# Patient Record
Sex: FEMALE | Race: WHITE | NOT HISPANIC OR LATINO | URBAN - METROPOLITAN AREA
[De-identification: names, ages, dates, MRNs, and addresses within clinical notes are randomized per-mention and may not be internally consistent; named-entity substitution may affect disease eponyms.]

---

## 2017-07-06 ENCOUNTER — OFFICE (AMBULATORY)
Dept: URBAN - METROPOLITAN AREA CLINIC 75 | Facility: CLINIC | Age: 77
End: 2017-07-06

## 2017-07-06 VITALS
SYSTOLIC BLOOD PRESSURE: 112 MMHG | DIASTOLIC BLOOD PRESSURE: 74 MMHG | HEIGHT: 68 IN | HEART RATE: 80 BPM | WEIGHT: 206 LBS

## 2017-07-06 DIAGNOSIS — Z86.010 PERSONAL HISTORY OF COLONIC POLYPS: ICD-10-CM

## 2017-07-06 DIAGNOSIS — K59.00 CONSTIPATION, UNSPECIFIED: ICD-10-CM

## 2017-07-06 DIAGNOSIS — R19.5 OTHER FECAL ABNORMALITIES: ICD-10-CM

## 2017-07-06 PROCEDURE — 99204 OFFICE O/P NEW MOD 45 MIN: CPT | Performed by: INTERNAL MEDICINE

## 2017-08-10 VITALS
TEMPERATURE: 97.1 F | DIASTOLIC BLOOD PRESSURE: 55 MMHG | DIASTOLIC BLOOD PRESSURE: 73 MMHG | HEART RATE: 85 BPM | SYSTOLIC BLOOD PRESSURE: 104 MMHG | SYSTOLIC BLOOD PRESSURE: 114 MMHG | DIASTOLIC BLOOD PRESSURE: 100 MMHG | DIASTOLIC BLOOD PRESSURE: 72 MMHG | SYSTOLIC BLOOD PRESSURE: 148 MMHG | RESPIRATION RATE: 15 BRPM | OXYGEN SATURATION: 98 % | RESPIRATION RATE: 16 BRPM | RESPIRATION RATE: 17 BRPM | SYSTOLIC BLOOD PRESSURE: 101 MMHG | WEIGHT: 206 LBS | DIASTOLIC BLOOD PRESSURE: 78 MMHG | HEART RATE: 88 BPM | HEART RATE: 83 BPM | DIASTOLIC BLOOD PRESSURE: 53 MMHG | HEART RATE: 80 BPM | SYSTOLIC BLOOD PRESSURE: 93 MMHG | SYSTOLIC BLOOD PRESSURE: 99 MMHG | HEIGHT: 68 IN | HEART RATE: 90 BPM | SYSTOLIC BLOOD PRESSURE: 122 MMHG | HEART RATE: 96 BPM | DIASTOLIC BLOOD PRESSURE: 54 MMHG | OXYGEN SATURATION: 100 % | SYSTOLIC BLOOD PRESSURE: 108 MMHG | TEMPERATURE: 97.3 F | DIASTOLIC BLOOD PRESSURE: 56 MMHG | OXYGEN SATURATION: 99 % | HEART RATE: 95 BPM | DIASTOLIC BLOOD PRESSURE: 46 MMHG | RESPIRATION RATE: 18 BRPM

## 2017-08-11 ENCOUNTER — AMBULATORY SURGICAL CENTER (AMBULATORY)
Dept: URBAN - METROPOLITAN AREA SURGERY 17 | Facility: SURGERY | Age: 77
End: 2017-08-11

## 2017-08-11 ENCOUNTER — OFFICE (AMBULATORY)
Dept: URBAN - METROPOLITAN AREA CLINIC 64 | Facility: CLINIC | Age: 77
End: 2017-08-11

## 2017-08-11 DIAGNOSIS — R19.5 OTHER FECAL ABNORMALITIES: ICD-10-CM

## 2017-08-11 DIAGNOSIS — Z86.010 PERSONAL HISTORY OF COLONIC POLYPS: ICD-10-CM

## 2017-08-11 DIAGNOSIS — K57.30 DIVERTICULOSIS OF LARGE INTESTINE WITHOUT PERFORATION OR ABS: ICD-10-CM

## 2017-08-11 DIAGNOSIS — K31.7 POLYP OF STOMACH AND DUODENUM: ICD-10-CM

## 2017-08-11 DIAGNOSIS — D12.5 BENIGN NEOPLASM OF SIGMOID COLON: ICD-10-CM

## 2017-08-11 DIAGNOSIS — K59.00 CONSTIPATION, UNSPECIFIED: ICD-10-CM

## 2017-08-11 DIAGNOSIS — K21.0 GASTRO-ESOPHAGEAL REFLUX DISEASE WITH ESOPHAGITIS: ICD-10-CM

## 2017-08-11 LAB
GI HISTOLOGY: A. UNSPECIFIED: (no result)
GI HISTOLOGY: B. UNSPECIFIED: (no result)
GI HISTOLOGY: C. SELECT: (no result)
GI HISTOLOGY: PDF REPORT: (no result)

## 2017-08-11 PROCEDURE — 88305 TISSUE EXAM BY PATHOLOGIST: CPT | Performed by: INTERNAL MEDICINE

## 2017-08-11 PROCEDURE — 45380 COLONOSCOPY AND BIOPSY: CPT | Performed by: INTERNAL MEDICINE

## 2017-08-11 PROCEDURE — 43239 EGD BIOPSY SINGLE/MULTIPLE: CPT | Performed by: INTERNAL MEDICINE

## 2017-08-11 RX ADMIN — PROPOFOL 50 MG: 10 INJECTION, EMULSION INTRAVENOUS at 12:43

## 2017-08-11 RX ADMIN — PROPOFOL 50 MG: 10 INJECTION, EMULSION INTRAVENOUS at 12:50

## 2017-08-11 RX ADMIN — PROPOFOL 100 MG: 10 INJECTION, EMULSION INTRAVENOUS at 12:43

## 2017-08-11 RX ADMIN — PROPOFOL 50 MG: 10 INJECTION, EMULSION INTRAVENOUS at 12:53

## 2017-08-11 RX ADMIN — PROPOFOL 50 MG: 10 INJECTION, EMULSION INTRAVENOUS at 12:58

## 2017-08-11 RX ADMIN — PROPOFOL 50 MG: 10 INJECTION, EMULSION INTRAVENOUS at 12:48

## 2019-04-22 ENCOUNTER — HOSPITAL ENCOUNTER (OUTPATIENT)
Dept: CARDIOLOGY | Facility: HOSPITAL | Age: 79
Discharge: HOME | End: 2019-04-22
Attending: INTERNAL MEDICINE | Admitting: INTERNAL MEDICINE

## 2020-03-18 ENCOUNTER — TRANSCRIBE ORDERS (OUTPATIENT)
Dept: ADMINISTRATIVE | Facility: HOSPITAL | Age: 80
End: 2020-03-18

## 2020-03-18 DIAGNOSIS — S02.30XB: Primary | ICD-10-CM

## 2020-03-18 DIAGNOSIS — S02.30XA CLOSED FRACTURE OF ORBITAL FLOOR, UNSPECIFIED LATERALITY, INITIAL ENCOUNTER (HCC): ICD-10-CM

## 2020-03-20 ENCOUNTER — HOSPITAL ENCOUNTER (OUTPATIENT)
Dept: CT IMAGING | Facility: HOSPITAL | Age: 80
Discharge: HOME | End: 2020-03-20
Admitting: OPHTHALMOLOGY

## 2020-03-20 DIAGNOSIS — S02.30XB: ICD-10-CM

## 2020-03-20 PROCEDURE — 70486 CT MAXILLOFACIAL W/O DYE: CPT

## 2020-05-01 ENCOUNTER — HOSPITAL ENCOUNTER (OUTPATIENT)
Facility: HOSPITAL | Age: 80
Setting detail: OBSERVATION
Discharge: HOME | End: 2020-05-02
Attending: EMERGENCY MEDICINE | Admitting: INTERNAL MEDICINE

## 2020-05-01 ENCOUNTER — APPOINTMENT (OUTPATIENT)
Dept: GENERAL RADIOLOGY | Facility: HOSPITAL | Age: 80
End: 2020-05-01

## 2020-05-01 DIAGNOSIS — I48.21 PERMANENT ATRIAL FIBRILLATION WITH RAPID VENTRICULAR RESPONSE (HCC): Primary | ICD-10-CM

## 2020-05-01 DIAGNOSIS — R04.0 ACUTE ANTERIOR EPISTAXIS: ICD-10-CM

## 2020-05-01 PROBLEM — Z79.01 CHRONIC ANTICOAGULATION: Status: ACTIVE | Noted: 2020-05-01

## 2020-05-01 LAB
ANION GAP SERPL CALCULATED.3IONS-SCNC: 14 MMOL/L (ref 5–15)
APTT PPP: 28.1 SECONDS (ref 24–31)
BASOPHILS # BLD AUTO: 0 10*3/MM3 (ref 0–0.2)
BASOPHILS NFR BLD AUTO: 0.7 % (ref 0–1.5)
BUN BLD-MCNC: 19 MG/DL (ref 8–23)
BUN/CREAT SERPL: 20.7 (ref 7–25)
CALCIUM SPEC-SCNC: 9.7 MG/DL (ref 8.6–10.5)
CHLORIDE SERPL-SCNC: 104 MMOL/L (ref 98–107)
CO2 SERPL-SCNC: 28 MMOL/L (ref 22–29)
CREAT BLD-MCNC: 0.92 MG/DL (ref 0.57–1)
DEPRECATED RDW RBC AUTO: 49.9 FL (ref 37–54)
EOSINOPHIL # BLD AUTO: 0.2 10*3/MM3 (ref 0–0.4)
EOSINOPHIL NFR BLD AUTO: 3.2 % (ref 0.3–6.2)
ERYTHROCYTE [DISTWIDTH] IN BLOOD BY AUTOMATED COUNT: 14.7 % (ref 12.3–15.4)
GFR SERPL CREATININE-BSD FRML MDRD: 59 ML/MIN/1.73
GLUCOSE BLD-MCNC: 106 MG/DL (ref 65–99)
HCT VFR BLD AUTO: 35.1 % (ref 34–46.6)
HGB BLD-MCNC: 11.9 G/DL (ref 12–15.9)
HOLD SPECIMEN: NORMAL
INR PPP: 1.22 (ref 0.9–1.1)
LYMPHOCYTES # BLD AUTO: 1.7 10*3/MM3 (ref 0.7–3.1)
LYMPHOCYTES NFR BLD AUTO: 27.6 % (ref 19.6–45.3)
MCH RBC QN AUTO: 32.9 PG (ref 26.6–33)
MCHC RBC AUTO-ENTMCNC: 34 G/DL (ref 31.5–35.7)
MCV RBC AUTO: 96.7 FL (ref 79–97)
MONOCYTES # BLD AUTO: 0.8 10*3/MM3 (ref 0.1–0.9)
MONOCYTES NFR BLD AUTO: 13.1 % (ref 5–12)
NEUTROPHILS # BLD AUTO: 3.4 10*3/MM3 (ref 1.7–7)
NEUTROPHILS NFR BLD AUTO: 55.4 % (ref 42.7–76)
NRBC BLD AUTO-RTO: 0.1 /100 WBC (ref 0–0.2)
PLATELET # BLD AUTO: 245 10*3/MM3 (ref 140–450)
PMV BLD AUTO: 8.7 FL (ref 6–12)
POTASSIUM BLD-SCNC: 3.9 MMOL/L (ref 3.5–5.2)
PROTHROMBIN TIME: 12.3 SECONDS (ref 9.6–11.7)
RBC # BLD AUTO: 3.63 10*6/MM3 (ref 3.77–5.28)
SODIUM BLD-SCNC: 146 MMOL/L (ref 136–145)
TROPONIN T SERPL-MCNC: <0.01 NG/ML (ref 0–0.03)
WBC NRBC COR # BLD: 6.1 10*3/MM3 (ref 3.4–10.8)

## 2020-05-01 PROCEDURE — 84484 ASSAY OF TROPONIN QUANT: CPT | Performed by: PHYSICIAN ASSISTANT

## 2020-05-01 PROCEDURE — 85610 PROTHROMBIN TIME: CPT | Performed by: EMERGENCY MEDICINE

## 2020-05-01 PROCEDURE — 25010000002 ONDANSETRON PER 1 MG: Performed by: EMERGENCY MEDICINE

## 2020-05-01 PROCEDURE — 25010000002 MORPHINE PER 10 MG: Performed by: EMERGENCY MEDICINE

## 2020-05-01 PROCEDURE — 96366 THER/PROPH/DIAG IV INF ADDON: CPT

## 2020-05-01 PROCEDURE — 96367 TX/PROPH/DG ADDL SEQ IV INF: CPT

## 2020-05-01 PROCEDURE — 80048 BASIC METABOLIC PNL TOTAL CA: CPT | Performed by: PHYSICIAN ASSISTANT

## 2020-05-01 PROCEDURE — 71045 X-RAY EXAM CHEST 1 VIEW: CPT

## 2020-05-01 PROCEDURE — 99219 PR INITIAL OBSERVATION CARE/DAY 50 MINUTES: CPT | Performed by: PHYSICIAN ASSISTANT

## 2020-05-01 PROCEDURE — G0378 HOSPITAL OBSERVATION PER HR: HCPCS

## 2020-05-01 PROCEDURE — 80048 BASIC METABOLIC PNL TOTAL CA: CPT | Performed by: EMERGENCY MEDICINE

## 2020-05-01 PROCEDURE — 93005 ELECTROCARDIOGRAM TRACING: CPT | Performed by: EMERGENCY MEDICINE

## 2020-05-01 PROCEDURE — 99284 EMERGENCY DEPT VISIT MOD MDM: CPT

## 2020-05-01 PROCEDURE — 96376 TX/PRO/DX INJ SAME DRUG ADON: CPT

## 2020-05-01 PROCEDURE — 96365 THER/PROPH/DIAG IV INF INIT: CPT

## 2020-05-01 PROCEDURE — 96375 TX/PRO/DX INJ NEW DRUG ADDON: CPT

## 2020-05-01 PROCEDURE — 85730 THROMBOPLASTIN TIME PARTIAL: CPT | Performed by: EMERGENCY MEDICINE

## 2020-05-01 PROCEDURE — 85025 COMPLETE CBC W/AUTO DIFF WBC: CPT | Performed by: EMERGENCY MEDICINE

## 2020-05-01 RX ORDER — LEVOTHYROXINE SODIUM 88 UG/1
88 TABLET ORAL
Status: DISCONTINUED | OUTPATIENT
Start: 2020-05-02 | End: 2020-05-02 | Stop reason: HOSPADM

## 2020-05-01 RX ORDER — ACETAMINOPHEN 650 MG/1
650 SUPPOSITORY RECTAL EVERY 4 HOURS PRN
Status: DISCONTINUED | OUTPATIENT
Start: 2020-05-01 | End: 2020-05-02 | Stop reason: HOSPADM

## 2020-05-01 RX ORDER — CETIRIZINE HYDROCHLORIDE 10 MG/1
10 TABLET ORAL DAILY
Status: DISCONTINUED | OUTPATIENT
Start: 2020-05-02 | End: 2020-05-02 | Stop reason: HOSPADM

## 2020-05-01 RX ORDER — PANTOPRAZOLE SODIUM 40 MG/1
40 TABLET, DELAYED RELEASE ORAL EVERY MORNING
Status: DISCONTINUED | OUTPATIENT
Start: 2020-05-02 | End: 2020-05-02 | Stop reason: HOSPADM

## 2020-05-01 RX ORDER — ALUMINA, MAGNESIA, AND SIMETHICONE 2400; 2400; 240 MG/30ML; MG/30ML; MG/30ML
15 SUSPENSION ORAL EVERY 6 HOURS PRN
Status: DISCONTINUED | OUTPATIENT
Start: 2020-05-01 | End: 2020-05-02 | Stop reason: HOSPADM

## 2020-05-01 RX ORDER — ONDANSETRON 4 MG/1
4 TABLET, FILM COATED ORAL EVERY 6 HOURS PRN
Status: DISCONTINUED | OUTPATIENT
Start: 2020-05-01 | End: 2020-05-02 | Stop reason: HOSPADM

## 2020-05-01 RX ORDER — ONDANSETRON 2 MG/ML
4 INJECTION INTRAMUSCULAR; INTRAVENOUS ONCE
Status: COMPLETED | OUTPATIENT
Start: 2020-05-01 | End: 2020-05-01

## 2020-05-01 RX ORDER — MULTIVITAMIN,THERAPEUTIC
1 TABLET ORAL DAILY
Status: DISCONTINUED | OUTPATIENT
Start: 2020-05-02 | End: 2020-05-02 | Stop reason: HOSPADM

## 2020-05-01 RX ORDER — COCAINE HYDROCHLORIDE 40 MG/ML
SOLUTION NASAL
Status: DISPENSED
Start: 2020-05-01 | End: 2020-05-02

## 2020-05-01 RX ORDER — ONDANSETRON 2 MG/ML
4 INJECTION INTRAMUSCULAR; INTRAVENOUS EVERY 6 HOURS PRN
Status: DISCONTINUED | OUTPATIENT
Start: 2020-05-01 | End: 2020-05-02 | Stop reason: HOSPADM

## 2020-05-01 RX ORDER — FUROSEMIDE 40 MG/1
40 TABLET ORAL 2 TIMES DAILY
COMMUNITY
End: 2022-01-01

## 2020-05-01 RX ORDER — CALCIUM CARBONATE 200(500)MG
2 TABLET,CHEWABLE ORAL 2 TIMES DAILY PRN
Status: DISCONTINUED | OUTPATIENT
Start: 2020-05-01 | End: 2020-05-02 | Stop reason: HOSPADM

## 2020-05-01 RX ORDER — ASPIRIN 81 MG/1
81 TABLET, CHEWABLE ORAL DAILY
Status: ON HOLD | COMMUNITY
End: 2020-05-02 | Stop reason: SDUPTHER

## 2020-05-01 RX ORDER — SODIUM CHLORIDE 0.9 % (FLUSH) 0.9 %
10 SYRINGE (ML) INJECTION AS NEEDED
Status: DISCONTINUED | OUTPATIENT
Start: 2020-05-01 | End: 2020-05-02 | Stop reason: HOSPADM

## 2020-05-01 RX ORDER — OMEPRAZOLE 20 MG/1
20 CAPSULE, DELAYED RELEASE ORAL DAILY
COMMUNITY
End: 2022-01-01

## 2020-05-01 RX ORDER — MORPHINE SULFATE 4 MG/ML
4 INJECTION, SOLUTION INTRAMUSCULAR; INTRAVENOUS ONCE
Status: COMPLETED | OUTPATIENT
Start: 2020-05-01 | End: 2020-05-01

## 2020-05-01 RX ORDER — DILTIAZEM HCL IN NACL,ISO-OSM 125 MG/125
5-15 PLASTIC BAG, INJECTION (ML) INTRAVENOUS
Status: DISCONTINUED | OUTPATIENT
Start: 2020-05-01 | End: 2020-05-02 | Stop reason: HOSPADM

## 2020-05-01 RX ORDER — POTASSIUM CHLORIDE 20 MEQ/1
40 TABLET, EXTENDED RELEASE ORAL AS NEEDED
Status: DISCONTINUED | OUTPATIENT
Start: 2020-05-01 | End: 2020-05-02 | Stop reason: HOSPADM

## 2020-05-01 RX ORDER — POTASSIUM CHLORIDE 1.5 G/1.77G
40 POWDER, FOR SOLUTION ORAL AS NEEDED
Status: DISCONTINUED | OUTPATIENT
Start: 2020-05-01 | End: 2020-05-02 | Stop reason: HOSPADM

## 2020-05-01 RX ORDER — DOCUSATE SODIUM 100 MG/1
100 CAPSULE, LIQUID FILLED ORAL 2 TIMES DAILY PRN
Status: DISCONTINUED | OUTPATIENT
Start: 2020-05-01 | End: 2020-05-02 | Stop reason: HOSPADM

## 2020-05-01 RX ORDER — CHOLECALCIFEROL (VITAMIN D3) 125 MCG
5 CAPSULE ORAL NIGHTLY PRN
Status: DISCONTINUED | OUTPATIENT
Start: 2020-05-01 | End: 2020-05-02 | Stop reason: HOSPADM

## 2020-05-01 RX ORDER — LEVOTHYROXINE SODIUM 88 UG/1
88 TABLET ORAL
COMMUNITY
End: 2022-01-01 | Stop reason: SDUPTHER

## 2020-05-01 RX ORDER — ACETAMINOPHEN 160 MG/5ML
650 SOLUTION ORAL EVERY 4 HOURS PRN
Status: DISCONTINUED | OUTPATIENT
Start: 2020-05-01 | End: 2020-05-02 | Stop reason: HOSPADM

## 2020-05-01 RX ORDER — ESTRADIOL 0.1 MG/G
2 CREAM VAGINAL 2 TIMES WEEKLY
COMMUNITY
End: 2022-01-01

## 2020-05-01 RX ORDER — BISACODYL 10 MG
10 SUPPOSITORY, RECTAL RECTAL DAILY PRN
Status: DISCONTINUED | OUTPATIENT
Start: 2020-05-01 | End: 2020-05-02 | Stop reason: HOSPADM

## 2020-05-01 RX ORDER — POTASSIUM CHLORIDE 20 MEQ/1
20 TABLET, EXTENDED RELEASE ORAL 2 TIMES DAILY
COMMUNITY
End: 2022-01-01 | Stop reason: SDUPTHER

## 2020-05-01 RX ORDER — ACETAMINOPHEN 325 MG/1
650 TABLET ORAL EVERY 4 HOURS PRN
Status: DISCONTINUED | OUTPATIENT
Start: 2020-05-01 | End: 2020-05-02 | Stop reason: HOSPADM

## 2020-05-01 RX ORDER — OXYBUTYNIN CHLORIDE 10 MG/1
10 TABLET, EXTENDED RELEASE ORAL DAILY
COMMUNITY
End: 2022-01-01

## 2020-05-01 RX ORDER — MAGNESIUM SULFATE HEPTAHYDRATE 40 MG/ML
2 INJECTION, SOLUTION INTRAVENOUS AS NEEDED
Status: DISCONTINUED | OUTPATIENT
Start: 2020-05-01 | End: 2020-05-02 | Stop reason: HOSPADM

## 2020-05-01 RX ORDER — MULTIPLE VITAMINS W/ MINERALS TAB 9MG-400MCG
1 TAB ORAL DAILY
COMMUNITY
End: 2022-01-01

## 2020-05-01 RX ORDER — OXYBUTYNIN CHLORIDE 10 MG/1
10 TABLET, EXTENDED RELEASE ORAL DAILY
Status: DISCONTINUED | OUTPATIENT
Start: 2020-05-02 | End: 2020-05-02 | Stop reason: HOSPADM

## 2020-05-01 RX ORDER — FUROSEMIDE 40 MG/1
40 TABLET ORAL
Status: DISCONTINUED | OUTPATIENT
Start: 2020-05-01 | End: 2020-05-02 | Stop reason: HOSPADM

## 2020-05-01 RX ORDER — DILTIAZEM HYDROCHLORIDE 5 MG/ML
5 INJECTION INTRAVENOUS ONCE
Status: COMPLETED | OUTPATIENT
Start: 2020-05-01 | End: 2020-05-01

## 2020-05-01 RX ORDER — LORATADINE 10 MG/1
10 TABLET ORAL EVERY EVENING
COMMUNITY

## 2020-05-01 RX ORDER — SODIUM CHLORIDE 0.9 % (FLUSH) 0.9 %
10 SYRINGE (ML) INJECTION EVERY 12 HOURS SCHEDULED
Status: DISCONTINUED | OUTPATIENT
Start: 2020-05-01 | End: 2020-05-02 | Stop reason: HOSPADM

## 2020-05-01 RX ORDER — MAGNESIUM SULFATE HEPTAHYDRATE 40 MG/ML
4 INJECTION, SOLUTION INTRAVENOUS AS NEEDED
Status: DISCONTINUED | OUTPATIENT
Start: 2020-05-01 | End: 2020-05-02 | Stop reason: HOSPADM

## 2020-05-01 RX ADMIN — DILTIAZEM HYDROCHLORIDE 5 MG: 5 INJECTION INTRAVENOUS at 20:09

## 2020-05-01 RX ADMIN — ONDANSETRON 4 MG: 2 INJECTION INTRAMUSCULAR; INTRAVENOUS at 19:24

## 2020-05-01 RX ADMIN — METOPROLOL TARTRATE 25 MG: 25 TABLET, FILM COATED ORAL at 23:00

## 2020-05-01 RX ADMIN — DILTIAZEM HYDROCHLORIDE 5 MG/HR: 5 INJECTION INTRAVENOUS at 20:55

## 2020-05-01 RX ADMIN — MORPHINE SULFATE 4 MG: 4 INJECTION INTRAVENOUS at 19:24

## 2020-05-01 RX ADMIN — Medication 10 ML: at 23:00

## 2020-05-01 RX ADMIN — SODIUM CHLORIDE 839 MG: 9 INJECTION, SOLUTION INTRAVENOUS at 20:17

## 2020-05-01 RX ADMIN — ACETAMINOPHEN 650 MG: 325 TABLET ORAL at 23:01

## 2020-05-01 NOTE — ED PROVIDER NOTES
Subjective   History of Present Illness  80-year-old female with an onset today of bleeding from the right side of her nose which is been recurrent.  Bleeding became heavier this evening and was running down the back of her throat so she came into the emergency department.  The patient has a history of orbital floor surgery with fracture of the nose after trauma about 1 month ago.  The patient also has a history of atrial fibrillation and is on Xarelto.  Last dose was less than 24 hours ago.  Review of Systems    No past medical history on file.    Allergies   Allergen Reactions   • Haloperidol Unknown - Low Severity       No past surgical history on file.    No family history on file.    Social History     Socioeconomic History   • Marital status:      Spouse name: Not on file   • Number of children: Not on file   • Years of education: Not on file   • Highest education level: Not on file           Objective   Physical Exam  Patient is awake and alert she was afebrile her heart rate was 113 and her sats are 100%.  HEENT exam pupils equal round reactive to light EOMs are full she has bleeding from the right side of the nose and I am unable to identify a definite site.  The patient has some blood noted in the posterior pharynx.  Her neck is supple and nontender.  The chest was clear and atraumatic.  Cardiovascular exam reveals a tachycardia.  Abdomen was soft she has no cyanosis clubbing or edema she has no generalized ecchymosis or petechiae.  Procedures           ED Course            Results for orders placed or performed during the hospital encounter of 05/01/20   Basic Metabolic Panel   Result Value Ref Range    Glucose 106 (H) 65 - 99 mg/dL    BUN 19 8 - 23 mg/dL    Creatinine 0.92 0.57 - 1.00 mg/dL    Sodium 146 (H) 136 - 145 mmol/L    Potassium 3.9 3.5 - 5.2 mmol/L    Chloride 104 98 - 107 mmol/L    CO2 28.0 22.0 - 29.0 mmol/L    Calcium 9.7 8.6 - 10.5 mg/dL    eGFR Non  Amer 59 (L) >60  mL/min/1.73    BUN/Creatinine Ratio 20.7 7.0 - 25.0    Anion Gap 14.0 5.0 - 15.0 mmol/L   Protime-INR   Result Value Ref Range    Protime 12.3 (H) 9.6 - 11.7 Seconds    INR 1.22 (H) 0.90 - 1.10   aPTT   Result Value Ref Range    PTT 28.1 24.0 - 31.0 seconds   CBC Auto Differential   Result Value Ref Range    WBC 6.10 3.40 - 10.80 10*3/mm3    RBC 3.63 (L) 3.77 - 5.28 10*6/mm3    Hemoglobin 11.9 (L) 12.0 - 15.9 g/dL    Hematocrit 35.1 34.0 - 46.6 %    MCV 96.7 79.0 - 97.0 fL    MCH 32.9 26.6 - 33.0 pg    MCHC 34.0 31.5 - 35.7 g/dL    RDW 14.7 12.3 - 15.4 %    RDW-SD 49.9 37.0 - 54.0 fl    MPV 8.7 6.0 - 12.0 fL    Platelets 245 140 - 450 10*3/mm3    Neutrophil % 55.4 42.7 - 76.0 %    Lymphocyte % 27.6 19.6 - 45.3 %    Monocyte % 13.1 (H) 5.0 - 12.0 %    Eosinophil % 3.2 0.3 - 6.2 %    Basophil % 0.7 0.0 - 1.5 %    Neutrophils, Absolute 3.40 1.70 - 7.00 10*3/mm3    Lymphocytes, Absolute 1.70 0.70 - 3.10 10*3/mm3    Monocytes, Absolute 0.80 0.10 - 0.90 10*3/mm3    Eosinophils, Absolute 0.20 0.00 - 0.40 10*3/mm3    Basophils, Absolute 0.00 0.00 - 0.20 10*3/mm3    nRBC 0.1 0.0 - 0.2 /100 WBC     Medications   Cocaine HCl 40 MG/ML nasal solution  - ADS Override Pull (has no administration in time range)   sodium chloride 0.9 % flush 10 mL (has no administration in time range)   Tranexamic Acid 839 mg in sodium chloride 0.9 % 100 mL (839 mg Intravenous New Bag 5/1/20 2017)   dilTIAZem (CARDIZEM) 125 mg in 125 mL NS (1 mg/mL) infusion (has no administration in time range)   Morphine sulfate (PF) injection 4 mg (4 mg Intravenous Given 5/1/20 1924)   ondansetron (ZOFRAN) injection 4 mg (4 mg Intravenous Given 5/1/20 1924)   dilTIAZem (CARDIZEM) injection 5 mg (5 mg Intravenous Given 5/1/20 2009)     No radiology results for the last day                                    MDM  The patient had evidence of bleeding from the right side of the nose.  The patient initially had a cottonball soaked with cocaine placed into the right  nares.  The patient had persistent heavy bleeding after removal of this and I could not identify a specific site on the septum of bleeding.  The patient had a rapid Rhino placed into the right nares with improvement of the bleeding.  The patient is on Xarelto due to her atrial fibrillation and her heart rate was actually 117.  The patient was treated with tranexamic acid to improve her coagulation.  This was successful with control of the bleeding.  The patient continued to have a tachycardia at 110 with her atrial fibrillation in which she was given Cardizem intravenously.  Heart rate was controlled.  The patient will be admitted this evening for observation of her epistaxis as well as continued treatment of her atrial fibrillation.  Her lab work was unremarkable.  Final diagnoses:   Permanent atrial fibrillation with rapid ventricular response   Acute anterior epistaxis            Etienne Holden MD  05/01/20 2027

## 2020-05-02 ENCOUNTER — APPOINTMENT (OUTPATIENT)
Dept: CARDIOLOGY | Facility: HOSPITAL | Age: 80
End: 2020-05-02

## 2020-05-02 VITALS
DIASTOLIC BLOOD PRESSURE: 71 MMHG | HEART RATE: 78 BPM | TEMPERATURE: 97.7 F | HEIGHT: 68 IN | OXYGEN SATURATION: 98 % | BODY MASS INDEX: 28.79 KG/M2 | SYSTOLIC BLOOD PRESSURE: 106 MMHG | WEIGHT: 190 LBS | RESPIRATION RATE: 16 BRPM

## 2020-05-02 PROBLEM — S02.85XA ORBIT FRACTURE, RIGHT (HCC): Status: ACTIVE | Noted: 2020-05-02

## 2020-05-02 LAB
ANION GAP SERPL CALCULATED.3IONS-SCNC: 15 MMOL/L (ref 5–15)
BASOPHILS # BLD AUTO: 0.1 10*3/MM3 (ref 0–0.2)
BASOPHILS NFR BLD AUTO: 1.3 % (ref 0–1.5)
BH CV ECHO MEAS - % IVS THICK: 83.9 %
BH CV ECHO MEAS - % LVPW THICK: 43.2 %
BH CV ECHO MEAS - ACS: 1.8 CM
BH CV ECHO MEAS - AO MAX PG (FULL): 2.2 MMHG
BH CV ECHO MEAS - AO MAX PG: 5.2 MMHG
BH CV ECHO MEAS - AO MEAN PG (FULL): 1.4 MMHG
BH CV ECHO MEAS - AO MEAN PG: 2.9 MMHG
BH CV ECHO MEAS - AO ROOT AREA (BSA CORRECTED): 1.7
BH CV ECHO MEAS - AO ROOT AREA: 9.2 CM^2
BH CV ECHO MEAS - AO ROOT DIAM: 3.4 CM
BH CV ECHO MEAS - AO V2 MAX: 114.3 CM/SEC
BH CV ECHO MEAS - AO V2 MEAN: 81.5 CM/SEC
BH CV ECHO MEAS - AO V2 VTI: 23 CM
BH CV ECHO MEAS - AORTIC HR: 212.3 BPM
BH CV ECHO MEAS - AORTIC R-R: 0.28 SEC
BH CV ECHO MEAS - AVA(I,A): 2.5 CM^2
BH CV ECHO MEAS - AVA(I,D): 2.5 CM^2
BH CV ECHO MEAS - AVA(V,A): 2.5 CM^2
BH CV ECHO MEAS - AVA(V,D): 2.5 CM^2
BH CV ECHO MEAS - BSA(HAYCOCK): 2.1 M^2
BH CV ECHO MEAS - BSA: 2 M^2
BH CV ECHO MEAS - BZI_BMI: 28.9 KILOGRAMS/M^2
BH CV ECHO MEAS - BZI_METRIC_HEIGHT: 172.7 CM
BH CV ECHO MEAS - BZI_METRIC_WEIGHT: 86.2 KG
BH CV ECHO MEAS - CI(AO): 22.4 L/MIN/M^2
BH CV ECHO MEAS - CI(LVOT): 6.1 L/MIN/M^2
BH CV ECHO MEAS - CO(AO): 44.9 L/MIN
BH CV ECHO MEAS - CO(LVOT): 12.2 L/MIN
BH CV ECHO MEAS - EDV(CUBED): 85 ML
BH CV ECHO MEAS - EDV(MOD-SP4): 49.7 ML
BH CV ECHO MEAS - EDV(TEICH): 87.6 ML
BH CV ECHO MEAS - EF(CUBED): 71.8 %
BH CV ECHO MEAS - EF(MOD-BP): 54 %
BH CV ECHO MEAS - EF(MOD-SP4): 54 %
BH CV ECHO MEAS - EF(TEICH): 63.7 %
BH CV ECHO MEAS - ESV(CUBED): 24 ML
BH CV ECHO MEAS - ESV(MOD-SP4): 22.9 ML
BH CV ECHO MEAS - ESV(TEICH): 31.8 ML
BH CV ECHO MEAS - FS: 34.4 %
BH CV ECHO MEAS - IVS/LVPW: 0.91
BH CV ECHO MEAS - IVSD: 0.8 CM
BH CV ECHO MEAS - IVSS: 1.5 CM
BH CV ECHO MEAS - LA DIMENSION(2D): 4.4 CM
BH CV ECHO MEAS - LV DIASTOLIC VOL/BSA (35-75): 24.9 ML/M^2
BH CV ECHO MEAS - LV MASS(C)D: 117.2 GRAMS
BH CV ECHO MEAS - LV MASS(C)DI: 58.6 GRAMS/M^2
BH CV ECHO MEAS - LV MASS(C)S: 128.8 GRAMS
BH CV ECHO MEAS - LV MASS(C)SI: 64.4 GRAMS/M^2
BH CV ECHO MEAS - LV MAX PG: 3 MMHG
BH CV ECHO MEAS - LV MEAN PG: 1.4 MMHG
BH CV ECHO MEAS - LV SYSTOLIC VOL/BSA (12-30): 11.4 ML/M^2
BH CV ECHO MEAS - LV V1 MAX: 86.6 CM/SEC
BH CV ECHO MEAS - LV V1 MEAN: 56.6 CM/SEC
BH CV ECHO MEAS - LV V1 VTI: 17.8 CM
BH CV ECHO MEAS - LVIDD: 4.4 CM
BH CV ECHO MEAS - LVIDS: 2.9 CM
BH CV ECHO MEAS - LVOT AREA: 3.2 CM^2
BH CV ECHO MEAS - LVOT DIAM: 2 CM
BH CV ECHO MEAS - LVPWD: 0.88 CM
BH CV ECHO MEAS - LVPWS: 1.3 CM
BH CV ECHO MEAS - MV DEC SLOPE: 617.1 CM/SEC^2
BH CV ECHO MEAS - MV DEC TIME: 0.17 SEC
BH CV ECHO MEAS - MV E MAX VEL: 107.7 CM/SEC
BH CV ECHO MEAS - MV MAX PG: 7.5 MMHG
BH CV ECHO MEAS - MV MEAN PG: 1.9 MMHG
BH CV ECHO MEAS - MV V2 MAX: 136.9 CM/SEC
BH CV ECHO MEAS - MV V2 MEAN: 60 CM/SEC
BH CV ECHO MEAS - MV V2 VTI: 23.6 CM
BH CV ECHO MEAS - MVA(VTI): 2.4 CM^2
BH CV ECHO MEAS - PA ACC TIME: 0.07 SEC
BH CV ECHO MEAS - PA MAX PG (FULL): 0.22 MMHG
BH CV ECHO MEAS - PA MAX PG: 1.4 MMHG
BH CV ECHO MEAS - PA MEAN PG (FULL): 0.55 MMHG
BH CV ECHO MEAS - PA MEAN PG: 1.7 MMHG
BH CV ECHO MEAS - PA PR(ACCEL): 48.2 MMHG
BH CV ECHO MEAS - PA V2 MAX: 42.3 CM/SEC
BH CV ECHO MEAS - PA V2 MEAN: 63.5 CM/SEC
BH CV ECHO MEAS - PA V2 VTI: 16.1 CM
BH CV ECHO MEAS - RAP SYSTOLE: 8 MMHG
BH CV ECHO MEAS - RV MAX PG: 1.2 MMHG
BH CV ECHO MEAS - RV MEAN PG: 1.1 MMHG
BH CV ECHO MEAS - RV V1 MAX: 39 CM/SEC
BH CV ECHO MEAS - RV V1 MEAN: 49.7 CM/SEC
BH CV ECHO MEAS - RV V1 VTI: 12.1 CM
BH CV ECHO MEAS - RVDD: 3.3 CM
BH CV ECHO MEAS - RVSP: 49.2 MMHG
BH CV ECHO MEAS - SI(AO): 105.7 ML/M^2
BH CV ECHO MEAS - SI(CUBED): 30.5 ML/M^2
BH CV ECHO MEAS - SI(LVOT): 28.8 ML/M^2
BH CV ECHO MEAS - SI(MOD-SP4): 13.4 ML/M^2
BH CV ECHO MEAS - SI(TEICH): 27.9 ML/M^2
BH CV ECHO MEAS - SV(AO): 211.3 ML
BH CV ECHO MEAS - SV(CUBED): 61 ML
BH CV ECHO MEAS - SV(LVOT): 57.6 ML
BH CV ECHO MEAS - SV(MOD-SP4): 26.9 ML
BH CV ECHO MEAS - SV(TEICH): 55.8 ML
BH CV ECHO MEAS - TR MAX VEL: 320.7 CM/SEC
BUN BLD-MCNC: 19 MG/DL (ref 8–23)
BUN/CREAT SERPL: 21.1 (ref 7–25)
CALCIUM SPEC-SCNC: 9.3 MG/DL (ref 8.6–10.5)
CHLORIDE SERPL-SCNC: 104 MMOL/L (ref 98–107)
CO2 SERPL-SCNC: 26 MMOL/L (ref 22–29)
CREAT BLD-MCNC: 0.9 MG/DL (ref 0.57–1)
DEPRECATED RDW RBC AUTO: 49.9 FL (ref 37–54)
EOSINOPHIL # BLD AUTO: 0.2 10*3/MM3 (ref 0–0.4)
EOSINOPHIL NFR BLD AUTO: 3.8 % (ref 0.3–6.2)
ERYTHROCYTE [DISTWIDTH] IN BLOOD BY AUTOMATED COUNT: 14.5 % (ref 12.3–15.4)
GFR SERPL CREATININE-BSD FRML MDRD: 60 ML/MIN/1.73
GLUCOSE BLD-MCNC: 107 MG/DL (ref 65–99)
HCT VFR BLD AUTO: 33.8 % (ref 34–46.6)
HGB BLD-MCNC: 11.3 G/DL (ref 12–15.9)
LV EF 2D ECHO EST: 50 %
LYMPHOCYTES # BLD AUTO: 1.8 10*3/MM3 (ref 0.7–3.1)
LYMPHOCYTES NFR BLD AUTO: 32.5 % (ref 19.6–45.3)
MAXIMAL PREDICTED HEART RATE: 141 BPM
MCH RBC QN AUTO: 32.3 PG (ref 26.6–33)
MCHC RBC AUTO-ENTMCNC: 33.3 G/DL (ref 31.5–35.7)
MCV RBC AUTO: 96.9 FL (ref 79–97)
MONOCYTES # BLD AUTO: 0.6 10*3/MM3 (ref 0.1–0.9)
MONOCYTES NFR BLD AUTO: 11.4 % (ref 5–12)
NEUTROPHILS # BLD AUTO: 2.8 10*3/MM3 (ref 1.7–7)
NEUTROPHILS NFR BLD AUTO: 51 % (ref 42.7–76)
NRBC BLD AUTO-RTO: 0.1 /100 WBC (ref 0–0.2)
PLATELET # BLD AUTO: 219 10*3/MM3 (ref 140–450)
PMV BLD AUTO: 8.8 FL (ref 6–12)
POTASSIUM BLD-SCNC: 4.2 MMOL/L (ref 3.5–5.2)
RBC # BLD AUTO: 3.49 10*6/MM3 (ref 3.77–5.28)
SODIUM BLD-SCNC: 145 MMOL/L (ref 136–145)
STRESS TARGET HR: 120 BPM
TROPONIN T SERPL-MCNC: <0.01 NG/ML (ref 0–0.03)
TROPONIN T SERPL-MCNC: <0.01 NG/ML (ref 0–0.03)
TSH SERPL DL<=0.05 MIU/L-ACNC: 2.1 UIU/ML (ref 0.27–4.2)
WBC NRBC COR # BLD: 5.5 10*3/MM3 (ref 3.4–10.8)

## 2020-05-02 PROCEDURE — 93306 TTE W/DOPPLER COMPLETE: CPT | Performed by: INTERNAL MEDICINE

## 2020-05-02 PROCEDURE — G0378 HOSPITAL OBSERVATION PER HR: HCPCS

## 2020-05-02 PROCEDURE — 93306 TTE W/DOPPLER COMPLETE: CPT

## 2020-05-02 PROCEDURE — 84484 ASSAY OF TROPONIN QUANT: CPT | Performed by: PHYSICIAN ASSISTANT

## 2020-05-02 PROCEDURE — 84443 ASSAY THYROID STIM HORMONE: CPT | Performed by: PHYSICIAN ASSISTANT

## 2020-05-02 PROCEDURE — 99217 PR OBSERVATION CARE DISCHARGE MANAGEMENT: CPT | Performed by: INTERNAL MEDICINE

## 2020-05-02 PROCEDURE — 85025 COMPLETE CBC W/AUTO DIFF WBC: CPT | Performed by: PHYSICIAN ASSISTANT

## 2020-05-02 PROCEDURE — 99204 OFFICE O/P NEW MOD 45 MIN: CPT | Performed by: INTERNAL MEDICINE

## 2020-05-02 RX ORDER — CEPHALEXIN 500 MG/1
500 CAPSULE ORAL 3 TIMES DAILY
Qty: 9 CAPSULE | Refills: 0 | Status: SHIPPED | OUTPATIENT
Start: 2020-05-02 | End: 2020-05-05

## 2020-05-02 RX ORDER — ASPIRIN 81 MG/1
81 TABLET, CHEWABLE ORAL DAILY
Start: 2020-05-07 | End: 2022-01-01 | Stop reason: HOSPADM

## 2020-05-02 RX ADMIN — Medication 10 ML: at 08:32

## 2020-05-02 RX ADMIN — ACETAMINOPHEN 650 MG: 325 TABLET ORAL at 03:26

## 2020-05-02 RX ADMIN — FUROSEMIDE 40 MG: 40 TABLET ORAL at 08:32

## 2020-05-02 RX ADMIN — ACETAMINOPHEN 650 MG: 325 TABLET ORAL at 08:32

## 2020-05-02 RX ADMIN — CETIRIZINE HYDROCHLORIDE 10 MG: 10 TABLET, FILM COATED ORAL at 08:32

## 2020-05-02 RX ADMIN — METOPROLOL TARTRATE 25 MG: 25 TABLET, FILM COATED ORAL at 08:32

## 2020-05-02 RX ADMIN — OXYBUTYNIN 10 MG: 10 TABLET, FILM COATED, EXTENDED RELEASE ORAL at 08:32

## 2020-05-02 RX ADMIN — PANTOPRAZOLE SODIUM 40 MG: 40 TABLET, DELAYED RELEASE ORAL at 06:36

## 2020-05-02 RX ADMIN — ACETAMINOPHEN 650 MG: 325 TABLET ORAL at 12:40

## 2020-05-02 RX ADMIN — LEVOTHYROXINE SODIUM 88 MCG: 88 TABLET ORAL at 06:36

## 2020-05-02 RX ADMIN — THERA TABS 1 TABLET: TAB at 08:32

## 2020-05-02 NOTE — DISCHARGE INSTR - APPOINTMENTS
Call [facility] to schedule an appointment for Monday May 4th or Tuesday May 5th to have the Rhinorocket removed from the right nare.   [address] [place] [address] [phone]

## 2020-05-02 NOTE — PLAN OF CARE
Problem: Patient Care Overview  Goal: Plan of Care Review  Outcome: Ongoing (interventions implemented as appropriate)  Note:   Pt still in afib, off cardizem drip. HR in 70's. Has rhinorocket in right nare. Originally had an appointment to see her ENT physician this Wednesday. No distress. Will continue to monitor  Goal: Individualization and Mutuality  Outcome: Ongoing (interventions implemented as appropriate)  Goal: Discharge Needs Assessment  Outcome: Ongoing (interventions implemented as appropriate)  Goal: Interprofessional Rounds/Family Conf  Outcome: Ongoing (interventions implemented as appropriate)

## 2020-05-02 NOTE — CONSULTS
Cardiology Consult Note      REQUESTING PHYSICIAN    Terrance Tyler MD    PATIENT IDENTIFICATION    Name:@ Age: 80 y.o. Sex:@ : @ MRN:@    REASON FOR CONSULTATION:  80-year-old female with no known history of coronary occlusive disease.  She does have history of permanent atrial fibrillation on Xarelto, essential hypertension, moderate MR, pulmonary hypertension.    Echo 2019:  EF 51%, moderate to severe LAE, mild MR, moderate TR, PASP 26.04 mmHg      CC:  Epistaxis      HISTORY OF PRESENT ILLNESS:   The patient presented to [facility]  with complaint of intermittent right-sided epistaxis over the past couple of weeks.    Patient states she and her  went on vacation to [facility] at the beginning of March and she suffered a fall, falling on her face.    Patient states she was walking across the street when she suddenly lost consciousness and fell.  She denies any warning to the fall, did not try to catch herself, she sustained no injuries to her hands or forearms.  She sustained a fracture to her orbital floor with subsequent surgery on 3/31.  The patient states she has had 4 episodes of epistaxis that she describes as moderate since her surgery. She has typically been able to get them to stop at home, but the bleeding was persistent for approximately 1.5 hours and she decided to come to the ED. EKG in ER showed A. fib RVR at 117, Rhino plug placed for her epistaxis.  ENT has performed video conference with the patient this morning.  They are requesting patient hold Xarelto until seen in their office for removal of Rhino plug on Tuesday of next week.   Additional work-up includes troponins negative x2, INR 1.22, hemoglobin 11.3.  Chest x-ray with no active disease.    Upon my evaluation, patient is lying supine in bed and appears to be in no acute distress.  Patient recalls a story as outlined above.  Her only complaint is some blurred vision that has been persistent since her surgery.  She  "denies any headache, chest pain, pressure or tightness.  She denies any shortness of breath.  Patient stated she has never had palpitations with her atrial fibrillation.  She denies any shortness of breath.  She sees her cardiologist-Dr. Clyde Muller- routinely.  2D echocardiogram has been completed here.    We had a long discussion regarding her syncopal event and that she should see her primary cardiologist for further work-up and evaluation.  We discussed the risk benefits and options with regards to anticoagulation cessation.        REVIEW OF SYSTEMS:  Pertinent items are noted in HPI, all other systems reviewed and negative    OBJECTIVE       ASSESSMENT/PLAN    Right-sided nosebleed    Chronic anticoagulation    GERD (gastroesophageal reflux disease)    HTN (hypertension)    Hypothyroid    Moderate mitral regurgitation    Pulmonary hypertension (CMS/HCC)    Permanent atrial fibrillation with rapid ventricular response    Orbit fracture, right    Recommendations  Patient will be low risk for stroke while off anticoagulation for the next few days.  Cardiac status appropriate for discharge when okay with other services  Follow-up with primary cardiologist in 1 to 2 weeks to discuss syncopal events and further cardiac work-up.  Resume anticoagulation at discretion of ENT.  Echocardiogram reviewed and demonstrated no significant wall motion abnormality and normal EF.  Significant biatrial enlargement was noted.      Vital Signs  Visit Vitals  /74 (BP Location: Left arm, Patient Position: Lying)   Pulse 68   Temp 97.7 °F (36.5 °C) (Oral)   Resp 14   Ht 172.7 cm (68\")   Wt 86.2 kg (190 lb)   SpO2 98%   BMI 28.89 kg/m²     Oxygen Therapy  SpO2: 98 %  Pulse Oximetry Type: Intermittent  Device (Oxygen Therapy): room air  Device (Oxygen Therapy): room air  Flowsheet Rows      First Filed Value   Admission Height  172.7 cm (68\") Documented at 05/01/2020 1818   Admission Weight  83.9 kg (185 lb) Documented at " "05/01/2020 1818        Intake & Output (last 3 days)       04/29 0701 - 04/30 0700 04/30 0701 - 05/01 0700 05/01 0701 - 05/02 0700 05/02 0701 - 05/03 0700    P.O.    240    IV Piggyback   200     Total Intake(mL/kg)   200 (2.3) 240 (2.8)    Urine (mL/kg/hr)    900 (2.8)    Total Output    900    Net   +200 -660            Urine Unmeasured Occurrence   1 x         Lines, Drains & Airways    Active LDAs     Name:   Placement date:   Placement time:   Site:   Days:    Peripheral IV 05/01/20 1905 Right Antecubital   05/01/20 1905    Antecubital   less than 1                MEDICAL HISTORY    Past Medical History:   Diagnosis Date   • Atrial fibrillation (CMS/HCC) 10/10/2013   • Chronic anticoagulation 5/1/2020   • GERD (gastroesophageal reflux disease) 2/11/2013   • HTN (hypertension) 6/12/2014   • Hypothyroid 2/11/2013        SURGICAL HISTORY    History reviewed. No pertinent surgical history.  Recent maxillofacial surgery    FAMILY HISTORY    Family History   Problem Relation Age of Onset   • Heart murmur Father        SOCIAL HISTORY    Social History     Tobacco Use   • Smoking status: Never Smoker   Substance Use Topics   • Alcohol use: Not on file        ALLERGIES    Allergies   Allergen Reactions   • Haloperidol Unknown - Low Severity              /74 (BP Location: Left arm, Patient Position: Lying)   Pulse 68   Temp 97.7 °F (36.5 °C) (Oral)   Resp 14   Ht 172.7 cm (68\")   Wt 86.2 kg (190 lb)   SpO2 98%   BMI 28.89 kg/m²   Intake/Output last 3 shifts:  I/O last 3 completed shifts:  In: 200 [IV Piggyback:200]  Out: -   Intake/Output this shift:  I/O this shift:  In: 240 [P.O.:240]  Out: 900 [Urine:900]    PHYSICAL EXAM:    General: Alert, cooperative, no distress, appears stated age  Head:  Normocephalic, mucous membranes moist, renal plug noted right nare  Eyes:  Conjunctiva/corneas clear, EOM's intact     Neck:  Supple,  no bruit  Lungs: Clear to auscultation bilaterally, no wheezes rhonchi rales " are noted  Chest wall: No tenderness  Heart::  Irregularly irregular rate and rhythm, S1 and S2 normal, 1/6 holosystolic murmur.  Rub or gallop  Abdomen: Soft, non-tender, nondistended bowel sounds active  Extremities: No cyanosis, clubbing, or edema groin soft no hematoma.  Pulses: 2+ and symmetric all extremities  Skin:  No rashes or lesions  Neuro/psych: A&O x3. CN II through XII are grossly intact with appropriate affect      Scheduled Meds:        cetirizine 10 mg Oral Daily   furosemide 40 mg Oral BID   levothyroxine 88 mcg Oral Q AM   metoprolol tartrate 25 mg Oral Q12H   oxybutynin XL 10 mg Oral Daily   pantoprazole 40 mg Oral QAM   sodium chloride 10 mL Intravenous Q12H   Thera 1 tablet Oral Daily       Continuous Infusions:      dilTIAZem 5-15 mg/hr Last Rate: Stopped (05/02/20 0015)       PRN Meds:    •  acetaminophen **OR** acetaminophen **OR** acetaminophen  •  aluminum-magnesium hydroxide-simethicone  •  bisacodyl  •  calcium carbonate  •  docusate sodium  •  magnesium hydroxide  •  magnesium sulfate **OR** magnesium sulfate **OR** magnesium sulfate  •  melatonin  •  ondansetron **OR** ondansetron  •  potassium chloride  •  potassium chloride  •  [COMPLETED] Insert peripheral IV **AND** sodium chloride  •  sodium chloride        Results Review:     I reviewed the patient's new clinical results.    CBC    Results from last 7 days   Lab Units 05/02/20  0423 05/01/20 1919   WBC 10*3/mm3 5.50 6.10   HEMOGLOBIN g/dL 11.3* 11.9*   PLATELETS 10*3/mm3 219 245     Cr Clearance Estimated Creatinine Clearance: 58.3 mL/min (by C-G formula based on SCr of 0.9 mg/dL).  Coag   Results from last 7 days   Lab Units 05/01/20 1919   INR  1.22*   APTT seconds 28.1     HbA1C No results found for: HGBA1C  Blood Glucose No results found for: POCGLU  Infection     CMP   Results from last 7 days   Lab Units 05/01/20  2218 05/01/20 1919   SODIUM mmol/L 145 146*   POTASSIUM mmol/L 4.2 3.9   CHLORIDE mmol/L 104 104   CO2  mmol/L 26.0 28.0   BUN mg/dL 19 19   CREATININE mg/dL 0.90 0.92   GLUCOSE mg/dL 107* 106*     ABG      UA      BASIA  No results found for: POCMETH, POCAMPHET, POCBARBITUR, POCBENZO, POCCOCAINE, POCOPIATES, POCOXYCODO, POCPHENCYC, POCPROPOXY, POCTHC, POCTRICYC  Lysis Labs   Results from last 7 days   Lab Units 05/02/20  0423 05/01/20 2218 05/01/20 1919   INR   --   --  1.22*   APTT seconds  --   --  28.1   HEMOGLOBIN g/dL 11.3*  --  11.9*   PLATELETS 10*3/mm3 219  --  245   CREATININE mg/dL  --  0.90 0.92     Radiology(recent) Xr Chest 1 View    Result Date: 5/1/2020  Mildly limited study demonstrating no active disease.  Electronically Signed By-Jarocho Lizarraga On:5/1/2020 8:46 PM This report was finalized on [id number] by  Jarocho Lizarraga, .        Results from last 7 days   Lab Units 05/02/20  0941   TROPONIN T ng/mL <0.010       Xrays, labs reviewed personally by physician.    ECG/EMG Results (most recent)     Procedure Component Value Units Date/Time    ECG 12 Lead [[id number]] Collected:  05/01/20 1918     Updated:  05/01/20 1920    Narrative:       HEART RATE= 117  bpm  RR Interval= 511  ms  PA Interval=   ms  P Horizontal Axis=   deg  P Front Axis=   deg  QRSD Interval= 93  ms  QT Interval= 311  ms  QRS Axis= -16  deg  T Wave Axis= -65  deg  - ABNORMAL ECG -  Atrial fibrillation  Inferior infarct, age indeterminate  Anterior infarct, old  No previous ECG available for comparison  Electronically Signed By:   Date and Time of Study: 2020-05-01 19:18:31    Adult Transthoracic Echo Complete W/ Cont if Necessary Per Protocol [[id number]] Collected:  05/02/20 0703     Updated:  05/02/20 0753     BSA 2.0 m^2      RVIDd 3.3 cm      IVSd 0.8 cm      IVSs 1.5 cm      LVIDd 4.4 cm      LVIDs 2.9 cm      LVPWd 0.88 cm      BH CV ECHO TIRARA - LVPWS 1.3 cm      IVS/LVPW 0.91     FS 34.4 %      EDV(Teich) 87.6 ml      ESV(Teich) 31.8 ml      EF(Teich) 63.7 %      EDV(cubed) 85.0 ml      ESV(cubed) 24.0 ml      EF(cubed) 71.8 %       % IVS thick 83.9 %      % LVPW thick 43.2 %      LV mass(C)d 117.2 grams      LV mass(C)dI 58.6 grams/m^2      LV mass(C)s 128.8 grams      LV mass(C)sI 64.4 grams/m^2      SV(Teich) 55.8 ml      SI(Teich) 27.9 ml/m^2      SV(cubed) 61.0 ml      SI(cubed) 30.5 ml/m^2      Ao root diam 3.4 cm      Ao root area 9.2 cm^2      ACS 1.8 cm      LVOT diam 2.0 cm      LVOT area 3.2 cm^2      EDV(MOD-sp4) 49.7 ml      ESV(MOD-sp4) 22.9 ml      EF(MOD-sp4) 54.0 %      SV(MOD-sp4) 26.9 ml      SI(MOD-sp4) 13.4 ml/m^2      Ao root area (BSA corrected) 1.7     LV You Vol (BSA corrected) 24.9 ml/m^2      LV Sys Vol (BSA corrected) 11.4 ml/m^2      Aortic R-R 0.28 sec      Aortic .3 BPM      MV E max meet 107.7 cm/sec      MV V2 max 136.9 cm/sec      MV max PG 7.5 mmHg      MV V2 mean 60.0 cm/sec      MV mean PG 1.9 mmHg      MV V2 VTI 23.6 cm      MVA(VTI) 2.4 cm^2      MV dec slope 617.1 cm/sec^2      MV dec time 0.17 sec      Ao pk meet 114.3 cm/sec      Ao max PG 5.2 mmHg      Ao max PG (full) 2.2 mmHg      Ao V2 mean 81.5 cm/sec      Ao mean PG 2.9 mmHg      Ao mean PG (full) 1.4 mmHg      Ao V2 VTI 23.0 cm      APRIL(I,A) 2.5 cm^2      APRIL(I,D) 2.5 cm^2      APRIL(V,A) 2.5 cm^2      APRIL(V,D) 2.5 cm^2      LV V1 max PG 3.0 mmHg      LV V1 mean PG 1.4 mmHg      LV V1 max 86.6 cm/sec      LV V1 mean 56.6 cm/sec      LV V1 VTI 17.8 cm      CO(Ao) 44.9 l/min      CI(Ao) 22.4 l/min/m^2      SV(Ao) 211.3 ml      SI(Ao) 105.7 ml/m^2      CO(LVOT) 12.2 l/min      CI(LVOT) 6.1 l/min/m^2      SV(LVOT) 57.6 ml      SI(LVOT) 28.8 ml/m^2      PA V2 max 42.3 cm/sec      PA max PG 1.4 mmHg      PA max PG (full) 0.22 mmHg      PA V2 mean 63.5 cm/sec      PA mean PG 1.7 mmHg      PA mean PG (full) 0.55 mmHg      PA V2 VTI 16.1 cm      PA acc time 0.07 sec      RV V1 max PG 1.2 mmHg      RV V1 mean PG 1.1 mmHg      RV V1 max 39.0 cm/sec      RV V1 mean 49.7 cm/sec      RV V1 VTI 12.1 cm      TR max meet 320.7 cm/sec      RVSP(TR) 49.2 mmHg       RAP systole 8.0 mmHg      PA pr(Accel) 48.2 mmHg       CV ECHO TIARRA - BZI_BMI 28.9 kilograms/m^2       CV ECHO TIARRA - BSA(HAYCOCK) 2.1 m^2       CV ECHO TIARRA - BZI_METRIC_WEIGHT 86.2 kg       CV ECHO TIARRA - BZI_METRIC_HEIGHT 172.7 cm      Target HR (85%) 120 bpm      Max. Pred. HR (100%) 141 bpm      EF(MOD-bp) 54.0 %      LA dimension(2D) 4.4 cm             Medication Review:   I have reviewed the patient's current medication list  Scheduled Meds:    cetirizine 10 mg Oral Daily   furosemide 40 mg Oral BID   levothyroxine 88 mcg Oral Q AM   metoprolol tartrate 25 mg Oral Q12H   oxybutynin XL 10 mg Oral Daily   pantoprazole 40 mg Oral QAM   sodium chloride 10 mL Intravenous Q12H   Thera 1 tablet Oral Daily     Continuous Infusions:    dilTIAZem 5-15 mg/hr Last Rate: Stopped (05/02/20 0015)     PRN Meds:.•  acetaminophen **OR** acetaminophen **OR** acetaminophen  •  aluminum-magnesium hydroxide-simethicone  •  bisacodyl  •  calcium carbonate  •  docusate sodium  •  magnesium hydroxide  •  magnesium sulfate **OR** magnesium sulfate **OR** magnesium sulfate  •  melatonin  •  ondansetron **OR** ondansetron  •  potassium chloride  •  potassium chloride  •  [COMPLETED] Insert peripheral IV **AND** sodium chloride  •  sodium chloride    Imaging:  Imaging Results (Last 72 Hours)     Procedure Component Value Units Date/Time    XR Chest 1 View [[id number]] Collected:  05/01/20 2046     Updated:  05/01/20 2049    Narrative:       DATE OF EXAM:  5/1/2020 8:20 PM     PROCEDURE:  XR CHEST 1 VW-     INDICATIONS:  Epistaxis, atrial fibrillation; I48.21-Permanent atrial fibrillation;  R04.0-Epistaxis     COMPARISON:  None available.     TECHNIQUE:   Single radiographic AP view of the chest was obtained.     FINDINGS:  The study is limited by lordotic patient positioning and patient  rotation towards the right. Overlying artifacts. The lungs are well  expanded and clear. No pneumothorax. Cardiomediastinal contours  "within  normal limits given technique. No acute osseous abnormality is  identified.        Impression:       Mildly limited study demonstrating no active disease.     Electronically Signed By-Jarocho Lizarraga On:5/1/2020 8:46 PM  This report was finalized on [id number] by  Jarocho Lizarraga, .            LIAN Palencia  05/02/20  10:46       EMR Dragon/Transcription:   \"Dictated utilizing Dragon dictation\".                   Electronically signed by LIAN Palencia, 05/02/20, 8:49 AM.    "

## 2020-05-02 NOTE — CONSULTS
Otolaryngology Consult  Date of Service: 5/2/2020    Reason for Consultation: epistaxis  Requesting physician: Mat  Service: ED    CC: epistaxis    History of Present Illness  Marychuy Verdugo is a 80 y.o. female admitted for epistaxis with history of Afib w/ RVR on Aspirin/Xarelto. In ED had packing of rapid rhino placed in right nare and bleeding has subsequently stopped. Patient also given TXA and blood thinners stopped. Patient has h/o right orbital floor fracture in early March 2020 in [facility], now s/p repair by ophthalmology in late Mar 2020. Bleeding has been off/on since but severe episode yesterday.       * Note this consult was conducted via telehealth communication    [facility]  Past Medical History:   Diagnosis Date   • Atrial fibrillation (CMS/HCC) 10/10/2013   • Chronic anticoagulation 5/1/2020   • GERD (gastroesophageal reflux disease) 2/11/2013   • HTN (hypertension) 6/12/2014   • Hypothyroid 2/11/2013       PSH  History reviewed. No pertinent surgical history.    Meds  Current Facility-Administered Medications   Medication Dose Route Frequency Provider Last Rate Last Dose   • acetaminophen (TYLENOL) tablet 650 mg  650 mg Oral Q4H PRN Rachel Rivero PA-C   650 mg at 05/02/20 0326    Or   • acetaminophen (TYLENOL) 160 MG/5ML solution 650 mg  650 mg Oral Q4H PRN Rachel Rivero PA-C        Or   • acetaminophen (TYLENOL) suppository 650 mg  650 mg Rectal Q4H PRN Rachel Rivero PA-C       • aluminum-magnesium hydroxide-simethicone (MAALOX MAX) 400-400-40 MG/5ML suspension 15 mL  15 mL Oral Q6H PRN Rachel Rivero PA-C       • bisacodyl (DULCOLAX) suppository 10 mg  10 mg Rectal Daily PRN Rachel Rivero PA-C       • calcium carbonate (TUMS) chewable tablet 500 mg (200 mg elemental)  2 tablet Oral BID PRN Rachel Rivero PA-C       • cetirizine (zyrTEC) tablet 10 mg  10 mg Oral Daily Rachel Rivero PA-C       • dilTIAZem (CARDIZEM) 125 mg in 125 mL NS (1 mg/mL) infusion  5-15 mg/hr  Intravenous Titrated Etienne Holden MD   Stopped at 05/02/20 0015   • docusate sodium (COLACE) capsule 100 mg  100 mg Oral BID PRN Rachel Rivero PA-C       • furosemide (LASIX) tablet 40 mg  40 mg Oral BID Rachel Rivero PA-C       • levothyroxine (SYNTHROID, LEVOTHROID) tablet 88 mcg  88 mcg Oral Q AM Rachel Rivero PA-C   88 mcg at 05/02/20 0636   • magnesium hydroxide (MILK OF MAGNESIA) suspension 2400 mg/10mL 10 mL  10 mL Oral Daily PRN Rachel Rivero PA-C       • Magnesium Sulfate 2 gram Bolus, followed by 8 gram infusion (total Mg dose 10 grams)- Mg less than or equal to 1mg/dL  2 g Intravenous PRN Rachel Rivero PA-C        Or   • Magnesium Sulfate 2 gram / 50mL Infusion (GIVE X 3 BAGS TO EQUAL 6GM TOTAL DOSE) - Mg 1.1 - 1.5 mg/dl  2 g Intravenous PRN Rachel Rivero PA-C        Or   • Magnesium Sulfate 4 gram infusion- Mg 1.6-1.9 mg/dL  4 g Intravenous PRN Rachel Rivero PA-C       • melatonin tablet 5 mg  5 mg Oral Nightly PRN Rachel Rivero PA-C       • metoprolol tartrate (LOPRESSOR) tablet 25 mg  25 mg Oral Q12H Rachel Rivero PA-C   25 mg at 05/01/20 2300   • ondansetron (ZOFRAN) tablet 4 mg  4 mg Oral Q6H PRN Rachel Rivero PA-C        Or   • ondansetron (ZOFRAN) injection 4 mg  4 mg Intravenous Q6H PRN Rachel Rivero PA-C       • oxybutynin XL (DITROPAN-XL) 24 hr tablet 10 mg  10 mg Oral Daily Rachel Rivero PA-C       • pantoprazole (PROTONIX) EC tablet 40 mg  40 mg Oral QAM Rachel Rivero PA-C   40 mg at 05/02/20 0636   • potassium chloride (K-DUR,KLOR-CON) CR tablet 40 mEq  40 mEq Oral PRN Rachel Rivero PA-C       • potassium chloride (KLOR-CON) packet 40 mEq  40 mEq Oral PRN Rachel Rivero PA-C       • sodium chloride 0.9 % flush 10 mL  10 mL Intravenous PRN Rachel Rivero PA-C       • sodium chloride 0.9 % flush 10 mL  10 mL Intravenous Q12H Rachel Rivero PA-C   10 mL at 05/01/20 2300   • sodium chloride 0.9 % flush 10 mL  10 mL  "Intravenous PRN Rachel Rivero PA-C       • Thera tablet 1 tablet  1 tablet Oral Daily Rachel Rivero PA-C           Social History  Social History     Socioeconomic History   • Marital status:      Spouse name: Not on file   • Number of children: Not on file   • Years of education: Not on file   • Highest education level: Not on file   Tobacco Use   • Smoking status: Never Smoker         Family History  Family History   Problem Relation Age of Onset   • Heart murmur Father        Allergies  Allergies   Allergen Reactions   • Haloperidol Unknown - Low Severity       ROS: Constitutional:  Negative; Eyes:  Negative; ENT: See HPI; CV: Afib with RVR; Resp:negative; GI:  Negative; : negative; MSK: negative; Integumentary: negative; Neuro: negative; Psych: negative; Endoc: negative; Heme: negative; Allergy/Immunology: negative      Physical Exam:  Vitals: /68 (Patient Position: Lying)   Pulse 78   Temp 97.8 °F (36.6 °C) (Oral)   Resp 14   Ht 172.7 cm (68\")   Wt 86.4 kg (190 lb 7.6 oz)   SpO2 94%   BMI 28.96 kg/m²     - PE somewhat limited due to nature of video visit, but on inspection rapid rhino packing in place. No active bleeding from mouth or either nare      Labs  Hgb 11.3  INR 1.2  PTT 28.1    Imaging  CT face from 3/20 reviewed showing right orbital floor blowout fracture    Studies reviewed  none    Procedure:    none        Assessment and Plan/Recommendations  Marychuy Verdugo is a 80 y.o. female with h/o Afib on blood thinners and orbital floor fracture, presenting with right sided nosebleed. Fracture was repaired late March.  Bleeding controlled by rapid rhino packing. Explained to patient that bleeding more likely related to mucosa nasal injury that hasn't healed as opposed to post-surgical bleeding      - Recommend leaving rapid rhino packing in place (may dc home with it) and have her follow up with her our office for removal  - please have her on anti-staph antibiotics (oral med " is ok, e.g. Keflex) until packing removed   - agree with cardiology consult. Appreciate their recs about whether she can temporarily stop Xarelto which would decrease chance of further bleeding  - Advised patient and nursing (if still in house) to call our office Monday AM to set up appt for Tues/Wed for packing removal      Rad Gr MD  [facility]  O: [phone]  C: [phone]

## 2020-05-02 NOTE — PLAN OF CARE
Problem: Patient Care Overview  Goal: Plan of Care Review  Outcome: Ongoing (interventions implemented as appropriate)  Flowsheets (Taken 5/2/2020 1411)  Progress: improving  Plan of Care Reviewed With: patient  Outcome Summary: pt still have rhinorocket in her right nare with some complaints of discomfort. Tylenol 650mg po being given with good pain control. Dr. Gr wants pt to make an appointment for Tuesday or Wednesday to have the rhinorocket removed. Pt will stay off of Xarelto until then as agreed to by Dr. Banks. Continue to monitor.

## 2020-05-02 NOTE — H&P
[facility] Medicine Services    Patient Name: Marychuy Verdugo  : 1940  MRN: [id number]  Primary Care Physician: Curt Ng MD  Date of admission: 2020    Patient Care Team:  Curt Ng MD as PCP - General  Curt Ng MD as PCP - Family Medicine    Subjective   History Present Illness     Chief Complaint:   Chief Complaint   Patient presents with   • Nose Bleed     Ms. Verdugo is a 80 y.o. with a past medical history of permanent atrial fibrillation on Xarelto, GERD, HTN and hypothyroidism who presents to [facility]  complaining of intermittent right-sided nosebleeds over the past couple of weeks.  The patient states she and her  went on vacation to [facility] at the beginning of March and she suffered a fall, falling on her face.  She underwent orbital floor surgery with fracture of her right orbital floor on 3/31.  The patient states her nosebleeds have been intermittent since then.  She has typically been able to get them to stop at home, but she states the bleeding was significant for approximately 1.5 hours before coming into the ED.    In the ED, the patient's labs included the following: Na 146, K 3.9, BUN 19, Cr 0.92, glucose 106, WBC 6.1, hgb 11.9, plts 245.  EKG: atrial fibrillation, .  The patient had a cottonball soaked with cocaine placed to her right nares with persistent bleeding.  A rapid rhino was placed with improvement and she was given TXA to help with coagulation.  She was given a cardizem bolus and placed on a cardizem drip with improvement in her heart rate.  The patient was admitted for ENT consultation and cardiac evaluation.           History of Present Illness    Review of Systems   HENT: Positive for nosebleeds.         Right orbital floor pain d/t recent fracture and repair   Neurological: Negative for dizziness and light-headedness.   All other systems reviewed and are negative.    Personal History     Past  Medical History:   Past Medical History:   Diagnosis Date   • Atrial fibrillation (CMS/HCC) 10/10/2013   • Chronic anticoagulation 5/1/2020   • GERD (gastroesophageal reflux disease) 2/11/2013   • HTN (hypertension) 6/12/2014   • Hypothyroid 2/11/2013     Surgical History:    History reviewed. No pertinent surgical history.     Family History: family history includes Heart murmur in her father.     Social History:  reports that she has never smoked. She does not have any smokeless tobacco history on file.    Medications:  Prior to Admission medications    Not on File       Allergies:    Allergies   Allergen Reactions   • Haloperidol Unknown - Low Severity       Objective   Objective     Vital Signs  Temp:  [97.5 °F (36.4 °C)-98 °F (36.7 °C)] 97.5 °F (36.4 °C)  Heart Rate:  [] 90  Resp:  [14-18] 14  BP: (109-152)/(56-90) 109/61  SpO2:  [97 %-100 %] 97 %  on   ;   Device (Oxygen Therapy): room air  Body mass index is 29.16 kg/m².    Physical Exam   Constitutional: She is oriented to person, place, and time. She appears well-developed and well-nourished. No distress.   HENT:   Right rapid rhino in place with dried blood on right nares   Eyes: Pupils are equal, round, and reactive to light. EOM are normal.   Neck: Normal range of motion. Neck supple.   Cardiovascular: Exam reveals no gallop and no friction rub.   No murmur heard.  Irregular rate and rhythm    Pulmonary/Chest: Effort normal and breath sounds normal. No stridor. No respiratory distress. She has no wheezes.   Abdominal: Soft. Bowel sounds are normal. She exhibits no distension. There is no tenderness. There is no guarding.   Musculoskeletal: Normal range of motion. She exhibits no edema or deformity.   Neurological: She is oriented to person, place, and time.   Skin: Skin is warm and dry. She is not diaphoretic.   Psychiatric: She has a normal mood and affect.   Vitals reviewed.    Results Review:    Results from last 7 days   Lab Units  05/01/20 1919   WBC 10*3/mm3 6.10   HEMOGLOBIN g/dL 11.9*   HEMATOCRIT % 35.1   PLATELETS 10*3/mm3 245   INR  1.22*     Results from last 7 days   Lab Units 05/01/20 2218 05/01/20 1919   SODIUM mmol/L  --  146*   POTASSIUM mmol/L  --  3.9   CHLORIDE mmol/L  --  104   CO2 mmol/L  --  28.0   BUN mg/dL  --  19   CREATININE mg/dL  --  0.92   GLUCOSE mg/dL  --  106*   CALCIUM mg/dL  --  9.7   TROPONIN T ng/mL <0.010  --      Estimated Creatinine Clearance: 57.2 mL/min (by C-G formula based on SCr of 0.92 mg/dL).  Brief Urine Lab Results     None          Microbiology Results (last 10 days)     ** No results found for the last 240 hours. **          ECG/EMG Results (most recent)     Procedure Component Value Units Date/Time    ECG 12 Lead [[id number]] Collected:  05/01/20 1918     Updated:  05/01/20 1920    Narrative:       HEART RATE= 117  bpm  RR Interval= 511  ms  MT Interval=   ms  P Horizontal Axis=   deg  P Front Axis=   deg  QRSD Interval= 93  ms  QT Interval= 311  ms  QRS Axis= -16  deg  T Wave Axis= -65  deg  - ABNORMAL ECG -  Atrial fibrillation  Inferior infarct, age indeterminate  Anterior infarct, old  No previous ECG available for comparison  Electronically Signed By:   Date and Time of Study: 2020-05-01 19:18:31                    Xr Chest 1 View    Result Date: 5/1/2020  Mildly limited study demonstrating no active disease.  Electronically Signed By-Jarocho Lizarraga On:5/1/2020 8:46 PM This report was finalized on [id number] by  Jarocho Lizarraga, .        Estimated Creatinine Clearance: 57.2 mL/min (by C-G formula based on SCr of 0.92 mg/dL).    Assessment/Plan   Assessment/Plan       Active Hospital Problems    Diagnosis  POA   • **Permanent atrial fibrillation with rapid ventricular response [I48.21]  Yes     Priority: High   • Right-sided nosebleed [R04.0]  Unknown     Priority: Medium   • Chronic anticoagulation [Z79.01]  Not Applicable   • HTN (hypertension) [I10]  Yes   • GERD (gastroesophageal reflux  disease) [K21.9]  Yes   • Hypothyroid [E03.9]  Yes      Resolved Hospital Problems   No resolved problems to display.     Atrial fibrillation w/ RVR with known permanent atrial fibrillation  - EKG: atrial fibrillation,   - echo pending  - TSH pending  - hold home aspirin and Xarelto d/t current nosebleed  - patient given cardizem bolus + drip in ED, continue cardizem drip   - cardiology consulted   - cardiac monitoring   - regular diet     Recurrent right sided nosebleed s/p orbital floor surgery  - s/p cottonball with cocaine, rapid rhino and TXA  - ENT consulted for AM evaluation   - monitor     Essential hypertension, chronic  - continue home lasix, metoprolol    Hypothyroidism   - continue home synthroid     GERD  - continue home omeprazole     Overweight (BMI 29.16)  - encourage lifestyle modifications    VTE Prophylaxis - bilateral SCDs    Mechanical Order History:     None      Pharmalogical Order History:     None        CODE STATUS:    Code Status and Medical Interventions:   Ordered at: 05/01/20 3138     Code Status:    CPR     Medical Interventions (Level of Support Prior to Arrest):    Full     I discussed the patient's findings and my recommendations with patient.    Electronically signed by Rachel Rivero PA-C, 05/01/20, 8:21 PM.  [facility] Hospitalist Team

## 2020-05-02 NOTE — DISCHARGE SUMMARY
Date of Admission: 5/1/2020    Date of Discharge:  5/2/2020    Length of stay:  LOS: 0 days     Patient was examined with relevant and adequate PPE keeping in mind the current coronavirus pandemic.      Presenting Problem/History of Present Illness   Present on Admission:  • Permanent atrial fibrillation with rapid ventricular response  • GERD (gastroesophageal reflux disease)  • Essential hypertension  • Hypothyroid  • Moderate mitral regurgitation  • Pulmonary hypertension (CMS/HCC)  • Orbit fracture, right        Hospital Course    Chief Complaint   Patient presents with   • Nose Bleed       Marychuy Verdugo 80 y.o. female.      Ms. Verdugo is a 80 y.o. with a past medical history of permanent atrial fibrillation on Xarelto, GERD, HTN and hypothyroidism who presents to [facility] 5/1 complaining of intermittent right-sided nosebleeds over the past couple of weeks.  The patient states she and her  went on vacation to [facility] at the beginning of March and she suffered a fall, falling on her face.  She underwent orbital floor surgery with fracture of her right orbital floor on 3/31.  The patient states her nosebleeds have been intermittent since then.  She has typically been able to get them to stop at home, but she states the bleeding was significant for approximately 1.5 hours before coming into the ED.     In the ED, the patient's labs included the following: Na 146, K 3.9, BUN 19, Cr 0.92, glucose 106, WBC 6.1, hgb 11.9, plts 245.  EKG: atrial fibrillation, .  The patient had a cottonball soaked with cocaine placed to her right nares with persistent bleeding.  A rapid rhino was placed with improvement and she was given TXA to help with coagulation.  She was given a cardizem bolus and placed on a cardizem drip with improvement in her heart rate.  The patient was admitted for ENT consultation and cardiac evaluation.            During the hospital stay patient was seen by ENT via a remote call  and by cardiology.  She was appropriately advised she needs to follow-up with ENT in 2 days for removal of the Rhino Rocket.  And she needs to stay off of aspirin and Eliquis.    Review of Systems   Constitution: Negative.   HENT: Positive for nosebleeds.    Eyes: Positive for double vision.   Cardiovascular: Negative.    Respiratory: Negative.    Endocrine: Negative.    Hematologic/Lymphatic: Negative.    Skin: Negative.    Musculoskeletal: Negative.    Gastrointestinal: Negative.    Genitourinary: Negative.    Neurological: Negative.    Psychiatric/Behavioral: Negative.    Allergic/Immunologic: Negative.    All other systems reviewed and are negative.      Family History   Problem Relation Age of Onset   • Heart murmur Father         Past Medical History:   Diagnosis Date   • Atrial fibrillation (CMS/HCC) 10/10/2013   • Chronic anticoagulation 5/1/2020   • GERD (gastroesophageal reflux disease) 2/11/2013   • HTN (hypertension) 6/12/2014   • Hypothyroid 2/11/2013       History reviewed. No pertinent surgical history.    Social History     Socioeconomic History   • Marital status:      Spouse name: Not on file   • Number of children: Not on file   • Years of education: Not on file   • Highest education level: Not on file   Tobacco Use   • Smoking status: Never Smoker       Vital Signs  Temp:  [97.5 °F (36.4 °C)-98 °F (36.7 °C)] 97.7 °F (36.5 °C)  Heart Rate:  [] 78  Resp:  [14-18] 16  BP: ()/(45-90) 106/71    Physical Exam:  Physical Exam   Constitutional: She is oriented to person, place, and time. She appears well-developed and well-nourished. No distress.   Very pleasant lady   HENT:   Head: Normocephalic and atraumatic.   Right Ear: External ear normal.   Left Ear: External ear normal.   Nose: Nose normal.   Mouth/Throat: Oropharynx is clear and moist. No oropharyngeal exudate.   Edema right maxillary area and Rhino Rocket in place in the right nose   Eyes: Pupils are equal, round, and  reactive to light. Conjunctivae and EOM are normal. Right eye exhibits no discharge. Left eye exhibits no discharge. No scleral icterus.   Neck: Normal range of motion. No JVD present. No tracheal deviation present. No thyromegaly present.   Cardiovascular: Normal rate and normal heart sounds. Exam reveals no gallop and no friction rub.   No murmur heard.  Rate and rhythm is irregular.  +2 leg edema per patient   Pulmonary/Chest: Effort normal and breath sounds normal. No stridor. No respiratory distress. She has no wheezes. She has no rales. She exhibits no tenderness.   Abdominal: Soft. Bowel sounds are normal. She exhibits no distension and no mass. There is no tenderness. There is no rebound and no guarding. No hernia.   Musculoskeletal: Normal range of motion. She exhibits edema. She exhibits no tenderness or deformity.   Lymphadenopathy:     She has no cervical adenopathy.   Neurological: She is alert and oriented to person, place, and time. No cranial nerve deficit or sensory deficit. She exhibits normal muscle tone. Coordination normal.   Skin: Skin is warm and dry. No rash noted. She is not diaphoretic. No erythema.   Psychiatric: She has a normal mood and affect. Her behavior is normal.   Nursing note and vitals reviewed.              Discharge Diagnosis:     Active Hospital Problems    Diagnosis  POA   • **Right-sided nosebleed [R04.0]  Unknown   • Orbit fracture, right [S02.85XA]  Yes   • Permanent atrial fibrillation with rapid ventricular response [I48.21]  Yes   • Chronic anticoagulation [Z79.01]  Not Applicable   • Pulmonary hypertension (CMS/HCC) [I27.20]  Yes   • Moderate mitral regurgitation [I34.0]  Yes   • Essential hypertension [I10]  Yes   • GERD (gastroesophageal reflux disease) [K21.9]  Yes   • Hypothyroid [E03.9]  Yes      Resolved Hospital Problems   No resolved problems to display.       Estimated Creatinine Clearance: 58.3 mL/min (by C-G formula based on SCr of 0.9 mg/dL).    Discharge  Disposition    Destination      Coordination has not been started for this encounter.      Durable Medical Equipment      Coordination has not been started for this encounter.      Dialysis/Infusion      Coordination has not been started for this encounter.      Home Medical Care      Coordination has not been started for this encounter.      Therapy      Coordination has not been started for this encounter.      Community Resources      Coordination has not been started for this encounter.              PT Recommendation and Plan          Home or Self Care           Discharge Medications      New Medications      Instructions Start Date   cephalexin 500 MG capsule  Commonly known as:  Keflex   500 mg, Oral, 3 Times Daily         Changes to Medications      Instructions Start Date   aspirin 81 MG chewable tablet  What changed:  These instructions start on May 7, 2020. If you are unsure what to do until then, ask your doctor or other care provider.   81 mg, Oral, Daily   Start Date:  May 7, 2020     rivaroxaban 20 MG tablet  Commonly known as:  XARELTO  What changed:  These instructions start on May 7, 2020. If you are unsure what to do until then, ask your doctor or other care provider.   20 mg, Oral, Daily   Start Date:  May 7, 2020        Continue These Medications      Instructions Start Date   estradiol 0.1 MG/GM vaginal cream  Commonly known as:  ESTRACE   2 g, Vaginal, 2 Times Weekly      furosemide 40 MG tablet  Commonly known as:  LASIX   40 mg, Oral, 2 Times Daily      levothyroxine 88 MCG tablet  Commonly known as:  SYNTHROID, LEVOTHROID   88 mcg, Oral, Daily      loratadine 10 MG tablet  Commonly known as:  CLARITIN   10 mg, Oral, Daily      metoprolol tartrate 25 MG tablet  Commonly known as:  LOPRESSOR   25 mg, Oral, 2 Times Daily      multivitamin with minerals tablet tablet   1 tablet, Oral, Daily      omeprazole 20 MG capsule  Commonly known as:  priLOSEC   20 mg, Oral, Daily      OS-TATUM 500 + D PO    1 tablet, Oral, Daily      oxybutynin XL 10 MG 24 hr tablet  Commonly known as:  DITROPAN-XL   10 mg, Oral, Daily      potassium chloride 20 MEQ CR tablet  Commonly known as:  K-DUR,KLOR-CON   20 mEq, Oral, Daily           Discharge medications personally reviewed by me and med rec done by me personally.  05/02/20, 4:21 PM        Consults:   Consults     Date and Time Order Name Status Description    5/2/2020 0030 Inpatient Cardiology Consult Completed     5/1/2020 2145 Inpatient ENT Consult Completed     5/1/2020 2009 Hospitalist (on-call MD unless specified)            Procedures Performed:         Pertinent Test Results:   Results from last 7 days   Lab Units 05/02/20  0423 05/01/20 1919   WBC 10*3/mm3 5.50 6.10   HEMOGLOBIN g/dL 11.3* 11.9*   HEMATOCRIT % 33.8* 35.1   MCV fL 96.9 96.7   MCH pg 32.3 32.9   PLATELETS 10*3/mm3 219 245     Results from last 7 days   Lab Units 05/01/20  2218 05/01/20 1919   SODIUM mmol/L 145 146*   POTASSIUM mmol/L 4.2 3.9   CHLORIDE mmol/L 104 104   CO2 mmol/L 26.0 28.0   BUN mg/dL 19 19   CREATININE mg/dL 0.90 0.92   CALCIUM mg/dL 9.3 9.7   GLUCOSE mg/dL 107* 106*         Lab Results   Component Value Date    CALCIUM 9.3 05/01/2020     No results found for: HGBA1C  No results found for: CHOL, CHLPL, TRIG, HDL, LDL, LDLDIRECT  No results found for: LIPASE        No results found for: INTRAOP, PREDX, FINALDX, COMDX  Inflammatory Biomarkers        Invalid input(s): ESR, D-DIMER QUANTITATIVE,  PROCALCITONIN  No results found for: COVID19     Microbiology Results (last 10 days)     ** No results found for the last 240 hours. **          ECG/EMG Results (most recent)     Procedure Component Value Units Date/Time    ECG 12 Lead [[id number]] Collected:  05/01/20 1918     Updated:  05/01/20 1920    Narrative:       HEART RATE= 117  bpm  RR Interval= 511  ms  MA Interval=   ms  P Horizontal Axis=   deg  P Front Axis=   deg  QRSD Interval= 93  ms  QT Interval= 311  ms  QRS Axis= -16  deg  T  Wave Axis= -65  deg  - ABNORMAL ECG -  Atrial fibrillation  Inferior infarct, age indeterminate  Anterior infarct, old  No previous ECG available for comparison  Electronically Signed By:   Date and Time of Study: 2020-05-01 19:18:31    Adult Transthoracic Echo Complete W/ Cont if Necessary Per Protocol [[id number]] Collected:  05/02/20 0703     Updated:  05/02/20 1059     BSA 2.0 m^2      RVIDd 3.3 cm      IVSd 0.8 cm      IVSs 1.5 cm      LVIDd 4.4 cm      LVIDs 2.9 cm      LVPWd 0.88 cm      BH CV ECHO TIARRA - LVPWS 1.3 cm      IVS/LVPW 0.91     FS 34.4 %      EDV(Teich) 87.6 ml      ESV(Teich) 31.8 ml      EF(Teich) 63.7 %      EDV(cubed) 85.0 ml      ESV(cubed) 24.0 ml      EF(cubed) 71.8 %      % IVS thick 83.9 %      % LVPW thick 43.2 %      LV mass(C)d 117.2 grams      LV mass(C)dI 58.6 grams/m^2      LV mass(C)s 128.8 grams      LV mass(C)sI 64.4 grams/m^2      SV(Teich) 55.8 ml      SI(Teich) 27.9 ml/m^2      SV(cubed) 61.0 ml      SI(cubed) 30.5 ml/m^2      Ao root diam 3.4 cm      Ao root area 9.2 cm^2      ACS 1.8 cm      LVOT diam 2.0 cm      LVOT area 3.2 cm^2      EDV(MOD-sp4) 49.7 ml      ESV(MOD-sp4) 22.9 ml      EF(MOD-sp4) 54.0 %      SV(MOD-sp4) 26.9 ml      SI(MOD-sp4) 13.4 ml/m^2      Ao root area (BSA corrected) 1.7     LV You Vol (BSA corrected) 24.9 ml/m^2      LV Sys Vol (BSA corrected) 11.4 ml/m^2      Aortic R-R 0.28 sec      Aortic .3 BPM      MV E max meet 107.7 cm/sec      MV V2 max 136.9 cm/sec      MV max PG 7.5 mmHg      MV V2 mean 60.0 cm/sec      MV mean PG 1.9 mmHg      MV V2 VTI 23.6 cm      MVA(VTI) 2.4 cm^2      MV dec slope 617.1 cm/sec^2      MV dec time 0.17 sec      Ao pk meet 114.3 cm/sec      Ao max PG 5.2 mmHg      Ao max PG (full) 2.2 mmHg      Ao V2 mean 81.5 cm/sec      Ao mean PG 2.9 mmHg      Ao mean PG (full) 1.4 mmHg      Ao V2 VTI 23.0 cm      APRIL(I,A) 2.5 cm^2      APRIL(I,D) 2.5 cm^2      APRIL(V,A) 2.5 cm^2      APRIL(V,D) 2.5 cm^2      LV V1 max PG 3.0 mmHg       LV V1 mean PG 1.4 mmHg      LV V1 max 86.6 cm/sec      LV V1 mean 56.6 cm/sec      LV V1 VTI 17.8 cm      CO(Ao) 44.9 l/min      CI(Ao) 22.4 l/min/m^2      SV(Ao) 211.3 ml      SI(Ao) 105.7 ml/m^2      CO(LVOT) 12.2 l/min      CI(LVOT) 6.1 l/min/m^2      SV(LVOT) 57.6 ml      SI(LVOT) 28.8 ml/m^2      PA V2 max 42.3 cm/sec      PA max PG 1.4 mmHg      PA max PG (full) 0.22 mmHg      PA V2 mean 63.5 cm/sec      PA mean PG 1.7 mmHg      PA mean PG (full) 0.55 mmHg      PA V2 VTI 16.1 cm      PA acc time 0.07 sec      RV V1 max PG 1.2 mmHg      RV V1 mean PG 1.1 mmHg      RV V1 max 39.0 cm/sec      RV V1 mean 49.7 cm/sec      RV V1 VTI 12.1 cm      TR max meet 320.7 cm/sec      RVSP(TR) 49.2 mmHg      RAP systole 8.0 mmHg      PA pr(Accel) 48.2 mmHg       CV ECHO TIARRA - BZI_BMI 28.9 kilograms/m^2       CV ECHO TIARRA - BSA(HAYCOCK) 2.1 m^2       CV ECHO TIARRA - BZI_METRIC_WEIGHT 86.2 kg       CV ECHO TIARRA - BZI_METRIC_HEIGHT 172.7 cm      Target HR (85%) 120 bpm      Max. Pred. HR (100%) 141 bpm      EF(MOD-bp) 54.0 %      LA dimension(2D) 4.4 cm      Echo EF Estimated 50 %     Narrative:       · Mild tricuspid valve regurgitation is present.  · Left atrial cavity size is severely dilated.  · Estimated EF = 50%.  · Left ventricular systolic function is normal.  · Mild aortic valve regurgitation is present.  · Mild mitral valve regurgitation is present  · Right ventricular cavity is mildly dilated.                  Results for orders placed during the hospital encounter of 05/01/20   Adult Transthoracic Echo Complete W/ Cont if Necessary Per Protocol    Narrative · Mild tricuspid valve regurgitation is present.  · Left atrial cavity size is severely dilated.  · Estimated EF = 50%.  · Left ventricular systolic function is normal.  · Mild aortic valve regurgitation is present.  · Mild mitral valve regurgitation is present  · Right ventricular cavity is mildly dilated.          Xr Chest 1 View    Result Date:  5/1/2020  Mildly limited study demonstrating no active disease.  Electronically Signed By-Jarocho Lizarraga On:5/1/2020 8:46 PM This report was finalized on [id number] by  Jarocho Lizarraga, .      Xrays, labs reviewed personally by me.  05/02/20  4:21 PM      Condition on Discharge:    Stable    Discharge Diet:   Dietary Orders (From admission, onward)     Start     Ordered    05/01/20 2146  Diet Regular  Diet Effective Now     Question:  Diet / Texture / Consistency  Answer:  Regular    05/01/20 2145                Activity at Discharge:   Activity Instructions     Activity as Tolerated      Additional Activity Instructions:      No strenous activities to elevate the heart rate which could in turn cause the nose to bleed more.                   Follow-up Appointments  No future appointments.  Additional Instructions for the Follow-ups that You Need to Schedule     Discharge Follow-up with PCP   As directed       Currently Documented PCP:    Curt Ng MD    PCP Phone Number:    [phone]     Follow Up Details:  If no PCP, call MD finder at [phone]         Discharge Follow-up with Specified Provider: Cardiology   As directed      To:  Cardiology         Discharge Follow-up with Specified Provider: ENT surgeon as advised; 3 Days   As directed      To:  ENT surgeon as advised    Follow Up:  3 Days         Discharge Follow-up with Specified Provider: Ocular surgeon; 3 Days   As directed      To:  Ocular surgeon    Follow Up:  3 Days               Test Results Pending at Discharge       Risk for Readmission (LACE) Score: 3 (5/2/2020  6:00 AM)          Terrance Tyler MD  05/02/20  16:21

## 2020-05-02 NOTE — DISCHARGE INSTR - ACTIVITY
No strenous activities to elevate the heart rate which could in turn cause the nose to bleed more.

## 2020-05-02 NOTE — NURSING NOTE
Dr. Chilo Gay with ENT called to check on the patient. She is off the floor having a 2D Echo done at this time. He would like to set up a televisit by way of cell phone if possible when she returns to the floor. He stated she will need to keep the rhinorocket in for at least 5 days. RN will contact him once patient returns to her room.

## 2020-05-04 NOTE — PROGRESS NOTES
Case Management Discharge Note      Final Note: d/c home                   Final Discharge Disposition Code: 01 - home or self-care

## 2021-09-04 ENCOUNTER — TRANSCRIBE ORDERS (OUTPATIENT)
Dept: ADMINISTRATIVE | Facility: HOSPITAL | Age: 81
End: 2021-09-04

## 2021-09-04 ENCOUNTER — LAB (OUTPATIENT)
Dept: LAB | Facility: HOSPITAL | Age: 81
End: 2021-09-04

## 2021-09-04 DIAGNOSIS — I10 HYPERTENSION, UNSPECIFIED TYPE: Primary | ICD-10-CM

## 2021-09-04 DIAGNOSIS — I10 HYPERTENSION, UNSPECIFIED TYPE: ICD-10-CM

## 2021-09-04 LAB
ALBUMIN SERPL-MCNC: 4 G/DL (ref 3.5–5.2)
ALBUMIN/GLOB SERPL: 1.3 G/DL
ALP SERPL-CCNC: 65 U/L (ref 39–117)
ALT SERPL W P-5'-P-CCNC: 14 U/L (ref 1–33)
ANION GAP SERPL CALCULATED.3IONS-SCNC: 11.6 MMOL/L (ref 5–15)
AST SERPL-CCNC: 27 U/L (ref 1–32)
BACTERIA UR QL AUTO: ABNORMAL /HPF
BASOPHILS # BLD AUTO: 0.14 10*3/MM3 (ref 0–0.2)
BASOPHILS NFR BLD AUTO: 2.8 % (ref 0–1.5)
BILIRUB SERPL-MCNC: 0.5 MG/DL (ref 0–1.2)
BILIRUB UR QL STRIP: NEGATIVE
BUN SERPL-MCNC: 17 MG/DL (ref 8–23)
BUN/CREAT SERPL: 17.5 (ref 7–25)
CALCIUM SPEC-SCNC: 9.5 MG/DL (ref 8.6–10.5)
CHLORIDE SERPL-SCNC: 103 MMOL/L (ref 98–107)
CHOLEST SERPL-MCNC: 182 MG/DL (ref 0–200)
CLARITY UR: CLEAR
CO2 SERPL-SCNC: 32.4 MMOL/L (ref 22–29)
COLOR UR: YELLOW
CREAT SERPL-MCNC: 0.97 MG/DL (ref 0.57–1)
DEPRECATED RDW RBC AUTO: 44.7 FL (ref 37–54)
EOSINOPHIL # BLD AUTO: 0.28 10*3/MM3 (ref 0–0.4)
EOSINOPHIL NFR BLD AUTO: 5.7 % (ref 0.3–6.2)
ERYTHROCYTE [DISTWIDTH] IN BLOOD BY AUTOMATED COUNT: 13.1 % (ref 12.3–15.4)
GFR SERPL CREATININE-BSD FRML MDRD: 55 ML/MIN/1.73
GLOBULIN UR ELPH-MCNC: 3.1 GM/DL
GLUCOSE SERPL-MCNC: 88 MG/DL (ref 65–99)
GLUCOSE UR STRIP-MCNC: NEGATIVE MG/DL
HCT VFR BLD AUTO: 36.9 % (ref 34–46.6)
HDLC SERPL-MCNC: 85 MG/DL (ref 40–60)
HGB BLD-MCNC: 12.1 G/DL (ref 12–15.9)
HGB UR QL STRIP.AUTO: NEGATIVE
HYALINE CASTS UR QL AUTO: ABNORMAL /LPF
IMM GRANULOCYTES # BLD AUTO: 0.01 10*3/MM3 (ref 0–0.05)
IMM GRANULOCYTES NFR BLD AUTO: 0.2 % (ref 0–0.5)
KETONES UR QL STRIP: NEGATIVE
LDLC SERPL CALC-MCNC: 84 MG/DL (ref 0–100)
LDLC/HDLC SERPL: 0.98 {RATIO}
LEUKOCYTE ESTERASE UR QL STRIP.AUTO: ABNORMAL
LYMPHOCYTES # BLD AUTO: 1.41 10*3/MM3 (ref 0.7–3.1)
LYMPHOCYTES NFR BLD AUTO: 28.7 % (ref 19.6–45.3)
MCH RBC QN AUTO: 30.5 PG (ref 26.6–33)
MCHC RBC AUTO-ENTMCNC: 32.8 G/DL (ref 31.5–35.7)
MCV RBC AUTO: 92.9 FL (ref 79–97)
MONOCYTES # BLD AUTO: 0.64 10*3/MM3 (ref 0.1–0.9)
MONOCYTES NFR BLD AUTO: 13 % (ref 5–12)
NEUTROPHILS NFR BLD AUTO: 2.44 10*3/MM3 (ref 1.7–7)
NEUTROPHILS NFR BLD AUTO: 49.6 % (ref 42.7–76)
NITRITE UR QL STRIP: NEGATIVE
NRBC BLD AUTO-RTO: 0 /100 WBC (ref 0–0.2)
PH UR STRIP.AUTO: 7 [PH] (ref 5–8)
PLATELET # BLD AUTO: 275 10*3/MM3 (ref 140–450)
PMV BLD AUTO: 10.7 FL (ref 6–12)
POTASSIUM SERPL-SCNC: 3.6 MMOL/L (ref 3.5–5.2)
PROT SERPL-MCNC: 7.1 G/DL (ref 6–8.5)
PROT UR QL STRIP: NEGATIVE
RBC # BLD AUTO: 3.97 10*6/MM3 (ref 3.77–5.28)
RBC # UR: ABNORMAL /HPF
REF LAB TEST METHOD: ABNORMAL
SODIUM SERPL-SCNC: 147 MMOL/L (ref 136–145)
SP GR UR STRIP: 1.01 (ref 1–1.03)
SQUAMOUS #/AREA URNS HPF: ABNORMAL /HPF
TRIGL SERPL-MCNC: 69 MG/DL (ref 0–150)
TSH SERPL DL<=0.05 MIU/L-ACNC: 2.41 UIU/ML (ref 0.27–4.2)
UROBILINOGEN UR QL STRIP: ABNORMAL
VLDLC SERPL-MCNC: 13 MG/DL (ref 5–40)
WBC # BLD AUTO: 4.92 10*3/MM3 (ref 3.4–10.8)
WBC UR QL AUTO: ABNORMAL /HPF

## 2021-09-04 PROCEDURE — 80053 COMPREHEN METABOLIC PANEL: CPT

## 2021-09-04 PROCEDURE — 85025 COMPLETE CBC W/AUTO DIFF WBC: CPT

## 2021-09-04 PROCEDURE — 84443 ASSAY THYROID STIM HORMONE: CPT

## 2021-09-04 PROCEDURE — 80061 LIPID PANEL: CPT

## 2021-09-04 PROCEDURE — 36415 COLL VENOUS BLD VENIPUNCTURE: CPT

## 2021-09-04 PROCEDURE — 81001 URINALYSIS AUTO W/SCOPE: CPT

## 2021-09-04 PROCEDURE — 87086 URINE CULTURE/COLONY COUNT: CPT

## 2021-09-05 LAB — BACTERIA SPEC AEROBE CULT: NO GROWTH

## 2022-01-01 ENCOUNTER — HOME CARE VISIT (OUTPATIENT)
Dept: HOME HEALTH SERVICES | Facility: HOME HEALTHCARE | Age: 82
End: 2022-01-01

## 2022-01-01 ENCOUNTER — APPOINTMENT (OUTPATIENT)
Dept: GENERAL RADIOLOGY | Facility: HOSPITAL | Age: 82
End: 2022-01-01

## 2022-01-01 ENCOUNTER — OFFICE VISIT (OUTPATIENT)
Dept: ORTHOPEDIC SURGERY | Facility: CLINIC | Age: 82
End: 2022-01-01

## 2022-01-01 ENCOUNTER — TELEPHONE (OUTPATIENT)
Dept: ORTHOPEDIC SURGERY | Facility: CLINIC | Age: 82
End: 2022-01-01

## 2022-01-01 ENCOUNTER — TRANSITIONAL CARE MANAGEMENT TELEPHONE ENCOUNTER (OUTPATIENT)
Dept: CALL CENTER | Facility: HOSPITAL | Age: 82
End: 2022-01-01

## 2022-01-01 ENCOUNTER — APPOINTMENT (OUTPATIENT)
Dept: MRI IMAGING | Facility: HOSPITAL | Age: 82
End: 2022-01-01

## 2022-01-01 ENCOUNTER — READMISSION MANAGEMENT (OUTPATIENT)
Dept: CALL CENTER | Facility: HOSPITAL | Age: 82
End: 2022-01-01

## 2022-01-01 ENCOUNTER — OFFICE VISIT (OUTPATIENT)
Dept: FAMILY MEDICINE CLINIC | Facility: CLINIC | Age: 82
End: 2022-01-01

## 2022-01-01 ENCOUNTER — TELEPHONE (OUTPATIENT)
Dept: PHYSICAL THERAPY | Facility: CLINIC | Age: 82
End: 2022-01-01

## 2022-01-01 ENCOUNTER — HOSPITAL ENCOUNTER (INPATIENT)
Facility: HOSPITAL | Age: 82
LOS: 6 days | Discharge: NURSING, REHAB OR LONG-TERM CARE | End: 2022-02-16
Attending: EMERGENCY MEDICINE | Admitting: ORTHOPAEDIC SURGERY

## 2022-01-01 ENCOUNTER — HOSPITAL ENCOUNTER (EMERGENCY)
Facility: HOSPITAL | Age: 82
Discharge: HOME | End: 2022-07-21
Admitting: EMERGENCY MEDICINE

## 2022-01-01 ENCOUNTER — TREATMENT (OUTPATIENT)
Dept: PHYSICAL THERAPY | Facility: CLINIC | Age: 82
End: 2022-01-01

## 2022-01-01 ENCOUNTER — APPOINTMENT (OUTPATIENT)
Dept: CARDIOLOGY | Facility: HOSPITAL | Age: 82
End: 2022-01-01

## 2022-01-01 ENCOUNTER — TELEPHONE (OUTPATIENT)
Dept: FAMILY MEDICINE CLINIC | Facility: CLINIC | Age: 82
End: 2022-01-01

## 2022-01-01 ENCOUNTER — NURSE TRIAGE (OUTPATIENT)
Dept: CALL CENTER | Facility: HOSPITAL | Age: 82
End: 2022-01-01

## 2022-01-01 ENCOUNTER — APPOINTMENT (OUTPATIENT)
Dept: OCCUPATIONAL THERAPY | Facility: HOSPITAL | Age: 82
End: 2022-01-01

## 2022-01-01 ENCOUNTER — TRANSCRIBE ORDERS (OUTPATIENT)
Dept: ADMINISTRATIVE | Facility: HOSPITAL | Age: 82
End: 2022-01-01

## 2022-01-01 ENCOUNTER — HOSPITAL ENCOUNTER (INPATIENT)
Facility: HOSPITAL | Age: 82
LOS: 3 days | End: 2022-11-04
Attending: INTERNAL MEDICINE

## 2022-01-01 ENCOUNTER — PRE-ADMISSION TESTING (OUTPATIENT)
Dept: PREADMISSION TESTING | Facility: HOSPITAL | Age: 82
End: 2022-01-01

## 2022-01-01 ENCOUNTER — HOSPITAL ENCOUNTER (OUTPATIENT)
Facility: HOSPITAL | Age: 82
Setting detail: OBSERVATION
Discharge: NURSING, REHAB OR LONG-TERM CARE | End: 2022-09-23
Attending: EMERGENCY MEDICINE | Admitting: INTERNAL MEDICINE

## 2022-01-01 ENCOUNTER — LAB (OUTPATIENT)
Dept: LAB | Facility: HOSPITAL | Age: 82
End: 2022-01-01

## 2022-01-01 ENCOUNTER — LAB (OUTPATIENT)
Dept: FAMILY MEDICINE CLINIC | Facility: CLINIC | Age: 82
End: 2022-01-01

## 2022-01-01 ENCOUNTER — HOSPITAL ENCOUNTER (OUTPATIENT)
Facility: HOSPITAL | Age: 82
Setting detail: OBSERVATION
Discharge: HOME | End: 2022-07-10
Attending: EMERGENCY MEDICINE | Admitting: HOSPITALIST

## 2022-01-01 ENCOUNTER — APPOINTMENT (OUTPATIENT)
Dept: CT IMAGING | Facility: HOSPITAL | Age: 82
End: 2022-01-01

## 2022-01-01 ENCOUNTER — DOCUMENTATION (OUTPATIENT)
Dept: PHYSICAL THERAPY | Facility: CLINIC | Age: 82
End: 2022-01-01

## 2022-01-01 ENCOUNTER — ANESTHESIA EVENT (OUTPATIENT)
Dept: PERIOP | Facility: HOSPITAL | Age: 82
End: 2022-01-01

## 2022-01-01 ENCOUNTER — HOSPITAL ENCOUNTER (OUTPATIENT)
Facility: HOSPITAL | Age: 82
Setting detail: HOSPITAL OUTPATIENT SURGERY
Discharge: HOME | End: 2022-08-04
Attending: OPHTHALMOLOGY | Admitting: OPHTHALMOLOGY

## 2022-01-01 ENCOUNTER — ANESTHESIA (OUTPATIENT)
Dept: PERIOP | Facility: HOSPITAL | Age: 82
End: 2022-01-01

## 2022-01-01 ENCOUNTER — HOSPITAL ENCOUNTER (INPATIENT)
Facility: HOSPITAL | Age: 82
LOS: 8 days | Discharge: HOSPICE | End: 2022-11-01
Attending: EMERGENCY MEDICINE

## 2022-01-01 ENCOUNTER — APPOINTMENT (OUTPATIENT)
Dept: NEUROLOGY | Facility: HOSPITAL | Age: 82
End: 2022-01-01

## 2022-01-01 ENCOUNTER — HOME HEALTH ADMISSION (OUTPATIENT)
Dept: HOME HEALTH SERVICES | Facility: HOME HEALTHCARE | Age: 82
End: 2022-01-01

## 2022-01-01 ENCOUNTER — HOSPITAL ENCOUNTER (OUTPATIENT)
Facility: HOSPITAL | Age: 82
Setting detail: OBSERVATION
Discharge: HOME | End: 2022-08-23
Attending: EMERGENCY MEDICINE | Admitting: EMERGENCY MEDICINE

## 2022-01-01 ENCOUNTER — PATIENT ROUNDING (BHMG ONLY) (OUTPATIENT)
Dept: FAMILY MEDICINE CLINIC | Facility: CLINIC | Age: 82
End: 2022-01-01

## 2022-01-01 VITALS
WEIGHT: 207.23 LBS | OXYGEN SATURATION: 95 % | HEIGHT: 69 IN | SYSTOLIC BLOOD PRESSURE: 143 MMHG | RESPIRATION RATE: 13 BRPM | BODY MASS INDEX: 30.69 KG/M2 | HEART RATE: 83 BPM | TEMPERATURE: 97.7 F | DIASTOLIC BLOOD PRESSURE: 89 MMHG

## 2022-01-01 VITALS
BODY MASS INDEX: 28.14 KG/M2 | RESPIRATION RATE: 18 BRPM | DIASTOLIC BLOOD PRESSURE: 87 MMHG | SYSTOLIC BLOOD PRESSURE: 132 MMHG | TEMPERATURE: 97.4 F | WEIGHT: 190 LBS | HEIGHT: 69 IN | OXYGEN SATURATION: 99 % | HEART RATE: 96 BPM

## 2022-01-01 VITALS
HEART RATE: 83 BPM | OXYGEN SATURATION: 98 % | DIASTOLIC BLOOD PRESSURE: 80 MMHG | TEMPERATURE: 96.9 F | SYSTOLIC BLOOD PRESSURE: 126 MMHG

## 2022-01-01 VITALS
TEMPERATURE: 97.4 F | DIASTOLIC BLOOD PRESSURE: 88 MMHG | SYSTOLIC BLOOD PRESSURE: 130 MMHG | HEART RATE: 94 BPM | RESPIRATION RATE: 16 BRPM | OXYGEN SATURATION: 97 %

## 2022-01-01 VITALS
HEIGHT: 69 IN | OXYGEN SATURATION: 98 % | DIASTOLIC BLOOD PRESSURE: 85 MMHG | WEIGHT: 202 LBS | SYSTOLIC BLOOD PRESSURE: 138 MMHG | HEART RATE: 129 BPM | BODY MASS INDEX: 29.92 KG/M2

## 2022-01-01 VITALS
HEIGHT: 69 IN | HEART RATE: 96 BPM | RESPIRATION RATE: 18 BRPM | SYSTOLIC BLOOD PRESSURE: 112 MMHG | DIASTOLIC BLOOD PRESSURE: 57 MMHG | OXYGEN SATURATION: 93 % | TEMPERATURE: 97.9 F | WEIGHT: 177.47 LBS | BODY MASS INDEX: 26.29 KG/M2

## 2022-01-01 VITALS
WEIGHT: 177 LBS | SYSTOLIC BLOOD PRESSURE: 108 MMHG | RESPIRATION RATE: 17 BRPM | DIASTOLIC BLOOD PRESSURE: 72 MMHG | HEIGHT: 69 IN | BODY MASS INDEX: 26.22 KG/M2 | OXYGEN SATURATION: 98 % | TEMPERATURE: 98 F | HEART RATE: 90 BPM

## 2022-01-01 VITALS
OXYGEN SATURATION: 96 % | RESPIRATION RATE: 17 BRPM | TEMPERATURE: 97.3 F | SYSTOLIC BLOOD PRESSURE: 132 MMHG | DIASTOLIC BLOOD PRESSURE: 64 MMHG | HEART RATE: 97 BPM

## 2022-01-01 VITALS
SYSTOLIC BLOOD PRESSURE: 122 MMHG | DIASTOLIC BLOOD PRESSURE: 88 MMHG | TEMPERATURE: 97.5 F | RESPIRATION RATE: 16 BRPM | HEART RATE: 107 BPM | OXYGEN SATURATION: 97 %

## 2022-01-01 VITALS
HEART RATE: 125 BPM | BODY MASS INDEX: 30.66 KG/M2 | SYSTOLIC BLOOD PRESSURE: 136 MMHG | HEIGHT: 69 IN | DIASTOLIC BLOOD PRESSURE: 85 MMHG | TEMPERATURE: 97.6 F | WEIGHT: 207 LBS

## 2022-01-01 VITALS
BODY MASS INDEX: 29.09 KG/M2 | SYSTOLIC BLOOD PRESSURE: 139 MMHG | WEIGHT: 196.43 LBS | HEIGHT: 69 IN | TEMPERATURE: 97.8 F | DIASTOLIC BLOOD PRESSURE: 83 MMHG | RESPIRATION RATE: 18 BRPM | HEART RATE: 101 BPM | OXYGEN SATURATION: 96 %

## 2022-01-01 VITALS
TEMPERATURE: 98.3 F | SYSTOLIC BLOOD PRESSURE: 142 MMHG | WEIGHT: 166 LBS | OXYGEN SATURATION: 99 % | RESPIRATION RATE: 18 BRPM | HEART RATE: 70 BPM | BODY MASS INDEX: 24.51 KG/M2 | DIASTOLIC BLOOD PRESSURE: 62 MMHG

## 2022-01-01 VITALS
BODY MASS INDEX: 29.94 KG/M2 | SYSTOLIC BLOOD PRESSURE: 136 MMHG | DIASTOLIC BLOOD PRESSURE: 96 MMHG | WEIGHT: 179.68 LBS | RESPIRATION RATE: 20 BRPM | TEMPERATURE: 98.3 F | HEIGHT: 65 IN | HEART RATE: 124 BPM | OXYGEN SATURATION: 91 %

## 2022-01-01 VITALS
BODY MASS INDEX: 26.51 KG/M2 | HEART RATE: 109 BPM | DIASTOLIC BLOOD PRESSURE: 75 MMHG | HEIGHT: 69 IN | SYSTOLIC BLOOD PRESSURE: 108 MMHG | WEIGHT: 179 LBS | OXYGEN SATURATION: 96 %

## 2022-01-01 VITALS
RESPIRATION RATE: 16 BRPM | BODY MASS INDEX: 26.39 KG/M2 | HEART RATE: 91 BPM | OXYGEN SATURATION: 98 % | SYSTOLIC BLOOD PRESSURE: 133 MMHG | WEIGHT: 178.2 LBS | DIASTOLIC BLOOD PRESSURE: 89 MMHG | HEIGHT: 69 IN | TEMPERATURE: 97.6 F

## 2022-01-01 VITALS
OXYGEN SATURATION: 99 % | DIASTOLIC BLOOD PRESSURE: 76 MMHG | HEART RATE: 93 BPM | SYSTOLIC BLOOD PRESSURE: 128 MMHG | TEMPERATURE: 97.1 F

## 2022-01-01 VITALS
HEART RATE: 125 BPM | DIASTOLIC BLOOD PRESSURE: 83 MMHG | SYSTOLIC BLOOD PRESSURE: 141 MMHG | TEMPERATURE: 98.6 F | BODY MASS INDEX: 30.36 KG/M2 | HEIGHT: 69 IN | WEIGHT: 205 LBS | OXYGEN SATURATION: 99 %

## 2022-01-01 VITALS
HEART RATE: 87 BPM | TEMPERATURE: 97.7 F | WEIGHT: 184.8 LBS | SYSTOLIC BLOOD PRESSURE: 118 MMHG | HEIGHT: 69 IN | BODY MASS INDEX: 27.37 KG/M2 | DIASTOLIC BLOOD PRESSURE: 79 MMHG | OXYGEN SATURATION: 97 %

## 2022-01-01 VITALS
SYSTOLIC BLOOD PRESSURE: 124 MMHG | RESPIRATION RATE: 18 BRPM | DIASTOLIC BLOOD PRESSURE: 82 MMHG | WEIGHT: 183 LBS | TEMPERATURE: 96.8 F | OXYGEN SATURATION: 97 % | BODY MASS INDEX: 27.11 KG/M2 | HEIGHT: 69 IN | HEART RATE: 67 BPM

## 2022-01-01 VITALS
DIASTOLIC BLOOD PRESSURE: 81 MMHG | BODY MASS INDEX: 29.42 KG/M2 | WEIGHT: 176.81 LBS | TEMPERATURE: 98.2 F | HEART RATE: 152 BPM | OXYGEN SATURATION: 76 % | SYSTOLIC BLOOD PRESSURE: 130 MMHG | RESPIRATION RATE: 20 BRPM

## 2022-01-01 VITALS
DIASTOLIC BLOOD PRESSURE: 61 MMHG | HEIGHT: 69 IN | HEART RATE: 105 BPM | TEMPERATURE: 97.7 F | BODY MASS INDEX: 27.78 KG/M2 | SYSTOLIC BLOOD PRESSURE: 86 MMHG | OXYGEN SATURATION: 97 % | WEIGHT: 187.6 LBS

## 2022-01-01 VITALS
HEART RATE: 89 BPM | RESPIRATION RATE: 16 BRPM | TEMPERATURE: 97.5 F | DIASTOLIC BLOOD PRESSURE: 88 MMHG | SYSTOLIC BLOOD PRESSURE: 132 MMHG | OXYGEN SATURATION: 99 %

## 2022-01-01 VITALS
WEIGHT: 183 LBS | SYSTOLIC BLOOD PRESSURE: 127 MMHG | HEIGHT: 69 IN | HEART RATE: 105 BPM | DIASTOLIC BLOOD PRESSURE: 76 MMHG | BODY MASS INDEX: 27.11 KG/M2

## 2022-01-01 VITALS
HEIGHT: 69 IN | WEIGHT: 179 LBS | RESPIRATION RATE: 16 BRPM | DIASTOLIC BLOOD PRESSURE: 86 MMHG | BODY MASS INDEX: 26.51 KG/M2 | HEART RATE: 107 BPM | OXYGEN SATURATION: 96 % | SYSTOLIC BLOOD PRESSURE: 148 MMHG

## 2022-01-01 VITALS
HEIGHT: 69 IN | DIASTOLIC BLOOD PRESSURE: 83 MMHG | SYSTOLIC BLOOD PRESSURE: 126 MMHG | OXYGEN SATURATION: 99 % | WEIGHT: 196.8 LBS | HEART RATE: 102 BPM | TEMPERATURE: 98.4 F | BODY MASS INDEX: 29.15 KG/M2

## 2022-01-01 VITALS
TEMPERATURE: 97.2 F | SYSTOLIC BLOOD PRESSURE: 104 MMHG | HEART RATE: 90 BPM | RESPIRATION RATE: 17 BRPM | DIASTOLIC BLOOD PRESSURE: 58 MMHG

## 2022-01-01 VITALS
RESPIRATION RATE: 18 BRPM | DIASTOLIC BLOOD PRESSURE: 62 MMHG | SYSTOLIC BLOOD PRESSURE: 120 MMHG | HEART RATE: 101 BPM | TEMPERATURE: 98.6 F | OXYGEN SATURATION: 99 %

## 2022-01-01 VITALS
HEART RATE: 103 BPM | OXYGEN SATURATION: 94 % | SYSTOLIC BLOOD PRESSURE: 122 MMHG | TEMPERATURE: 97.1 F | DIASTOLIC BLOOD PRESSURE: 76 MMHG

## 2022-01-01 DIAGNOSIS — I27.20 PULMONARY HYPERTENSION: Primary | ICD-10-CM

## 2022-01-01 DIAGNOSIS — R26.2 DIFFICULTY WALKING: Primary | ICD-10-CM

## 2022-01-01 DIAGNOSIS — Z96.642 PRESENCE OF LEFT ARTIFICIAL HIP JOINT: ICD-10-CM

## 2022-01-01 DIAGNOSIS — E86.0 DEHYDRATION: ICD-10-CM

## 2022-01-01 DIAGNOSIS — S32.591A CLOSED FRACTURE OF MULTIPLE PUBIC RAMI, RIGHT, INITIAL ENCOUNTER: ICD-10-CM

## 2022-01-01 DIAGNOSIS — G44.031 INTRACTABLE EPISODIC PAROXYSMAL HEMICRANIA: Primary | ICD-10-CM

## 2022-01-01 DIAGNOSIS — I48.0 PAROXYSMAL ATRIAL FIBRILLATION: Chronic | ICD-10-CM

## 2022-01-01 DIAGNOSIS — E03.9 MYXEDEMA HEART DISEASE: ICD-10-CM

## 2022-01-01 DIAGNOSIS — Z96.642 STATUS POST TOTAL HIP REPLACEMENT, LEFT: Primary | ICD-10-CM

## 2022-01-01 DIAGNOSIS — A41.9 SEPSIS, DUE TO UNSPECIFIED ORGANISM, UNSPECIFIED WHETHER ACUTE ORGAN DYSFUNCTION PRESENT: Primary | ICD-10-CM

## 2022-01-01 DIAGNOSIS — N30.01 ACUTE CYSTITIS WITH HEMATURIA: ICD-10-CM

## 2022-01-01 DIAGNOSIS — I10 ESSENTIAL HYPERTENSION: Primary | Chronic | ICD-10-CM

## 2022-01-01 DIAGNOSIS — I10 ESSENTIAL HYPERTENSION: ICD-10-CM

## 2022-01-01 DIAGNOSIS — R26.89 BALANCE PROBLEM: ICD-10-CM

## 2022-01-01 DIAGNOSIS — R26.2 DIFFICULTY WALKING: ICD-10-CM

## 2022-01-01 DIAGNOSIS — I73.89: ICD-10-CM

## 2022-01-01 DIAGNOSIS — I10 ESSENTIAL HYPERTENSION: Chronic | ICD-10-CM

## 2022-01-01 DIAGNOSIS — R41.82 ALTERED MENTAL STATUS, UNSPECIFIED ALTERED MENTAL STATUS TYPE: ICD-10-CM

## 2022-01-01 DIAGNOSIS — I48.0 PAROXYSMAL ATRIAL FIBRILLATION: ICD-10-CM

## 2022-01-01 DIAGNOSIS — R53.1 GENERALIZED WEAKNESS: Primary | ICD-10-CM

## 2022-01-01 DIAGNOSIS — Z96.642 PRESENCE OF LEFT ARTIFICIAL HIP JOINT: Primary | ICD-10-CM

## 2022-01-01 DIAGNOSIS — R60.0 BILATERAL LOWER EXTREMITY EDEMA: ICD-10-CM

## 2022-01-01 DIAGNOSIS — I51.9 MYXEDEMA HEART DISEASE: ICD-10-CM

## 2022-01-01 DIAGNOSIS — R60.9 PERIPHERAL EDEMA: Primary | ICD-10-CM

## 2022-01-01 DIAGNOSIS — I07.1 MODERATE TRICUSPID REGURGITATION: ICD-10-CM

## 2022-01-01 DIAGNOSIS — R00.0 TACHYCARDIA: ICD-10-CM

## 2022-01-01 DIAGNOSIS — H02.402 PTOSIS OF LEFT EYELID: Primary | ICD-10-CM

## 2022-01-01 DIAGNOSIS — I89.0 LYMPHEDEMA OF BOTH LOWER EXTREMITIES: Primary | ICD-10-CM

## 2022-01-01 DIAGNOSIS — H53.2 DIPLOPIA: ICD-10-CM

## 2022-01-01 DIAGNOSIS — L24.A9 DRAINAGE FROM WOUND: ICD-10-CM

## 2022-01-01 DIAGNOSIS — R60.9 PERIPHERAL EDEMA: ICD-10-CM

## 2022-01-01 DIAGNOSIS — E03.9 ACQUIRED HYPOTHYROIDISM: Chronic | ICD-10-CM

## 2022-01-01 DIAGNOSIS — R51.9 HEADACHE: ICD-10-CM

## 2022-01-01 DIAGNOSIS — Z13.820 ENCOUNTER FOR OSTEOPOROSIS SCREENING IN ASYMPTOMATIC POSTMENOPAUSAL PATIENT: ICD-10-CM

## 2022-01-01 DIAGNOSIS — Z78.0 ENCOUNTER FOR OSTEOPOROSIS SCREENING IN ASYMPTOMATIC POSTMENOPAUSAL PATIENT: ICD-10-CM

## 2022-01-01 DIAGNOSIS — S32.591A PUBIC RAMUS FRACTURE, RIGHT, CLOSED, INITIAL ENCOUNTER: ICD-10-CM

## 2022-01-01 DIAGNOSIS — I27.20 PULMONARY HYPERTENSION: Primary | Chronic | ICD-10-CM

## 2022-01-01 DIAGNOSIS — R30.0 DYSURIA: ICD-10-CM

## 2022-01-01 DIAGNOSIS — I27.20 PULMONARY HYPERTENSION: Chronic | ICD-10-CM

## 2022-01-01 DIAGNOSIS — H49.02 LEFT OCULOMOTOR NERVE PALSY: ICD-10-CM

## 2022-01-01 DIAGNOSIS — R06.02 EXERTIONAL SHORTNESS OF BREATH: Primary | ICD-10-CM

## 2022-01-01 DIAGNOSIS — I82.461 ACUTE DEEP VEIN THROMBOSIS (DVT) OF CALF MUSCLE VEIN OF RIGHT LOWER EXTREMITY: ICD-10-CM

## 2022-01-01 DIAGNOSIS — H49.02 LEFT OCULOMOTOR NERVE PALSY: Primary | ICD-10-CM

## 2022-01-01 DIAGNOSIS — H02.402 PTOSIS OF LEFT EYELID: ICD-10-CM

## 2022-01-01 DIAGNOSIS — I10 ESSENTIAL HYPERTENSION: Primary | ICD-10-CM

## 2022-01-01 DIAGNOSIS — R60.0 LOWER EXTREMITY EDEMA: ICD-10-CM

## 2022-01-01 DIAGNOSIS — L03.119 CELLULITIS OF LOWER EXTREMITY, UNSPECIFIED LATERALITY: ICD-10-CM

## 2022-01-01 DIAGNOSIS — E03.9 ACQUIRED HYPOTHYROIDISM: ICD-10-CM

## 2022-01-01 DIAGNOSIS — S72.002A LEFT DISPLACED FEMORAL NECK FRACTURE: Primary | ICD-10-CM

## 2022-01-01 DIAGNOSIS — Z91.81 AT RISK FOR FALLS: ICD-10-CM

## 2022-01-01 DIAGNOSIS — N32.81 OVERACTIVE BLADDER: ICD-10-CM

## 2022-01-01 DIAGNOSIS — R26.81 UNSTEADY GAIT WHEN WALKING: ICD-10-CM

## 2022-01-01 LAB
25(OH)D3 SERPL-MCNC: 57.1 NG/ML (ref 30–100)
ABO GROUP BLD: NORMAL
ACHR BIND AB SER-SCNC: <0.03 NMOL/L (ref 0–0.24)
ACHR BLOCK AB SER-ACNC: 25 % (ref 0–25)
ACHR MOD AB SER QL FC: NEGATIVE
ACHR MOD AB/ACHR TOTAL SFR SER: NORMAL %
ALBUMIN MFR CSF ELPH: 60.4 % (ref 56.8–76.4)
ALBUMIN SERPL-MCNC: 3.2 G/DL (ref 3.5–5.2)
ALBUMIN SERPL-MCNC: 3.6 G/DL (ref 3.5–5.2)
ALBUMIN SERPL-MCNC: 3.7 G/DL (ref 3.5–5.2)
ALBUMIN SERPL-MCNC: 3.8 G/DL (ref 3.5–5.2)
ALBUMIN SERPL-MCNC: 4 G/DL (ref 3.5–5.2)
ALBUMIN SERPL-MCNC: 4.1 G/DL (ref 3.5–5.2)
ALBUMIN SERPL-MCNC: 4.2 G/DL (ref 3.5–5.2)
ALBUMIN SERPL-MCNC: 4.3 G/DL (ref 3.5–5.2)
ALBUMIN SERPL-MCNC: 4.3 G/DL (ref 3.5–5.2)
ALBUMIN/GLOB SERPL: 1.3 G/DL
ALBUMIN/GLOB SERPL: 1.3 G/DL
ALBUMIN/GLOB SERPL: 1.4 G/DL
ALBUMIN/GLOB SERPL: 1.5 G/DL
ALBUMIN/GLOB SERPL: 1.6 G/DL
ALBUMIN/GLOB SERPL: 1.7 G/DL
ALBUMIN/GLOB SERPL: 1.7 G/DL
ALP SERPL-CCNC: 118 U/L (ref 39–117)
ALP SERPL-CCNC: 60 U/L (ref 39–117)
ALP SERPL-CCNC: 61 U/L (ref 39–117)
ALP SERPL-CCNC: 61 U/L (ref 39–117)
ALP SERPL-CCNC: 69 U/L (ref 39–117)
ALP SERPL-CCNC: 71 U/L (ref 39–117)
ALP SERPL-CCNC: 73 U/L (ref 39–117)
ALP SERPL-CCNC: 74 U/L (ref 39–117)
ALP SERPL-CCNC: 98 U/L (ref 39–117)
ALPHA1 GLOB MFR CSF ELPH: 4.8 % (ref 1.1–6.6)
ALPHA2 GLOB MFR CSF ELPH: 9.5 % (ref 3–12.6)
ALT SERPL W P-5'-P-CCNC: 11 U/L (ref 1–33)
ALT SERPL W P-5'-P-CCNC: 12 U/L (ref 1–33)
ALT SERPL W P-5'-P-CCNC: 13 U/L (ref 1–33)
ALT SERPL W P-5'-P-CCNC: 13 U/L (ref 1–33)
ALT SERPL W P-5'-P-CCNC: 15 U/L (ref 1–33)
ALT SERPL W P-5'-P-CCNC: 15 U/L (ref 1–33)
ALT SERPL W P-5'-P-CCNC: 22 U/L (ref 1–33)
ALT SERPL W P-5'-P-CCNC: 24 U/L (ref 1–33)
ALT SERPL W P-5'-P-CCNC: 35 U/L (ref 1–33)
AMORPH URATE CRY URNS QL MICRO: ABNORMAL /HPF
ANION GAP SERPL CALCULATED.3IONS-SCNC: 10 MMOL/L (ref 5–15)
ANION GAP SERPL CALCULATED.3IONS-SCNC: 11 MMOL/L (ref 5–15)
ANION GAP SERPL CALCULATED.3IONS-SCNC: 11.8 MMOL/L (ref 5–15)
ANION GAP SERPL CALCULATED.3IONS-SCNC: 12 MMOL/L (ref 5–15)
ANION GAP SERPL CALCULATED.3IONS-SCNC: 12.3 MMOL/L (ref 5–15)
ANION GAP SERPL CALCULATED.3IONS-SCNC: 13 MMOL/L (ref 5–15)
ANION GAP SERPL CALCULATED.3IONS-SCNC: 13 MMOL/L (ref 5–15)
ANION GAP SERPL CALCULATED.3IONS-SCNC: 14 MMOL/L (ref 5–15)
ANION GAP SERPL CALCULATED.3IONS-SCNC: 15 MMOL/L (ref 5–15)
ANION GAP SERPL CALCULATED.3IONS-SCNC: 16 MMOL/L (ref 5–15)
ANION GAP SERPL CALCULATED.3IONS-SCNC: 19 MMOL/L (ref 5–15)
ANION GAP SERPL CALCULATED.3IONS-SCNC: 20 MMOL/L (ref 5–15)
ANION GAP SERPL CALCULATED.3IONS-SCNC: 7.6 MMOL/L (ref 5–15)
ANION GAP SERPL CALCULATED.3IONS-SCNC: 9 MMOL/L (ref 5–15)
ANISOCYTOSIS BLD QL: ABNORMAL
APPEARANCE CSF: CLEAR
APTT PPP: 22.2 SECONDS (ref 61–76.5)
APTT PPP: 24.1 SECONDS (ref 61–76.5)
APTT PPP: 24.2 SECONDS (ref 61–76.5)
APTT PPP: 24.4 SECONDS (ref 61–76.5)
APTT PPP: 33.3 SECONDS (ref 61–76.5)
APTT PPP: 51.8 SECONDS (ref 61–76.5)
APTT PPP: 64.7 SECONDS (ref 61–76.5)
APTT PPP: 80.7 SECONDS (ref 61–76.5)
APTT PPP: 84 SECONDS (ref 61–76.5)
APTT PPP: >139 SECONDS (ref 61–76.5)
APTT PPP: >200 SECONDS (ref 22.7–35.4)
ARTERIAL PATENCY WRIST A: POSITIVE
AST SERPL-CCNC: 23 U/L (ref 1–32)
AST SERPL-CCNC: 23 U/L (ref 1–32)
AST SERPL-CCNC: 24 U/L (ref 1–32)
AST SERPL-CCNC: 26 U/L (ref 1–32)
AST SERPL-CCNC: 30 U/L (ref 1–32)
AST SERPL-CCNC: 33 U/L (ref 1–32)
AST SERPL-CCNC: 42 U/L (ref 1–32)
ATMOSPHERIC PRESS: ABNORMAL MM[HG]
B PARAPERT DNA SPEC QL NAA+PROBE: NOT DETECTED
B PERT DNA SPEC QL NAA+PROBE: NOT DETECTED
B-GLOBULIN MFR CSF ELPH: 15.5 % (ref 7.3–17.9)
BACTERIA SPEC AEROBE CULT: ABNORMAL
BACTERIA SPEC AEROBE CULT: NORMAL
BACTERIA UR QL AUTO: ABNORMAL /HPF
BASE EXCESS BLDA CALC-SCNC: 1.5 MMOL/L (ref 0–3)
BASOPHILS # BLD AUTO: 0 10*3/MM3 (ref 0–0.2)
BASOPHILS # BLD AUTO: 0.1 10*3/MM3 (ref 0–0.2)
BASOPHILS # BLD AUTO: 0.11 10*3/MM3 (ref 0–0.2)
BASOPHILS NFR BLD AUTO: 0.4 % (ref 0–1.5)
BASOPHILS NFR BLD AUTO: 0.6 % (ref 0–1.5)
BASOPHILS NFR BLD AUTO: 0.7 % (ref 0–1.5)
BASOPHILS NFR BLD AUTO: 0.9 % (ref 0–1.5)
BASOPHILS NFR BLD AUTO: 1 % (ref 0–1.5)
BASOPHILS NFR BLD AUTO: 1 % (ref 0–1.5)
BASOPHILS NFR BLD AUTO: 1.1 % (ref 0–1.5)
BASOPHILS NFR BLD AUTO: 1.3 % (ref 0–1.5)
BASOPHILS NFR BLD AUTO: 1.5 % (ref 0–1.5)
BASOPHILS NFR BLD AUTO: 2.1 % (ref 0–1.5)
BASOPHILS NFR BLD AUTO: 2.2 % (ref 0–1.5)
BDY SITE: ABNORMAL
BH CV ECHO MEAS - ACS: 1.76 CM
BH CV ECHO MEAS - ACS: 2.35 CM
BH CV ECHO MEAS - AO MAX PG: 5.3 MMHG
BH CV ECHO MEAS - AO MEAN PG: 3.1 MMHG
BH CV ECHO MEAS - AO ROOT DIAM: 3.5 CM
BH CV ECHO MEAS - AO ROOT DIAM: 3.8 CM
BH CV ECHO MEAS - AO V2 MAX: 114.8 CM/SEC
BH CV ECHO MEAS - AO V2 VTI: 21.7 CM
BH CV ECHO MEAS - AVA(I,D): 4 CM2
BH CV ECHO MEAS - EDV(CUBED): 59.8 ML
BH CV ECHO MEAS - EDV(CUBED): 67.3 ML
BH CV ECHO MEAS - EDV(MOD-SP4): 34.7 ML
BH CV ECHO MEAS - EDV(MOD-SP4): 67.9 ML
BH CV ECHO MEAS - EF(MOD-BP): 54 %
BH CV ECHO MEAS - EF(MOD-SP4): 41.9 %
BH CV ECHO MEAS - EF(MOD-SP4): 53.7 %
BH CV ECHO MEAS - ESV(CUBED): 18.3 ML
BH CV ECHO MEAS - ESV(CUBED): 37.8 ML
BH CV ECHO MEAS - ESV(MOD-SP4): 20.1 ML
BH CV ECHO MEAS - ESV(MOD-SP4): 31.4 ML
BH CV ECHO MEAS - FS: 17.5 %
BH CV ECHO MEAS - FS: 32.6 %
BH CV ECHO MEAS - IVS/LVPW: 0.84 CM
BH CV ECHO MEAS - IVS/LVPW: 1.07 CM
BH CV ECHO MEAS - IVSD: 0.86 CM
BH CV ECHO MEAS - IVSD: 1.11 CM
BH CV ECHO MEAS - LA A2CS (ATRIAL LENGTH): 4.8 CM
BH CV ECHO MEAS - LA DIMENSION: 3.5 CM
BH CV ECHO MEAS - LV DIASTOLIC VOL/BSA (35-75): 18.9 CM2
BH CV ECHO MEAS - LV DIASTOLIC VOL/BSA (35-75): 33.6 CM2
BH CV ECHO MEAS - LV MASS(C)D: 112.5 GRAMS
BH CV ECHO MEAS - LV MASS(C)D: 144.3 GRAMS
BH CV ECHO MEAS - LV MAX PG: 2.35 MMHG
BH CV ECHO MEAS - LV MEAN PG: 1.39 MMHG
BH CV ECHO MEAS - LV SYSTOLIC VOL/BSA (12-30): 11 CM2
BH CV ECHO MEAS - LV SYSTOLIC VOL/BSA (12-30): 15.5 CM2
BH CV ECHO MEAS - LV V1 MAX: 76.6 CM/SEC
BH CV ECHO MEAS - LV V1 VTI: 17.4 CM
BH CV ECHO MEAS - LVIDD: 3.9 CM
BH CV ECHO MEAS - LVIDD: 4.1 CM
BH CV ECHO MEAS - LVIDS: 2.6 CM
BH CV ECHO MEAS - LVIDS: 3.4 CM
BH CV ECHO MEAS - LVOT AREA: 2.9 CM2
BH CV ECHO MEAS - LVOT AREA: 5 CM2
BH CV ECHO MEAS - LVOT DIAM: 1.91 CM
BH CV ECHO MEAS - LVOT DIAM: 2.5 CM
BH CV ECHO MEAS - LVPWD: 1.02 CM
BH CV ECHO MEAS - LVPWD: 1.04 CM
BH CV ECHO MEAS - MV DEC TIME: 0.16 MSEC
BH CV ECHO MEAS - MV E MAX VEL: 86.5 CM/SEC
BH CV ECHO MEAS - MV MAX PG: 5.7 MMHG
BH CV ECHO MEAS - MV MEAN PG: 2.9 MMHG
BH CV ECHO MEAS - MV V2 VTI: 18 CM
BH CV ECHO MEAS - MVA(VTI): 4.8 CM2
BH CV ECHO MEAS - PA V2 MAX: 93.1 CM/SEC
BH CV ECHO MEAS - PI END-D VEL: 103 CM/SEC
BH CV ECHO MEAS - QP/QS: 0.34
BH CV ECHO MEAS - RV MAX PG: 1.95 MMHG
BH CV ECHO MEAS - RV V1 MAX: 69.8 CM/SEC
BH CV ECHO MEAS - RV V1 VTI: 13.1 CM
BH CV ECHO MEAS - RVDD: 2.6 CM
BH CV ECHO MEAS - RVDD: 3.4 CM
BH CV ECHO MEAS - RVOT DIAM: 1.7 CM
BH CV ECHO MEAS - SI(MOD-SP4): 18 ML/M2
BH CV ECHO MEAS - SI(MOD-SP4): 7.9 ML/M2
BH CV ECHO MEAS - SV(LVOT): 86.7 ML
BH CV ECHO MEAS - SV(MOD-SP4): 14.5 ML
BH CV ECHO MEAS - SV(MOD-SP4): 36.5 ML
BH CV ECHO MEAS - SV(RVOT): 29.5 ML
BH CV ECHO MEAS - TR MAX PG: 36.5 MMHG
BH CV ECHO MEAS - TR MAX VEL: 301.2 CM/SEC
BH CV LOW VAS RIGHT GASTRONEMIUS VESSEL: 1
BH CV LOW VAS RIGHT GASTRONEMIUS VESSEL: 1
BH CV LOW VAS RIGHT LESSER SAPH VESSEL: 1
BH CV LOWER VASCULAR LEFT COMMON FEMORAL AUGMENT: NORMAL
BH CV LOWER VASCULAR LEFT COMMON FEMORAL AUGMENT: NORMAL
BH CV LOWER VASCULAR LEFT COMMON FEMORAL COMPETENT: NORMAL
BH CV LOWER VASCULAR LEFT COMMON FEMORAL COMPETENT: NORMAL
BH CV LOWER VASCULAR LEFT COMMON FEMORAL COMPRESS: NORMAL
BH CV LOWER VASCULAR LEFT COMMON FEMORAL COMPRESS: NORMAL
BH CV LOWER VASCULAR LEFT COMMON FEMORAL PHASIC: NORMAL
BH CV LOWER VASCULAR LEFT COMMON FEMORAL PHASIC: NORMAL
BH CV LOWER VASCULAR LEFT COMMON FEMORAL SPONT: NORMAL
BH CV LOWER VASCULAR LEFT COMMON FEMORAL SPONT: NORMAL
BH CV LOWER VASCULAR LEFT DISTAL FEMORAL COMPRESS: NORMAL
BH CV LOWER VASCULAR LEFT GASTRONEMIUS COMPRESS: NORMAL
BH CV LOWER VASCULAR LEFT GREATER SAPH AK COMPRESS: NORMAL
BH CV LOWER VASCULAR LEFT GREATER SAPH BK COMPRESS: NORMAL
BH CV LOWER VASCULAR LEFT LESSER SAPH COMPRESS: NORMAL
BH CV LOWER VASCULAR LEFT MID FEMORAL AUGMENT: NORMAL
BH CV LOWER VASCULAR LEFT MID FEMORAL COMPETENT: NORMAL
BH CV LOWER VASCULAR LEFT MID FEMORAL COMPRESS: NORMAL
BH CV LOWER VASCULAR LEFT MID FEMORAL PHASIC: NORMAL
BH CV LOWER VASCULAR LEFT MID FEMORAL SPONT: NORMAL
BH CV LOWER VASCULAR LEFT PERONEAL COMPRESS: NORMAL
BH CV LOWER VASCULAR LEFT POPLITEAL AUGMENT: NORMAL
BH CV LOWER VASCULAR LEFT POPLITEAL COMPETENT: NORMAL
BH CV LOWER VASCULAR LEFT POPLITEAL COMPRESS: NORMAL
BH CV LOWER VASCULAR LEFT POPLITEAL PHASIC: NORMAL
BH CV LOWER VASCULAR LEFT POPLITEAL SPONT: NORMAL
BH CV LOWER VASCULAR LEFT POSTERIOR TIBIAL COMPRESS: NORMAL
BH CV LOWER VASCULAR LEFT PROXIMAL FEMORAL COMPRESS: NORMAL
BH CV LOWER VASCULAR LEFT SAPHENOFEMORAL JUNCTION COMPRESS: NORMAL
BH CV LOWER VASCULAR RIGHT COMMON FEMORAL AUGMENT: NORMAL
BH CV LOWER VASCULAR RIGHT COMMON FEMORAL AUGMENT: NORMAL
BH CV LOWER VASCULAR RIGHT COMMON FEMORAL COMPETENT: NORMAL
BH CV LOWER VASCULAR RIGHT COMMON FEMORAL COMPETENT: NORMAL
BH CV LOWER VASCULAR RIGHT COMMON FEMORAL COMPRESS: NORMAL
BH CV LOWER VASCULAR RIGHT COMMON FEMORAL COMPRESS: NORMAL
BH CV LOWER VASCULAR RIGHT COMMON FEMORAL PHASIC: NORMAL
BH CV LOWER VASCULAR RIGHT COMMON FEMORAL PHASIC: NORMAL
BH CV LOWER VASCULAR RIGHT COMMON FEMORAL SPONT: NORMAL
BH CV LOWER VASCULAR RIGHT COMMON FEMORAL SPONT: NORMAL
BH CV LOWER VASCULAR RIGHT DISTAL FEMORAL COMPRESS: NORMAL
BH CV LOWER VASCULAR RIGHT DISTAL FEMORAL COMPRESS: NORMAL
BH CV LOWER VASCULAR RIGHT GASTRONEMIUS COMPRESS: NORMAL
BH CV LOWER VASCULAR RIGHT GASTRONEMIUS COMPRESS: NORMAL
BH CV LOWER VASCULAR RIGHT GASTRONEMIUS THROMBUS: NORMAL
BH CV LOWER VASCULAR RIGHT GASTRONEMIUS THROMBUS: NORMAL
BH CV LOWER VASCULAR RIGHT GREATER SAPH AK COMPRESS: NORMAL
BH CV LOWER VASCULAR RIGHT GREATER SAPH AK COMPRESS: NORMAL
BH CV LOWER VASCULAR RIGHT GREATER SAPH BK COMPRESS: NORMAL
BH CV LOWER VASCULAR RIGHT GREATER SAPH BK COMPRESS: NORMAL
BH CV LOWER VASCULAR RIGHT LESSER SAPH COMPRESS: NORMAL
BH CV LOWER VASCULAR RIGHT LESSER SAPH COMPRESS: NORMAL
BH CV LOWER VASCULAR RIGHT LESSER SAPH THROMBUS: NORMAL
BH CV LOWER VASCULAR RIGHT MID FEMORAL AUGMENT: NORMAL
BH CV LOWER VASCULAR RIGHT MID FEMORAL AUGMENT: NORMAL
BH CV LOWER VASCULAR RIGHT MID FEMORAL COMPETENT: NORMAL
BH CV LOWER VASCULAR RIGHT MID FEMORAL COMPETENT: NORMAL
BH CV LOWER VASCULAR RIGHT MID FEMORAL COMPRESS: NORMAL
BH CV LOWER VASCULAR RIGHT MID FEMORAL COMPRESS: NORMAL
BH CV LOWER VASCULAR RIGHT MID FEMORAL PHASIC: NORMAL
BH CV LOWER VASCULAR RIGHT MID FEMORAL PHASIC: NORMAL
BH CV LOWER VASCULAR RIGHT MID FEMORAL SPONT: NORMAL
BH CV LOWER VASCULAR RIGHT MID FEMORAL SPONT: NORMAL
BH CV LOWER VASCULAR RIGHT PERONEAL COMPRESS: NORMAL
BH CV LOWER VASCULAR RIGHT PERONEAL COMPRESS: NORMAL
BH CV LOWER VASCULAR RIGHT POPLITEAL AUGMENT: NORMAL
BH CV LOWER VASCULAR RIGHT POPLITEAL AUGMENT: NORMAL
BH CV LOWER VASCULAR RIGHT POPLITEAL COMPETENT: NORMAL
BH CV LOWER VASCULAR RIGHT POPLITEAL COMPETENT: NORMAL
BH CV LOWER VASCULAR RIGHT POPLITEAL COMPRESS: NORMAL
BH CV LOWER VASCULAR RIGHT POPLITEAL COMPRESS: NORMAL
BH CV LOWER VASCULAR RIGHT POPLITEAL PHASIC: NORMAL
BH CV LOWER VASCULAR RIGHT POPLITEAL PHASIC: NORMAL
BH CV LOWER VASCULAR RIGHT POPLITEAL SPONT: NORMAL
BH CV LOWER VASCULAR RIGHT POPLITEAL SPONT: NORMAL
BH CV LOWER VASCULAR RIGHT POSTERIOR TIBIAL COMPRESS: NORMAL
BH CV LOWER VASCULAR RIGHT POSTERIOR TIBIAL COMPRESS: NORMAL
BH CV LOWER VASCULAR RIGHT PROXIMAL FEMORAL COMPRESS: NORMAL
BH CV LOWER VASCULAR RIGHT PROXIMAL FEMORAL COMPRESS: NORMAL
BH CV LOWER VASCULAR RIGHT SAPHENOFEMORAL JUNCTION COMPRESS: NORMAL
BH CV LOWER VASCULAR RIGHT SAPHENOFEMORAL JUNCTION COMPRESS: NORMAL
BILIRUB BLD-MCNC: NEGATIVE MG/DL
BILIRUB SERPL-MCNC: 0.3 MG/DL (ref 0–1.2)
BILIRUB SERPL-MCNC: 0.4 MG/DL (ref 0–1.2)
BILIRUB SERPL-MCNC: 0.5 MG/DL (ref 0–1.2)
BILIRUB SERPL-MCNC: 0.5 MG/DL (ref 0–1.2)
BILIRUB SERPL-MCNC: 0.8 MG/DL (ref 0–1.2)
BILIRUB UR QL STRIP: NEGATIVE
BLD GP AB SCN SERPL QL: NEGATIVE
BUN SERPL-MCNC: 12 MG/DL (ref 8–23)
BUN SERPL-MCNC: 14 MG/DL (ref 8–23)
BUN SERPL-MCNC: 15 MG/DL (ref 8–23)
BUN SERPL-MCNC: 18 MG/DL (ref 8–23)
BUN SERPL-MCNC: 19 MG/DL (ref 8–23)
BUN SERPL-MCNC: 20 MG/DL (ref 8–23)
BUN SERPL-MCNC: 20 MG/DL (ref 8–23)
BUN SERPL-MCNC: 21 MG/DL (ref 8–23)
BUN SERPL-MCNC: 23 MG/DL (ref 8–23)
BUN SERPL-MCNC: 24 MG/DL (ref 8–23)
BUN SERPL-MCNC: 26 MG/DL (ref 8–23)
BUN SERPL-MCNC: 28 MG/DL (ref 8–23)
BUN SERPL-MCNC: 28 MG/DL (ref 8–23)
BUN SERPL-MCNC: 38 MG/DL (ref 8–23)
BUN SERPL-MCNC: 47 MG/DL (ref 8–23)
BUN SERPL-MCNC: 48 MG/DL (ref 8–23)
BUN SERPL-MCNC: 58 MG/DL (ref 8–23)
BUN/CREAT SERPL: 14.5 (ref 7–25)
BUN/CREAT SERPL: 14.7 (ref 7–25)
BUN/CREAT SERPL: 16.7 (ref 7–25)
BUN/CREAT SERPL: 17.3 (ref 7–25)
BUN/CREAT SERPL: 17.8 (ref 7–25)
BUN/CREAT SERPL: 18.7 (ref 7–25)
BUN/CREAT SERPL: 18.8 (ref 7–25)
BUN/CREAT SERPL: 19.1 (ref 7–25)
BUN/CREAT SERPL: 20.5 (ref 7–25)
BUN/CREAT SERPL: 22.8 (ref 7–25)
BUN/CREAT SERPL: 22.9 (ref 7–25)
BUN/CREAT SERPL: 24.2 (ref 7–25)
BUN/CREAT SERPL: 25.2 (ref 7–25)
BUN/CREAT SERPL: 27.5 (ref 7–25)
BUN/CREAT SERPL: 30 (ref 7–25)
BUN/CREAT SERPL: 33.3 (ref 7–25)
BUN/CREAT SERPL: 40 (ref 7–25)
BUN/CREAT SERPL: 42.7 (ref 7–25)
BUN/CREAT SERPL: 44.3 (ref 7–25)
BUN/CREAT SERPL: 47.5 (ref 7–25)
BUN/CREAT SERPL: 48 (ref 7–25)
BURR CELLS BLD QL SMEAR: ABNORMAL
BURR CELLS BLD QL SMEAR: ABNORMAL
C GATTII+NEOFOR DNA CSF QL NAA+NON-PROBE: NOT DETECTED
C PNEUM DNA NPH QL NAA+NON-PROBE: NOT DETECTED
CA-I SERPL ISE-MCNC: 1.27 MMOL/L (ref 1.2–1.3)
CALCIUM SPEC-SCNC: 10 MG/DL (ref 8.6–10.5)
CALCIUM SPEC-SCNC: 10.4 MG/DL (ref 8.6–10.5)
CALCIUM SPEC-SCNC: 10.9 MG/DL (ref 8.6–10.5)
CALCIUM SPEC-SCNC: 7.8 MG/DL (ref 8.6–10.5)
CALCIUM SPEC-SCNC: 8.1 MG/DL (ref 8.6–10.5)
CALCIUM SPEC-SCNC: 8.1 MG/DL (ref 8.6–10.5)
CALCIUM SPEC-SCNC: 8.2 MG/DL (ref 8.6–10.5)
CALCIUM SPEC-SCNC: 8.3 MG/DL (ref 8.6–10.5)
CALCIUM SPEC-SCNC: 8.6 MG/DL (ref 8.6–10.5)
CALCIUM SPEC-SCNC: 8.6 MG/DL (ref 8.6–10.5)
CALCIUM SPEC-SCNC: 8.8 MG/DL (ref 8.6–10.5)
CALCIUM SPEC-SCNC: 8.9 MG/DL (ref 8.6–10.5)
CALCIUM SPEC-SCNC: 9 MG/DL (ref 8.6–10.5)
CALCIUM SPEC-SCNC: 9 MG/DL (ref 8.6–10.5)
CALCIUM SPEC-SCNC: 9.1 MG/DL (ref 8.6–10.5)
CALCIUM SPEC-SCNC: 9.2 MG/DL (ref 8.6–10.5)
CALCIUM SPEC-SCNC: 9.4 MG/DL (ref 8.6–10.5)
CALCIUM SPEC-SCNC: 9.6 MG/DL (ref 8.6–10.5)
CALCIUM SPEC-SCNC: 9.7 MG/DL (ref 8.6–10.5)
CALCIUM SPEC-SCNC: 9.8 MG/DL (ref 8.6–10.5)
CHLORIDE SERPL-SCNC: 100 MMOL/L (ref 98–107)
CHLORIDE SERPL-SCNC: 101 MMOL/L (ref 98–107)
CHLORIDE SERPL-SCNC: 102 MMOL/L (ref 98–107)
CHLORIDE SERPL-SCNC: 102 MMOL/L (ref 98–107)
CHLORIDE SERPL-SCNC: 103 MMOL/L (ref 98–107)
CHLORIDE SERPL-SCNC: 103 MMOL/L (ref 98–107)
CHLORIDE SERPL-SCNC: 104 MMOL/L (ref 98–107)
CHLORIDE SERPL-SCNC: 105 MMOL/L (ref 98–107)
CHLORIDE SERPL-SCNC: 105 MMOL/L (ref 98–107)
CHLORIDE SERPL-SCNC: 106 MMOL/L (ref 98–107)
CHLORIDE SERPL-SCNC: 108 MMOL/L (ref 98–107)
CHLORIDE SERPL-SCNC: 109 MMOL/L (ref 98–107)
CHLORIDE SERPL-SCNC: 110 MMOL/L (ref 98–107)
CHLORIDE SERPL-SCNC: 111 MMOL/L (ref 98–107)
CHLORIDE SERPL-SCNC: 112 MMOL/L (ref 98–107)
CHLORIDE SERPL-SCNC: 114 MMOL/L (ref 98–107)
CHLORIDE SERPL-SCNC: 115 MMOL/L (ref 98–107)
CHLORIDE SERPL-SCNC: 118 MMOL/L (ref 98–107)
CHLORIDE SERPL-SCNC: 95 MMOL/L (ref 98–107)
CHLORIDE SERPL-SCNC: 96 MMOL/L (ref 98–107)
CHLORIDE SERPL-SCNC: 98 MMOL/L (ref 98–107)
CHOLEST SERPL-MCNC: 161 MG/DL (ref 0–200)
CHOLEST SERPL-MCNC: 173 MG/DL (ref 0–200)
CHOLEST SERPL-MCNC: 197 MG/DL (ref 0–200)
CK SERPL-CCNC: 66 U/L (ref 20–180)
CLARITY UR: ABNORMAL
CLARITY UR: CLEAR
CLARITY, POC: CLEAR
CMV DNA CSF QL NAA+PROBE: NOT DETECTED
CO2 BLDA-SCNC: 23.6 MMOL/L (ref 22–29)
CO2 SERPL-SCNC: 19 MMOL/L (ref 22–29)
CO2 SERPL-SCNC: 20 MMOL/L (ref 22–29)
CO2 SERPL-SCNC: 21 MMOL/L (ref 22–29)
CO2 SERPL-SCNC: 21 MMOL/L (ref 22–29)
CO2 SERPL-SCNC: 22 MMOL/L (ref 22–29)
CO2 SERPL-SCNC: 22 MMOL/L (ref 22–29)
CO2 SERPL-SCNC: 23 MMOL/L (ref 22–29)
CO2 SERPL-SCNC: 23 MMOL/L (ref 22–29)
CO2 SERPL-SCNC: 24 MMOL/L (ref 22–29)
CO2 SERPL-SCNC: 25 MMOL/L (ref 22–29)
CO2 SERPL-SCNC: 25 MMOL/L (ref 22–29)
CO2 SERPL-SCNC: 26 MMOL/L (ref 22–29)
CO2 SERPL-SCNC: 27 MMOL/L (ref 22–29)
CO2 SERPL-SCNC: 27.7 MMOL/L (ref 22–29)
CO2 SERPL-SCNC: 28.2 MMOL/L (ref 22–29)
CO2 SERPL-SCNC: 29 MMOL/L (ref 22–29)
CO2 SERPL-SCNC: 29 MMOL/L (ref 22–29)
CO2 SERPL-SCNC: 30 MMOL/L (ref 22–29)
CO2 SERPL-SCNC: 31 MMOL/L (ref 22–29)
CO2 SERPL-SCNC: 31 MMOL/L (ref 22–29)
CO2 SERPL-SCNC: 31.4 MMOL/L (ref 22–29)
COD CRY URNS QL: ABNORMAL /HPF
COLOR CSF: ABNORMAL
COLOR SPUN CSF: COLORLESS
COLOR UR: ABNORMAL
COLOR UR: YELLOW
CREAT SERPL-MCNC: 0.42 MG/DL (ref 0.57–1)
CREAT SERPL-MCNC: 0.45 MG/DL (ref 0.57–1)
CREAT SERPL-MCNC: 0.45 MG/DL (ref 0.57–1)
CREAT SERPL-MCNC: 0.5 MG/DL (ref 0.57–1)
CREAT SERPL-MCNC: 0.65 MG/DL (ref 0.57–1)
CREAT SERPL-MCNC: 0.83 MG/DL (ref 0.57–1)
CREAT SERPL-MCNC: 0.83 MG/DL (ref 0.57–1)
CREAT SERPL-MCNC: 0.88 MG/DL (ref 0.57–1)
CREAT SERPL-MCNC: 0.89 MG/DL (ref 0.57–1)
CREAT SERPL-MCNC: 0.95 MG/DL (ref 0.57–1)
CREAT SERPL-MCNC: 0.99 MG/DL (ref 0.57–1)
CREAT SERPL-MCNC: 1 MG/DL (ref 0.57–1)
CREAT SERPL-MCNC: 1.01 MG/DL (ref 0.57–1)
CREAT SERPL-MCNC: 1.01 MG/DL (ref 0.57–1)
CREAT SERPL-MCNC: 1.02 MG/DL (ref 0.57–1)
CREAT SERPL-MCNC: 1.07 MG/DL (ref 0.57–1)
CREAT SERPL-MCNC: 1.1 MG/DL (ref 0.57–1)
CREAT SERPL-MCNC: 1.11 MG/DL (ref 0.57–1)
CREAT SERPL-MCNC: 1.2 MG/DL (ref 0.57–1)
CREAT SERPL-MCNC: 1.29 MG/DL (ref 0.57–1)
CREAT SERPL-MCNC: 1.29 MG/DL (ref 0.57–1)
CREAT SERPL-MCNC: 1.31 MG/DL (ref 0.57–1)
CREAT SERPL-MCNC: 1.39 MG/DL (ref 0.57–1)
CRP SERPL-MCNC: 0.66 MG/DL (ref 0–0.5)
CRP SERPL-MCNC: <0.3 MG/DL (ref 0–0.5)
CRYPTOC AG CSF QL LA: NEGATIVE
D DIMER PPP FEU-MCNC: 3.09 MG/L (FEU) (ref 0–0.59)
D-LACTATE SERPL-SCNC: 2 MMOL/L (ref 0.5–2)
D-LACTATE SERPL-SCNC: 3.8 MMOL/L (ref 0.5–2)
D-LACTATE SERPL-SCNC: 4.8 MMOL/L (ref 0.5–2)
D-LACTATE SERPL-SCNC: 5.7 MMOL/L (ref 0.5–2)
DACRYOCYTES BLD QL SMEAR: ABNORMAL
DACRYOCYTES BLD QL SMEAR: ABNORMAL
DEPRECATED RDW RBC AUTO: 41.8 FL (ref 37–54)
DEPRECATED RDW RBC AUTO: 46.8 FL (ref 37–54)
DEPRECATED RDW RBC AUTO: 46.8 FL (ref 37–54)
DEPRECATED RDW RBC AUTO: 48.6 FL (ref 37–54)
DEPRECATED RDW RBC AUTO: 51.2 FL (ref 37–54)
DEPRECATED RDW RBC AUTO: 52.5 FL (ref 37–54)
DEPRECATED RDW RBC AUTO: 54.7 FL (ref 37–54)
DEPRECATED RDW RBC AUTO: 55.1 FL (ref 37–54)
DEPRECATED RDW RBC AUTO: 55.6 FL (ref 37–54)
DEPRECATED RDW RBC AUTO: 56 FL (ref 37–54)
DEPRECATED RDW RBC AUTO: 57.3 FL (ref 37–54)
DEPRECATED RDW RBC AUTO: 57.8 FL (ref 37–54)
DEPRECATED RDW RBC AUTO: 58.6 FL (ref 37–54)
DEPRECATED RDW RBC AUTO: 59.1 FL (ref 37–54)
DEPRECATED RDW RBC AUTO: 59.5 FL (ref 37–54)
DEPRECATED RDW RBC AUTO: 59.9 FL (ref 37–54)
DEPRECATED RDW RBC AUTO: 59.9 FL (ref 37–54)
DEPRECATED RDW RBC AUTO: 60.8 FL (ref 37–54)
DEPRECATED RDW RBC AUTO: 60.8 FL (ref 37–54)
DEPRECATED RDW RBC AUTO: 62.6 FL (ref 37–54)
E COLI K1 DNA CSF QL NAA+NON-PROBE: NOT DETECTED
EGFRCR SERPLBLD CKD-EPI 2021: 38.2 ML/MIN/1.73
EGFRCR SERPLBLD CKD-EPI 2021: 41 ML/MIN/1.73
EGFRCR SERPLBLD CKD-EPI 2021: 41.8 ML/MIN/1.73
EGFRCR SERPLBLD CKD-EPI 2021: 41.8 ML/MIN/1.73
EGFRCR SERPLBLD CKD-EPI 2021: 45.6 ML/MIN/1.73
EGFRCR SERPLBLD CKD-EPI 2021: 50 ML/MIN/1.73
EGFRCR SERPLBLD CKD-EPI 2021: 50.6 ML/MIN/1.73
EGFRCR SERPLBLD CKD-EPI 2021: 52.3 ML/MIN/1.73
EGFRCR SERPLBLD CKD-EPI 2021: 56 ML/MIN/1.73
EGFRCR SERPLBLD CKD-EPI 2021: 56 ML/MIN/1.73
EGFRCR SERPLBLD CKD-EPI 2021: 56.7 ML/MIN/1.73
EGFRCR SERPLBLD CKD-EPI 2021: 57.4 ML/MIN/1.73
EGFRCR SERPLBLD CKD-EPI 2021: 65.2 ML/MIN/1.73
EGFRCR SERPLBLD CKD-EPI 2021: 66.1 ML/MIN/1.73
EGFRCR SERPLBLD CKD-EPI 2021: 70.9 ML/MIN/1.73
EGFRCR SERPLBLD CKD-EPI 2021: 88.6 ML/MIN/1.73
EGFRCR SERPLBLD CKD-EPI 2021: 94.4 ML/MIN/1.73
EGFRCR SERPLBLD CKD-EPI 2021: 96.8 ML/MIN/1.73
EGFRCR SERPLBLD CKD-EPI 2021: 96.8 ML/MIN/1.73
EGFRCR SERPLBLD CKD-EPI 2021: 98.4 ML/MIN/1.73
EOSINOPHIL # BLD AUTO: 0 10*3/MM3 (ref 0–0.4)
EOSINOPHIL # BLD AUTO: 0 10*3/MM3 (ref 0–0.4)
EOSINOPHIL # BLD AUTO: 0.1 10*3/MM3 (ref 0–0.4)
EOSINOPHIL # BLD AUTO: 0.19 10*3/MM3 (ref 0–0.4)
EOSINOPHIL # BLD AUTO: 0.2 10*3/MM3 (ref 0–0.4)
EOSINOPHIL # BLD MANUAL: 0.14 10*3/MM3 (ref 0–0.4)
EOSINOPHIL NFR BLD AUTO: 0.1 % (ref 0.3–6.2)
EOSINOPHIL NFR BLD AUTO: 0.4 % (ref 0.3–6.2)
EOSINOPHIL NFR BLD AUTO: 1.4 % (ref 0.3–6.2)
EOSINOPHIL NFR BLD AUTO: 1.6 % (ref 0.3–6.2)
EOSINOPHIL NFR BLD AUTO: 1.9 % (ref 0.3–6.2)
EOSINOPHIL NFR BLD AUTO: 1.9 % (ref 0.3–6.2)
EOSINOPHIL NFR BLD AUTO: 2.2 % (ref 0.3–6.2)
EOSINOPHIL NFR BLD AUTO: 2.7 % (ref 0.3–6.2)
EOSINOPHIL NFR BLD AUTO: 3.2 % (ref 0.3–6.2)
EOSINOPHIL NFR BLD AUTO: 3.4 % (ref 0.3–6.2)
EOSINOPHIL NFR BLD AUTO: 3.7 % (ref 0.3–6.2)
EOSINOPHIL NFR BLD MANUAL: 1 % (ref 0.3–6.2)
ERYTHROCYTE [DISTWIDTH] IN BLOOD BY AUTOMATED COUNT: 13.1 % (ref 12.3–15.4)
ERYTHROCYTE [DISTWIDTH] IN BLOOD BY AUTOMATED COUNT: 14.8 % (ref 12.3–15.4)
ERYTHROCYTE [DISTWIDTH] IN BLOOD BY AUTOMATED COUNT: 14.9 % (ref 12.3–15.4)
ERYTHROCYTE [DISTWIDTH] IN BLOOD BY AUTOMATED COUNT: 15 % (ref 12.3–15.4)
ERYTHROCYTE [DISTWIDTH] IN BLOOD BY AUTOMATED COUNT: 16.5 % (ref 12.3–15.4)
ERYTHROCYTE [DISTWIDTH] IN BLOOD BY AUTOMATED COUNT: 16.6 % (ref 12.3–15.4)
ERYTHROCYTE [DISTWIDTH] IN BLOOD BY AUTOMATED COUNT: 16.8 % (ref 12.3–15.4)
ERYTHROCYTE [DISTWIDTH] IN BLOOD BY AUTOMATED COUNT: 16.8 % (ref 12.3–15.4)
ERYTHROCYTE [DISTWIDTH] IN BLOOD BY AUTOMATED COUNT: 17 % (ref 12.3–15.4)
ERYTHROCYTE [DISTWIDTH] IN BLOOD BY AUTOMATED COUNT: 17.1 % (ref 12.3–15.4)
ERYTHROCYTE [DISTWIDTH] IN BLOOD BY AUTOMATED COUNT: 17.2 % (ref 12.3–15.4)
ERYTHROCYTE [DISTWIDTH] IN BLOOD BY AUTOMATED COUNT: 17.5 % (ref 12.3–15.4)
ERYTHROCYTE [DISTWIDTH] IN BLOOD BY AUTOMATED COUNT: 17.5 % (ref 12.3–15.4)
ERYTHROCYTE [DISTWIDTH] IN BLOOD BY AUTOMATED COUNT: 17.9 % (ref 12.3–15.4)
ERYTHROCYTE [DISTWIDTH] IN BLOOD BY AUTOMATED COUNT: 18.5 % (ref 12.3–15.4)
ERYTHROCYTE [DISTWIDTH] IN BLOOD BY AUTOMATED COUNT: 18.7 % (ref 12.3–15.4)
ERYTHROCYTE [DISTWIDTH] IN BLOOD BY AUTOMATED COUNT: 19 % (ref 12.3–15.4)
ERYTHROCYTE [DISTWIDTH] IN BLOOD BY AUTOMATED COUNT: 19 % (ref 12.3–15.4)
ERYTHROCYTE [DISTWIDTH] IN BLOOD BY AUTOMATED COUNT: 19.1 % (ref 12.3–15.4)
ERYTHROCYTE [DISTWIDTH] IN BLOOD BY AUTOMATED COUNT: 19.2 % (ref 12.3–15.4)
ERYTHROCYTE [SEDIMENTATION RATE] IN BLOOD: 18 MM/HR (ref 0–30)
ERYTHROCYTE [SEDIMENTATION RATE] IN BLOOD: 5 MM/HR (ref 0–30)
ERYTHROCYTE [SEDIMENTATION RATE] IN BLOOD: 7 MM/HR (ref 0–30)
EV RNA CSF QL NAA+PROBE: NOT DETECTED
EXPIRATION DATE: ABNORMAL
FLUAV SUBTYP SPEC NAA+PROBE: NOT DETECTED
FLUBV RNA ISLT QL NAA+PROBE: NOT DETECTED
FOLATE SERPL-MCNC: >20 NG/ML (ref 4.78–24.2)
GAMMA GLOB MFR CSF ELPH: 6 % (ref 3–13)
GFR SERPL CREATININE-BSD FRML MDRD: 52 ML/MIN/1.73
GFR SERPL CREATININE-BSD FRML MDRD: 57 ML/MIN/1.73
GFR SERPL CREATININE-BSD FRML MDRD: 66 ML/MIN/1.73
GLOBULIN UR ELPH-MCNC: 2.1 GM/DL
GLOBULIN UR ELPH-MCNC: 2.3 GM/DL
GLOBULIN UR ELPH-MCNC: 2.6 GM/DL
GLOBULIN UR ELPH-MCNC: 2.6 GM/DL
GLOBULIN UR ELPH-MCNC: 2.7 GM/DL
GLOBULIN UR ELPH-MCNC: 2.8 GM/DL
GLOBULIN UR ELPH-MCNC: 2.9 GM/DL
GLOBULIN UR ELPH-MCNC: 3.1 GM/DL
GLOBULIN UR ELPH-MCNC: 3.1 GM/DL
GLUCOSE BLDC GLUCOMTR-MCNC: 102 MG/DL (ref 70–105)
GLUCOSE BLDC GLUCOMTR-MCNC: 103 MG/DL (ref 70–105)
GLUCOSE BLDC GLUCOMTR-MCNC: 108 MG/DL (ref 70–105)
GLUCOSE BLDC GLUCOMTR-MCNC: 109 MG/DL (ref 70–105)
GLUCOSE BLDC GLUCOMTR-MCNC: 126 MG/DL (ref 70–105)
GLUCOSE BLDC GLUCOMTR-MCNC: 132 MG/DL (ref 70–105)
GLUCOSE BLDC GLUCOMTR-MCNC: 134 MG/DL (ref 70–105)
GLUCOSE BLDC GLUCOMTR-MCNC: 136 MG/DL (ref 70–105)
GLUCOSE BLDC GLUCOMTR-MCNC: 143 MG/DL (ref 70–105)
GLUCOSE BLDC GLUCOMTR-MCNC: 144 MG/DL (ref 70–105)
GLUCOSE BLDC GLUCOMTR-MCNC: 146 MG/DL (ref 70–105)
GLUCOSE BLDC GLUCOMTR-MCNC: 152 MG/DL (ref 70–105)
GLUCOSE BLDC GLUCOMTR-MCNC: 155 MG/DL (ref 70–105)
GLUCOSE BLDC GLUCOMTR-MCNC: 156 MG/DL (ref 70–105)
GLUCOSE BLDC GLUCOMTR-MCNC: 157 MG/DL (ref 70–105)
GLUCOSE BLDC GLUCOMTR-MCNC: 159 MG/DL (ref 70–105)
GLUCOSE BLDC GLUCOMTR-MCNC: 160 MG/DL (ref 70–105)
GLUCOSE BLDC GLUCOMTR-MCNC: 168 MG/DL (ref 70–105)
GLUCOSE BLDC GLUCOMTR-MCNC: 171 MG/DL (ref 70–105)
GLUCOSE BLDC GLUCOMTR-MCNC: 172 MG/DL (ref 70–105)
GLUCOSE BLDC GLUCOMTR-MCNC: 174 MG/DL (ref 70–105)
GLUCOSE BLDC GLUCOMTR-MCNC: 182 MG/DL (ref 70–105)
GLUCOSE BLDC GLUCOMTR-MCNC: 210 MG/DL (ref 70–105)
GLUCOSE BLDC GLUCOMTR-MCNC: 78 MG/DL (ref 70–105)
GLUCOSE BLDC GLUCOMTR-MCNC: 78 MG/DL (ref 70–105)
GLUCOSE BLDC GLUCOMTR-MCNC: 86 MG/DL (ref 70–105)
GLUCOSE BLDC GLUCOMTR-MCNC: 87 MG/DL (ref 70–105)
GLUCOSE BLDC GLUCOMTR-MCNC: 92 MG/DL (ref 70–105)
GLUCOSE CSF-MCNC: 93 MG/DL (ref 40–70)
GLUCOSE SERPL-MCNC: 100 MG/DL (ref 65–99)
GLUCOSE SERPL-MCNC: 100 MG/DL (ref 65–99)
GLUCOSE SERPL-MCNC: 103 MG/DL (ref 65–99)
GLUCOSE SERPL-MCNC: 104 MG/DL (ref 65–99)
GLUCOSE SERPL-MCNC: 108 MG/DL (ref 65–99)
GLUCOSE SERPL-MCNC: 108 MG/DL (ref 65–99)
GLUCOSE SERPL-MCNC: 117 MG/DL (ref 65–99)
GLUCOSE SERPL-MCNC: 123 MG/DL (ref 65–99)
GLUCOSE SERPL-MCNC: 129 MG/DL (ref 65–99)
GLUCOSE SERPL-MCNC: 153 MG/DL (ref 65–99)
GLUCOSE SERPL-MCNC: 153 MG/DL (ref 65–99)
GLUCOSE SERPL-MCNC: 191 MG/DL (ref 65–99)
GLUCOSE SERPL-MCNC: 211 MG/DL (ref 65–99)
GLUCOSE SERPL-MCNC: 79 MG/DL (ref 65–99)
GLUCOSE SERPL-MCNC: 83 MG/DL (ref 65–99)
GLUCOSE SERPL-MCNC: 84 MG/DL (ref 65–99)
GLUCOSE SERPL-MCNC: 89 MG/DL (ref 65–99)
GLUCOSE SERPL-MCNC: 91 MG/DL (ref 65–99)
GLUCOSE SERPL-MCNC: 91 MG/DL (ref 65–99)
GLUCOSE SERPL-MCNC: 93 MG/DL (ref 65–99)
GLUCOSE SERPL-MCNC: 95 MG/DL (ref 65–99)
GLUCOSE SERPL-MCNC: 97 MG/DL (ref 65–99)
GLUCOSE SERPL-MCNC: 97 MG/DL (ref 65–99)
GLUCOSE UR STRIP-MCNC: NEGATIVE MG/DL
GP B STREP DNA SPEC QL NAA+PROBE: NOT DETECTED
GRAM STN SPEC: NORMAL
HADV DNA SPEC NAA+PROBE: NOT DETECTED
HAEM INFLU SEROTYP DNA SPEC NAA+PROBE: NOT DETECTED
HBA1C MFR BLD: 5.7 % (ref 3.5–5.6)
HCO3 BLDA-SCNC: 22.8 MMOL/L (ref 21–28)
HCOV 229E RNA SPEC QL NAA+PROBE: NOT DETECTED
HCOV HKU1 RNA SPEC QL NAA+PROBE: NOT DETECTED
HCOV NL63 RNA SPEC QL NAA+PROBE: NOT DETECTED
HCOV OC43 RNA SPEC QL NAA+PROBE: NOT DETECTED
HCT VFR BLD AUTO: 29.3 % (ref 34–46.6)
HCT VFR BLD AUTO: 33.5 % (ref 34–46.6)
HCT VFR BLD AUTO: 34 % (ref 34–46.6)
HCT VFR BLD AUTO: 34.7 % (ref 34–46.6)
HCT VFR BLD AUTO: 35.9 % (ref 34–46.6)
HCT VFR BLD AUTO: 36.4 % (ref 34–46.6)
HCT VFR BLD AUTO: 36.5 % (ref 34–46.6)
HCT VFR BLD AUTO: 37.2 % (ref 34–46.6)
HCT VFR BLD AUTO: 37.6 % (ref 34–46.6)
HCT VFR BLD AUTO: 39.1 % (ref 34–46.6)
HCT VFR BLD AUTO: 39.4 % (ref 34–46.6)
HCT VFR BLD AUTO: 39.6 % (ref 34–46.6)
HCT VFR BLD AUTO: 40.4 % (ref 34–46.6)
HCT VFR BLD AUTO: 40.5 % (ref 34–46.6)
HCT VFR BLD AUTO: 40.8 % (ref 34–46.6)
HCT VFR BLD AUTO: 41 % (ref 34–46.6)
HCT VFR BLD AUTO: 42.2 % (ref 34–46.6)
HCT VFR BLD AUTO: 45 % (ref 34–46.6)
HCT VFR BLD AUTO: 47.6 % (ref 34–46.6)
HCT VFR BLD AUTO: 52.1 % (ref 34–46.6)
HDLC SERPL-MCNC: 50 MG/DL (ref 40–60)
HDLC SERPL-MCNC: 90 MG/DL (ref 40–60)
HDLC SERPL-MCNC: 91 MG/DL (ref 40–60)
HEMODILUTION: YES
HGB BLD-MCNC: 11 G/DL (ref 12–15.9)
HGB BLD-MCNC: 11.2 G/DL (ref 12–15.9)
HGB BLD-MCNC: 11.6 G/DL (ref 12–15.9)
HGB BLD-MCNC: 11.7 G/DL (ref 12–15.9)
HGB BLD-MCNC: 11.7 G/DL (ref 12–15.9)
HGB BLD-MCNC: 11.8 G/DL (ref 12–15.9)
HGB BLD-MCNC: 12 G/DL (ref 12–15.9)
HGB BLD-MCNC: 12.3 G/DL (ref 12–15.9)
HGB BLD-MCNC: 12.9 G/DL (ref 12–15.9)
HGB BLD-MCNC: 12.9 G/DL (ref 12–15.9)
HGB BLD-MCNC: 13.1 G/DL (ref 12–15.9)
HGB BLD-MCNC: 13.2 G/DL (ref 12–15.9)
HGB BLD-MCNC: 13.3 G/DL (ref 12–15.9)
HGB BLD-MCNC: 13.3 G/DL (ref 12–15.9)
HGB BLD-MCNC: 13.8 G/DL (ref 12–15.9)
HGB BLD-MCNC: 14.2 G/DL (ref 12–15.9)
HGB BLD-MCNC: 14.4 G/DL (ref 12–15.9)
HGB BLD-MCNC: 15.5 G/DL (ref 12–15.9)
HGB BLD-MCNC: 16.5 G/DL (ref 12–15.9)
HGB BLD-MCNC: 9.9 G/DL (ref 12–15.9)
HGB UR QL STRIP.AUTO: ABNORMAL
HGB UR QL STRIP.AUTO: NEGATIVE
HHV6 DNA CSF QL NAA+PROBE: NOT DETECTED
HMPV RNA NPH QL NAA+NON-PROBE: NOT DETECTED
HOLD SPECIMEN: NORMAL
HPIV1 RNA ISLT QL NAA+PROBE: NOT DETECTED
HPIV2 RNA SPEC QL NAA+PROBE: NOT DETECTED
HPIV3 RNA NPH QL NAA+PROBE: NOT DETECTED
HPIV4 P GENE NPH QL NAA+PROBE: NOT DETECTED
HSV1 DNA CSF QL NAA+PROBE: NOT DETECTED
HSV2 DNA CSF QL NAA+PROBE: NOT DETECTED
HYALINE CASTS UR QL AUTO: ABNORMAL /LPF
IMM GRANULOCYTES # BLD AUTO: 0.03 10*3/MM3 (ref 0–0.05)
IMM GRANULOCYTES NFR BLD AUTO: 0.6 % (ref 0–0.5)
INHALED O2 CONCENTRATION: 36 %
INR PPP: 0.96 (ref 0.93–1.1)
INR PPP: 1 (ref 0.93–1.1)
INR PPP: 1.02 (ref 0.93–1.1)
INR PPP: 1.09 (ref 0.93–1.1)
INR PPP: 1.14 (ref 0.93–1.1)
INR PPP: <0.93 (ref 0.93–1.1)
KETONES UR QL STRIP: ABNORMAL
KETONES UR QL STRIP: ABNORMAL
KETONES UR QL STRIP: NEGATIVE
KETONES UR QL: NEGATIVE
L MONOCYTOG RRNA SPEC QL PROBE: NOT DETECTED
LAB AP CASE REPORT: NORMAL
LARGE PLATELETS: ABNORMAL
LARGE PLATELETS: ABNORMAL
LDLC SERPL CALC-MCNC: 111 MG/DL (ref 0–100)
LDLC SERPL CALC-MCNC: 59 MG/DL (ref 0–100)
LDLC SERPL CALC-MCNC: 72 MG/DL (ref 0–100)
LDLC/HDLC SERPL: 0.66 {RATIO}
LDLC/HDLC SERPL: 0.8 {RATIO}
LDLC/HDLC SERPL: 2.1 {RATIO}
LEUKOCYTE EST, POC: ABNORMAL
LEUKOCYTE ESTERASE UR QL STRIP.AUTO: ABNORMAL
LEUKOCYTE ESTERASE UR QL STRIP.AUTO: NEGATIVE
LIPASE SERPL-CCNC: 58 U/L (ref 13–60)
LV EF 2D ECHO EST: 60 %
LYMPHOCYTES # BLD AUTO: 0.7 10*3/MM3 (ref 0.7–3.1)
LYMPHOCYTES # BLD AUTO: 0.8 10*3/MM3 (ref 0.7–3.1)
LYMPHOCYTES # BLD AUTO: 0.9 10*3/MM3 (ref 0.7–3.1)
LYMPHOCYTES # BLD AUTO: 1 10*3/MM3 (ref 0.7–3.1)
LYMPHOCYTES # BLD AUTO: 1 10*3/MM3 (ref 0.7–3.1)
LYMPHOCYTES # BLD AUTO: 1.1 10*3/MM3 (ref 0.7–3.1)
LYMPHOCYTES # BLD AUTO: 1.1 10*3/MM3 (ref 0.7–3.1)
LYMPHOCYTES # BLD AUTO: 1.2 10*3/MM3 (ref 0.7–3.1)
LYMPHOCYTES # BLD AUTO: 1.5 10*3/MM3 (ref 0.7–3.1)
LYMPHOCYTES # BLD AUTO: 1.5 10*3/MM3 (ref 0.7–3.1)
LYMPHOCYTES # BLD AUTO: 1.7 10*3/MM3 (ref 0.7–3.1)
LYMPHOCYTES # BLD MANUAL: 0.16 10*3/MM3 (ref 0.7–3.1)
LYMPHOCYTES # BLD MANUAL: 0.2 10*3/MM3 (ref 0.7–3.1)
LYMPHOCYTES # BLD MANUAL: 0.48 10*3/MM3 (ref 0.7–3.1)
LYMPHOCYTES # BLD MANUAL: 0.53 10*3/MM3 (ref 0.7–3.1)
LYMPHOCYTES # BLD MANUAL: 0.6 10*3/MM3 (ref 0.7–3.1)
LYMPHOCYTES # BLD MANUAL: 0.62 10*3/MM3 (ref 0.7–3.1)
LYMPHOCYTES # BLD MANUAL: 1.19 10*3/MM3 (ref 0.7–3.1)
LYMPHOCYTES # BLD MANUAL: 3 10*3/MM3 (ref 0.7–3.1)
LYMPHOCYTES NFR BLD AUTO: 13.3 % (ref 19.6–45.3)
LYMPHOCYTES NFR BLD AUTO: 16.5 % (ref 19.6–45.3)
LYMPHOCYTES NFR BLD AUTO: 17.3 % (ref 19.6–45.3)
LYMPHOCYTES NFR BLD AUTO: 17.7 % (ref 19.6–45.3)
LYMPHOCYTES NFR BLD AUTO: 18.2 % (ref 19.6–45.3)
LYMPHOCYTES NFR BLD AUTO: 21.6 % (ref 19.6–45.3)
LYMPHOCYTES NFR BLD AUTO: 23.5 % (ref 19.6–45.3)
LYMPHOCYTES NFR BLD AUTO: 24.5 % (ref 19.6–45.3)
LYMPHOCYTES NFR BLD AUTO: 27.7 % (ref 19.6–45.3)
LYMPHOCYTES NFR BLD AUTO: 5.8 % (ref 19.6–45.3)
LYMPHOCYTES NFR BLD AUTO: 9.6 % (ref 19.6–45.3)
LYMPHOCYTES NFR BLD MANUAL: 1 % (ref 5–12)
LYMPHOCYTES NFR BLD MANUAL: 11 % (ref 5–12)
LYMPHOCYTES NFR BLD MANUAL: 2 % (ref 5–12)
LYMPHOCYTES NFR BLD MANUAL: 3 % (ref 5–12)
LYMPHOCYTES NFR BLD MANUAL: 4 % (ref 5–12)
LYMPHOCYTES NFR BLD MANUAL: 6 % (ref 5–12)
Lab: ABNORMAL
M PNEUMO IGG SER IA-ACNC: NOT DETECTED
MAGNESIUM SERPL-MCNC: 1.8 MG/DL (ref 1.6–2.4)
MAGNESIUM SERPL-MCNC: 1.8 MG/DL (ref 1.6–2.4)
MAGNESIUM SERPL-MCNC: 2.1 MG/DL (ref 1.6–2.4)
MAGNESIUM SERPL-MCNC: 3 MG/DL (ref 1.6–2.4)
MAXIMAL PREDICTED HEART RATE: 139 BPM
MCH RBC QN AUTO: 28 PG (ref 26.6–33)
MCH RBC QN AUTO: 28.1 PG (ref 26.6–33)
MCH RBC QN AUTO: 28.2 PG (ref 26.6–33)
MCH RBC QN AUTO: 28.5 PG (ref 26.6–33)
MCH RBC QN AUTO: 28.7 PG (ref 26.6–33)
MCH RBC QN AUTO: 28.8 PG (ref 26.6–33)
MCH RBC QN AUTO: 29.1 PG (ref 26.6–33)
MCH RBC QN AUTO: 29.4 PG (ref 26.6–33)
MCH RBC QN AUTO: 30 PG (ref 26.6–33)
MCH RBC QN AUTO: 30.2 PG (ref 26.6–33)
MCH RBC QN AUTO: 30.3 PG (ref 26.6–33)
MCH RBC QN AUTO: 30.5 PG (ref 26.6–33)
MCH RBC QN AUTO: 30.7 PG (ref 26.6–33)
MCH RBC QN AUTO: 30.9 PG (ref 26.6–33)
MCH RBC QN AUTO: 31 PG (ref 26.6–33)
MCH RBC QN AUTO: 31 PG (ref 26.6–33)
MCH RBC QN AUTO: 31.6 PG (ref 26.6–33)
MCH RBC QN AUTO: 31.8 PG (ref 26.6–33)
MCHC RBC AUTO-ENTMCNC: 31.5 G/DL (ref 31.5–35.7)
MCHC RBC AUTO-ENTMCNC: 31.7 G/DL (ref 31.5–35.7)
MCHC RBC AUTO-ENTMCNC: 31.8 G/DL (ref 31.5–35.7)
MCHC RBC AUTO-ENTMCNC: 32.1 G/DL (ref 31.5–35.7)
MCHC RBC AUTO-ENTMCNC: 32.3 G/DL (ref 31.5–35.7)
MCHC RBC AUTO-ENTMCNC: 32.3 G/DL (ref 31.5–35.7)
MCHC RBC AUTO-ENTMCNC: 32.4 G/DL (ref 31.5–35.7)
MCHC RBC AUTO-ENTMCNC: 32.5 G/DL (ref 31.5–35.7)
MCHC RBC AUTO-ENTMCNC: 32.8 G/DL (ref 31.5–35.7)
MCHC RBC AUTO-ENTMCNC: 32.8 G/DL (ref 31.5–35.7)
MCHC RBC AUTO-ENTMCNC: 32.9 G/DL (ref 31.5–35.7)
MCHC RBC AUTO-ENTMCNC: 33.1 G/DL (ref 31.5–35.7)
MCHC RBC AUTO-ENTMCNC: 33.3 G/DL (ref 31.5–35.7)
MCHC RBC AUTO-ENTMCNC: 33.4 G/DL (ref 31.5–35.7)
MCHC RBC AUTO-ENTMCNC: 33.4 G/DL (ref 31.5–35.7)
MCHC RBC AUTO-ENTMCNC: 33.8 G/DL (ref 31.5–35.7)
MCHC RBC AUTO-ENTMCNC: 33.9 G/DL (ref 31.5–35.7)
MCHC RBC AUTO-ENTMCNC: 34 G/DL (ref 31.5–35.7)
MCV RBC AUTO: 85.3 FL (ref 79–97)
MCV RBC AUTO: 85.5 FL (ref 79–97)
MCV RBC AUTO: 88.1 FL (ref 79–97)
MCV RBC AUTO: 88.3 FL (ref 79–97)
MCV RBC AUTO: 88.4 FL (ref 79–97)
MCV RBC AUTO: 88.5 FL (ref 79–97)
MCV RBC AUTO: 88.7 FL (ref 79–97)
MCV RBC AUTO: 89.4 FL (ref 79–97)
MCV RBC AUTO: 90.2 FL (ref 79–97)
MCV RBC AUTO: 90.7 FL (ref 79–97)
MCV RBC AUTO: 90.8 FL (ref 79–97)
MCV RBC AUTO: 91.4 FL (ref 79–97)
MCV RBC AUTO: 92.8 FL (ref 79–97)
MCV RBC AUTO: 94.7 FL (ref 79–97)
MCV RBC AUTO: 95 FL (ref 79–97)
MCV RBC AUTO: 95 FL (ref 79–97)
MCV RBC AUTO: 95.2 FL (ref 79–97)
MCV RBC AUTO: 95.8 FL (ref 79–97)
MCV RBC AUTO: 95.9 FL (ref 79–97)
MCV RBC AUTO: 96.3 FL (ref 79–97)
METAMYELOCYTES NFR BLD MANUAL: 3 % (ref 0–0)
METAMYELOCYTES NFR BLD MANUAL: 3 % (ref 0–0)
METAMYELOCYTES NFR BLD MANUAL: 5 % (ref 0–0)
METHOD: ABNORMAL
MODALITY: ABNORMAL
MONOCYTES # BLD AUTO: 0.6 10*3/MM3 (ref 0.1–0.9)
MONOCYTES # BLD AUTO: 0.7 10*3/MM3 (ref 0.1–0.9)
MONOCYTES # BLD AUTO: 0.72 10*3/MM3 (ref 0.1–0.9)
MONOCYTES # BLD AUTO: 0.8 10*3/MM3 (ref 0.1–0.9)
MONOCYTES # BLD AUTO: 1 10*3/MM3 (ref 0.1–0.9)
MONOCYTES # BLD: 0.08 10*3/MM3 (ref 0.1–0.9)
MONOCYTES # BLD: 0.18 10*3/MM3 (ref 0.1–0.9)
MONOCYTES # BLD: 0.25 10*3/MM3 (ref 0.1–0.9)
MONOCYTES # BLD: 0.3 10*3/MM3 (ref 0.1–0.9)
MONOCYTES # BLD: 0.4 10*3/MM3 (ref 0.1–0.9)
MONOCYTES # BLD: 0.41 10*3/MM3 (ref 0.1–0.9)
MONOCYTES # BLD: 0.57 10*3/MM3 (ref 0.1–0.9)
MONOCYTES # BLD: 0.94 10*3/MM3 (ref 0.1–0.9)
MONOCYTES NFR BLD AUTO: 10 % (ref 5–12)
MONOCYTES NFR BLD AUTO: 10.1 % (ref 5–12)
MONOCYTES NFR BLD AUTO: 11.3 % (ref 5–12)
MONOCYTES NFR BLD AUTO: 11.5 % (ref 5–12)
MONOCYTES NFR BLD AUTO: 11.9 % (ref 5–12)
MONOCYTES NFR BLD AUTO: 12.2 % (ref 5–12)
MONOCYTES NFR BLD AUTO: 12.2 % (ref 5–12)
MONOCYTES NFR BLD AUTO: 14.1 % (ref 5–12)
MONOCYTES NFR BLD AUTO: 6.8 % (ref 5–12)
MONOCYTES NFR BLD AUTO: 7.7 % (ref 5–12)
MONOCYTES NFR BLD AUTO: 8.6 % (ref 5–12)
MUSK AB SER IA-ACNC: <1 U/ML
MYELOCYTES NFR BLD MANUAL: 1 % (ref 0–0)
MYELOCYTES NFR BLD MANUAL: 1 % (ref 0–0)
MYELOCYTES NFR BLD MANUAL: 4 % (ref 0–0)
N MEN DNA SPEC QL NAA+PROBE: NOT DETECTED
NEUTROPHILS # BLD AUTO: 10.58 10*3/MM3 (ref 1.7–7)
NEUTROPHILS # BLD AUTO: 11.7 10*3/MM3 (ref 1.7–7)
NEUTROPHILS # BLD AUTO: 6.12 10*3/MM3 (ref 1.7–7)
NEUTROPHILS # BLD AUTO: 6.97 10*3/MM3 (ref 1.7–7)
NEUTROPHILS # BLD AUTO: 7.3 10*3/MM3 (ref 1.7–7)
NEUTROPHILS # BLD AUTO: 7.44 10*3/MM3 (ref 1.7–7)
NEUTROPHILS # BLD AUTO: 7.74 10*3/MM3 (ref 1.7–7)
NEUTROPHILS # BLD AUTO: 8.02 10*3/MM3 (ref 1.7–7)
NEUTROPHILS NFR BLD AUTO: 10.5 10*3/MM3 (ref 1.7–7)
NEUTROPHILS NFR BLD AUTO: 2.94 10*3/MM3 (ref 1.7–7)
NEUTROPHILS NFR BLD AUTO: 3.2 10*3/MM3 (ref 1.7–7)
NEUTROPHILS NFR BLD AUTO: 3.7 10*3/MM3 (ref 1.7–7)
NEUTROPHILS NFR BLD AUTO: 3.8 10*3/MM3 (ref 1.7–7)
NEUTROPHILS NFR BLD AUTO: 4 10*3/MM3 (ref 1.7–7)
NEUTROPHILS NFR BLD AUTO: 4 10*3/MM3 (ref 1.7–7)
NEUTROPHILS NFR BLD AUTO: 4.8 10*3/MM3 (ref 1.7–7)
NEUTROPHILS NFR BLD AUTO: 4.8 10*3/MM3 (ref 1.7–7)
NEUTROPHILS NFR BLD AUTO: 57.8 % (ref 42.7–76)
NEUTROPHILS NFR BLD AUTO: 58.4 % (ref 42.7–76)
NEUTROPHILS NFR BLD AUTO: 6 10*3/MM3 (ref 1.7–7)
NEUTROPHILS NFR BLD AUTO: 6.5 10*3/MM3 (ref 1.7–7)
NEUTROPHILS NFR BLD AUTO: 61.3 % (ref 42.7–76)
NEUTROPHILS NFR BLD AUTO: 64.2 % (ref 42.7–76)
NEUTROPHILS NFR BLD AUTO: 66.3 % (ref 42.7–76)
NEUTROPHILS NFR BLD AUTO: 67.6 % (ref 42.7–76)
NEUTROPHILS NFR BLD AUTO: 68 % (ref 42.7–76)
NEUTROPHILS NFR BLD AUTO: 68.4 % (ref 42.7–76)
NEUTROPHILS NFR BLD AUTO: 76.4 % (ref 42.7–76)
NEUTROPHILS NFR BLD AUTO: 77.5 % (ref 42.7–76)
NEUTROPHILS NFR BLD AUTO: 86.1 % (ref 42.7–76)
NEUTROPHILS NFR BLD MANUAL: 74 % (ref 42.7–76)
NEUTROPHILS NFR BLD MANUAL: 74 % (ref 42.7–76)
NEUTROPHILS NFR BLD MANUAL: 75 % (ref 42.7–76)
NEUTROPHILS NFR BLD MANUAL: 78 % (ref 42.7–76)
NEUTROPHILS NFR BLD MANUAL: 81 % (ref 42.7–76)
NEUTROPHILS NFR BLD MANUAL: 81 % (ref 42.7–76)
NEUTROPHILS NFR BLD MANUAL: 89 % (ref 42.7–76)
NEUTROPHILS NFR BLD MANUAL: 89 % (ref 42.7–76)
NEUTS BAND NFR BLD MANUAL: 1 % (ref 0–5)
NEUTS BAND NFR BLD MANUAL: 10 % (ref 0–5)
NEUTS BAND NFR BLD MANUAL: 12 % (ref 0–5)
NEUTS BAND NFR BLD MANUAL: 7 % (ref 0–5)
NITRITE UR QL STRIP: NEGATIVE
NITRITE UR-MCNC: NEGATIVE MG/ML
NRBC BLD AUTO-RTO: 0 /100 WBC (ref 0–0.2)
NRBC BLD AUTO-RTO: 0.1 /100 WBC (ref 0–0.2)
NRBC SPEC MANUAL: 1 /100 WBC (ref 0–0.2)
NT-PROBNP SERPL-MCNC: 1493 PG/ML (ref 0–1800)
NT-PROBNP SERPL-MCNC: 1527 PG/ML (ref 0–1800)
NT-PROBNP SERPL-MCNC: 1583 PG/ML (ref 0–1800)
NT-PROBNP SERPL-MCNC: 1771 PG/ML (ref 0–1800)
NT-PROBNP SERPL-MCNC: 2212 PG/ML (ref 0–1800)
NT-PROBNP SERPL-MCNC: 932.8 PG/ML (ref 0–1800)
NUC CELL # CSF MANUAL: 0 /MM3 (ref 0–5)
OLIGOCLONAL BANDS CSF ELPH-IMP: ABNORMAL %
PARECHOVIRUS A RNA CSF QL NAA+NON-PROBE: NOT DETECTED
PATH REPORT.FINAL DX SPEC: NORMAL
PATH REPORT.GROSS SPEC: NORMAL
PATH REPORT.GROSS SPEC: NORMAL
PATHOLOGY REVIEW: YES
PCO2 BLDA: 26.1 MM HG (ref 35–48)
PH BLDA: 7.55 PH UNITS (ref 7.35–7.45)
PH UR STRIP.AUTO: 5.5 [PH] (ref 5–8)
PH UR STRIP.AUTO: 5.5 [PH] (ref 5–8)
PH UR STRIP.AUTO: 6 [PH] (ref 5–8)
PH UR STRIP.AUTO: 6.5 [PH] (ref 5–8)
PH UR STRIP.AUTO: <=5 [PH] (ref 5–8)
PH UR: 6.5 [PH] (ref 5–8)
PHOSPHATE SERPL-MCNC: 2.8 MG/DL (ref 2.5–4.5)
PHOSPHATE SERPL-MCNC: 3 MG/DL (ref 2.5–4.5)
PHOSPHATE SERPL-MCNC: 3.3 MG/DL (ref 2.5–4.5)
PHOSPHATE SERPL-MCNC: 3.6 MG/DL (ref 2.5–4.5)
PLAT MORPH BLD: NORMAL
PLATELET # BLD AUTO: 129 10*3/MM3 (ref 140–450)
PLATELET # BLD AUTO: 138 10*3/MM3 (ref 140–450)
PLATELET # BLD AUTO: 149 10*3/MM3 (ref 140–450)
PLATELET # BLD AUTO: 169 10*3/MM3 (ref 140–450)
PLATELET # BLD AUTO: 181 10*3/MM3 (ref 140–450)
PLATELET # BLD AUTO: 181 10*3/MM3 (ref 140–450)
PLATELET # BLD AUTO: 182 10*3/MM3 (ref 140–450)
PLATELET # BLD AUTO: 183 10*3/MM3 (ref 140–450)
PLATELET # BLD AUTO: 210 10*3/MM3 (ref 140–450)
PLATELET # BLD AUTO: 216 10*3/MM3 (ref 140–450)
PLATELET # BLD AUTO: 225 10*3/MM3 (ref 140–450)
PLATELET # BLD AUTO: 237 10*3/MM3 (ref 140–450)
PLATELET # BLD AUTO: 252 10*3/MM3 (ref 140–450)
PLATELET # BLD AUTO: 264 10*3/MM3 (ref 140–450)
PLATELET # BLD AUTO: 269 10*3/MM3 (ref 140–450)
PLATELET # BLD AUTO: 298 10*3/MM3 (ref 140–450)
PLATELET # BLD AUTO: 305 10*3/MM3 (ref 140–450)
PLATELET # BLD AUTO: 326 10*3/MM3 (ref 140–450)
PLATELET # BLD AUTO: 85 10*3/MM3 (ref 140–450)
PLATELET # BLD AUTO: 92 10*3/MM3 (ref 140–450)
PMV BLD AUTO: 10.1 FL (ref 6–12)
PMV BLD AUTO: 10.6 FL (ref 6–12)
PMV BLD AUTO: 10.8 FL (ref 6–12)
PMV BLD AUTO: 7.4 FL (ref 6–12)
PMV BLD AUTO: 7.6 FL (ref 6–12)
PMV BLD AUTO: 7.7 FL (ref 6–12)
PMV BLD AUTO: 7.8 FL (ref 6–12)
PMV BLD AUTO: 7.9 FL (ref 6–12)
PMV BLD AUTO: 8.1 FL (ref 6–12)
PMV BLD AUTO: 8.8 FL (ref 6–12)
PMV BLD AUTO: 8.8 FL (ref 6–12)
PMV BLD AUTO: 8.9 FL (ref 6–12)
PMV BLD AUTO: 8.9 FL (ref 6–12)
PMV BLD AUTO: 9.2 FL (ref 6–12)
PMV BLD AUTO: 9.6 FL (ref 6–12)
PMV BLD AUTO: 9.7 FL (ref 6–12)
PO2 BLDA: 63.4 MM HG (ref 83–108)
POIKILOCYTOSIS BLD QL SMEAR: ABNORMAL
POTASSIUM SERPL-SCNC: 2.8 MMOL/L (ref 3.5–5.2)
POTASSIUM SERPL-SCNC: 3 MMOL/L (ref 3.5–5.2)
POTASSIUM SERPL-SCNC: 3.1 MMOL/L (ref 3.5–5.2)
POTASSIUM SERPL-SCNC: 3.2 MMOL/L (ref 3.5–5.2)
POTASSIUM SERPL-SCNC: 3.4 MMOL/L (ref 3.5–5.2)
POTASSIUM SERPL-SCNC: 3.5 MMOL/L (ref 3.5–5.2)
POTASSIUM SERPL-SCNC: 3.7 MMOL/L (ref 3.5–5.2)
POTASSIUM SERPL-SCNC: 3.8 MMOL/L (ref 3.5–5.2)
POTASSIUM SERPL-SCNC: 4 MMOL/L (ref 3.5–5.2)
POTASSIUM SERPL-SCNC: 4 MMOL/L (ref 3.5–5.2)
POTASSIUM SERPL-SCNC: 4.1 MMOL/L (ref 3.5–5.2)
POTASSIUM SERPL-SCNC: 4.2 MMOL/L (ref 3.5–5.2)
POTASSIUM SERPL-SCNC: 4.3 MMOL/L (ref 3.5–5.2)
POTASSIUM SERPL-SCNC: 4.5 MMOL/L (ref 3.5–5.2)
POTASSIUM SERPL-SCNC: 4.5 MMOL/L (ref 3.5–5.2)
POTASSIUM SERPL-SCNC: 4.7 MMOL/L (ref 3.5–5.2)
POTASSIUM SERPL-SCNC: 4.8 MMOL/L (ref 3.5–5.2)
PREALB MFR CSF ELPH: 3.9 % (ref 2.2–7.1)
PROCALCITONIN SERPL-MCNC: 0.16 NG/ML (ref 0–0.25)
PROMYELOCYTES NFR BLD MANUAL: 2 % (ref 0–0)
PROT CSF-MCNC: 75.1 MG/DL (ref 15–45)
PROT CSF-MCNC: 75.4 MG/DL (ref 0–44)
PROT SERPL-MCNC: 5.3 G/DL (ref 6–8.5)
PROT SERPL-MCNC: 6.1 G/DL (ref 6–8.5)
PROT SERPL-MCNC: 6.3 G/DL (ref 6–8.5)
PROT SERPL-MCNC: 6.4 G/DL (ref 6–8.5)
PROT SERPL-MCNC: 6.9 G/DL (ref 6–8.5)
PROT SERPL-MCNC: 7.2 G/DL (ref 6–8.5)
PROT SERPL-MCNC: 7.4 G/DL (ref 6–8.5)
PROT UR QL STRIP: ABNORMAL
PROT UR QL STRIP: NEGATIVE
PROT UR STRIP-MCNC: ABNORMAL MG/DL
PROTHROMBIN TIME: 10.3 SECONDS (ref 9.6–11.7)
PROTHROMBIN TIME: 10.5 SECONDS (ref 9.6–11.7)
PROTHROMBIN TIME: 10.7 SECONDS (ref 9.6–11.7)
PROTHROMBIN TIME: 11.2 SECONDS (ref 9.6–11.7)
PROTHROMBIN TIME: 11.7 SECONDS (ref 9.6–11.7)
PROTHROMBIN TIME: 9.5 SECONDS (ref 9.6–11.7)
QT INTERVAL: 325 MS
QT INTERVAL: 335 MS
QT INTERVAL: 351 MS
QT INTERVAL: 363 MS
QT INTERVAL: 363 MS
QT INTERVAL: 369 MS
RBC # BLD AUTO: 3.25 10*6/MM3 (ref 3.77–5.28)
RBC # BLD AUTO: 3.69 10*6/MM3 (ref 3.77–5.28)
RBC # BLD AUTO: 3.83 10*6/MM3 (ref 3.77–5.28)
RBC # BLD AUTO: 3.86 10*6/MM3 (ref 3.77–5.28)
RBC # BLD AUTO: 3.93 10*6/MM3 (ref 3.77–5.28)
RBC # BLD AUTO: 4.06 10*6/MM3 (ref 3.77–5.28)
RBC # BLD AUTO: 4.08 10*6/MM3 (ref 3.77–5.28)
RBC # BLD AUTO: 4.1 10*6/MM3 (ref 3.77–5.28)
RBC # BLD AUTO: 4.11 10*6/MM3 (ref 3.77–5.28)
RBC # BLD AUTO: 4.18 10*6/MM3 (ref 3.77–5.28)
RBC # BLD AUTO: 4.24 10*6/MM3 (ref 3.77–5.28)
RBC # BLD AUTO: 4.27 10*6/MM3 (ref 3.77–5.28)
RBC # BLD AUTO: 4.45 10*6/MM3 (ref 3.77–5.28)
RBC # BLD AUTO: 4.46 10*6/MM3 (ref 3.77–5.28)
RBC # BLD AUTO: 4.54 10*6/MM3 (ref 3.77–5.28)
RBC # BLD AUTO: 4.7 10*6/MM3 (ref 3.77–5.28)
RBC # BLD AUTO: 4.74 10*6/MM3 (ref 3.77–5.28)
RBC # BLD AUTO: 4.74 10*6/MM3 (ref 3.77–5.28)
RBC # BLD AUTO: 5 10*6/MM3 (ref 3.77–5.28)
RBC # BLD AUTO: 5.41 10*6/MM3 (ref 3.77–5.28)
RBC # CSF MANUAL: 1125 /MM3 (ref 0–0)
RBC # UR STRIP: ABNORMAL /HPF
RBC # UR STRIP: ABNORMAL /UL
RBC MORPH BLD: NORMAL
REF LAB TEST METHOD: ABNORMAL
REFLEX INFORMATION: NORMAL
RH BLD: NEGATIVE
RHINOVIRUS RNA SPEC NAA+PROBE: NOT DETECTED
RSV RNA NPH QL NAA+NON-PROBE: NOT DETECTED
S PNEUM DNA CSF QL NAA+NON-PROBE: NOT DETECTED
SAO2 % BLDCOA: 94.9 % (ref 94–98)
SARS-COV-2 ORF1AB RESP QL NAA+PROBE: NOT DETECTED
SARS-COV-2 RNA NPH QL NAA+NON-PROBE: NOT DETECTED
SARS-COV-2 RNA PNL SPEC NAA+PROBE: NOT DETECTED
SARS-COV-2 RNA PNL SPEC NAA+PROBE: NOT DETECTED
SARS-COV-2 RNA RESP QL NAA+PROBE: NOT DETECTED
SCAN SLIDE: NORMAL
SMALL PLATELETS BLD QL SMEAR: ABNORMAL
SMALL PLATELETS BLD QL SMEAR: ADEQUATE
SODIUM SERPL-SCNC: 135 MMOL/L (ref 136–145)
SODIUM SERPL-SCNC: 136 MMOL/L (ref 136–145)
SODIUM SERPL-SCNC: 137 MMOL/L (ref 136–145)
SODIUM SERPL-SCNC: 137 MMOL/L (ref 136–145)
SODIUM SERPL-SCNC: 138 MMOL/L (ref 136–145)
SODIUM SERPL-SCNC: 139 MMOL/L (ref 136–145)
SODIUM SERPL-SCNC: 139 MMOL/L (ref 136–145)
SODIUM SERPL-SCNC: 140 MMOL/L (ref 136–145)
SODIUM SERPL-SCNC: 141 MMOL/L (ref 136–145)
SODIUM SERPL-SCNC: 142 MMOL/L (ref 136–145)
SODIUM SERPL-SCNC: 142 MMOL/L (ref 136–145)
SODIUM SERPL-SCNC: 145 MMOL/L (ref 136–145)
SODIUM SERPL-SCNC: 145 MMOL/L (ref 136–145)
SODIUM SERPL-SCNC: 146 MMOL/L (ref 136–145)
SODIUM SERPL-SCNC: 148 MMOL/L (ref 136–145)
SODIUM SERPL-SCNC: 148 MMOL/L (ref 136–145)
SODIUM SERPL-SCNC: 149 MMOL/L (ref 136–145)
SODIUM SERPL-SCNC: 149 MMOL/L (ref 136–145)
SODIUM SERPL-SCNC: 150 MMOL/L (ref 136–145)
SODIUM SERPL-SCNC: 152 MMOL/L (ref 136–145)
SP GR UR STRIP: 1.01 (ref 1–1.03)
SP GR UR STRIP: 1.01 (ref 1–1.03)
SP GR UR STRIP: 1.02 (ref 1–1.03)
SP GR UR: 1.01 (ref 1–1.03)
SPECIMEN VOL CSF: 5 ML
SQUAMOUS #/AREA URNS HPF: ABNORMAL /HPF
STRESS TARGET HR: 118 BPM
STRIA MUS AB TITR SER IF: NORMAL {TITER}
STRIA MUS IGG SER QL IF: NORMAL
T GONDII DNA SPEC QL NAA+PROBE: NEGATIVE
T&S EXPIRATION DATE: NORMAL
T4 FREE SERPL-MCNC: 1.1 NG/DL (ref 0.93–1.7)
T4 FREE SERPL-MCNC: 1.77 NG/DL (ref 0.93–1.7)
TOXIC GRANULATION: ABNORMAL
TOXIC GRANULATION: ABNORMAL
TRIGL SERPL-MCNC: 209 MG/DL (ref 0–150)
TRIGL SERPL-MCNC: 49 MG/DL (ref 0–150)
TRIGL SERPL-MCNC: 53 MG/DL (ref 0–150)
TROPONIN T SERPL-MCNC: 0.01 NG/ML (ref 0–0.03)
TROPONIN T SERPL-MCNC: 0.07 NG/ML (ref 0–0.03)
TROPONIN T SERPL-MCNC: 0.14 NG/ML (ref 0–0.03)
TROPONIN T SERPL-MCNC: 0.16 NG/ML (ref 0–0.03)
TSH SERPL DL<=0.05 MIU/L-ACNC: 0.47 UIU/ML (ref 0.27–4.2)
TSH SERPL DL<=0.05 MIU/L-ACNC: 0.62 UIU/ML (ref 0.27–4.2)
TSH SERPL DL<=0.05 MIU/L-ACNC: 2.03 UIU/ML (ref 0.27–4.2)
TSH SERPL DL<=0.05 MIU/L-ACNC: 2.34 UIU/ML (ref 0.27–4.2)
TSH SERPL DL<=0.05 MIU/L-ACNC: 3.51 UIU/ML (ref 0.27–4.2)
TSH SERPL DL<=0.05 MIU/L-ACNC: 3.66 UIU/ML (ref 0.27–4.2)
TUBE # CSF: 3
URATE CRY URNS QL MICRO: ABNORMAL /HPF
UROBILINOGEN UR QL STRIP: ABNORMAL
UROBILINOGEN UR QL STRIP: NORMAL
UROBILINOGEN UR QL: NORMAL
VARIANT LYMPHS NFR BLD MANUAL: 1 % (ref 19.6–45.3)
VARIANT LYMPHS NFR BLD MANUAL: 12 % (ref 19.6–45.3)
VARIANT LYMPHS NFR BLD MANUAL: 16 % (ref 19.6–45.3)
VARIANT LYMPHS NFR BLD MANUAL: 2 % (ref 0–5)
VARIANT LYMPHS NFR BLD MANUAL: 2 % (ref 19.6–45.3)
VARIANT LYMPHS NFR BLD MANUAL: 4 % (ref 19.6–45.3)
VARIANT LYMPHS NFR BLD MANUAL: 5 % (ref 0–5)
VARIANT LYMPHS NFR BLD MANUAL: 6 % (ref 19.6–45.3)
VARIANT LYMPHS NFR BLD MANUAL: 7 % (ref 19.6–45.3)
VARIANT LYMPHS NFR BLD MANUAL: 7 % (ref 19.6–45.3)
VIT B12 BLD-MCNC: 1349 PG/ML (ref 211–946)
VLDLC SERPL-MCNC: 11 MG/DL (ref 5–40)
VLDLC SERPL-MCNC: 11 MG/DL (ref 5–40)
VLDLC SERPL-MCNC: 36 MG/DL (ref 5–40)
VZV DNA CSF QL NAA+PROBE: NOT DETECTED
WBC # UR STRIP: ABNORMAL /HPF
WBC MORPH BLD: NORMAL
WBC NRBC COR # BLD: 12.2 10*3/MM3 (ref 3.4–10.8)
WBC NRBC COR # BLD: 13.3 10*3/MM3 (ref 3.4–10.8)
WBC NRBC COR # BLD: 14.3 10*3/MM3 (ref 3.4–10.8)
WBC NRBC COR # BLD: 5.09 10*3/MM3 (ref 3.4–10.8)
WBC NRBC COR # BLD: 5.5 10*3/MM3 (ref 3.4–10.8)
WBC NRBC COR # BLD: 5.5 10*3/MM3 (ref 3.4–10.8)
WBC NRBC COR # BLD: 5.9 10*3/MM3 (ref 3.4–10.8)
WBC NRBC COR # BLD: 6 10*3/MM3 (ref 3.4–10.8)
WBC NRBC COR # BLD: 6 10*3/MM3 (ref 3.4–10.8)
WBC NRBC COR # BLD: 6.8 10*3/MM3 (ref 3.4–10.8)
WBC NRBC COR # BLD: 7 10*3/MM3 (ref 3.4–10.8)
WBC NRBC COR # BLD: 7.4 10*3/MM3 (ref 3.4–10.8)
WBC NRBC COR # BLD: 7.8 10*3/MM3 (ref 3.4–10.8)
WBC NRBC COR # BLD: 8 10*3/MM3 (ref 3.4–10.8)
WBC NRBC COR # BLD: 8.2 10*3/MM3 (ref 3.4–10.8)
WBC NRBC COR # BLD: 8.4 10*3/MM3 (ref 3.4–10.8)
WBC NRBC COR # BLD: 8.5 10*3/MM3 (ref 3.4–10.8)
WBC NRBC COR # BLD: 8.5 10*3/MM3 (ref 3.4–10.8)
WBC NRBC COR # BLD: 8.8 10*3/MM3 (ref 3.4–10.8)
WBC NRBC COR # BLD: 9.9 10*3/MM3 (ref 3.4–10.8)
WHOLE BLOOD HOLD COAG: NORMAL
WHOLE BLOOD HOLD COAG: NORMAL
WHOLE BLOOD HOLD SPECIMEN: NORMAL
WHOLE BLOOD HOLD SPECIMEN: NORMAL

## 2022-01-01 PROCEDURE — 25010000002 ACYCLOVIR PER 5 MG: Performed by: PSYCHIATRY & NEUROLOGY

## 2022-01-01 PROCEDURE — 25010000002 METHYLPREDNISOLONE PER 125 MG: Performed by: PSYCHIATRY & NEUROLOGY

## 2022-01-01 PROCEDURE — 82962 GLUCOSE BLOOD TEST: CPT

## 2022-01-01 PROCEDURE — 99232 SBSQ HOSP IP/OBS MODERATE 35: CPT | Performed by: INTERNAL MEDICINE

## 2022-01-01 PROCEDURE — 97110 THERAPEUTIC EXERCISES: CPT | Performed by: PHYSICAL THERAPIST

## 2022-01-01 PROCEDURE — 85025 COMPLETE CBC W/AUTO DIFF WBC: CPT | Performed by: INTERNAL MEDICINE

## 2022-01-01 PROCEDURE — 97530 THERAPEUTIC ACTIVITIES: CPT

## 2022-01-01 PROCEDURE — 93970 EXTREMITY STUDY: CPT

## 2022-01-01 PROCEDURE — U0003 INFECTIOUS AGENT DETECTION BY NUCLEIC ACID (DNA OR RNA); SEVERE ACUTE RESPIRATORY SYNDROME CORONAVIRUS 2 (SARS-COV-2) (CORONAVIRUS DISEASE [COVID-19]), AMPLIFIED PROBE TECHNIQUE, MAKING USE OF HIGH THROUGHPUT TECHNOLOGIES AS DESCRIBED BY CMS-2020-01-R: HCPCS | Performed by: NURSE PRACTITIONER

## 2022-01-01 PROCEDURE — 36415 COLL VENOUS BLD VENIPUNCTURE: CPT

## 2022-01-01 PROCEDURE — 97530 THERAPEUTIC ACTIVITIES: CPT | Performed by: PHYSICAL THERAPIST

## 2022-01-01 PROCEDURE — 82330 ASSAY OF CALCIUM: CPT | Performed by: INTERNAL MEDICINE

## 2022-01-01 PROCEDURE — G0378 HOSPITAL OBSERVATION PER HR: HCPCS

## 2022-01-01 PROCEDURE — 85025 COMPLETE CBC W/AUTO DIFF WBC: CPT | Performed by: PHYSICIAN ASSISTANT

## 2022-01-01 PROCEDURE — 93308 TTE F-UP OR LMTD: CPT

## 2022-01-01 PROCEDURE — 71045 X-RAY EXAM CHEST 1 VIEW: CPT

## 2022-01-01 PROCEDURE — 93005 ELECTROCARDIOGRAM TRACING: CPT | Performed by: EMERGENCY MEDICINE

## 2022-01-01 PROCEDURE — 25010000002 LORAZEPAM PER 2 MG

## 2022-01-01 PROCEDURE — 99215 OFFICE O/P EST HI 40 MIN: CPT | Performed by: FAMILY MEDICINE

## 2022-01-01 PROCEDURE — 29580 STRAPPING UNNA BOOT: CPT | Performed by: FAMILY MEDICINE

## 2022-01-01 PROCEDURE — G0299 HHS/HOSPICE OF RN EA 15 MIN: HCPCS

## 2022-01-01 PROCEDURE — 83605 ASSAY OF LACTIC ACID: CPT | Performed by: INTERNAL MEDICINE

## 2022-01-01 PROCEDURE — C9290 INJ, BUPIVACAINE LIPOSOME: HCPCS | Performed by: ORTHOPAEDIC SURGERY

## 2022-01-01 PROCEDURE — 85730 THROMBOPLASTIN TIME PARTIAL: CPT | Performed by: INTERNAL MEDICINE

## 2022-01-01 PROCEDURE — 83735 ASSAY OF MAGNESIUM: CPT | Performed by: NURSE PRACTITIONER

## 2022-01-01 PROCEDURE — 87483 CNS DNA AMP PROBE TYPE 12-25: CPT | Performed by: NURSE PRACTITIONER

## 2022-01-01 PROCEDURE — 86901 BLOOD TYPING SEROLOGIC RH(D): CPT | Performed by: EMERGENCY MEDICINE

## 2022-01-01 PROCEDURE — 85025 COMPLETE CBC W/AUTO DIFF WBC: CPT | Performed by: EMERGENCY MEDICINE

## 2022-01-01 PROCEDURE — 85007 BL SMEAR W/DIFF WBC COUNT: CPT | Performed by: INTERNAL MEDICINE

## 2022-01-01 PROCEDURE — 97165 OT EVAL LOW COMPLEX 30 MIN: CPT

## 2022-01-01 PROCEDURE — 85730 THROMBOPLASTIN TIME PARTIAL: CPT

## 2022-01-01 PROCEDURE — 94799 UNLISTED PULMONARY SVC/PX: CPT

## 2022-01-01 PROCEDURE — 99284 EMERGENCY DEPT VISIT MOD MDM: CPT

## 2022-01-01 PROCEDURE — 85025 COMPLETE CBC W/AUTO DIFF WBC: CPT | Performed by: NURSE PRACTITIONER

## 2022-01-01 PROCEDURE — 96375 TX/PRO/DX INJ NEW DRUG ADDON: CPT

## 2022-01-01 PROCEDURE — 84157 ASSAY OF PROTEIN OTHER: CPT | Performed by: NURSE PRACTITIONER

## 2022-01-01 PROCEDURE — 80048 BASIC METABOLIC PNL TOTAL CA: CPT | Performed by: FAMILY MEDICINE

## 2022-01-01 PROCEDURE — 99285 EMERGENCY DEPT VISIT HI MDM: CPT

## 2022-01-01 PROCEDURE — 80053 COMPREHEN METABOLIC PANEL: CPT

## 2022-01-01 PROCEDURE — 99024 POSTOP FOLLOW-UP VISIT: CPT | Performed by: ORTHOPAEDIC SURGERY

## 2022-01-01 PROCEDURE — 97110 THERAPEUTIC EXERCISES: CPT

## 2022-01-01 PROCEDURE — 83735 ASSAY OF MAGNESIUM: CPT | Performed by: HOSPITALIST

## 2022-01-01 PROCEDURE — 63710000001 INSULIN LISPRO (HUMAN) PER 5 UNITS: Performed by: INTERNAL MEDICINE

## 2022-01-01 PROCEDURE — 25010000002 MORPHINE PER 10 MG: Performed by: INTERNAL MEDICINE

## 2022-01-01 PROCEDURE — 99217 PR OBSERVATION CARE DISCHARGE MANAGEMENT: CPT | Performed by: HOSPITALIST

## 2022-01-01 PROCEDURE — 25010000002 ENOXAPARIN PER 10 MG: Performed by: HOSPITALIST

## 2022-01-01 PROCEDURE — 83880 ASSAY OF NATRIURETIC PEPTIDE: CPT | Performed by: NURSE PRACTITIONER

## 2022-01-01 PROCEDURE — 25010000002 LORAZEPAM PER 2 MG: Performed by: INTERNAL MEDICINE

## 2022-01-01 PROCEDURE — 89050 BODY FLUID CELL COUNT: CPT | Performed by: NURSE PRACTITIONER

## 2022-01-01 PROCEDURE — 93005 ELECTROCARDIOGRAM TRACING: CPT | Performed by: NURSE PRACTITIONER

## 2022-01-01 PROCEDURE — 97112 NEUROMUSCULAR REEDUCATION: CPT | Performed by: PHYSICAL THERAPIST

## 2022-01-01 PROCEDURE — 25010000002 MORPHINE PER 10 MG

## 2022-01-01 PROCEDURE — 85730 THROMBOPLASTIN TIME PARTIAL: CPT | Performed by: NURSE PRACTITIONER

## 2022-01-01 PROCEDURE — 86140 C-REACTIVE PROTEIN: CPT | Performed by: NURSE PRACTITIONER

## 2022-01-01 PROCEDURE — 25010000002 ENOXAPARIN PER 10 MG: Performed by: INTERNAL MEDICINE

## 2022-01-01 PROCEDURE — 99214 OFFICE O/P EST MOD 30 MIN: CPT | Performed by: NURSE PRACTITIONER

## 2022-01-01 PROCEDURE — 009U3ZX DRAINAGE OF SPINAL CANAL, PERCUTANEOUS APPROACH, DIAGNOSTIC: ICD-10-PCS | Performed by: INTERNAL MEDICINE

## 2022-01-01 PROCEDURE — 85652 RBC SED RATE AUTOMATED: CPT | Performed by: PSYCHIATRY & NEUROLOGY

## 2022-01-01 PROCEDURE — 84443 ASSAY THYROID STIM HORMONE: CPT

## 2022-01-01 PROCEDURE — 85610 PROTHROMBIN TIME: CPT | Performed by: EMERGENCY MEDICINE

## 2022-01-01 PROCEDURE — 80048 BASIC METABOLIC PNL TOTAL CA: CPT | Performed by: NURSE PRACTITIONER

## 2022-01-01 PROCEDURE — 93005 ELECTROCARDIOGRAM TRACING: CPT

## 2022-01-01 PROCEDURE — 70450 CT HEAD/BRAIN W/O DYE: CPT

## 2022-01-01 PROCEDURE — 80053 COMPREHEN METABOLIC PANEL: CPT | Performed by: PHYSICIAN ASSISTANT

## 2022-01-01 PROCEDURE — 93308 TTE F-UP OR LMTD: CPT | Performed by: INTERNAL MEDICINE

## 2022-01-01 PROCEDURE — 99232 SBSQ HOSP IP/OBS MODERATE 35: CPT | Performed by: HOSPITALIST

## 2022-01-01 PROCEDURE — 83519 RIA NONANTIBODY: CPT | Performed by: PSYCHIATRY & NEUROLOGY

## 2022-01-01 PROCEDURE — 80048 BASIC METABOLIC PNL TOTAL CA: CPT | Performed by: INTERNAL MEDICINE

## 2022-01-01 PROCEDURE — C1776 JOINT DEVICE (IMPLANTABLE): HCPCS | Performed by: ORTHOPAEDIC SURGERY

## 2022-01-01 PROCEDURE — 96374 THER/PROPH/DIAG INJ IV PUSH: CPT

## 2022-01-01 PROCEDURE — 85025 COMPLETE CBC W/AUTO DIFF WBC: CPT

## 2022-01-01 PROCEDURE — 80053 COMPREHEN METABOLIC PANEL: CPT | Performed by: NURSE PRACTITIONER

## 2022-01-01 PROCEDURE — 80053 COMPREHEN METABOLIC PANEL: CPT | Performed by: EMERGENCY MEDICINE

## 2022-01-01 PROCEDURE — 88108 CYTOPATH CONCENTRATE TECH: CPT | Performed by: NURSE PRACTITIONER

## 2022-01-01 PROCEDURE — 97162 PT EVAL MOD COMPLEX 30 MIN: CPT

## 2022-01-01 PROCEDURE — 74018 RADEX ABDOMEN 1 VIEW: CPT

## 2022-01-01 PROCEDURE — 25010000002 LEVETIRACETAM IN NACL 0.82% 500 MG/100ML SOLUTION

## 2022-01-01 PROCEDURE — 25010000002 HEPARIN (PORCINE) 25000-0.45 UT/250ML-% SOLUTION: Performed by: NURSE PRACTITIONER

## 2022-01-01 PROCEDURE — 25010000002 ROPIVACAINE PER 1 MG: Performed by: ORTHOPAEDIC SURGERY

## 2022-01-01 PROCEDURE — 0202U NFCT DS 22 TRGT SARS-COV-2: CPT | Performed by: PHYSICIAN ASSISTANT

## 2022-01-01 PROCEDURE — 93010 ELECTROCARDIOGRAM REPORT: CPT | Performed by: INTERNAL MEDICINE

## 2022-01-01 PROCEDURE — 83880 ASSAY OF NATRIURETIC PEPTIDE: CPT

## 2022-01-01 PROCEDURE — 0SRB0JZ REPLACEMENT OF LEFT HIP JOINT WITH SYNTHETIC SUBSTITUTE, OPEN APPROACH: ICD-10-PCS | Performed by: ORTHOPAEDIC SURGERY

## 2022-01-01 PROCEDURE — U0004 COV-19 TEST NON-CDC HGH THRU: HCPCS

## 2022-01-01 PROCEDURE — 93005 ELECTROCARDIOGRAM TRACING: CPT | Performed by: PHYSICIAN ASSISTANT

## 2022-01-01 PROCEDURE — 99214 OFFICE O/P EST MOD 30 MIN: CPT | Performed by: PSYCHIATRY & NEUROLOGY

## 2022-01-01 PROCEDURE — 25010000002 FUROSEMIDE PER 20 MG: Performed by: NURSE PRACTITIONER

## 2022-01-01 PROCEDURE — 99231 SBSQ HOSP IP/OBS SF/LOW 25: CPT | Performed by: NURSE PRACTITIONER

## 2022-01-01 PROCEDURE — 25010000002 NEOSTIGMINE 5 MG/10ML SOLUTION: Performed by: ANESTHESIOLOGY

## 2022-01-01 PROCEDURE — 80061 LIPID PANEL: CPT | Performed by: INTERNAL MEDICINE

## 2022-01-01 PROCEDURE — 96361 HYDRATE IV INFUSION ADD-ON: CPT

## 2022-01-01 PROCEDURE — 84100 ASSAY OF PHOSPHORUS: CPT | Performed by: INTERNAL MEDICINE

## 2022-01-01 PROCEDURE — G0157 HHC PT ASSISTANT EA 15: HCPCS

## 2022-01-01 PROCEDURE — 84439 ASSAY OF FREE THYROXINE: CPT | Performed by: NURSE PRACTITIONER

## 2022-01-01 PROCEDURE — 96372 THER/PROPH/DIAG INJ SC/IM: CPT

## 2022-01-01 PROCEDURE — 25010000002 FENTANYL CITRATE (PF) 50 MCG/ML SOLUTION: Performed by: NURSE ANESTHETIST, CERTIFIED REGISTERED

## 2022-01-01 PROCEDURE — 85025 COMPLETE CBC W/AUTO DIFF WBC: CPT | Performed by: PSYCHIATRY & NEUROLOGY

## 2022-01-01 PROCEDURE — 25010000002 HYDROMORPHONE 1 MG/ML SOLUTION: Performed by: EMERGENCY MEDICINE

## 2022-01-01 PROCEDURE — 25010000002 ENOXAPARIN PER 10 MG: Performed by: PHYSICIAN ASSISTANT

## 2022-01-01 PROCEDURE — 85610 PROTHROMBIN TIME: CPT | Performed by: NURSE PRACTITIONER

## 2022-01-01 PROCEDURE — 87102 FUNGUS ISOLATION CULTURE: CPT | Performed by: NURSE PRACTITIONER

## 2022-01-01 PROCEDURE — 36415 COLL VENOUS BLD VENIPUNCTURE: CPT | Performed by: NURSE PRACTITIONER

## 2022-01-01 PROCEDURE — 96360 HYDRATION IV INFUSION INIT: CPT

## 2022-01-01 PROCEDURE — 97166 OT EVAL MOD COMPLEX 45 MIN: CPT

## 2022-01-01 PROCEDURE — 99214 OFFICE O/P EST MOD 30 MIN: CPT | Performed by: FAMILY MEDICINE

## 2022-01-01 PROCEDURE — 25010000002 PIPERACILLIN SOD-TAZOBACTAM PER 1 G: Performed by: INTERNAL MEDICINE

## 2022-01-01 PROCEDURE — 95819 EEG AWAKE AND ASLEEP: CPT | Performed by: PSYCHIATRY & NEUROLOGY

## 2022-01-01 PROCEDURE — 87086 URINE CULTURE/COLONY COUNT: CPT | Performed by: NURSE PRACTITIONER

## 2022-01-01 PROCEDURE — 25010000002 VANCOMYCIN HCL 1.25 G RECONSTITUTED SOLUTION 1 EACH VIAL

## 2022-01-01 PROCEDURE — 83690 ASSAY OF LIPASE: CPT | Performed by: INTERNAL MEDICINE

## 2022-01-01 PROCEDURE — 82607 VITAMIN B-12: CPT | Performed by: PSYCHIATRY & NEUROLOGY

## 2022-01-01 PROCEDURE — 87635 SARS-COV-2 COVID-19 AMP PRB: CPT | Performed by: HOSPITALIST

## 2022-01-01 PROCEDURE — 93325 DOPPLER ECHO COLOR FLOW MAPG: CPT

## 2022-01-01 PROCEDURE — 99222 1ST HOSP IP/OBS MODERATE 55: CPT | Performed by: INTERNAL MEDICINE

## 2022-01-01 PROCEDURE — 36600 WITHDRAWAL OF ARTERIAL BLOOD: CPT

## 2022-01-01 PROCEDURE — 36415 COLL VENOUS BLD VENIPUNCTURE: CPT | Performed by: FAMILY MEDICINE

## 2022-01-01 PROCEDURE — 0 IOPAMIDOL PER 1 ML: Performed by: NURSE PRACTITIONER

## 2022-01-01 PROCEDURE — 83735 ASSAY OF MAGNESIUM: CPT | Performed by: PHYSICIAN ASSISTANT

## 2022-01-01 PROCEDURE — 84484 ASSAY OF TROPONIN QUANT: CPT | Performed by: NURSE PRACTITIONER

## 2022-01-01 PROCEDURE — 97161 PT EVAL LOW COMPLEX 20 MIN: CPT | Performed by: PHYSICAL THERAPIST

## 2022-01-01 PROCEDURE — 97535 SELF CARE MNGMENT TRAINING: CPT

## 2022-01-01 PROCEDURE — 84484 ASSAY OF TROPONIN QUANT: CPT | Performed by: INTERNAL MEDICINE

## 2022-01-01 PROCEDURE — 25010000002 ONDANSETRON PER 1 MG: Performed by: NURSE ANESTHETIST, CERTIFIED REGISTERED

## 2022-01-01 PROCEDURE — 84484 ASSAY OF TROPONIN QUANT: CPT

## 2022-01-01 PROCEDURE — 0 CEFAZOLIN PER 500 MG: Performed by: ORTHOPAEDIC SURGERY

## 2022-01-01 PROCEDURE — 81001 URINALYSIS AUTO W/SCOPE: CPT | Performed by: NURSE PRACTITIONER

## 2022-01-01 PROCEDURE — 87205 SMEAR GRAM STAIN: CPT | Performed by: NURSE PRACTITIONER

## 2022-01-01 PROCEDURE — 0 BUPIVACAINE LIPOSOME 1.3 % SUSPENSION: Performed by: ORTHOPAEDIC SURGERY

## 2022-01-01 PROCEDURE — 70553 MRI BRAIN STEM W/O & W/DYE: CPT

## 2022-01-01 PROCEDURE — G0180 MD CERTIFICATION HHA PATIENT: HCPCS | Performed by: ORTHOPAEDIC SURGERY

## 2022-01-01 PROCEDURE — 71275 CT ANGIOGRAPHY CHEST: CPT

## 2022-01-01 PROCEDURE — 99226 PR SBSQ OBSERVATION CARE/DAY 35 MINUTES: CPT | Performed by: HOSPITALIST

## 2022-01-01 PROCEDURE — 25010000002 DEXAMETHASONE PER 1 MG: Performed by: NURSE ANESTHETIST, CERTIFIED REGISTERED

## 2022-01-01 PROCEDURE — 25010000002 ONDANSETRON PER 1 MG: Performed by: PHYSICIAN ASSISTANT

## 2022-01-01 PROCEDURE — 82746 ASSAY OF FOLIC ACID SERUM: CPT | Performed by: PSYCHIATRY & NEUROLOGY

## 2022-01-01 PROCEDURE — 86255 FLUORESCENT ANTIBODY SCREEN: CPT | Performed by: PSYCHIATRY & NEUROLOGY

## 2022-01-01 PROCEDURE — 25010000002 PIPERACILLIN SOD-TAZOBACTAM PER 1 G: Performed by: NURSE PRACTITIONER

## 2022-01-01 PROCEDURE — 86900 BLOOD TYPING SEROLOGIC ABO: CPT

## 2022-01-01 PROCEDURE — 93325 DOPPLER ECHO COLOR FLOW MAPG: CPT | Performed by: INTERNAL MEDICINE

## 2022-01-01 PROCEDURE — 83605 ASSAY OF LACTIC ACID: CPT

## 2022-01-01 PROCEDURE — 36415 COLL VENOUS BLD VENIPUNCTURE: CPT | Performed by: EMERGENCY MEDICINE

## 2022-01-01 PROCEDURE — C9803 HOPD COVID-19 SPEC COLLECT: HCPCS

## 2022-01-01 PROCEDURE — 99214 OFFICE O/P EST MOD 30 MIN: CPT | Performed by: INTERNAL MEDICINE

## 2022-01-01 PROCEDURE — 81003 URINALYSIS AUTO W/O SCOPE: CPT | Performed by: PHYSICIAN ASSISTANT

## 2022-01-01 PROCEDURE — 4A00X4Z MEASUREMENT OF CENTRAL NERVOUS ELECTRICAL ACTIVITY, EXTERNAL APPROACH: ICD-10-PCS | Performed by: PSYCHIATRY & NEUROLOGY

## 2022-01-01 PROCEDURE — 84484 ASSAY OF TROPONIN QUANT: CPT | Performed by: PHYSICIAN ASSISTANT

## 2022-01-01 PROCEDURE — 85007 BL SMEAR W/DIFF WBC COUNT: CPT | Performed by: NURSE PRACTITIONER

## 2022-01-01 PROCEDURE — 25010000002 HEPARIN (PORCINE) 25000-0.45 UT/250ML-% SOLUTION: Performed by: INTERNAL MEDICINE

## 2022-01-01 PROCEDURE — 84145 PROCALCITONIN (PCT): CPT | Performed by: NURSE PRACTITIONER

## 2022-01-01 PROCEDURE — 87077 CULTURE AEROBIC IDENTIFY: CPT | Performed by: NURSE PRACTITIONER

## 2022-01-01 PROCEDURE — 25010000002 MIDAZOLAM PER 1 MG: Performed by: STUDENT IN AN ORGANIZED HEALTH CARE EDUCATION/TRAINING PROGRAM

## 2022-01-01 PROCEDURE — 93306 TTE W/DOPPLER COMPLETE: CPT

## 2022-01-01 PROCEDURE — 99213 OFFICE O/P EST LOW 20 MIN: CPT | Performed by: ORTHOPAEDIC SURGERY

## 2022-01-01 PROCEDURE — 83036 HEMOGLOBIN GLYCOSYLATED A1C: CPT | Performed by: INTERNAL MEDICINE

## 2022-01-01 PROCEDURE — 70551 MRI BRAIN STEM W/O DYE: CPT

## 2022-01-01 PROCEDURE — 83880 ASSAY OF NATRIURETIC PEPTIDE: CPT | Performed by: FAMILY MEDICINE

## 2022-01-01 PROCEDURE — 85379 FIBRIN DEGRADATION QUANT: CPT | Performed by: NURSE PRACTITIONER

## 2022-01-01 PROCEDURE — 93971 EXTREMITY STUDY: CPT

## 2022-01-01 PROCEDURE — 97116 GAIT TRAINING THERAPY: CPT

## 2022-01-01 PROCEDURE — 87186 SC STD MICRODIL/AGAR DIL: CPT | Performed by: NURSE PRACTITIONER

## 2022-01-01 PROCEDURE — 99219 PR INITIAL OBSERVATION CARE/DAY 50 MINUTES: CPT | Performed by: NURSE PRACTITIONER

## 2022-01-01 PROCEDURE — 62270 DX LMBR SPI PNXR: CPT | Performed by: NURSE PRACTITIONER

## 2022-01-01 PROCEDURE — 80061 LIPID PANEL: CPT

## 2022-01-01 PROCEDURE — 36415 COLL VENOUS BLD VENIPUNCTURE: CPT | Performed by: INTERNAL MEDICINE

## 2022-01-01 PROCEDURE — 82803 BLOOD GASES ANY COMBINATION: CPT

## 2022-01-01 PROCEDURE — 97163 PT EVAL HIGH COMPLEX 45 MIN: CPT

## 2022-01-01 PROCEDURE — 25010000002 LEVETIRACETAM IN NACL 0.82% 500 MG/100ML SOLUTION: Performed by: PSYCHIATRY & NEUROLOGY

## 2022-01-01 PROCEDURE — G0151 HHCP-SERV OF PT,EA 15 MIN: HCPCS

## 2022-01-01 PROCEDURE — 76000 FLUOROSCOPY <1 HR PHYS/QHP: CPT

## 2022-01-01 PROCEDURE — 25010000002 ROPIVACAINE PER 1 MG: Performed by: STUDENT IN AN ORGANIZED HEALTH CARE EDUCATION/TRAINING PROGRAM

## 2022-01-01 PROCEDURE — 99239 HOSP IP/OBS DSCHRG MGMT >30: CPT | Performed by: HOSPITALIST

## 2022-01-01 PROCEDURE — 85730 THROMBOPLASTIN TIME PARTIAL: CPT | Performed by: PSYCHIATRY & NEUROLOGY

## 2022-01-01 PROCEDURE — C1713 ANCHOR/SCREW BN/BN,TIS/BN: HCPCS | Performed by: ORTHOPAEDIC SURGERY

## 2022-01-01 PROCEDURE — 73521 X-RAY EXAM HIPS BI 2 VIEWS: CPT

## 2022-01-01 PROCEDURE — 84439 ASSAY OF FREE THYROXINE: CPT | Performed by: PSYCHIATRY & NEUROLOGY

## 2022-01-01 PROCEDURE — 86850 RBC ANTIBODY SCREEN: CPT | Performed by: EMERGENCY MEDICINE

## 2022-01-01 PROCEDURE — 87040 BLOOD CULTURE FOR BACTERIA: CPT | Performed by: NURSE PRACTITIONER

## 2022-01-01 PROCEDURE — 84100 ASSAY OF PHOSPHORUS: CPT | Performed by: NURSE PRACTITIONER

## 2022-01-01 PROCEDURE — 87015 SPECIMEN INFECT AGNT CONCNTJ: CPT | Performed by: NURSE PRACTITIONER

## 2022-01-01 PROCEDURE — 99222 1ST HOSP IP/OBS MODERATE 55: CPT | Performed by: ORTHOPAEDIC SURGERY

## 2022-01-01 PROCEDURE — P9612 CATHETERIZE FOR URINE SPEC: HCPCS

## 2022-01-01 PROCEDURE — 81003 URINALYSIS AUTO W/O SCOPE: CPT | Performed by: NURSE PRACTITIONER

## 2022-01-01 PROCEDURE — A9579 GAD-BASE MR CONTRAST NOS,1ML: HCPCS | Performed by: INTERNAL MEDICINE

## 2022-01-01 PROCEDURE — 86901 BLOOD TYPING SEROLOGIC RH(D): CPT

## 2022-01-01 PROCEDURE — U0005 INFEC AGEN DETEC AMPLI PROBE: HCPCS

## 2022-01-01 PROCEDURE — 82945 GLUCOSE OTHER FLUID: CPT | Performed by: NURSE PRACTITIONER

## 2022-01-01 PROCEDURE — 84443 ASSAY THYROID STIM HORMONE: CPT | Performed by: NURSE PRACTITIONER

## 2022-01-01 PROCEDURE — 83880 ASSAY OF NATRIURETIC PEPTIDE: CPT | Performed by: INTERNAL MEDICINE

## 2022-01-01 PROCEDURE — 63710000001 PREDNISONE PER 5 MG: Performed by: PSYCHIATRY & NEUROLOGY

## 2022-01-01 PROCEDURE — 70544 MR ANGIOGRAPHY HEAD W/O DYE: CPT

## 2022-01-01 PROCEDURE — 73501 X-RAY EXAM HIP UNI 1 VIEW: CPT

## 2022-01-01 PROCEDURE — 25010000002 PROPOFOL 200 MG/20ML EMULSION: Performed by: ANESTHESIOLOGIST ASSISTANT

## 2022-01-01 PROCEDURE — 83880 ASSAY OF NATRIURETIC PEPTIDE: CPT | Performed by: PHYSICIAN ASSISTANT

## 2022-01-01 PROCEDURE — 83735 ASSAY OF MAGNESIUM: CPT | Performed by: INTERNAL MEDICINE

## 2022-01-01 PROCEDURE — 87798 DETECT AGENT NOS DNA AMP: CPT | Performed by: NURSE PRACTITIONER

## 2022-01-01 PROCEDURE — 84166 PROTEIN E-PHORESIS/URINE/CSF: CPT | Performed by: NURSE PRACTITIONER

## 2022-01-01 PROCEDURE — 84100 ASSAY OF PHOSPHORUS: CPT | Performed by: PHYSICIAN ASSISTANT

## 2022-01-01 PROCEDURE — 99213 OFFICE O/P EST LOW 20 MIN: CPT | Performed by: FAMILY MEDICINE

## 2022-01-01 PROCEDURE — 97140 MANUAL THERAPY 1/> REGIONS: CPT | Performed by: PHYSICAL THERAPIST

## 2022-01-01 PROCEDURE — 84443 ASSAY THYROID STIM HORMONE: CPT | Performed by: PHYSICIAN ASSISTANT

## 2022-01-01 PROCEDURE — 81001 URINALYSIS AUTO W/SCOPE: CPT

## 2022-01-01 PROCEDURE — 99222 1ST HOSP IP/OBS MODERATE 55: CPT | Performed by: PSYCHIATRY & NEUROLOGY

## 2022-01-01 PROCEDURE — 80061 LIPID PANEL: CPT | Performed by: PHYSICIAN ASSISTANT

## 2022-01-01 PROCEDURE — 87635 SARS-COV-2 COVID-19 AMP PRB: CPT | Performed by: EMERGENCY MEDICINE

## 2022-01-01 PROCEDURE — C1889 IMPLANT/INSERT DEVICE, NOC: HCPCS | Performed by: ORTHOPAEDIC SURGERY

## 2022-01-01 PROCEDURE — 73502 X-RAY EXAM HIP UNI 2-3 VIEWS: CPT

## 2022-01-01 PROCEDURE — U0005 INFEC AGEN DETEC AMPLI PROBE: HCPCS | Performed by: NURSE PRACTITIONER

## 2022-01-01 PROCEDURE — 99283 EMERGENCY DEPT VISIT LOW MDM: CPT

## 2022-01-01 PROCEDURE — 97162 PT EVAL MOD COMPLEX 30 MIN: CPT | Performed by: PHYSICAL THERAPIST

## 2022-01-01 PROCEDURE — 25010000002 ENOXAPARIN PER 10 MG: Performed by: NURSE PRACTITIONER

## 2022-01-01 PROCEDURE — 87070 CULTURE OTHR SPECIMN AEROBIC: CPT | Performed by: NURSE PRACTITIONER

## 2022-01-01 PROCEDURE — 76942 ECHO GUIDE FOR BIOPSY: CPT | Performed by: ORTHOPAEDIC SURGERY

## 2022-01-01 PROCEDURE — 99223 1ST HOSP IP/OBS HIGH 75: CPT | Performed by: HOSPITALIST

## 2022-01-01 PROCEDURE — 87327 CRYPTOCOCCUS NEOFORM AG IA: CPT | Performed by: NURSE PRACTITIONER

## 2022-01-01 PROCEDURE — 71046 X-RAY EXAM CHEST 2 VIEWS: CPT

## 2022-01-01 PROCEDURE — 85730 THROMBOPLASTIN TIME PARTIAL: CPT | Performed by: EMERGENCY MEDICINE

## 2022-01-01 PROCEDURE — 85007 BL SMEAR W/DIFF WBC COUNT: CPT | Performed by: PSYCHIATRY & NEUROLOGY

## 2022-01-01 PROCEDURE — 25010000002 FENTANYL CITRATE (PF) 100 MCG/2ML SOLUTION: Performed by: ANESTHESIOLOGIST ASSISTANT

## 2022-01-01 PROCEDURE — 86900 BLOOD TYPING SEROLOGIC ABO: CPT | Performed by: EMERGENCY MEDICINE

## 2022-01-01 PROCEDURE — 27130 TOTAL HIP ARTHROPLASTY: CPT | Performed by: ORTHOPAEDIC SURGERY

## 2022-01-01 PROCEDURE — 82550 ASSAY OF CK (CPK): CPT | Performed by: INTERNAL MEDICINE

## 2022-01-01 PROCEDURE — 92610 EVALUATE SWALLOWING FUNCTION: CPT

## 2022-01-01 PROCEDURE — 88305 TISSUE EXAM BY PATHOLOGIST: CPT | Performed by: OPHTHALMOLOGY

## 2022-01-01 PROCEDURE — 73560 X-RAY EXAM OF KNEE 1 OR 2: CPT

## 2022-01-01 PROCEDURE — 25010000002 GADOTERIDOL PER 1 ML: Performed by: INTERNAL MEDICINE

## 2022-01-01 PROCEDURE — 95819 EEG AWAKE AND ASLEEP: CPT

## 2022-01-01 PROCEDURE — 25010000002 PROPOFOL 10 MG/ML EMULSION: Performed by: NURSE ANESTHETIST, CERTIFIED REGISTERED

## 2022-01-01 PROCEDURE — 85027 COMPLETE CBC AUTOMATED: CPT | Performed by: INTERNAL MEDICINE

## 2022-01-01 PROCEDURE — 25010000002 DEXAMETHASONE PER 1 MG: Performed by: STUDENT IN AN ORGANIZED HEALTH CARE EDUCATION/TRAINING PROGRAM

## 2022-01-01 PROCEDURE — 86140 C-REACTIVE PROTEIN: CPT | Performed by: PSYCHIATRY & NEUROLOGY

## 2022-01-01 PROCEDURE — 80048 BASIC METABOLIC PNL TOTAL CA: CPT | Performed by: PHYSICIAN ASSISTANT

## 2022-01-01 PROCEDURE — 93306 TTE W/DOPPLER COMPLETE: CPT | Performed by: INTERNAL MEDICINE

## 2022-01-01 PROCEDURE — 82306 VITAMIN D 25 HYDROXY: CPT

## 2022-01-01 PROCEDURE — 81001 URINALYSIS AUTO W/SCOPE: CPT | Performed by: PSYCHIATRY & NEUROLOGY

## 2022-01-01 PROCEDURE — 85652 RBC SED RATE AUTOMATED: CPT | Performed by: NURSE PRACTITIONER

## 2022-01-01 PROCEDURE — 85652 RBC SED RATE AUTOMATED: CPT | Performed by: EMERGENCY MEDICINE

## 2022-01-01 PROCEDURE — 85610 PROTHROMBIN TIME: CPT | Performed by: INTERNAL MEDICINE

## 2022-01-01 PROCEDURE — 85610 PROTHROMBIN TIME: CPT

## 2022-01-01 DEVICE — BIOLOX® DELTA, CERAMIC FEMORAL HEAD, S, Ø 40/-3.5, TAPER 12/14
Type: IMPLANTABLE DEVICE | Site: HIP | Status: FUNCTIONAL
Brand: BIOLOX® DELTA

## 2022-01-01 DEVICE — IMPLANTABLE DEVICE: Type: IMPLANTABLE DEVICE | Site: HIP | Status: FUNCTIONAL

## 2022-01-01 DEVICE — SCRW ACET CORT TRILOGY S/TAP 6.5X20: Type: IMPLANTABLE DEVICE | Site: HIP | Status: FUNCTIONAL

## 2022-01-01 DEVICE — STEM FEM/HIP AVENIR/COMPLETE HI/OFFST HA SZ6.5: Type: IMPLANTABLE DEVICE | Site: HIP | Status: FUNCTIONAL

## 2022-01-01 DEVICE — CP HIP UPCHRG OSSEOTI LTD HL CUPS: Type: IMPLANTABLE DEVICE | Site: HIP | Status: FUNCTIONAL

## 2022-01-01 DEVICE — TOTAL HIP PRIMARY: Type: IMPLANTABLE DEVICE | Site: HIP | Status: FUNCTIONAL

## 2022-01-01 DEVICE — SUT NONABS MAXBRAID/PE NMBR2 C7 38IN BLU 900335: Type: IMPLANTABLE DEVICE | Site: HIP | Status: FUNCTIONAL

## 2022-01-01 DEVICE — DEV CONTRL TISS STRATAFIX SPIRAL MNCRYL UD 3/0 PLS 30CM: Type: IMPLANTABLE DEVICE | Site: HIP | Status: FUNCTIONAL

## 2022-01-01 DEVICE — SEAL HEMO SURG ARISTA/AH ABS/PWDR 1GM: Type: IMPLANTABLE DEVICE | Site: HIP | Status: FUNCTIONAL

## 2022-01-01 DEVICE — SHLL ACET OSSEOTI G7 4H SZF 54MM: Type: IMPLANTABLE DEVICE | Site: HIP | Status: FUNCTIONAL

## 2022-01-01 RX ORDER — ONDANSETRON 4 MG/1
4 TABLET, FILM COATED ORAL EVERY 6 HOURS PRN
COMMUNITY

## 2022-01-01 RX ORDER — INSULIN LISPRO 100 [IU]/ML
2-7 INJECTION, SOLUTION INTRAVENOUS; SUBCUTANEOUS
Status: DISCONTINUED | OUTPATIENT
Start: 2022-01-01 | End: 2022-01-01

## 2022-01-01 RX ORDER — BISACODYL 5 MG/1
5 TABLET, DELAYED RELEASE ORAL DAILY PRN
Status: DISCONTINUED | OUTPATIENT
Start: 2022-01-01 | End: 2022-01-01

## 2022-01-01 RX ORDER — BISACODYL 10 MG
10 SUPPOSITORY, RECTAL RECTAL DAILY PRN
Status: DISCONTINUED | OUTPATIENT
Start: 2022-01-01 | End: 2022-01-01 | Stop reason: HOSPADM

## 2022-01-01 RX ORDER — LIDOCAINE 50 MG/G
1 PATCH TOPICAL
Status: DISCONTINUED | OUTPATIENT
Start: 2022-01-01 | End: 2022-01-01 | Stop reason: HOSPADM

## 2022-01-01 RX ORDER — MIDAZOLAM HYDROCHLORIDE 1 MG/ML
1 INJECTION INTRAMUSCULAR; INTRAVENOUS
Status: DISCONTINUED | OUTPATIENT
Start: 2022-01-01 | End: 2022-01-01 | Stop reason: HOSPADM

## 2022-01-01 RX ORDER — LORAZEPAM 2 MG/ML
1 CONCENTRATE ORAL
Status: DISCONTINUED | OUTPATIENT
Start: 2022-01-01 | End: 2022-01-01 | Stop reason: HOSPADM

## 2022-01-01 RX ORDER — POLYETHYLENE GLYCOL 3350 17 G/17G
17 POWDER, FOR SOLUTION ORAL DAILY PRN
Status: DISCONTINUED | OUTPATIENT
Start: 2022-01-01 | End: 2022-01-01 | Stop reason: HOSPADM

## 2022-01-01 RX ORDER — LORAZEPAM 2 MG/ML
0.5 INJECTION INTRAMUSCULAR
Status: CANCELLED | OUTPATIENT
Start: 2022-01-01 | End: 2022-11-08

## 2022-01-01 RX ORDER — SODIUM CHLORIDE 0.9 % (FLUSH) 0.9 %
10 SYRINGE (ML) INJECTION AS NEEDED
Status: DISCONTINUED | OUTPATIENT
Start: 2022-01-01 | End: 2022-01-01 | Stop reason: HOSPADM

## 2022-01-01 RX ORDER — SODIUM CHLORIDE, SODIUM LACTATE, POTASSIUM CHLORIDE, CALCIUM CHLORIDE 600; 310; 30; 20 MG/100ML; MG/100ML; MG/100ML; MG/100ML
125 INJECTION, SOLUTION INTRAVENOUS CONTINUOUS
Status: DISCONTINUED | OUTPATIENT
Start: 2022-01-01 | End: 2022-01-01

## 2022-01-01 RX ORDER — LORAZEPAM 2 MG/ML
2 INJECTION INTRAMUSCULAR
Status: CANCELLED | OUTPATIENT
Start: 2022-01-01 | End: 2022-11-08

## 2022-01-01 RX ORDER — GLYCOPYRROLATE 0.2 MG/ML
INJECTION INTRAMUSCULAR; INTRAVENOUS AS NEEDED
Status: DISCONTINUED | OUTPATIENT
Start: 2022-01-01 | End: 2022-01-01 | Stop reason: SURG

## 2022-01-01 RX ORDER — ENOXAPARIN SODIUM 100 MG/ML
1 INJECTION SUBCUTANEOUS EVERY 12 HOURS
Status: DISCONTINUED | OUTPATIENT
Start: 2022-01-01 | End: 2022-01-01

## 2022-01-01 RX ORDER — SODIUM CHLORIDE 0.9 % (FLUSH) 0.9 %
10 SYRINGE (ML) INJECTION EVERY 12 HOURS SCHEDULED
Status: DISCONTINUED | OUTPATIENT
Start: 2022-01-01 | End: 2022-01-01 | Stop reason: HOSPADM

## 2022-01-01 RX ORDER — MELATONIN
1000 DAILY
Status: DISCONTINUED | OUTPATIENT
Start: 2022-01-01 | End: 2022-01-01 | Stop reason: HOSPADM

## 2022-01-01 RX ORDER — LEVETIRACETAM 5 MG/ML
500 INJECTION INTRAVASCULAR EVERY 12 HOURS SCHEDULED
Status: DISCONTINUED | OUTPATIENT
Start: 2022-01-01 | End: 2022-01-01 | Stop reason: HOSPADM

## 2022-01-01 RX ORDER — POTASSIUM CHLORIDE 20 MEQ/1
40 TABLET, EXTENDED RELEASE ORAL AS NEEDED
Status: DISCONTINUED | OUTPATIENT
Start: 2022-01-01 | End: 2022-01-01 | Stop reason: HOSPADM

## 2022-01-01 RX ORDER — LORAZEPAM 2 MG/ML
0.5 CONCENTRATE ORAL
Status: DISCONTINUED | OUTPATIENT
Start: 2022-01-01 | End: 2022-01-01 | Stop reason: HOSPADM

## 2022-01-01 RX ORDER — HYDROCODONE BITARTRATE AND ACETAMINOPHEN 5; 325 MG/1; MG/1
1 TABLET ORAL EVERY 4 HOURS PRN
Status: DISCONTINUED | OUTPATIENT
Start: 2022-01-01 | End: 2022-01-01

## 2022-01-01 RX ORDER — POTASSIUM CHLORIDE 20 MEQ/1
20 TABLET, EXTENDED RELEASE ORAL 2 TIMES DAILY WITH MEALS
Status: DISCONTINUED | OUTPATIENT
Start: 2022-01-01 | End: 2022-01-01 | Stop reason: HOSPADM

## 2022-01-01 RX ORDER — SULFAMETHOXAZOLE AND TRIMETHOPRIM 800; 160 MG/1; MG/1
1 TABLET ORAL 2 TIMES DAILY
Qty: 20 TABLET | Refills: 0 | Status: SHIPPED | OUTPATIENT
Start: 2022-01-01 | End: 2022-01-01 | Stop reason: SDUPTHER

## 2022-01-01 RX ORDER — LORAZEPAM 2 MG/ML
1 INJECTION INTRAMUSCULAR
Status: DISCONTINUED | OUTPATIENT
Start: 2022-01-01 | End: 2022-01-01 | Stop reason: HOSPADM

## 2022-01-01 RX ORDER — POTASSIUM CHLORIDE 1.5 G/1.77G
40 POWDER, FOR SOLUTION ORAL AS NEEDED
Status: DISCONTINUED | OUTPATIENT
Start: 2022-01-01 | End: 2022-01-01 | Stop reason: HOSPADM

## 2022-01-01 RX ORDER — LABETALOL HYDROCHLORIDE 5 MG/ML
5 INJECTION, SOLUTION INTRAVENOUS
Status: DISCONTINUED | OUTPATIENT
Start: 2022-01-01 | End: 2022-01-01 | Stop reason: HOSPADM

## 2022-01-01 RX ORDER — ASPIRIN 81 MG/1
81 TABLET, CHEWABLE ORAL DAILY
Status: DISCONTINUED | OUTPATIENT
Start: 2022-01-01 | End: 2022-01-01 | Stop reason: HOSPADM

## 2022-01-01 RX ORDER — POTASSIUM CHLORIDE 1.5 G/1.77G
40 POWDER, FOR SOLUTION ORAL AS NEEDED
Status: CANCELLED | OUTPATIENT
Start: 2022-01-01

## 2022-01-01 RX ORDER — ACETAMINOPHEN 500 MG
1000 TABLET ORAL ONCE
Status: COMPLETED | OUTPATIENT
Start: 2022-01-01 | End: 2022-01-01

## 2022-01-01 RX ORDER — CHOLECALCIFEROL (VITAMIN D3) 125 MCG
5 CAPSULE ORAL NIGHTLY PRN
Status: DISCONTINUED | OUTPATIENT
Start: 2022-01-01 | End: 2022-01-01 | Stop reason: HOSPADM

## 2022-01-01 RX ORDER — LORAZEPAM 2 MG/ML
2 INJECTION INTRAMUSCULAR
Status: DISCONTINUED | OUTPATIENT
Start: 2022-01-01 | End: 2022-01-01 | Stop reason: HOSPADM

## 2022-01-01 RX ORDER — SODIUM CHLORIDE 0.9 % (FLUSH) 0.9 %
10 SYRINGE (ML) INJECTION EVERY 12 HOURS SCHEDULED
Status: CANCELLED | OUTPATIENT
Start: 2022-01-01

## 2022-01-01 RX ORDER — LORAZEPAM 1 MG/1
2 TABLET ORAL
Status: DISCONTINUED | OUTPATIENT
Start: 2022-01-01 | End: 2022-01-01

## 2022-01-01 RX ORDER — SULFAMETHOXAZOLE AND TRIMETHOPRIM 800; 160 MG/1; MG/1
1 TABLET ORAL 2 TIMES DAILY
Qty: 20 TABLET | Refills: 0 | Status: SHIPPED | OUTPATIENT
Start: 2022-01-01 | End: 2022-01-01

## 2022-01-01 RX ORDER — ENOXAPARIN SODIUM 100 MG/ML
1 INJECTION SUBCUTANEOUS ONCE
Status: COMPLETED | OUTPATIENT
Start: 2022-01-01 | End: 2022-01-01

## 2022-01-01 RX ORDER — SACCHAROMYCES BOULARDII 250 MG
250 CAPSULE ORAL NIGHTLY
COMMUNITY

## 2022-01-01 RX ORDER — FLUMAZENIL 0.1 MG/ML
0.2 INJECTION INTRAVENOUS AS NEEDED
Status: DISCONTINUED | OUTPATIENT
Start: 2022-01-01 | End: 2022-01-01 | Stop reason: HOSPADM

## 2022-01-01 RX ORDER — PROMETHAZINE HYDROCHLORIDE 25 MG/1
25 SUPPOSITORY RECTAL ONCE AS NEEDED
Status: DISCONTINUED | OUTPATIENT
Start: 2022-01-01 | End: 2022-01-01 | Stop reason: HOSPADM

## 2022-01-01 RX ORDER — LORAZEPAM 1 MG/1
2 TABLET ORAL
Status: CANCELLED | OUTPATIENT
Start: 2022-01-01 | End: 2022-11-06

## 2022-01-01 RX ORDER — LORAZEPAM 0.5 MG/1
0.5 TABLET ORAL
Status: DISCONTINUED | OUTPATIENT
Start: 2022-01-01 | End: 2022-01-01 | Stop reason: HOSPADM

## 2022-01-01 RX ORDER — MULTIPLE VITAMINS W/ MINERALS TAB 9MG-400MCG
1 TAB ORAL DAILY
Status: DISCONTINUED | OUTPATIENT
Start: 2022-01-01 | End: 2022-01-01 | Stop reason: HOSPADM

## 2022-01-01 RX ORDER — HEPARIN SODIUM 10000 [USP'U]/100ML
18 INJECTION, SOLUTION INTRAVENOUS
Status: DISCONTINUED | OUTPATIENT
Start: 2022-01-01 | End: 2022-01-01 | Stop reason: SDUPTHER

## 2022-01-01 RX ORDER — LORAZEPAM 2 MG/ML
1 INJECTION INTRAMUSCULAR
Status: CANCELLED | OUTPATIENT
Start: 2022-01-01 | End: 2022-11-06

## 2022-01-01 RX ORDER — LEVETIRACETAM 5 MG/ML
500 INJECTION INTRAVASCULAR EVERY 12 HOURS SCHEDULED
Status: DISCONTINUED | OUTPATIENT
Start: 2022-01-01 | End: 2022-01-01

## 2022-01-01 RX ORDER — LORAZEPAM 2 MG/ML
2 INJECTION INTRAMUSCULAR
Status: CANCELLED | OUTPATIENT
Start: 2022-01-01 | End: 2022-11-06

## 2022-01-01 RX ORDER — PROPOFOL 10 MG/ML
INJECTION, EMULSION INTRAVENOUS AS NEEDED
Status: DISCONTINUED | OUTPATIENT
Start: 2022-01-01 | End: 2022-01-01 | Stop reason: SURG

## 2022-01-01 RX ORDER — NICOTINE POLACRILEX 4 MG
15 LOZENGE BUCCAL
Status: CANCELLED | OUTPATIENT
Start: 2022-01-01

## 2022-01-01 RX ORDER — LORAZEPAM 2 MG/ML
0.5 INJECTION INTRAMUSCULAR
Status: DISCONTINUED | OUTPATIENT
Start: 2022-01-01 | End: 2022-01-01 | Stop reason: HOSPADM

## 2022-01-01 RX ORDER — ACETAMINOPHEN 325 MG/1
650 TABLET ORAL EVERY 4 HOURS PRN
Status: CANCELLED | OUTPATIENT
Start: 2022-01-01

## 2022-01-01 RX ORDER — METOPROLOL SUCCINATE 25 MG/1
25 TABLET, EXTENDED RELEASE ORAL
Status: DISCONTINUED | OUTPATIENT
Start: 2022-01-01 | End: 2022-01-01 | Stop reason: HOSPADM

## 2022-01-01 RX ORDER — LEVOTHYROXINE SODIUM 88 UG/1
88 TABLET ORAL
Status: DISCONTINUED | OUTPATIENT
Start: 2022-01-01 | End: 2022-01-01 | Stop reason: HOSPADM

## 2022-01-01 RX ORDER — HYDROCODONE BITARTRATE AND ACETAMINOPHEN 5; 325 MG/1; MG/1
1 TABLET ORAL EVERY 6 HOURS PRN
Qty: 15 TABLET | Refills: 0 | Status: SHIPPED | OUTPATIENT
Start: 2022-01-01 | End: 2022-01-01

## 2022-01-01 RX ORDER — ROPIVACAINE HYDROCHLORIDE 5 MG/ML
INJECTION, SOLUTION EPIDURAL; INFILTRATION; PERINEURAL
Status: COMPLETED | OUTPATIENT
Start: 2022-01-01 | End: 2022-01-01

## 2022-01-01 RX ORDER — POLYETHYLENE GLYCOL 3350 17 G/17G
17 POWDER, FOR SOLUTION ORAL DAILY
Status: DISCONTINUED | OUTPATIENT
Start: 2022-01-01 | End: 2022-01-01 | Stop reason: HOSPADM

## 2022-01-01 RX ORDER — LORAZEPAM 2 MG/ML
2 CONCENTRATE ORAL
Status: DISCONTINUED | OUTPATIENT
Start: 2022-01-01 | End: 2022-01-01 | Stop reason: HOSPADM

## 2022-01-01 RX ORDER — METOPROLOL TARTRATE 5 MG/5ML
5 INJECTION INTRAVENOUS EVERY 6 HOURS PRN
Status: DISCONTINUED | OUTPATIENT
Start: 2022-01-01 | End: 2022-01-01 | Stop reason: HOSPADM

## 2022-01-01 RX ORDER — OXYBUTYNIN CHLORIDE 10 MG/1
10 TABLET, EXTENDED RELEASE ORAL DAILY
Status: DISCONTINUED | OUTPATIENT
Start: 2022-01-01 | End: 2022-01-01 | Stop reason: HOSPADM

## 2022-01-01 RX ORDER — PANTOPRAZOLE SODIUM 40 MG/1
40 TABLET, DELAYED RELEASE ORAL DAILY
Status: DISCONTINUED | OUTPATIENT
Start: 2022-01-01 | End: 2022-01-01 | Stop reason: HOSPADM

## 2022-01-01 RX ORDER — LORAZEPAM 1 MG/1
1 TABLET ORAL
Status: CANCELLED | OUTPATIENT
Start: 2022-01-01 | End: 2022-11-08

## 2022-01-01 RX ORDER — LORAZEPAM 2 MG/ML
2 INJECTION INTRAMUSCULAR
Status: DISCONTINUED | OUTPATIENT
Start: 2022-01-01 | End: 2022-01-01

## 2022-01-01 RX ORDER — MIDAZOLAM HYDROCHLORIDE 1 MG/ML
INJECTION INTRAMUSCULAR; INTRAVENOUS
Status: COMPLETED | OUTPATIENT
Start: 2022-01-01 | End: 2022-01-01

## 2022-01-01 RX ORDER — POTASSIUM CHLORIDE 20 MEQ/1
20 TABLET, EXTENDED RELEASE ORAL 2 TIMES DAILY
Qty: 180 TABLET | Refills: 1 | Status: SHIPPED | OUTPATIENT
Start: 2022-01-01

## 2022-01-01 RX ORDER — LORAZEPAM 2 MG/ML
2 CONCENTRATE ORAL
Status: CANCELLED | OUTPATIENT
Start: 2022-01-01 | End: 2022-11-08

## 2022-01-01 RX ORDER — OLANZAPINE 10 MG/2ML
1 INJECTION, POWDER, LYOPHILIZED, FOR SOLUTION INTRAMUSCULAR
Status: DISCONTINUED | OUTPATIENT
Start: 2022-01-01 | End: 2022-01-01 | Stop reason: HOSPADM

## 2022-01-01 RX ORDER — CALCIUM CARBONATE 200(500)MG
1 TABLET,CHEWABLE ORAL 2 TIMES DAILY PRN
Status: DISCONTINUED | OUTPATIENT
Start: 2022-01-01 | End: 2022-01-01 | Stop reason: HOSPADM

## 2022-01-01 RX ORDER — HYDROCODONE BITARTRATE AND ACETAMINOPHEN 5; 325 MG/1; MG/1
1 TABLET ORAL EVERY 4 HOURS PRN
Status: ACTIVE | OUTPATIENT
Start: 2022-01-01 | End: 2022-01-01

## 2022-01-01 RX ORDER — BISACODYL 10 MG
10 SUPPOSITORY, RECTAL RECTAL DAILY PRN
Status: DISCONTINUED | OUTPATIENT
Start: 2022-01-01 | End: 2022-01-01

## 2022-01-01 RX ORDER — CHOLECALCIFEROL (VITAMIN D3) 125 MCG
5 CAPSULE ORAL NIGHTLY PRN
Status: CANCELLED | OUTPATIENT
Start: 2022-01-01

## 2022-01-01 RX ORDER — OLANZAPINE 10 MG/2ML
1 INJECTION, POWDER, LYOPHILIZED, FOR SOLUTION INTRAMUSCULAR
Status: CANCELLED | OUTPATIENT
Start: 2022-01-01

## 2022-01-01 RX ORDER — ONDANSETRON 2 MG/ML
4 INJECTION INTRAMUSCULAR; INTRAVENOUS EVERY 6 HOURS PRN
Status: DISCONTINUED | OUTPATIENT
Start: 2022-01-01 | End: 2022-01-01 | Stop reason: HOSPADM

## 2022-01-01 RX ORDER — BUMETANIDE 2 MG/1
2 TABLET ORAL DAILY
Start: 2022-01-01 | End: 2022-01-01 | Stop reason: SDUPTHER

## 2022-01-01 RX ORDER — BUMETANIDE 0.25 MG/ML
1 INJECTION INTRAMUSCULAR; INTRAVENOUS ONCE
Status: COMPLETED | OUTPATIENT
Start: 2022-01-01 | End: 2022-01-01

## 2022-01-01 RX ORDER — DEXTROSE MONOHYDRATE 25 G/50ML
25 INJECTION, SOLUTION INTRAVENOUS
Status: CANCELLED | OUTPATIENT
Start: 2022-01-01

## 2022-01-01 RX ORDER — METOPROLOL SUCCINATE 25 MG/1
25 TABLET, EXTENDED RELEASE ORAL
Qty: 30 TABLET | Refills: 1 | Status: SHIPPED | OUTPATIENT
Start: 2022-01-01 | End: 2022-01-01 | Stop reason: SDUPTHER

## 2022-01-01 RX ORDER — DOXYCYCLINE HYCLATE 100 MG
100 TABLET ORAL 2 TIMES DAILY
Qty: 20 TABLET | Refills: 0 | Status: SHIPPED | OUTPATIENT
Start: 2022-01-01 | End: 2022-01-01 | Stop reason: SDUPTHER

## 2022-01-01 RX ORDER — POLYETHYLENE GLYCOL 3350 17 G/17G
17 POWDER, FOR SOLUTION ORAL DAILY PRN
Status: DISCONTINUED | OUTPATIENT
Start: 2022-01-01 | End: 2022-01-01

## 2022-01-01 RX ORDER — ENOXAPARIN SODIUM 100 MG/ML
90 INJECTION SUBCUTANEOUS EVERY 12 HOURS
Status: DISCONTINUED | OUTPATIENT
Start: 2022-01-01 | End: 2022-01-01

## 2022-01-01 RX ORDER — PREDNISONE 10 MG/1
20 TABLET ORAL 3 TIMES DAILY
Status: ON HOLD | COMMUNITY
Start: 2022-01-01 | End: 2022-01-01

## 2022-01-01 RX ORDER — ROCURONIUM BROMIDE 10 MG/ML
INJECTION, SOLUTION INTRAVENOUS AS NEEDED
Status: DISCONTINUED | OUTPATIENT
Start: 2022-01-01 | End: 2022-01-01 | Stop reason: SURG

## 2022-01-01 RX ORDER — DOXYCYCLINE HYCLATE 100 MG
100 TABLET ORAL 2 TIMES DAILY
Qty: 20 TABLET | Refills: 0 | Status: SHIPPED | OUTPATIENT
Start: 2022-01-01 | End: 2022-01-01

## 2022-01-01 RX ORDER — SODIUM CHLORIDE 0.9 % (FLUSH) 0.9 %
3 SYRINGE (ML) INJECTION EVERY 12 HOURS SCHEDULED
Status: DISCONTINUED | OUTPATIENT
Start: 2022-01-01 | End: 2022-01-01 | Stop reason: HOSPADM

## 2022-01-01 RX ORDER — CARBOXYMETHYLCELLULOSE SODIUM 5 MG/ML
1 SOLUTION/ DROPS OPHTHALMIC 2 TIMES DAILY
COMMUNITY

## 2022-01-01 RX ORDER — PREDNISONE 10 MG/1
10 TABLET ORAL DAILY
Qty: 30 TABLET | Refills: 0 | Status: SHIPPED | OUTPATIENT
Start: 2022-01-01 | End: 2022-01-01

## 2022-01-01 RX ORDER — SODIUM CHLORIDE 9 MG/ML
75 INJECTION, SOLUTION INTRAVENOUS CONTINUOUS
Status: DISCONTINUED | OUTPATIENT
Start: 2022-01-01 | End: 2022-01-01 | Stop reason: HOSPADM

## 2022-01-01 RX ORDER — CARBOXYMETHYLCELLULOSE SODIUM 10 MG/ML
1 GEL OPHTHALMIC
Status: CANCELLED | OUTPATIENT
Start: 2022-01-01

## 2022-01-01 RX ORDER — ASPIRIN 81 MG/1
81 TABLET, CHEWABLE ORAL DAILY
Status: DISCONTINUED | OUTPATIENT
Start: 2022-01-01 | End: 2022-01-01

## 2022-01-01 RX ORDER — POTASSIUM CHLORIDE 20 MEQ/1
40 TABLET, EXTENDED RELEASE ORAL AS NEEDED
Status: DISCONTINUED | OUTPATIENT
Start: 2022-01-01 | End: 2022-01-01

## 2022-01-01 RX ORDER — EPHEDRINE SULFATE 50 MG/ML
5 INJECTION, SOLUTION INTRAVENOUS ONCE AS NEEDED
Status: DISCONTINUED | OUTPATIENT
Start: 2022-01-01 | End: 2022-01-01 | Stop reason: HOSPADM

## 2022-01-01 RX ORDER — SODIUM CHLORIDE 450 MG/100ML
75 INJECTION, SOLUTION INTRAVENOUS CONTINUOUS
Status: DISCONTINUED | OUTPATIENT
Start: 2022-01-01 | End: 2022-01-01

## 2022-01-01 RX ORDER — SODIUM CHLORIDE 0.9 % (FLUSH) 0.9 %
10 SYRINGE (ML) INJECTION AS NEEDED
Status: CANCELLED | OUTPATIENT
Start: 2022-01-01

## 2022-01-01 RX ORDER — ACETAMINOPHEN 325 MG/1
650 TABLET ORAL EVERY 6 HOURS PRN
COMMUNITY

## 2022-01-01 RX ORDER — LORAZEPAM 2 MG/ML
0.5 INJECTION INTRAMUSCULAR
Status: DISCONTINUED | OUTPATIENT
Start: 2022-01-01 | End: 2022-01-01

## 2022-01-01 RX ORDER — ACETAMINOPHEN 325 MG/1
650 TABLET ORAL EVERY 4 HOURS PRN
Status: DISCONTINUED | OUTPATIENT
Start: 2022-01-01 | End: 2022-01-01 | Stop reason: HOSPADM

## 2022-01-01 RX ORDER — SODIUM CHLORIDE, SODIUM LACTATE, POTASSIUM CHLORIDE, CALCIUM CHLORIDE 600; 310; 30; 20 MG/100ML; MG/100ML; MG/100ML; MG/100ML
9 INJECTION, SOLUTION INTRAVENOUS CONTINUOUS
Status: DISCONTINUED | OUTPATIENT
Start: 2022-01-01 | End: 2022-01-01 | Stop reason: HOSPADM

## 2022-01-01 RX ORDER — PANTOPRAZOLE SODIUM 40 MG/1
40 TABLET, DELAYED RELEASE ORAL
Status: DISCONTINUED | OUTPATIENT
Start: 2022-01-01 | End: 2022-01-01 | Stop reason: HOSPADM

## 2022-01-01 RX ORDER — LORAZEPAM 0.5 MG/1
0.5 TABLET ORAL
Status: DISCONTINUED | OUTPATIENT
Start: 2022-01-01 | End: 2022-01-01

## 2022-01-01 RX ORDER — SODIUM CHLORIDE 0.9 % (FLUSH) 0.9 %
10 SYRINGE (ML) INJECTION AS NEEDED
Status: DISCONTINUED | OUTPATIENT
Start: 2022-01-01 | End: 2022-01-01 | Stop reason: SDUPTHER

## 2022-01-01 RX ORDER — BUMETANIDE 2 MG/1
2 TABLET ORAL 2 TIMES DAILY
Status: ON HOLD | COMMUNITY
Start: 2022-01-01 | End: 2022-01-01 | Stop reason: SDUPTHER

## 2022-01-01 RX ORDER — SCOLOPAMINE TRANSDERMAL SYSTEM 1 MG/1
1 PATCH, EXTENDED RELEASE TRANSDERMAL
Status: DISCONTINUED | OUTPATIENT
Start: 2022-01-01 | End: 2022-01-01 | Stop reason: HOSPADM

## 2022-01-01 RX ORDER — MEPERIDINE HYDROCHLORIDE 25 MG/ML
12.5 INJECTION INTRAMUSCULAR; INTRAVENOUS; SUBCUTANEOUS
Status: DISCONTINUED | OUTPATIENT
Start: 2022-01-01 | End: 2022-01-01 | Stop reason: HOSPADM

## 2022-01-01 RX ORDER — LEVETIRACETAM 100 MG/ML
500 SOLUTION ORAL EVERY 12 HOURS SCHEDULED
Status: DISCONTINUED | OUTPATIENT
Start: 2022-01-01 | End: 2022-01-01

## 2022-01-01 RX ORDER — OXYCODONE HYDROCHLORIDE AND ACETAMINOPHEN 5; 325 MG/1; MG/1
TABLET ORAL
Qty: 40 TABLET | Refills: 0 | Status: SHIPPED | OUTPATIENT
Start: 2022-01-01 | End: 2022-01-01 | Stop reason: HOSPADM

## 2022-01-01 RX ORDER — ONDANSETRON 2 MG/ML
4 INJECTION INTRAMUSCULAR; INTRAVENOUS ONCE AS NEEDED
Status: DISCONTINUED | OUTPATIENT
Start: 2022-01-01 | End: 2022-01-01 | Stop reason: HOSPADM

## 2022-01-01 RX ORDER — NEOSTIGMINE METHYLSULFATE 0.5 MG/ML
INJECTION, SOLUTION INTRAVENOUS AS NEEDED
Status: DISCONTINUED | OUTPATIENT
Start: 2022-01-01 | End: 2022-01-01 | Stop reason: SURG

## 2022-01-01 RX ORDER — ALUMINA, MAGNESIA, AND SIMETHICONE 2400; 2400; 240 MG/30ML; MG/30ML; MG/30ML
15 SUSPENSION ORAL EVERY 6 HOURS PRN
Status: DISCONTINUED | OUTPATIENT
Start: 2022-01-01 | End: 2022-01-01 | Stop reason: HOSPADM

## 2022-01-01 RX ORDER — ACETAMINOPHEN 650 MG/1
650 SUPPOSITORY RECTAL EVERY 4 HOURS PRN
Status: DISCONTINUED | OUTPATIENT
Start: 2022-01-01 | End: 2022-01-01 | Stop reason: HOSPADM

## 2022-01-01 RX ORDER — GLYCOPYRROLATE 0.2 MG/ML
0.1 INJECTION INTRAMUSCULAR; INTRAVENOUS EVERY 4 HOURS PRN
Status: CANCELLED | OUTPATIENT
Start: 2022-01-01

## 2022-01-01 RX ORDER — LORAZEPAM 2 MG/ML
1 INJECTION INTRAMUSCULAR
Status: DISCONTINUED | OUTPATIENT
Start: 2022-01-01 | End: 2022-01-01

## 2022-01-01 RX ORDER — PANTOPRAZOLE SODIUM 40 MG/1
40 TABLET, DELAYED RELEASE ORAL DAILY
Status: CANCELLED | OUTPATIENT
Start: 2022-01-01

## 2022-01-01 RX ORDER — MIDAZOLAM HYDROCHLORIDE 1 MG/ML
0.5 INJECTION INTRAMUSCULAR; INTRAVENOUS
Status: DISCONTINUED | OUTPATIENT
Start: 2022-01-01 | End: 2022-01-01 | Stop reason: HOSPADM

## 2022-01-01 RX ORDER — PANTOPRAZOLE SODIUM 40 MG/10ML
40 INJECTION, POWDER, LYOPHILIZED, FOR SOLUTION INTRAVENOUS
Status: DISCONTINUED | OUTPATIENT
Start: 2022-01-01 | End: 2022-01-01

## 2022-01-01 RX ORDER — CETIRIZINE HYDROCHLORIDE 10 MG/1
10 TABLET ORAL DAILY
Status: DISCONTINUED | OUTPATIENT
Start: 2022-01-01 | End: 2022-01-01 | Stop reason: HOSPADM

## 2022-01-01 RX ORDER — MAGNESIUM SULFATE HEPTAHYDRATE 40 MG/ML
2 INJECTION, SOLUTION INTRAVENOUS AS NEEDED
Status: DISCONTINUED | OUTPATIENT
Start: 2022-01-01 | End: 2022-01-01 | Stop reason: HOSPADM

## 2022-01-01 RX ORDER — DOCUSATE SODIUM 100 MG/1
100 CAPSULE, LIQUID FILLED ORAL EVERY EVENING
Status: DISCONTINUED | OUTPATIENT
Start: 2022-01-01 | End: 2022-01-01

## 2022-01-01 RX ORDER — LORAZEPAM 2 MG/ML
0.5 CONCENTRATE ORAL
Status: CANCELLED | OUTPATIENT
Start: 2022-01-01 | End: 2022-11-08

## 2022-01-01 RX ORDER — LORAZEPAM 2 MG/ML
1 CONCENTRATE ORAL
Status: CANCELLED | OUTPATIENT
Start: 2022-01-01 | End: 2022-11-08

## 2022-01-01 RX ORDER — ONDANSETRON 2 MG/ML
INJECTION INTRAMUSCULAR; INTRAVENOUS AS NEEDED
Status: DISCONTINUED | OUTPATIENT
Start: 2022-01-01 | End: 2022-01-01 | Stop reason: SURG

## 2022-01-01 RX ORDER — DEXTROSE MONOHYDRATE 25 G/50ML
25 INJECTION, SOLUTION INTRAVENOUS
Status: DISCONTINUED | OUTPATIENT
Start: 2022-01-01 | End: 2022-01-01 | Stop reason: HOSPADM

## 2022-01-01 RX ORDER — DOCUSATE SODIUM 100 MG/1
100 CAPSULE, LIQUID FILLED ORAL DAILY
Status: DISCONTINUED | OUTPATIENT
Start: 2022-01-01 | End: 2022-01-01 | Stop reason: HOSPADM

## 2022-01-01 RX ORDER — PANTOPRAZOLE SODIUM 40 MG/1
40 TABLET, DELAYED RELEASE ORAL DAILY
COMMUNITY

## 2022-01-01 RX ORDER — LORAZEPAM 1 MG/1
1 TABLET ORAL
Status: DISCONTINUED | OUTPATIENT
Start: 2022-01-01 | End: 2022-01-01

## 2022-01-01 RX ORDER — LEVOTHYROXINE SODIUM 88 UG/1
88 TABLET ORAL
Status: DISCONTINUED | OUTPATIENT
Start: 2022-01-01 | End: 2022-01-01

## 2022-01-01 RX ORDER — DIPHENHYDRAMINE HYDROCHLORIDE 50 MG/ML
12.5 INJECTION INTRAMUSCULAR; INTRAVENOUS
Status: DISCONTINUED | OUTPATIENT
Start: 2022-01-01 | End: 2022-01-01 | Stop reason: HOSPADM

## 2022-01-01 RX ORDER — ONDANSETRON 4 MG/1
4 TABLET, FILM COATED ORAL EVERY 6 HOURS PRN
Status: DISCONTINUED | OUTPATIENT
Start: 2022-01-01 | End: 2022-01-01 | Stop reason: HOSPADM

## 2022-01-01 RX ORDER — MAGNESIUM SULFATE 1 G/100ML
1 INJECTION INTRAVENOUS AS NEEDED
Status: CANCELLED | OUTPATIENT
Start: 2022-01-01

## 2022-01-01 RX ORDER — POLYETHYLENE GLYCOL 3350 17 G/17G
17 POWDER, FOR SOLUTION ORAL DAILY
Refills: 0 | Status: DISCONTINUED | OUTPATIENT
Start: 2022-01-01 | End: 2022-01-01 | Stop reason: HOSPADM

## 2022-01-01 RX ORDER — ACETAMINOPHEN 160 MG/5ML
650 SOLUTION ORAL EVERY 4 HOURS PRN
Status: CANCELLED | OUTPATIENT
Start: 2022-01-01

## 2022-01-01 RX ORDER — POTASSIUM CHLORIDE 1500 MG/1
20 TABLET, FILM COATED, EXTENDED RELEASE ORAL EVERY MORNING
COMMUNITY
Start: 2022-01-01 | End: 2022-01-01 | Stop reason: HOSPADM

## 2022-01-01 RX ORDER — FENTANYL CITRATE 50 UG/ML
50 INJECTION, SOLUTION INTRAMUSCULAR; INTRAVENOUS
Status: DISCONTINUED | OUTPATIENT
Start: 2022-01-01 | End: 2022-01-01 | Stop reason: HOSPADM

## 2022-01-01 RX ORDER — GLYCOPYRROLATE 0.2 MG/ML
0.1 INJECTION INTRAMUSCULAR; INTRAVENOUS EVERY 4 HOURS PRN
Status: DISCONTINUED | OUTPATIENT
Start: 2022-01-01 | End: 2022-01-01 | Stop reason: HOSPADM

## 2022-01-01 RX ORDER — NITROGLYCERIN 0.4 MG/1
0.4 TABLET SUBLINGUAL
Status: DISCONTINUED | OUTPATIENT
Start: 2022-01-01 | End: 2022-01-01 | Stop reason: HOSPADM

## 2022-01-01 RX ORDER — LORAZEPAM 2 MG/ML
0.5 INJECTION INTRAMUSCULAR
Status: CANCELLED | OUTPATIENT
Start: 2022-01-01 | End: 2022-11-06

## 2022-01-01 RX ORDER — NICOTINE POLACRILEX 4 MG
15 LOZENGE BUCCAL
Status: DISCONTINUED | OUTPATIENT
Start: 2022-01-01 | End: 2022-01-01 | Stop reason: HOSPADM

## 2022-01-01 RX ORDER — BUMETANIDE 2 MG/1
2 TABLET ORAL 2 TIMES DAILY
Qty: 180 TABLET | Refills: 1 | Status: SHIPPED | OUTPATIENT
Start: 2022-01-01 | End: 2022-01-01

## 2022-01-01 RX ORDER — MAGNESIUM SULFATE HEPTAHYDRATE 40 MG/ML
4 INJECTION, SOLUTION INTRAVENOUS AS NEEDED
Status: DISCONTINUED | OUTPATIENT
Start: 2022-01-01 | End: 2022-01-01 | Stop reason: HOSPADM

## 2022-01-01 RX ORDER — MORPHINE SULFATE 2 MG/ML
2 INJECTION, SOLUTION INTRAMUSCULAR; INTRAVENOUS
Status: DISCONTINUED | OUTPATIENT
Start: 2022-01-01 | End: 2022-01-01 | Stop reason: HOSPADM

## 2022-01-01 RX ORDER — MAGNESIUM HYDROXIDE 1200 MG/15ML
LIQUID ORAL AS NEEDED
Status: DISCONTINUED | OUTPATIENT
Start: 2022-01-01 | End: 2022-01-01 | Stop reason: HOSPADM

## 2022-01-01 RX ORDER — BUMETANIDE 0.25 MG/ML
2 INJECTION INTRAMUSCULAR; INTRAVENOUS ONCE
Status: DISCONTINUED | OUTPATIENT
Start: 2022-01-01 | End: 2022-01-01

## 2022-01-01 RX ORDER — METOPROLOL SUCCINATE 50 MG/1
50 TABLET, EXTENDED RELEASE ORAL DAILY
Qty: 90 TABLET | Refills: 1 | Status: SHIPPED | OUTPATIENT
Start: 2022-01-01

## 2022-01-01 RX ORDER — MAGNESIUM SULFATE HEPTAHYDRATE 40 MG/ML
2 INJECTION, SOLUTION INTRAVENOUS AS NEEDED
Status: CANCELLED | OUTPATIENT
Start: 2022-01-01

## 2022-01-01 RX ORDER — MAGNESIUM SULFATE 1 G/100ML
1 INJECTION INTRAVENOUS AS NEEDED
Status: DISCONTINUED | OUTPATIENT
Start: 2022-01-01 | End: 2022-01-01 | Stop reason: HOSPADM

## 2022-01-01 RX ORDER — OXYCODONE HCL 10 MG/1
10 TABLET, FILM COATED, EXTENDED RELEASE ORAL ONCE
Status: COMPLETED | OUTPATIENT
Start: 2022-01-01 | End: 2022-01-01

## 2022-01-01 RX ORDER — METOPROLOL TARTRATE 5 MG/5ML
5 INJECTION INTRAVENOUS ONCE
Status: COMPLETED | OUTPATIENT
Start: 2022-01-01 | End: 2022-01-01

## 2022-01-01 RX ORDER — FENTANYL CITRATE 50 UG/ML
INJECTION, SOLUTION INTRAMUSCULAR; INTRAVENOUS AS NEEDED
Status: DISCONTINUED | OUTPATIENT
Start: 2022-01-01 | End: 2022-01-01 | Stop reason: SURG

## 2022-01-01 RX ORDER — HYDROCODONE BITARTRATE AND ACETAMINOPHEN 5; 325 MG/1; MG/1
1 TABLET ORAL EVERY 4 HOURS PRN
Status: DISCONTINUED | OUTPATIENT
Start: 2022-01-01 | End: 2022-01-01 | Stop reason: HOSPADM

## 2022-01-01 RX ORDER — BISACODYL 5 MG/1
5 TABLET, DELAYED RELEASE ORAL DAILY PRN
Status: DISCONTINUED | OUTPATIENT
Start: 2022-01-01 | End: 2022-01-01 | Stop reason: HOSPADM

## 2022-01-01 RX ORDER — PYRIDOSTIGMINE BROMIDE 60 MG/1
60 TABLET ORAL EVERY 8 HOURS SCHEDULED
Status: DISCONTINUED | OUTPATIENT
Start: 2022-01-01 | End: 2022-01-01 | Stop reason: HOSPADM

## 2022-01-01 RX ORDER — DEXTROSE MONOHYDRATE 50 MG/ML
125 INJECTION, SOLUTION INTRAVENOUS CONTINUOUS
Status: DISCONTINUED | OUTPATIENT
Start: 2022-01-01 | End: 2022-01-01

## 2022-01-01 RX ORDER — SULFAMETHOXAZOLE AND TRIMETHOPRIM 800; 160 MG/1; MG/1
1 TABLET ORAL EVERY 12 HOURS SCHEDULED
Qty: 11 TABLET | Refills: 0 | Status: SHIPPED | OUTPATIENT
Start: 2022-01-01 | End: 2022-01-01

## 2022-01-01 RX ORDER — SODIUM CHLORIDE 0.9 % (FLUSH) 0.9 %
10 SYRINGE (ML) INJECTION EVERY 12 HOURS SCHEDULED
Status: DISCONTINUED | OUTPATIENT
Start: 2022-01-01 | End: 2022-01-01 | Stop reason: SDUPTHER

## 2022-01-01 RX ORDER — NITROGLYCERIN 0.4 MG/1
0.4 TABLET SUBLINGUAL
Status: DISCONTINUED | OUTPATIENT
Start: 2022-01-01 | End: 2022-01-01

## 2022-01-01 RX ORDER — HYDROCODONE BITARTRATE AND ACETAMINOPHEN 5; 325 MG/1; MG/1
1 TABLET ORAL EVERY 4 HOURS PRN
Qty: 18 TABLET | Refills: 0
Start: 2022-01-01 | End: 2022-01-01

## 2022-01-01 RX ORDER — ACETAMINOPHEN 650 MG/1
650 SUPPOSITORY RECTAL ONCE AS NEEDED
Status: DISCONTINUED | OUTPATIENT
Start: 2022-01-01 | End: 2022-01-01 | Stop reason: HOSPADM

## 2022-01-01 RX ORDER — CELECOXIB 200 MG/1
200 CAPSULE ORAL ONCE
Status: COMPLETED | OUTPATIENT
Start: 2022-01-01 | End: 2022-01-01

## 2022-01-01 RX ORDER — DIPHENHYDRAMINE HCL 25 MG
25 CAPSULE ORAL
Status: DISCONTINUED | OUTPATIENT
Start: 2022-01-01 | End: 2022-01-01 | Stop reason: HOSPADM

## 2022-01-01 RX ORDER — LEVOTHYROXINE SODIUM 88 UG/1
88 TABLET ORAL
Qty: 90 TABLET | Refills: 1 | Status: SHIPPED | OUTPATIENT
Start: 2022-01-01 | End: 2022-01-01 | Stop reason: HOSPADM

## 2022-01-01 RX ORDER — HYDRALAZINE HYDROCHLORIDE 20 MG/ML
5 INJECTION INTRAMUSCULAR; INTRAVENOUS
Status: DISCONTINUED | OUTPATIENT
Start: 2022-01-01 | End: 2022-01-01 | Stop reason: HOSPADM

## 2022-01-01 RX ORDER — CELECOXIB 200 MG/1
200 CAPSULE ORAL ONCE
Status: DISCONTINUED | OUTPATIENT
Start: 2022-01-01 | End: 2022-01-01

## 2022-01-01 RX ORDER — FUROSEMIDE 40 MG/1
40 TABLET ORAL 2 TIMES DAILY
COMMUNITY

## 2022-01-01 RX ORDER — FUROSEMIDE 20 MG/1
20 TABLET ORAL
Status: DISCONTINUED | OUTPATIENT
Start: 2022-01-01 | End: 2022-01-01 | Stop reason: HOSPADM

## 2022-01-01 RX ORDER — ACETAMINOPHEN 160 MG/5ML
650 SOLUTION ORAL EVERY 4 HOURS PRN
Status: DISCONTINUED | OUTPATIENT
Start: 2022-01-01 | End: 2022-01-01 | Stop reason: HOSPADM

## 2022-01-01 RX ORDER — HEPARIN SODIUM 10000 [USP'U]/100ML
18 INJECTION, SOLUTION INTRAVENOUS
Status: DISCONTINUED | OUTPATIENT
Start: 2022-01-01 | End: 2022-01-01

## 2022-01-01 RX ORDER — MECLIZINE HCL 12.5 MG/1
12.5 TABLET ORAL EVERY 6 HOURS PRN
COMMUNITY

## 2022-01-01 RX ORDER — LORAZEPAM 2 MG/ML
1 CONCENTRATE ORAL
Status: CANCELLED | OUTPATIENT
Start: 2022-01-01 | End: 2022-11-06

## 2022-01-01 RX ORDER — ACETAMINOPHEN 650 MG/1
650 SUPPOSITORY RECTAL EVERY 4 HOURS PRN
Status: CANCELLED | OUTPATIENT
Start: 2022-01-01

## 2022-01-01 RX ORDER — POLYETHYLENE GLYCOL 3350 17 G/17G
17 POWDER, FOR SOLUTION ORAL DAILY
Qty: 476 G | Refills: 0 | Status: SHIPPED | OUTPATIENT
Start: 2022-01-01

## 2022-01-01 RX ORDER — AMOXICILLIN 250 MG
2 CAPSULE ORAL 2 TIMES DAILY
Status: DISCONTINUED | OUTPATIENT
Start: 2022-01-01 | End: 2022-01-01

## 2022-01-01 RX ORDER — PROPOFOL 10 MG/ML
VIAL (ML) INTRAVENOUS AS NEEDED
Status: DISCONTINUED | OUTPATIENT
Start: 2022-01-01 | End: 2022-01-01 | Stop reason: SURG

## 2022-01-01 RX ORDER — AMOXICILLIN 250 MG
2 CAPSULE ORAL 2 TIMES DAILY
Status: DISCONTINUED | OUTPATIENT
Start: 2022-01-01 | End: 2022-01-01 | Stop reason: HOSPADM

## 2022-01-01 RX ORDER — HYDROCODONE BITARTRATE AND ACETAMINOPHEN 5; 325 MG/1; MG/1
1 TABLET ORAL EVERY 6 HOURS PRN
Status: DISCONTINUED | OUTPATIENT
Start: 2022-01-01 | End: 2022-01-01 | Stop reason: HOSPADM

## 2022-01-01 RX ORDER — BACITRACIN ZINC 500 [USP'U]/G
OINTMENT TOPICAL
Qty: 30 G | Refills: 0 | Status: ON HOLD | OUTPATIENT
Start: 2022-01-01 | End: 2022-01-01

## 2022-01-01 RX ORDER — MELATONIN
1000 DAILY
COMMUNITY
End: 2022-01-01

## 2022-01-01 RX ORDER — BISACODYL 10 MG
10 SUPPOSITORY, RECTAL RECTAL DAILY PRN
COMMUNITY

## 2022-01-01 RX ORDER — PYRIDOSTIGMINE BROMIDE 60 MG/1
60 TABLET ORAL EVERY 8 HOURS SCHEDULED
Qty: 90 TABLET | Refills: 0 | Status: SHIPPED | OUTPATIENT
Start: 2022-01-01 | End: 2022-01-01

## 2022-01-01 RX ORDER — LORAZEPAM 2 MG/ML
1 INJECTION INTRAMUSCULAR
Status: CANCELLED | OUTPATIENT
Start: 2022-01-01 | End: 2022-11-08

## 2022-01-01 RX ORDER — NYSTATIN 100000 U/G
1 CREAM TOPICAL 2 TIMES DAILY
COMMUNITY

## 2022-01-01 RX ORDER — FUROSEMIDE 20 MG/1
TABLET ORAL
Qty: 60 TABLET | Refills: 1 | Status: SHIPPED | OUTPATIENT
Start: 2022-01-01 | End: 2022-01-01

## 2022-01-01 RX ORDER — PHENYLEPHRINE HCL IN 0.9% NACL 1 MG/10 ML
SYRINGE (ML) INTRAVENOUS AS NEEDED
Status: DISCONTINUED | OUTPATIENT
Start: 2022-01-01 | End: 2022-01-01 | Stop reason: SURG

## 2022-01-01 RX ORDER — SODIUM CHLORIDE 0.9 % (FLUSH) 0.9 %
3-10 SYRINGE (ML) INJECTION AS NEEDED
Status: DISCONTINUED | OUTPATIENT
Start: 2022-01-01 | End: 2022-01-01 | Stop reason: HOSPADM

## 2022-01-01 RX ORDER — POTASSIUM CHLORIDE 1500 MG/1
20 TABLET, FILM COATED, EXTENDED RELEASE ORAL 2 TIMES DAILY
COMMUNITY
Start: 2022-01-01 | End: 2022-01-01

## 2022-01-01 RX ORDER — CARBOXYMETHYLCELLULOSE SODIUM 10 MG/ML
1 GEL OPHTHALMIC
Status: DISCONTINUED | OUTPATIENT
Start: 2022-01-01 | End: 2022-01-01 | Stop reason: HOSPADM

## 2022-01-01 RX ORDER — DEXTROSE AND SODIUM CHLORIDE 5; .45 G/100ML; G/100ML
75 INJECTION, SOLUTION INTRAVENOUS CONTINUOUS
Status: DISCONTINUED | OUTPATIENT
Start: 2022-01-01 | End: 2022-01-01

## 2022-01-01 RX ORDER — MULTIPLE VITAMINS W/ MINERALS TAB 9MG-400MCG
1 TAB ORAL DAILY
COMMUNITY

## 2022-01-01 RX ORDER — ACETAMINOPHEN 500 MG
1000 TABLET ORAL ONCE
Status: DISCONTINUED | OUTPATIENT
Start: 2022-01-01 | End: 2022-01-01

## 2022-01-01 RX ORDER — POTASSIUM CHLORIDE 1.5 G/1.77G
40 POWDER, FOR SOLUTION ORAL AS NEEDED
Status: DISCONTINUED | OUTPATIENT
Start: 2022-01-01 | End: 2022-01-01

## 2022-01-01 RX ORDER — POTASSIUM CHLORIDE 1500 MG/1
20 TABLET, FILM COATED, EXTENDED RELEASE ORAL EVERY MORNING
COMMUNITY
Start: 2022-01-01 | End: 2022-01-01

## 2022-01-01 RX ORDER — PREDNISONE 10 MG/1
10 TABLET ORAL DAILY
Status: DISCONTINUED | OUTPATIENT
Start: 2022-01-01 | End: 2022-01-01 | Stop reason: HOSPADM

## 2022-01-01 RX ORDER — SODIUM CHLORIDE, SODIUM LACTATE, POTASSIUM CHLORIDE, CALCIUM CHLORIDE 600; 310; 30; 20 MG/100ML; MG/100ML; MG/100ML; MG/100ML
50 INJECTION, SOLUTION INTRAVENOUS CONTINUOUS
Status: DISPENSED | OUTPATIENT
Start: 2022-01-01 | End: 2022-01-01

## 2022-01-01 RX ORDER — LANSOPRAZOLE
30 KIT EVERY MORNING
Status: DISCONTINUED | OUTPATIENT
Start: 2022-01-01 | End: 2022-01-01 | Stop reason: HOSPADM

## 2022-01-01 RX ORDER — SODIUM CHLORIDE 9 MG/ML
75 INJECTION, SOLUTION INTRAVENOUS CONTINUOUS
Status: DISPENSED | OUTPATIENT
Start: 2022-01-01 | End: 2022-01-01

## 2022-01-01 RX ORDER — LORAZEPAM 1 MG/1
2 TABLET ORAL
Status: DISCONTINUED | OUTPATIENT
Start: 2022-01-01 | End: 2022-01-01 | Stop reason: HOSPADM

## 2022-01-01 RX ORDER — POLYETHYLENE GLYCOL 3350 17 G/17G
17 POWDER, FOR SOLUTION ORAL DAILY
Status: DISCONTINUED | OUTPATIENT
Start: 2022-01-01 | End: 2022-01-01

## 2022-01-01 RX ORDER — GABAPENTIN 300 MG/1
600 CAPSULE ORAL ONCE
Status: COMPLETED | OUTPATIENT
Start: 2022-01-01 | End: 2022-01-01

## 2022-01-01 RX ORDER — FUROSEMIDE 20 MG/1
20 TABLET ORAL 2 TIMES DAILY
Qty: 60 TABLET | Refills: 1 | Status: SHIPPED | OUTPATIENT
Start: 2022-01-01 | End: 2022-01-01

## 2022-01-01 RX ORDER — OXYBUTYNIN CHLORIDE 5 MG/1
10 TABLET ORAL DAILY
Status: DISCONTINUED | OUTPATIENT
Start: 2022-01-01 | End: 2022-01-01

## 2022-01-01 RX ORDER — LORAZEPAM 2 MG/ML
2 CONCENTRATE ORAL
Status: CANCELLED | OUTPATIENT
Start: 2022-01-01 | End: 2022-11-06

## 2022-01-01 RX ORDER — HYDROCODONE BITARTRATE AND ACETAMINOPHEN 5; 325 MG/1; MG/1
1 TABLET ORAL EVERY 6 HOURS PRN
Qty: 24 TABLET | Refills: 0 | Status: SHIPPED | OUTPATIENT
Start: 2022-01-01 | End: 2022-01-01

## 2022-01-01 RX ORDER — LORAZEPAM 1 MG/1
1 TABLET ORAL
Status: CANCELLED | OUTPATIENT
Start: 2022-01-01 | End: 2022-11-06

## 2022-01-01 RX ORDER — FUROSEMIDE 40 MG/1
40 TABLET ORAL
Status: DISCONTINUED | OUTPATIENT
Start: 2022-01-01 | End: 2022-01-01 | Stop reason: HOSPADM

## 2022-01-01 RX ORDER — DEXAMETHASONE SODIUM PHOSPHATE 10 MG/ML
INJECTION INTRAMUSCULAR; INTRAVENOUS AS NEEDED
Status: DISCONTINUED | OUTPATIENT
Start: 2022-01-01 | End: 2022-01-01 | Stop reason: SURG

## 2022-01-01 RX ORDER — SULFAMETHOXAZOLE AND TRIMETHOPRIM 800; 160 MG/1; MG/1
1 TABLET ORAL EVERY 12 HOURS SCHEDULED
Status: DISCONTINUED | OUTPATIENT
Start: 2022-01-01 | End: 2022-01-01 | Stop reason: HOSPADM

## 2022-01-01 RX ORDER — NALOXONE HCL 0.4 MG/ML
0.4 VIAL (ML) INJECTION AS NEEDED
Status: DISCONTINUED | OUTPATIENT
Start: 2022-01-01 | End: 2022-01-01 | Stop reason: HOSPADM

## 2022-01-01 RX ORDER — ACETAMINOPHEN 325 MG/1
650 TABLET ORAL ONCE AS NEEDED
Status: DISCONTINUED | OUTPATIENT
Start: 2022-01-01 | End: 2022-01-01 | Stop reason: HOSPADM

## 2022-01-01 RX ORDER — LORAZEPAM 2 MG/ML
0.5 CONCENTRATE ORAL
Status: CANCELLED | OUTPATIENT
Start: 2022-01-01 | End: 2022-11-06

## 2022-01-01 RX ORDER — IPRATROPIUM BROMIDE AND ALBUTEROL SULFATE 2.5; .5 MG/3ML; MG/3ML
3 SOLUTION RESPIRATORY (INHALATION) ONCE AS NEEDED
Status: DISCONTINUED | OUTPATIENT
Start: 2022-01-01 | End: 2022-01-01 | Stop reason: HOSPADM

## 2022-01-01 RX ORDER — METOPROLOL TARTRATE 5 MG/5ML
5 INJECTION INTRAVENOUS EVERY 6 HOURS PRN
Status: CANCELLED | OUTPATIENT
Start: 2022-01-01

## 2022-01-01 RX ORDER — ONDANSETRON 2 MG/ML
4 INJECTION INTRAMUSCULAR; INTRAVENOUS EVERY 6 HOURS PRN
Status: CANCELLED | OUTPATIENT
Start: 2022-01-01

## 2022-01-01 RX ORDER — LIDOCAINE HYDROCHLORIDE 10 MG/ML
INJECTION, SOLUTION EPIDURAL; INFILTRATION; INTRACAUDAL; PERINEURAL AS NEEDED
Status: DISCONTINUED | OUTPATIENT
Start: 2022-01-01 | End: 2022-01-01 | Stop reason: SURG

## 2022-01-01 RX ORDER — LORAZEPAM 0.5 MG/1
0.5 TABLET ORAL
Status: CANCELLED | OUTPATIENT
Start: 2022-01-01 | End: 2022-11-06

## 2022-01-01 RX ORDER — DOCUSATE SODIUM 100 MG/1
100 CAPSULE, LIQUID FILLED ORAL EVERY EVENING
Status: DISCONTINUED | OUTPATIENT
Start: 2022-01-01 | End: 2022-01-01 | Stop reason: HOSPADM

## 2022-01-01 RX ORDER — OXYBUTYNIN CHLORIDE 10 MG/1
10 TABLET, EXTENDED RELEASE ORAL DAILY
Qty: 90 TABLET | Refills: 1 | Status: SHIPPED | OUTPATIENT
Start: 2022-01-01 | End: 2022-01-01

## 2022-01-01 RX ORDER — DIPHENOXYLATE HYDROCHLORIDE AND ATROPINE SULFATE 2.5; .025 MG/1; MG/1
1 TABLET ORAL
Status: DISCONTINUED | OUTPATIENT
Start: 2022-01-01 | End: 2022-01-01 | Stop reason: HOSPADM

## 2022-01-01 RX ORDER — BUMETANIDE 1 MG/1
2 TABLET ORAL 2 TIMES DAILY
Status: DISCONTINUED | OUTPATIENT
Start: 2022-01-01 | End: 2022-01-01 | Stop reason: HOSPADM

## 2022-01-01 RX ORDER — PYRIDOSTIGMINE BROMIDE 60 MG/1
60 TABLET ORAL ONCE
Status: COMPLETED | OUTPATIENT
Start: 2022-01-01 | End: 2022-01-01

## 2022-01-01 RX ORDER — METOPROLOL SUCCINATE 25 MG/1
50 TABLET, EXTENDED RELEASE ORAL DAILY
Status: DISCONTINUED | OUTPATIENT
Start: 2022-01-01 | End: 2022-01-01

## 2022-01-01 RX ORDER — LORAZEPAM 1 MG/1
1 TABLET ORAL
Status: DISCONTINUED | OUTPATIENT
Start: 2022-01-01 | End: 2022-01-01 | Stop reason: HOSPADM

## 2022-01-01 RX ORDER — MORPHINE SULFATE 4 MG/ML
1 INJECTION, SOLUTION INTRAMUSCULAR; INTRAVENOUS
Status: DISCONTINUED | OUTPATIENT
Start: 2022-01-01 | End: 2022-01-01 | Stop reason: HOSPADM

## 2022-01-01 RX ORDER — LIDOCAINE HYDROCHLORIDE 10 MG/ML
0.5 INJECTION, SOLUTION EPIDURAL; INFILTRATION; INTRACAUDAL; PERINEURAL ONCE AS NEEDED
Status: DISCONTINUED | OUTPATIENT
Start: 2022-01-01 | End: 2022-01-01 | Stop reason: HOSPADM

## 2022-01-01 RX ORDER — LORAZEPAM 1 MG/1
2 TABLET ORAL
Status: CANCELLED | OUTPATIENT
Start: 2022-01-01 | End: 2022-11-08

## 2022-01-01 RX ORDER — PANTOPRAZOLE SODIUM 40 MG/1
40 TABLET, DELAYED RELEASE ORAL EVERY MORNING
Status: DISCONTINUED | OUTPATIENT
Start: 2022-01-01 | End: 2022-01-01 | Stop reason: HOSPADM

## 2022-01-01 RX ORDER — POTASSIUM CHLORIDE 20 MEQ/1
20 TABLET, EXTENDED RELEASE ORAL 2 TIMES DAILY
Status: DISCONTINUED | OUTPATIENT
Start: 2022-01-01 | End: 2022-01-01 | Stop reason: HOSPADM

## 2022-01-01 RX ORDER — ONDANSETRON 4 MG/1
4 TABLET, FILM COATED ORAL EVERY 6 HOURS PRN
Qty: 30 TABLET | Refills: 0 | Status: SHIPPED | OUTPATIENT
Start: 2022-01-01 | End: 2022-01-01

## 2022-01-01 RX ORDER — NALOXONE HCL 0.4 MG/ML
0.2 VIAL (ML) INJECTION AS NEEDED
Status: DISCONTINUED | OUTPATIENT
Start: 2022-01-01 | End: 2022-01-01 | Stop reason: HOSPADM

## 2022-01-01 RX ORDER — FAMOTIDINE 10 MG/ML
20 INJECTION, SOLUTION INTRAVENOUS ONCE
Status: COMPLETED | OUTPATIENT
Start: 2022-01-01 | End: 2022-01-01

## 2022-01-01 RX ORDER — PANTOPRAZOLE SODIUM 40 MG/1
40 TABLET, DELAYED RELEASE ORAL
Status: DISCONTINUED | OUTPATIENT
Start: 2022-01-01 | End: 2022-01-01

## 2022-01-01 RX ORDER — CEFDINIR 300 MG/1
300 CAPSULE ORAL 2 TIMES DAILY
Qty: 10 CAPSULE | Refills: 0 | Status: SHIPPED | OUTPATIENT
Start: 2022-01-01 | End: 2022-01-01

## 2022-01-01 RX ORDER — ROPIVACAINE HYDROCHLORIDE 5 MG/ML
INJECTION, SOLUTION EPIDURAL; INFILTRATION; PERINEURAL AS NEEDED
Status: DISCONTINUED | OUTPATIENT
Start: 2022-01-01 | End: 2022-01-01 | Stop reason: HOSPADM

## 2022-01-01 RX ORDER — DIPHENOXYLATE HYDROCHLORIDE AND ATROPINE SULFATE 2.5; .025 MG/1; MG/1
1 TABLET ORAL
Status: CANCELLED | OUTPATIENT
Start: 2022-01-01

## 2022-01-01 RX ORDER — GINSENG 100 MG
CAPSULE ORAL AS NEEDED
Status: DISCONTINUED | OUTPATIENT
Start: 2022-01-01 | End: 2022-01-01 | Stop reason: HOSPADM

## 2022-01-01 RX ORDER — TRANEXAMIC ACID 100 MG/ML
INJECTION, SOLUTION INTRAVENOUS AS NEEDED
Status: DISCONTINUED | OUTPATIENT
Start: 2022-01-01 | End: 2022-01-01 | Stop reason: SURG

## 2022-01-01 RX ORDER — CHOLECALCIFEROL (VITAMIN D3) 125 MCG
5 CAPSULE ORAL NIGHTLY PRN
Status: DISCONTINUED | OUTPATIENT
Start: 2022-01-01 | End: 2022-01-01

## 2022-01-01 RX ORDER — METOPROLOL SUCCINATE 50 MG/1
50 TABLET, EXTENDED RELEASE ORAL DAILY
Status: DISCONTINUED | OUTPATIENT
Start: 2022-01-01 | End: 2022-01-01 | Stop reason: HOSPADM

## 2022-01-01 RX ORDER — PROMETHAZINE HYDROCHLORIDE 25 MG/1
25 TABLET ORAL ONCE AS NEEDED
Status: DISCONTINUED | OUTPATIENT
Start: 2022-01-01 | End: 2022-01-01 | Stop reason: HOSPADM

## 2022-01-01 RX ORDER — DOCUSATE SODIUM 100 MG/1
100 CAPSULE, LIQUID FILLED ORAL EVERY EVENING
COMMUNITY

## 2022-01-01 RX ORDER — FUROSEMIDE 10 MG/ML
40 INJECTION INTRAMUSCULAR; INTRAVENOUS ONCE
Status: COMPLETED | OUTPATIENT
Start: 2022-01-01 | End: 2022-01-01

## 2022-01-01 RX ORDER — LORAZEPAM 0.5 MG/1
0.5 TABLET ORAL
Status: CANCELLED | OUTPATIENT
Start: 2022-01-01 | End: 2022-11-08

## 2022-01-01 RX ORDER — LEVOTHYROXINE SODIUM 88 UG/1
88 TABLET ORAL
Qty: 30 TABLET | Refills: 0 | Status: SHIPPED | OUTPATIENT
Start: 2022-01-01

## 2022-01-01 RX ORDER — ACETAMINOPHEN 650 MG
TABLET, EXTENDED RELEASE ORAL AS NEEDED
Status: DISCONTINUED | OUTPATIENT
Start: 2022-01-01 | End: 2022-01-01 | Stop reason: HOSPADM

## 2022-01-01 RX ORDER — DEXAMETHASONE SODIUM PHOSPHATE 4 MG/ML
INJECTION, SOLUTION INTRA-ARTICULAR; INTRALESIONAL; INTRAMUSCULAR; INTRAVENOUS; SOFT TISSUE
Status: COMPLETED | OUTPATIENT
Start: 2022-01-01 | End: 2022-01-01

## 2022-01-01 RX ORDER — LIDOCAINE HYDROCHLORIDE 20 MG/ML
INJECTION, SOLUTION INFILTRATION; PERINEURAL AS NEEDED
Status: DISCONTINUED | OUTPATIENT
Start: 2022-01-01 | End: 2022-01-01 | Stop reason: SURG

## 2022-01-01 RX ORDER — MELATONIN
1000 DAILY
COMMUNITY

## 2022-01-01 RX ORDER — CARBOXYMETHYLCELLULOSE SODIUM 10 MG/ML
1 GEL OPHTHALMIC
Status: DISCONTINUED | OUTPATIENT
Start: 2022-01-01 | End: 2022-01-01

## 2022-01-01 RX ORDER — GLYCOPYRROLATE 0.2 MG/ML
0.4 INJECTION INTRAMUSCULAR; INTRAVENOUS 4 TIMES DAILY
Status: DISCONTINUED | OUTPATIENT
Start: 2022-01-01 | End: 2022-01-01 | Stop reason: HOSPADM

## 2022-01-01 RX ORDER — BUMETANIDE 2 MG/1
2 TABLET ORAL DAILY
Qty: 90 TABLET | Refills: 1 | Status: SHIPPED | OUTPATIENT
Start: 2022-01-01 | End: 2022-01-01

## 2022-01-01 RX ADMIN — OXYBUTYNIN CHLORIDE 10 MG: 5 TABLET ORAL at 08:56

## 2022-01-01 RX ADMIN — LANSOPRAZOLE 30 MG: KIT at 06:03

## 2022-01-01 RX ADMIN — MULTIPLE VITAMINS W/ MINERALS TAB 1 TABLET: TAB at 18:19

## 2022-01-01 RX ADMIN — CELECOXIB 200 MG: 200 CAPSULE ORAL at 07:49

## 2022-01-01 RX ADMIN — OXYBUTYNIN CHLORIDE 10 MG: 10 TABLET, EXTENDED RELEASE ORAL at 08:31

## 2022-01-01 RX ADMIN — METOPROLOL TARTRATE 25 MG: 25 TABLET, FILM COATED ORAL at 22:50

## 2022-01-01 RX ADMIN — LORAZEPAM 0.5 MG: 2 INJECTION INTRAMUSCULAR; INTRAVENOUS at 13:30

## 2022-01-01 RX ADMIN — SODIUM CHLORIDE 500 MG: 900 INJECTION INTRAVENOUS at 06:49

## 2022-01-01 RX ADMIN — LORAZEPAM 2 MG: 2 INJECTION INTRAMUSCULAR; INTRAVENOUS at 13:44

## 2022-01-01 RX ADMIN — Medication 10 ML: at 20:34

## 2022-01-01 RX ADMIN — PREDNISONE 10 MG: 10 TABLET ORAL at 13:14

## 2022-01-01 RX ADMIN — LORAZEPAM 0.5 MG: 2 INJECTION INTRAMUSCULAR; INTRAVENOUS at 22:51

## 2022-01-01 RX ADMIN — POTASSIUM CHLORIDE 40 MEQ: 1.5 POWDER, FOR SOLUTION ORAL at 06:06

## 2022-01-01 RX ADMIN — FENTANYL CITRATE 25 MCG: 50 INJECTION INTRAMUSCULAR; INTRAVENOUS at 08:59

## 2022-01-01 RX ADMIN — LORAZEPAM 0.5 MG: 2 INJECTION INTRAMUSCULAR; INTRAVENOUS at 19:00

## 2022-01-01 RX ADMIN — Medication 10 ML: at 09:25

## 2022-01-01 RX ADMIN — PROPOFOL 100 MG: 10 INJECTION, EMULSION INTRAVENOUS at 13:52

## 2022-01-01 RX ADMIN — METOPROLOL TARTRATE 25 MG: 25 TABLET, FILM COATED ORAL at 20:51

## 2022-01-01 RX ADMIN — HYDROCODONE BITARTRATE AND ACETAMINOPHEN 1 TABLET: 5; 325 TABLET ORAL at 14:38

## 2022-01-01 RX ADMIN — ACYCLOVIR SODIUM 570 MG: 1000 INJECTION, SOLUTION INTRAVENOUS at 15:15

## 2022-01-01 RX ADMIN — MULTIPLE VITAMINS W/ MINERALS TAB 1 TABLET: TAB at 09:18

## 2022-01-01 RX ADMIN — POTASSIUM CHLORIDE 20 MEQ: 1500 TABLET, EXTENDED RELEASE ORAL at 20:30

## 2022-01-01 RX ADMIN — INSULIN LISPRO 2 UNITS: 100 INJECTION, SOLUTION INTRAVENOUS; SUBCUTANEOUS at 14:01

## 2022-01-01 RX ADMIN — ROPIVACAINE HYDROCHLORIDE 30 ML: 5 INJECTION EPIDURAL; INFILTRATION; PERINEURAL at 07:59

## 2022-01-01 RX ADMIN — SENNOSIDES AND DOCUSATE SODIUM 2 TABLET: 50; 8.6 TABLET ORAL at 09:32

## 2022-01-01 RX ADMIN — PIPERACILLIN AND TAZOBACTAM 3.38 G: 3; .375 INJECTION, POWDER, FOR SOLUTION INTRAVENOUS at 18:48

## 2022-01-01 RX ADMIN — POTASSIUM CHLORIDE 20 MEQ: 1500 TABLET, EXTENDED RELEASE ORAL at 08:12

## 2022-01-01 RX ADMIN — LEVOTHYROXINE SODIUM 88 MCG: 0.09 TABLET ORAL at 05:13

## 2022-01-01 RX ADMIN — FUROSEMIDE 20 MG: 20 TABLET ORAL at 17:43

## 2022-01-01 RX ADMIN — SODIUM CHLORIDE 500 MG: 900 INJECTION INTRAVENOUS at 04:27

## 2022-01-01 RX ADMIN — METOPROLOL SUCCINATE 25 MG: 25 TABLET, EXTENDED RELEASE ORAL at 08:29

## 2022-01-01 RX ADMIN — LORAZEPAM 2 MG: 2 INJECTION INTRAMUSCULAR; INTRAVENOUS at 16:31

## 2022-01-01 RX ADMIN — MULTIPLE VITAMINS W/ MINERALS TAB 1 TABLET: TAB at 08:11

## 2022-01-01 RX ADMIN — POTASSIUM CHLORIDE 40 MEQ: 1.5 POWDER, FOR SOLUTION ORAL at 18:58

## 2022-01-01 RX ADMIN — Medication 10 ML: at 08:19

## 2022-01-01 RX ADMIN — ACETAMINOPHEN 650 MG: 325 TABLET, FILM COATED ORAL at 07:00

## 2022-01-01 RX ADMIN — Medication 10 ML: at 21:58

## 2022-01-01 RX ADMIN — POLYETHYLENE GLYCOL 3350 17 G: 17 POWDER, FOR SOLUTION ORAL at 10:49

## 2022-01-01 RX ADMIN — ACYCLOVIR SODIUM 570 MG: 1000 INJECTION, SOLUTION INTRAVENOUS at 00:01

## 2022-01-01 RX ADMIN — MORPHINE SULFATE 2 MG: 2 INJECTION, SOLUTION INTRAMUSCULAR; INTRAVENOUS at 08:42

## 2022-01-01 RX ADMIN — POTASSIUM CHLORIDE 40 MEQ: 1.5 POWDER, FOR SOLUTION ORAL at 15:30

## 2022-01-01 RX ADMIN — FUROSEMIDE 40 MG: 40 TABLET ORAL at 09:39

## 2022-01-01 RX ADMIN — PANTOPRAZOLE SODIUM 40 MG: 40 TABLET, DELAYED RELEASE ORAL at 09:18

## 2022-01-01 RX ADMIN — SODIUM CHLORIDE 500 MG: 900 INJECTION INTRAVENOUS at 05:53

## 2022-01-01 RX ADMIN — LORAZEPAM 1 MG: 2 INJECTION INTRAMUSCULAR; INTRAVENOUS at 18:39

## 2022-01-01 RX ADMIN — LEVETIRACETAM 500 MG: 5 INJECTION INTRAVENOUS at 08:31

## 2022-01-01 RX ADMIN — PYRIDOSTIGMINE BROMIDE 60 MG: 60 TABLET ORAL at 11:51

## 2022-01-01 RX ADMIN — PANTOPRAZOLE SODIUM 40 MG: 40 TABLET, DELAYED RELEASE ORAL at 08:11

## 2022-01-01 RX ADMIN — LIDOCAINE 1 PATCH: 50 PATCH TOPICAL at 22:46

## 2022-01-01 RX ADMIN — ONDANSETRON 4 MG: 2 INJECTION INTRAMUSCULAR; INTRAVENOUS at 15:25

## 2022-01-01 RX ADMIN — GLYCOPYRROLATE 0.4 MG: 0.2 INJECTION INTRAMUSCULAR; INTRAVENOUS at 15:25

## 2022-01-01 RX ADMIN — DEXTROSE AND SODIUM CHLORIDE 75 ML/HR: 5; 450 INJECTION, SOLUTION INTRAVENOUS at 02:37

## 2022-01-01 RX ADMIN — HYDROCODONE BITARTRATE AND ACETAMINOPHEN 1 TABLET: 5; 325 TABLET ORAL at 19:10

## 2022-01-01 RX ADMIN — ENOXAPARIN SODIUM 30 MG: 30 INJECTION SUBCUTANEOUS at 17:29

## 2022-01-01 RX ADMIN — METOPROLOL TARTRATE 25 MG: 25 TABLET, FILM COATED ORAL at 08:30

## 2022-01-01 RX ADMIN — Medication 10 ML: at 21:25

## 2022-01-01 RX ADMIN — LANSOPRAZOLE 30 MG: KIT at 06:14

## 2022-01-01 RX ADMIN — LEVOTHYROXINE SODIUM 88 MCG: 0.09 TABLET ORAL at 05:02

## 2022-01-01 RX ADMIN — HYDROCODONE BITARTRATE AND ACETAMINOPHEN 1 TABLET: 5; 325 TABLET ORAL at 00:17

## 2022-01-01 RX ADMIN — LEVETIRACETAM 500 MG: 500 SOLUTION ORAL at 20:34

## 2022-01-01 RX ADMIN — MORPHINE SULFATE 2 MG: 2 INJECTION, SOLUTION INTRAMUSCULAR; INTRAVENOUS at 16:31

## 2022-01-01 RX ADMIN — PANTOPRAZOLE SODIUM 40 MG: 40 TABLET, DELAYED RELEASE ORAL at 06:37

## 2022-01-01 RX ADMIN — HYDROCODONE BITARTRATE AND ACETAMINOPHEN 1 TABLET: 5; 325 TABLET ORAL at 04:18

## 2022-01-01 RX ADMIN — DEXTROSE AND SODIUM CHLORIDE 75 ML/HR: 5; 450 INJECTION, SOLUTION INTRAVENOUS at 11:36

## 2022-01-01 RX ADMIN — METOPROLOL TARTRATE 25 MG: 25 TABLET, FILM COATED ORAL at 09:13

## 2022-01-01 RX ADMIN — MORPHINE SULFATE 2 MG: 2 INJECTION, SOLUTION INTRAMUSCULAR; INTRAVENOUS at 16:20

## 2022-01-01 RX ADMIN — POTASSIUM CHLORIDE 40 MEQ: 20 TABLET, EXTENDED RELEASE ORAL at 13:54

## 2022-01-01 RX ADMIN — HYDROCODONE BITARTRATE AND ACETAMINOPHEN 1 TABLET: 5; 325 TABLET ORAL at 08:25

## 2022-01-01 RX ADMIN — DEXAMETHASONE SODIUM PHOSPHATE 4 MG: 4 INJECTION, SOLUTION INTRAMUSCULAR; INTRAVENOUS at 07:59

## 2022-01-01 RX ADMIN — LIDOCAINE 1 PATCH: 50 PATCH TOPICAL at 08:42

## 2022-01-01 RX ADMIN — Medication 10 ML: at 08:08

## 2022-01-01 RX ADMIN — Medication 10 ML: at 09:13

## 2022-01-01 RX ADMIN — PANTOPRAZOLE SODIUM 40 MG: 40 TABLET, DELAYED RELEASE ORAL at 06:01

## 2022-01-01 RX ADMIN — TRANEXAMIC ACID 1000 MG: 100 INJECTION, SOLUTION INTRAVENOUS at 08:40

## 2022-01-01 RX ADMIN — OXYBUTYNIN CHLORIDE 10 MG: 10 TABLET, EXTENDED RELEASE ORAL at 08:29

## 2022-01-01 RX ADMIN — HYDROCODONE BITARTRATE AND ACETAMINOPHEN 1 TABLET: 5; 325 TABLET ORAL at 23:06

## 2022-01-01 RX ADMIN — SODIUM CHLORIDE 500 MG: 900 INJECTION INTRAVENOUS at 16:21

## 2022-01-01 RX ADMIN — LIDOCAINE 1 PATCH: 50 PATCH TOPICAL at 09:00

## 2022-01-01 RX ADMIN — Medication 3 ML: at 20:08

## 2022-01-01 RX ADMIN — METOPROLOL TARTRATE 25 MG: 25 TABLET, FILM COATED ORAL at 20:41

## 2022-01-01 RX ADMIN — GLYCOPYRROLATE 0.4 MG: 0.2 INJECTION INTRAMUSCULAR; INTRAVENOUS at 08:42

## 2022-01-01 RX ADMIN — LEVOTHYROXINE SODIUM 88 MCG: 0.09 TABLET ORAL at 05:30

## 2022-01-01 RX ADMIN — SENNOSIDES AND DOCUSATE SODIUM 2 TABLET: 50; 8.6 TABLET ORAL at 20:24

## 2022-01-01 RX ADMIN — POLYETHYLENE GLYCOL 3350 17 G: 17 POWDER, FOR SOLUTION ORAL at 10:04

## 2022-01-01 RX ADMIN — Medication 10 ML: at 08:35

## 2022-01-01 RX ADMIN — FUROSEMIDE 40 MG: 40 TABLET ORAL at 09:10

## 2022-01-01 RX ADMIN — SUGAMMADEX 50 MG: 100 INJECTION, SOLUTION INTRAVENOUS at 09:57

## 2022-01-01 RX ADMIN — DOCUSATE SODIUM LIQUID 100 MG: 100 LIQUID ORAL at 12:03

## 2022-01-01 RX ADMIN — CETIRIZINE HYDROCHLORIDE 10 MG: 10 TABLET, FILM COATED ORAL at 08:32

## 2022-01-01 RX ADMIN — LORAZEPAM 0.5 MG: 2 INJECTION INTRAMUSCULAR; INTRAVENOUS at 23:16

## 2022-01-01 RX ADMIN — GLYCOPYRROLATE 0.1 MG: 0.2 INJECTION INTRAMUSCULAR; INTRAVENOUS at 21:54

## 2022-01-01 RX ADMIN — GLYCOPYRROLATE 0.1 MG: 0.2 INJECTION INTRAMUSCULAR; INTRAVENOUS at 01:48

## 2022-01-01 RX ADMIN — LANSOPRAZOLE 30 MG: KIT at 05:31

## 2022-01-01 RX ADMIN — PIPERACILLIN AND TAZOBACTAM 3.38 G: 3; .375 INJECTION, POWDER, FOR SOLUTION INTRAVENOUS at 12:46

## 2022-01-01 RX ADMIN — DEXTROSE AND SODIUM CHLORIDE 125 ML/HR: 5; 450 INJECTION, SOLUTION INTRAVENOUS at 12:04

## 2022-01-01 RX ADMIN — FENTANYL CITRATE 25 MCG: 50 INJECTION INTRAMUSCULAR; INTRAVENOUS at 13:50

## 2022-01-01 RX ADMIN — HYDROCODONE BITARTRATE AND ACETAMINOPHEN 1 TABLET: 5; 325 TABLET ORAL at 00:11

## 2022-01-01 RX ADMIN — Medication 1000 UNITS: at 08:12

## 2022-01-01 RX ADMIN — ACYCLOVIR SODIUM 770 MG: 1000 INJECTION, SOLUTION INTRAVENOUS at 22:09

## 2022-01-01 RX ADMIN — GLYCOPYRROLATE 0.1 MG: 0.2 INJECTION INTRAMUSCULAR; INTRAVENOUS at 23:23

## 2022-01-01 RX ADMIN — PIPERACILLIN AND TAZOBACTAM 3.38 G: 3; .375 INJECTION, POWDER, FOR SOLUTION INTRAVENOUS at 14:01

## 2022-01-01 RX ADMIN — BUMETANIDE 1 MG: 0.25 INJECTION, SOLUTION INTRAMUSCULAR; INTRAVENOUS at 11:59

## 2022-01-01 RX ADMIN — CEFAZOLIN SODIUM 2 G: 1 INJECTION, POWDER, FOR SOLUTION INTRAMUSCULAR; INTRAVENOUS at 08:45

## 2022-01-01 RX ADMIN — APIXABAN 5 MG: 5 TABLET, FILM COATED ORAL at 02:34

## 2022-01-01 RX ADMIN — LEVETIRACETAM 500 MG: 500 SOLUTION ORAL at 21:58

## 2022-01-01 RX ADMIN — FUROSEMIDE 40 MG: 40 TABLET ORAL at 18:33

## 2022-01-01 RX ADMIN — HYDROCODONE BITARTRATE AND ACETAMINOPHEN 1 TABLET: 5; 325 TABLET ORAL at 07:29

## 2022-01-01 RX ADMIN — Medication 10 ML: at 20:05

## 2022-01-01 RX ADMIN — LEVETIRACETAM 500 MG: 500 SOLUTION ORAL at 08:50

## 2022-01-01 RX ADMIN — LANSOPRAZOLE 30 MG: KIT at 06:16

## 2022-01-01 RX ADMIN — SODIUM CHLORIDE, POTASSIUM CHLORIDE, SODIUM LACTATE AND CALCIUM CHLORIDE 125 ML/HR: 600; 310; 30; 20 INJECTION, SOLUTION INTRAVENOUS at 17:50

## 2022-01-01 RX ADMIN — MORPHINE SULFATE 2 MG: 2 INJECTION, SOLUTION INTRAMUSCULAR; INTRAVENOUS at 07:19

## 2022-01-01 RX ADMIN — Medication 10 ML: at 21:39

## 2022-01-01 RX ADMIN — MORPHINE SULFATE 2 MG: 2 INJECTION, SOLUTION INTRAMUSCULAR; INTRAVENOUS at 23:57

## 2022-01-01 RX ADMIN — METOPROLOL TARTRATE 25 MG: 25 TABLET, FILM COATED ORAL at 17:42

## 2022-01-01 RX ADMIN — Medication 100 MCG: at 08:54

## 2022-01-01 RX ADMIN — POTASSIUM CHLORIDE 20 MEQ: 1500 TABLET, EXTENDED RELEASE ORAL at 08:30

## 2022-01-01 RX ADMIN — LIDOCAINE HYDROCHLORIDE 50 MG: 10 INJECTION, SOLUTION EPIDURAL; INFILTRATION; INTRACAUDAL; PERINEURAL at 08:17

## 2022-01-01 RX ADMIN — METOPROLOL TARTRATE 25 MG: 25 TABLET, FILM COATED ORAL at 20:23

## 2022-01-01 RX ADMIN — SODIUM CHLORIDE 75 ML/HR: 4.5 INJECTION, SOLUTION INTRAVENOUS at 21:54

## 2022-01-01 RX ADMIN — PIPERACILLIN AND TAZOBACTAM 3.38 G: 3; .375 INJECTION, POWDER, FOR SOLUTION INTRAVENOUS at 12:03

## 2022-01-01 RX ADMIN — INSULIN LISPRO 2 UNITS: 100 INJECTION, SOLUTION INTRAVENOUS; SUBCUTANEOUS at 08:30

## 2022-01-01 RX ADMIN — GLYCOPYRROLATE 0.1 MG: 0.2 INJECTION INTRAMUSCULAR; INTRAVENOUS at 13:39

## 2022-01-01 RX ADMIN — INSULIN LISPRO 2 UNITS: 100 INJECTION, SOLUTION INTRAVENOUS; SUBCUTANEOUS at 17:40

## 2022-01-01 RX ADMIN — ACYCLOVIR SODIUM 570 MG: 1000 INJECTION, SOLUTION INTRAVENOUS at 15:30

## 2022-01-01 RX ADMIN — SODIUM CHLORIDE 500 ML: 0.9 INJECTION, SOLUTION INTRAVENOUS at 09:00

## 2022-01-01 RX ADMIN — Medication 100 MCG: at 08:46

## 2022-01-01 RX ADMIN — HYDROCODONE BITARTRATE AND ACETAMINOPHEN 1 TABLET: 5; 325 TABLET ORAL at 18:28

## 2022-01-01 RX ADMIN — LORAZEPAM 2 MG: 2 INJECTION INTRAMUSCULAR; INTRAVENOUS at 04:22

## 2022-01-01 RX ADMIN — PIPERACILLIN AND TAZOBACTAM 3.38 G: 3; .375 INJECTION, POWDER, FOR SOLUTION INTRAVENOUS at 11:53

## 2022-01-01 RX ADMIN — MORPHINE SULFATE 4 MG: 4 INJECTION, SOLUTION INTRAMUSCULAR; INTRAVENOUS at 13:39

## 2022-01-01 RX ADMIN — FUROSEMIDE 40 MG: 40 TABLET ORAL at 17:42

## 2022-01-01 RX ADMIN — FENTANYL CITRATE 25 MCG: 50 INJECTION INTRAMUSCULAR; INTRAVENOUS at 09:05

## 2022-01-01 RX ADMIN — ACETAMINOPHEN 1000 MG: 500 TABLET, FILM COATED ORAL at 07:49

## 2022-01-01 RX ADMIN — HYDROCODONE BITARTRATE AND ACETAMINOPHEN 1 TABLET: 5; 325 TABLET ORAL at 09:17

## 2022-01-01 RX ADMIN — POLYETHYLENE GLYCOL 3350 17 G: 17 POWDER, FOR SOLUTION ORAL at 09:00

## 2022-01-01 RX ADMIN — PIPERACILLIN AND TAZOBACTAM 3.38 G: 3; .375 INJECTION, POWDER, FOR SOLUTION INTRAVENOUS at 17:30

## 2022-01-01 RX ADMIN — LEVETIRACETAM 500 MG: 500 SOLUTION ORAL at 09:32

## 2022-01-01 RX ADMIN — METOPROLOL TARTRATE 25 MG: 25 TABLET, FILM COATED ORAL at 00:11

## 2022-01-01 RX ADMIN — PANTOPRAZOLE SODIUM 40 MG: 40 TABLET, DELAYED RELEASE ORAL at 05:13

## 2022-01-01 RX ADMIN — SODIUM CHLORIDE 500 MG: 900 INJECTION INTRAVENOUS at 03:51

## 2022-01-01 RX ADMIN — SULFAMETHOXAZOLE AND TRIMETHOPRIM 1 TABLET: 800; 160 TABLET ORAL at 09:12

## 2022-01-01 RX ADMIN — CETIRIZINE HYDROCHLORIDE 10 MG: 10 TABLET, FILM COATED ORAL at 08:29

## 2022-01-01 RX ADMIN — GABAPENTIN 600 MG: 300 CAPSULE ORAL at 07:49

## 2022-01-01 RX ADMIN — POTASSIUM CHLORIDE 20 MEQ: 1500 TABLET, EXTENDED RELEASE ORAL at 09:18

## 2022-01-01 RX ADMIN — ENOXAPARIN SODIUM 80 MG: 100 INJECTION SUBCUTANEOUS at 20:32

## 2022-01-01 RX ADMIN — Medication 10 ML: at 21:54

## 2022-01-01 RX ADMIN — POTASSIUM CHLORIDE 40 MEQ: 1.5 POWDER, FOR SOLUTION ORAL at 08:09

## 2022-01-01 RX ADMIN — METOPROLOL TARTRATE 25 MG: 25 TABLET, FILM COATED ORAL at 16:17

## 2022-01-01 RX ADMIN — LIDOCAINE 1 PATCH: 50 PATCH TOPICAL at 10:04

## 2022-01-01 RX ADMIN — DEXAMETHASONE SODIUM PHOSPHATE 8 MG: 10 INJECTION INTRAMUSCULAR; INTRAVENOUS at 14:55

## 2022-01-01 RX ADMIN — DOCUSATE SODIUM 100 MG: 100 CAPSULE, LIQUID FILLED ORAL at 18:19

## 2022-01-01 RX ADMIN — SENNOSIDES AND DOCUSATE SODIUM 2 TABLET: 50; 8.6 TABLET ORAL at 20:04

## 2022-01-01 RX ADMIN — Medication 3 ML: at 22:26

## 2022-01-01 RX ADMIN — ASPIRIN 81 MG: 81 TABLET, CHEWABLE ORAL at 08:56

## 2022-01-01 RX ADMIN — INSULIN LISPRO 2 UNITS: 100 INJECTION, SOLUTION INTRAVENOUS; SUBCUTANEOUS at 11:36

## 2022-01-01 RX ADMIN — Medication 1000 UNITS: at 09:18

## 2022-01-01 RX ADMIN — LEVOTHYROXINE SODIUM 88 MCG: 0.09 TABLET ORAL at 06:01

## 2022-01-01 RX ADMIN — GLYCOPYRROLATE 0.1 MG: 0.2 INJECTION INTRAMUSCULAR; INTRAVENOUS at 23:57

## 2022-01-01 RX ADMIN — SODIUM CHLORIDE 500 MG: 900 INJECTION INTRAVENOUS at 04:00

## 2022-01-01 RX ADMIN — GLYCOPYRROLATE 0.1 MG: 0.2 INJECTION INTRAMUSCULAR; INTRAVENOUS at 04:21

## 2022-01-01 RX ADMIN — LORAZEPAM 1 MG: 2 INJECTION INTRAMUSCULAR; INTRAVENOUS at 19:48

## 2022-01-01 RX ADMIN — SODIUM CHLORIDE 75 ML/HR: 9 INJECTION, SOLUTION INTRAVENOUS at 22:00

## 2022-01-01 RX ADMIN — Medication 10 ML: at 11:07

## 2022-01-01 RX ADMIN — LIDOCAINE HYDROCHLORIDE 100 MG: 20 INJECTION, SOLUTION INFILTRATION; PERINEURAL at 13:50

## 2022-01-01 RX ADMIN — DOCUSATE SODIUM LIQUID 100 MG: 100 LIQUID ORAL at 08:09

## 2022-01-01 RX ADMIN — NEOSTIGMINE METHYLSULFATE 2 MG: 0.5 INJECTION INTRAVENOUS at 15:25

## 2022-01-01 RX ADMIN — HYDROCODONE BITARTRATE AND ACETAMINOPHEN 1 TABLET: 5; 325 TABLET ORAL at 11:31

## 2022-01-01 RX ADMIN — LEVETIRACETAM 500 MG: 500 SOLUTION ORAL at 21:16

## 2022-01-01 RX ADMIN — DEXTROSE AND SODIUM CHLORIDE 125 ML/HR: 5; 450 INJECTION, SOLUTION INTRAVENOUS at 22:33

## 2022-01-01 RX ADMIN — APIXABAN 10 MG: 5 TABLET, FILM COATED ORAL at 09:27

## 2022-01-01 RX ADMIN — Medication 3 ML: at 09:00

## 2022-01-01 RX ADMIN — LORAZEPAM 2 MG: 2 INJECTION INTRAMUSCULAR; INTRAVENOUS at 08:43

## 2022-01-01 RX ADMIN — CETIRIZINE HYDROCHLORIDE TABLETS 10 MG: 10 TABLET, FILM COATED ORAL at 08:12

## 2022-01-01 RX ADMIN — Medication 10 ML: at 08:43

## 2022-01-01 RX ADMIN — SODIUM CHLORIDE 75 ML/HR: 9 INJECTION, SOLUTION INTRAVENOUS at 20:04

## 2022-01-01 RX ADMIN — LORAZEPAM 1 MG: 2 INJECTION INTRAMUSCULAR; INTRAVENOUS at 11:30

## 2022-01-01 RX ADMIN — FUROSEMIDE 40 MG: 10 INJECTION, SOLUTION INTRAMUSCULAR; INTRAVENOUS at 21:15

## 2022-01-01 RX ADMIN — POLYETHYLENE GLYCOL 3350 17 G: 17 POWDER, FOR SOLUTION ORAL at 09:39

## 2022-01-01 RX ADMIN — OXYBUTYNIN CHLORIDE 10 MG: 10 TABLET, EXTENDED RELEASE ORAL at 08:12

## 2022-01-01 RX ADMIN — LEVOTHYROXINE SODIUM 88 MCG: 88 TABLET ORAL at 05:46

## 2022-01-01 RX ADMIN — LEVETIRACETAM 500 MG: 5 INJECTION INTRAVENOUS at 10:50

## 2022-01-01 RX ADMIN — LORAZEPAM 1 MG: 2 INJECTION INTRAMUSCULAR; INTRAVENOUS at 02:26

## 2022-01-01 RX ADMIN — METOPROLOL TARTRATE 25 MG: 25 TABLET, FILM COATED ORAL at 09:10

## 2022-01-01 RX ADMIN — Medication 10 ML: at 20:08

## 2022-01-01 RX ADMIN — PANTOPRAZOLE SODIUM 40 MG: 40 TABLET, DELAYED RELEASE ORAL at 18:19

## 2022-01-01 RX ADMIN — LORAZEPAM 2 MG: 2 INJECTION INTRAMUSCULAR; INTRAVENOUS at 23:57

## 2022-01-01 RX ADMIN — FENTANYL CITRATE 25 MCG: 50 INJECTION INTRAMUSCULAR; INTRAVENOUS at 15:18

## 2022-01-01 RX ADMIN — POTASSIUM CHLORIDE 20 MEQ: 1500 TABLET, EXTENDED RELEASE ORAL at 20:23

## 2022-01-01 RX ADMIN — HEPARIN SODIUM 12 UNITS/KG/HR: 10000 INJECTION, SOLUTION INTRAVENOUS at 09:00

## 2022-01-01 RX ADMIN — POLYETHYLENE GLYCOL 3350 17 G: 17 POWDER, FOR SOLUTION ORAL at 18:20

## 2022-01-01 RX ADMIN — LORAZEPAM 2 MG: 2 INJECTION INTRAMUSCULAR; INTRAVENOUS at 09:26

## 2022-01-01 RX ADMIN — Medication 3 ML: at 08:25

## 2022-01-01 RX ADMIN — PYRIDOSTIGMINE BROMIDE 60 MG: 60 TABLET ORAL at 05:42

## 2022-01-01 RX ADMIN — ENOXAPARIN SODIUM 80 MG: 100 INJECTION SUBCUTANEOUS at 10:50

## 2022-01-01 RX ADMIN — MORPHINE SULFATE 2 MG: 2 INJECTION, SOLUTION INTRAMUSCULAR; INTRAVENOUS at 04:22

## 2022-01-01 RX ADMIN — GLYCOPYRROLATE 0.1 MG: 0.2 INJECTION INTRAMUSCULAR; INTRAVENOUS at 09:25

## 2022-01-01 RX ADMIN — HYDROMORPHONE HYDROCHLORIDE 0.5 MG: 1 INJECTION, SOLUTION INTRAMUSCULAR; INTRAVENOUS; SUBCUTANEOUS at 14:36

## 2022-01-01 RX ADMIN — Medication 10 ML: at 08:56

## 2022-01-01 RX ADMIN — Medication 100 MCG: at 08:41

## 2022-01-01 RX ADMIN — POLYETHYLENE GLYCOL 3350 17 G: 17 POWDER, FOR SOLUTION ORAL at 08:11

## 2022-01-01 RX ADMIN — METOPROLOL TARTRATE 25 MG: 25 TABLET, FILM COATED ORAL at 09:32

## 2022-01-01 RX ADMIN — BUMETANIDE 2 MG: 1 TABLET ORAL at 20:23

## 2022-01-01 RX ADMIN — PYRIDOSTIGMINE BROMIDE 60 MG: 60 TABLET ORAL at 13:14

## 2022-01-01 RX ADMIN — HYDROCODONE BITARTRATE AND ACETAMINOPHEN 1 TABLET: 5; 325 TABLET ORAL at 16:17

## 2022-01-01 RX ADMIN — LORAZEPAM 2 MG: 2 INJECTION INTRAMUSCULAR; INTRAVENOUS at 20:14

## 2022-01-01 RX ADMIN — METOPROLOL SUCCINATE 50 MG: 50 TABLET, EXTENDED RELEASE ORAL at 09:18

## 2022-01-01 RX ADMIN — HEPARIN SODIUM 15 UNITS/KG/HR: 10000 INJECTION, SOLUTION INTRAVENOUS at 06:29

## 2022-01-01 RX ADMIN — PIPERACILLIN AND TAZOBACTAM 3.38 G: 3; .375 INJECTION, POWDER, FOR SOLUTION INTRAVENOUS at 02:01

## 2022-01-01 RX ADMIN — SODIUM CHLORIDE 500 MG: 900 INJECTION INTRAVENOUS at 16:27

## 2022-01-01 RX ADMIN — POTASSIUM CHLORIDE 20 MEQ: 1500 TABLET, EXTENDED RELEASE ORAL at 20:04

## 2022-01-01 RX ADMIN — Medication 10 ML: at 09:06

## 2022-01-01 RX ADMIN — ACYCLOVIR SODIUM 770 MG: 1000 INJECTION, SOLUTION INTRAVENOUS at 05:38

## 2022-01-01 RX ADMIN — MORPHINE SULFATE 2 MG: 2 INJECTION, SOLUTION INTRAMUSCULAR; INTRAVENOUS at 20:15

## 2022-01-01 RX ADMIN — LEVETIRACETAM 500 MG: 500 SOLUTION ORAL at 08:09

## 2022-01-01 RX ADMIN — METOPROLOL TARTRATE 25 MG: 25 TABLET, FILM COATED ORAL at 20:08

## 2022-01-01 RX ADMIN — PYRIDOSTIGMINE BROMIDE 60 MG: 60 TABLET ORAL at 14:30

## 2022-01-01 RX ADMIN — APIXABAN 5 MG: 5 TABLET, FILM COATED ORAL at 08:17

## 2022-01-01 RX ADMIN — LANSOPRAZOLE 30 MG: KIT at 06:49

## 2022-01-01 RX ADMIN — ENOXAPARIN SODIUM 90 MG: 100 INJECTION SUBCUTANEOUS at 21:15

## 2022-01-01 RX ADMIN — LEVOTHYROXINE SODIUM 88 MCG: 0.09 TABLET ORAL at 06:14

## 2022-01-01 RX ADMIN — HYDROCODONE BITARTRATE AND ACETAMINOPHEN 1 TABLET: 5; 325 TABLET ORAL at 05:36

## 2022-01-01 RX ADMIN — MORPHINE SULFATE 2 MG: 2 INJECTION, SOLUTION INTRAMUSCULAR; INTRAVENOUS at 11:27

## 2022-01-01 RX ADMIN — DOCUSATE SODIUM 100 MG: 100 CAPSULE, LIQUID FILLED ORAL at 08:12

## 2022-01-01 RX ADMIN — MORPHINE SULFATE 2 MG: 2 INJECTION, SOLUTION INTRAMUSCULAR; INTRAVENOUS at 04:21

## 2022-01-01 RX ADMIN — LEVOTHYROXINE SODIUM 88 MCG: 0.09 TABLET ORAL at 06:25

## 2022-01-01 RX ADMIN — SENNOSIDES AND DOCUSATE SODIUM 2 TABLET: 50; 8.6 TABLET ORAL at 22:50

## 2022-01-01 RX ADMIN — IOPAMIDOL 100 ML: 755 INJECTION, SOLUTION INTRAVENOUS at 18:40

## 2022-01-01 RX ADMIN — ACYCLOVIR SODIUM 570 MG: 1000 INJECTION, SOLUTION INTRAVENOUS at 22:13

## 2022-01-01 RX ADMIN — SCOPALAMINE 1 PATCH: 1 PATCH, EXTENDED RELEASE TRANSDERMAL at 16:49

## 2022-01-01 RX ADMIN — FENTANYL CITRATE 25 MCG: 50 INJECTION INTRAMUSCULAR; INTRAVENOUS at 15:25

## 2022-01-01 RX ADMIN — ONDANSETRON 4 MG: 2 INJECTION INTRAMUSCULAR; INTRAVENOUS at 09:56

## 2022-01-01 RX ADMIN — BUMETANIDE 2 MG: 1 TABLET ORAL at 08:31

## 2022-01-01 RX ADMIN — METOPROLOL TARTRATE 25 MG: 25 TABLET, FILM COATED ORAL at 10:50

## 2022-01-01 RX ADMIN — LANSOPRAZOLE 30 MG: KIT at 06:11

## 2022-01-01 RX ADMIN — DEXAMETHASONE SODIUM PHOSPHATE 4 MG: 10 INJECTION INTRAMUSCULAR; INTRAVENOUS at 14:03

## 2022-01-01 RX ADMIN — PIPERACILLIN AND TAZOBACTAM 3.38 G: 3; .375 INJECTION, POWDER, FOR SOLUTION INTRAVENOUS at 10:50

## 2022-01-01 RX ADMIN — FUROSEMIDE 40 MG: 40 TABLET ORAL at 12:41

## 2022-01-01 RX ADMIN — LEVOTHYROXINE SODIUM 88 MCG: 0.09 TABLET ORAL at 05:33

## 2022-01-01 RX ADMIN — PIPERACILLIN AND TAZOBACTAM 3.38 G: 3; .375 INJECTION, POWDER, FOR SOLUTION INTRAVENOUS at 06:16

## 2022-01-01 RX ADMIN — HYDROCODONE BITARTRATE AND ACETAMINOPHEN 1 TABLET: 5; 325 TABLET ORAL at 19:44

## 2022-01-01 RX ADMIN — MORPHINE SULFATE 4 MG: 4 INJECTION, SOLUTION INTRAMUSCULAR; INTRAVENOUS at 08:12

## 2022-01-01 RX ADMIN — Medication 10 ML: at 08:12

## 2022-01-01 RX ADMIN — SODIUM CHLORIDE 500 MG: 900 INJECTION INTRAVENOUS at 16:49

## 2022-01-01 RX ADMIN — MORPHINE SULFATE 2 MG: 2 INJECTION, SOLUTION INTRAMUSCULAR; INTRAVENOUS at 13:32

## 2022-01-01 RX ADMIN — HYDROCODONE BITARTRATE AND ACETAMINOPHEN 1 TABLET: 5; 325 TABLET ORAL at 18:15

## 2022-01-01 RX ADMIN — FENTANYL CITRATE 25 MCG: 50 INJECTION INTRAMUSCULAR; INTRAVENOUS at 15:11

## 2022-01-01 RX ADMIN — HEPARIN SODIUM 18 UNITS/KG/HR: 10000 INJECTION, SOLUTION INTRAVENOUS at 13:25

## 2022-01-01 RX ADMIN — FUROSEMIDE 40 MG: 40 TABLET ORAL at 17:29

## 2022-01-01 RX ADMIN — PIPERACILLIN AND TAZOBACTAM 3.38 G: 3; .375 INJECTION, POWDER, FOR SOLUTION INTRAVENOUS at 03:04

## 2022-01-01 RX ADMIN — HYDROCODONE BITARTRATE AND ACETAMINOPHEN 1 TABLET: 5; 325 TABLET ORAL at 14:54

## 2022-01-01 RX ADMIN — METOPROLOL TARTRATE 25 MG: 25 TABLET, FILM COATED ORAL at 08:42

## 2022-01-01 RX ADMIN — LEVOTHYROXINE SODIUM 88 MCG: 0.09 TABLET ORAL at 06:16

## 2022-01-01 RX ADMIN — Medication 100 MCG: at 09:04

## 2022-01-01 RX ADMIN — DEXTROSE MONOHYDRATE 125 ML/HR: 50 INJECTION, SOLUTION INTRAVENOUS at 08:16

## 2022-01-01 RX ADMIN — HYDROCODONE BITARTRATE AND ACETAMINOPHEN 1 TABLET: 5; 325 TABLET ORAL at 14:51

## 2022-01-01 RX ADMIN — FUROSEMIDE 40 MG: 40 TABLET ORAL at 09:00

## 2022-01-01 RX ADMIN — LEVOTHYROXINE SODIUM 88 MCG: 0.09 TABLET ORAL at 05:38

## 2022-01-01 RX ADMIN — ROCURONIUM BROMIDE 50 MG: 50 INJECTION, SOLUTION INTRAVENOUS at 08:17

## 2022-01-01 RX ADMIN — HYDROCODONE BITARTRATE AND ACETAMINOPHEN 1 TABLET: 5; 325 TABLET ORAL at 23:32

## 2022-01-01 RX ADMIN — SUGAMMADEX 100 MG: 100 INJECTION, SOLUTION INTRAVENOUS at 10:02

## 2022-01-01 RX ADMIN — DOCUSATE SODIUM LIQUID 100 MG: 100 LIQUID ORAL at 09:32

## 2022-01-01 RX ADMIN — MORPHINE SULFATE 2 MG: 2 INJECTION, SOLUTION INTRAMUSCULAR; INTRAVENOUS at 09:25

## 2022-01-01 RX ADMIN — METOPROLOL TARTRATE 5 MG: 1 INJECTION, SOLUTION INTRAVENOUS at 11:07

## 2022-01-01 RX ADMIN — PANTOPRAZOLE SODIUM 40 MG: 40 TABLET, DELAYED RELEASE ORAL at 05:32

## 2022-01-01 RX ADMIN — FAMOTIDINE 20 MG: 10 INJECTION INTRAVENOUS at 12:24

## 2022-01-01 RX ADMIN — SODIUM CHLORIDE 75 ML/HR: 9 INJECTION, SOLUTION INTRAVENOUS at 08:10

## 2022-01-01 RX ADMIN — HYDROCODONE BITARTRATE AND ACETAMINOPHEN 1 TABLET: 5; 325 TABLET ORAL at 03:07

## 2022-01-01 RX ADMIN — LANSOPRAZOLE 30 MG: KIT at 05:38

## 2022-01-01 RX ADMIN — SODIUM CHLORIDE 75 ML/HR: 9 INJECTION, SOLUTION INTRAVENOUS at 23:35

## 2022-01-01 RX ADMIN — OXYBUTYNIN CHLORIDE 10 MG: 10 TABLET, EXTENDED RELEASE ORAL at 09:18

## 2022-01-01 RX ADMIN — MORPHINE SULFATE 4 MG: 4 INJECTION, SOLUTION INTRAMUSCULAR; INTRAVENOUS at 07:41

## 2022-01-01 RX ADMIN — SODIUM CHLORIDE, POTASSIUM CHLORIDE, SODIUM LACTATE AND CALCIUM CHLORIDE 1000 ML: 600; 310; 30; 20 INJECTION, SOLUTION INTRAVENOUS at 10:30

## 2022-01-01 RX ADMIN — ACYCLOVIR SODIUM 570 MG: 1000 INJECTION, SOLUTION INTRAVENOUS at 06:12

## 2022-01-01 RX ADMIN — SODIUM CHLORIDE 500 MG: 900 INJECTION INTRAVENOUS at 16:47

## 2022-01-01 RX ADMIN — OXYCODONE HYDROCHLORIDE 10 MG: 10 TABLET, FILM COATED, EXTENDED RELEASE ORAL at 07:49

## 2022-01-01 RX ADMIN — BUMETANIDE 2 MG: 1 TABLET ORAL at 20:04

## 2022-01-01 RX ADMIN — LEVETIRACETAM 500 MG: 5 INJECTION INTRAVENOUS at 22:50

## 2022-01-01 RX ADMIN — Medication 10 ML: at 09:41

## 2022-01-01 RX ADMIN — SENNOSIDES AND DOCUSATE SODIUM 2 TABLET: 50; 8.6 TABLET ORAL at 08:32

## 2022-01-01 RX ADMIN — ACYCLOVIR SODIUM 570 MG: 1000 INJECTION, SOLUTION INTRAVENOUS at 06:32

## 2022-01-01 RX ADMIN — LEVETIRACETAM 500 MG: 5 INJECTION INTRAVENOUS at 09:13

## 2022-01-01 RX ADMIN — ROCURONIUM BROMIDE 30 MG: 50 INJECTION INTRAVENOUS at 13:52

## 2022-01-01 RX ADMIN — ROCURONIUM BROMIDE 20 MG: 50 INJECTION, SOLUTION INTRAVENOUS at 09:00

## 2022-01-01 RX ADMIN — PIPERACILLIN AND TAZOBACTAM 3.38 G: 3; .375 INJECTION, POWDER, FOR SOLUTION INTRAVENOUS at 20:34

## 2022-01-01 RX ADMIN — POTASSIUM CHLORIDE 20 MEQ: 1500 TABLET, EXTENDED RELEASE ORAL at 08:32

## 2022-01-01 RX ADMIN — DOCUSATE SODIUM 100 MG: 100 CAPSULE, LIQUID FILLED ORAL at 17:43

## 2022-01-01 RX ADMIN — SULFAMETHOXAZOLE AND TRIMETHOPRIM 1 TABLET: 800; 160 TABLET ORAL at 20:23

## 2022-01-01 RX ADMIN — METOPROLOL TARTRATE 25 MG: 25 TABLET, FILM COATED ORAL at 08:50

## 2022-01-01 RX ADMIN — LEVOTHYROXINE SODIUM 88 MCG: 0.09 TABLET ORAL at 06:37

## 2022-01-01 RX ADMIN — SULFAMETHOXAZOLE AND TRIMETHOPRIM 1 TABLET: 800; 160 TABLET ORAL at 08:32

## 2022-01-01 RX ADMIN — ACYCLOVIR SODIUM 770 MG: 1000 INJECTION, SOLUTION INTRAVENOUS at 14:01

## 2022-01-01 RX ADMIN — POTASSIUM CHLORIDE 20 MEQ: 1500 TABLET, EXTENDED RELEASE ORAL at 17:43

## 2022-01-01 RX ADMIN — ACYCLOVIR SODIUM 770 MG: 1000 INJECTION, SOLUTION INTRAVENOUS at 21:24

## 2022-01-01 RX ADMIN — ACYCLOVIR SODIUM 570 MG: 1000 INJECTION, SOLUTION INTRAVENOUS at 17:32

## 2022-01-01 RX ADMIN — PROPOFOL 150 MCG/KG/MIN: 10 INJECTION, EMULSION INTRAVENOUS at 08:21

## 2022-01-01 RX ADMIN — CETIRIZINE HYDROCHLORIDE 10 MG: 10 TABLET, FILM COATED ORAL at 09:17

## 2022-01-01 RX ADMIN — MORPHINE SULFATE 2 MG: 2 INJECTION, SOLUTION INTRAMUSCULAR; INTRAVENOUS at 18:39

## 2022-01-01 RX ADMIN — SODIUM CHLORIDE 75 ML/HR: 4.5 INJECTION, SOLUTION INTRAVENOUS at 08:31

## 2022-01-01 RX ADMIN — POLYETHYLENE GLYCOL 3350 17 G: 17 POWDER, FOR SOLUTION ORAL at 08:42

## 2022-01-01 RX ADMIN — POLYETHYLENE GLYCOL 3350 17 G: 17 POWDER, FOR SOLUTION ORAL at 08:17

## 2022-01-01 RX ADMIN — LORAZEPAM 2 MG: 2 INJECTION INTRAMUSCULAR; INTRAVENOUS at 13:38

## 2022-01-01 RX ADMIN — PIPERACILLIN AND TAZOBACTAM 3.38 G: 3; .375 INJECTION, POWDER, FOR SOLUTION INTRAVENOUS at 22:50

## 2022-01-01 RX ADMIN — ACETAMINOPHEN 650 MG: 325 TABLET, FILM COATED ORAL at 10:13

## 2022-01-01 RX ADMIN — METOPROLOL TARTRATE 25 MG: 25 TABLET, FILM COATED ORAL at 21:58

## 2022-01-01 RX ADMIN — INSULIN LISPRO 2 UNITS: 100 INJECTION, SOLUTION INTRAVENOUS; SUBCUTANEOUS at 17:56

## 2022-01-01 RX ADMIN — MORPHINE SULFATE 2 MG: 2 INJECTION, SOLUTION INTRAMUSCULAR; INTRAVENOUS at 13:41

## 2022-01-01 RX ADMIN — Medication 10 ML: at 20:15

## 2022-01-01 RX ADMIN — PROPOFOL 150 MG: 10 INJECTION, EMULSION INTRAVENOUS at 08:16

## 2022-01-01 RX ADMIN — Medication 10 ML: at 08:37

## 2022-01-01 RX ADMIN — PANTOPRAZOLE SODIUM 40 MG: 40 TABLET, DELAYED RELEASE ORAL at 07:18

## 2022-01-01 RX ADMIN — Medication 10 ML: at 20:29

## 2022-01-01 RX ADMIN — METOPROLOL TARTRATE 25 MG: 25 TABLET, FILM COATED ORAL at 12:03

## 2022-01-01 RX ADMIN — Medication 3 ML: at 09:42

## 2022-01-01 RX ADMIN — VANCOMYCIN HYDROCHLORIDE 1250 MG: 1.25 INJECTION, POWDER, LYOPHILIZED, FOR SOLUTION INTRAVENOUS at 12:00

## 2022-01-01 RX ADMIN — PANTOPRAZOLE SODIUM 40 MG: 40 INJECTION, POWDER, FOR SOLUTION INTRAVENOUS at 05:51

## 2022-01-01 RX ADMIN — SODIUM CHLORIDE, POTASSIUM CHLORIDE, SODIUM LACTATE AND CALCIUM CHLORIDE 9 ML/HR: 600; 310; 30; 20 INJECTION, SOLUTION INTRAVENOUS at 12:06

## 2022-01-01 RX ADMIN — METOPROLOL TARTRATE 25 MG: 25 TABLET, FILM COATED ORAL at 09:00

## 2022-01-01 RX ADMIN — LEVOTHYROXINE SODIUM 88 MCG: 0.09 TABLET ORAL at 05:58

## 2022-01-01 RX ADMIN — GLYCOPYRROLATE 0.1 MG: 0.2 INJECTION INTRAMUSCULAR; INTRAVENOUS at 13:42

## 2022-01-01 RX ADMIN — FUROSEMIDE 20 MG: 20 TABLET ORAL at 09:17

## 2022-01-01 RX ADMIN — SODIUM CHLORIDE, POTASSIUM CHLORIDE, SODIUM LACTATE AND CALCIUM CHLORIDE 1382 ML: 600; 310; 30; 20 INJECTION, SOLUTION INTRAVENOUS at 11:52

## 2022-01-01 RX ADMIN — Medication 10 ML: at 10:52

## 2022-01-01 RX ADMIN — HYDROCODONE BITARTRATE AND ACETAMINOPHEN 1 TABLET: 5; 325 TABLET ORAL at 06:52

## 2022-01-01 RX ADMIN — ACETAMINOPHEN 650 MG: 325 TABLET, FILM COATED ORAL at 22:01

## 2022-01-01 RX ADMIN — PIPERACILLIN AND TAZOBACTAM 3.38 G: 3; .375 INJECTION, POWDER, FOR SOLUTION INTRAVENOUS at 03:49

## 2022-01-01 RX ADMIN — Medication 10 ML: at 20:24

## 2022-01-01 RX ADMIN — METOPROLOL TARTRATE 25 MG: 25 TABLET, FILM COATED ORAL at 20:32

## 2022-01-01 RX ADMIN — METOPROLOL TARTRATE 25 MG: 25 TABLET, FILM COATED ORAL at 12:41

## 2022-01-01 RX ADMIN — LEVOTHYROXINE SODIUM 88 MCG: 0.09 TABLET ORAL at 06:04

## 2022-01-01 RX ADMIN — Medication 10 ML: at 21:16

## 2022-01-01 RX ADMIN — FUROSEMIDE 40 MG: 40 TABLET ORAL at 19:16

## 2022-01-01 RX ADMIN — SENNOSIDES AND DOCUSATE SODIUM 2 TABLET: 50; 8.6 TABLET ORAL at 08:29

## 2022-01-01 RX ADMIN — LIDOCAINE 1 PATCH: 50 PATCH TOPICAL at 09:39

## 2022-01-01 RX ADMIN — BUMETANIDE 2 MG: 1 TABLET ORAL at 08:28

## 2022-01-01 RX ADMIN — FENTANYL CITRATE 25 MCG: 50 INJECTION INTRAMUSCULAR; INTRAVENOUS at 08:18

## 2022-01-01 RX ADMIN — SODIUM CHLORIDE, POTASSIUM CHLORIDE, SODIUM LACTATE AND CALCIUM CHLORIDE 75 ML/HR: 600; 310; 30; 20 INJECTION, SOLUTION INTRAVENOUS at 14:37

## 2022-01-01 RX ADMIN — MORPHINE SULFATE 4 MG: 4 INJECTION, SOLUTION INTRAMUSCULAR; INTRAVENOUS at 11:14

## 2022-01-01 RX ADMIN — LEVOTHYROXINE SODIUM 88 MCG: 0.09 TABLET ORAL at 06:49

## 2022-01-01 RX ADMIN — SODIUM CHLORIDE 500 MG: 900 INJECTION INTRAVENOUS at 17:06

## 2022-01-01 RX ADMIN — LORAZEPAM 1 MG: 2 INJECTION INTRAMUSCULAR; INTRAVENOUS at 13:31

## 2022-01-01 RX ADMIN — METOPROLOL SUCCINATE 25 MG: 25 TABLET, EXTENDED RELEASE ORAL at 08:32

## 2022-01-01 RX ADMIN — LEVETIRACETAM 500 MG: 5 INJECTION INTRAVENOUS at 21:21

## 2022-01-01 RX ADMIN — INSULIN LISPRO 2 UNITS: 100 INJECTION, SOLUTION INTRAVENOUS; SUBCUTANEOUS at 08:09

## 2022-01-01 RX ADMIN — LORAZEPAM 0.5 MG: 2 LIQUID ORAL at 01:48

## 2022-01-01 RX ADMIN — LEVOTHYROXINE SODIUM 88 MCG: 0.09 TABLET ORAL at 06:09

## 2022-01-01 RX ADMIN — LEVETIRACETAM 500 MG: 5 INJECTION INTRAVENOUS at 16:14

## 2022-01-01 RX ADMIN — LORAZEPAM 2 MG: 2 INJECTION INTRAMUSCULAR; INTRAVENOUS at 04:21

## 2022-01-01 RX ADMIN — LORAZEPAM 1 MG: 2 INJECTION INTRAMUSCULAR; INTRAVENOUS at 08:12

## 2022-01-01 RX ADMIN — FENTANYL CITRATE 25 MCG: 50 INJECTION INTRAMUSCULAR; INTRAVENOUS at 09:18

## 2022-01-01 RX ADMIN — ENOXAPARIN SODIUM 30 MG: 30 INJECTION SUBCUTANEOUS at 16:17

## 2022-01-01 RX ADMIN — Medication 3 ML: at 09:10

## 2022-01-01 RX ADMIN — POLYETHYLENE GLYCOL 3350 17 G: 17 POWDER, FOR SOLUTION ORAL at 09:32

## 2022-01-01 RX ADMIN — LIDOCAINE 1 PATCH: 50 PATCH TOPICAL at 09:08

## 2022-01-01 RX ADMIN — HYDROCODONE BITARTRATE AND ACETAMINOPHEN 1 TABLET: 5; 325 TABLET ORAL at 19:13

## 2022-01-01 RX ADMIN — Medication 3 ML: at 20:52

## 2022-01-01 RX ADMIN — LORAZEPAM 1 MG: 2 INJECTION INTRAMUSCULAR; INTRAVENOUS at 21:54

## 2022-01-01 RX ADMIN — POLYETHYLENE GLYCOL 3350 17 G: 17 POWDER, FOR SOLUTION ORAL at 09:08

## 2022-01-01 RX ADMIN — LORAZEPAM 1 MG: 2 INJECTION INTRAMUSCULAR; INTRAVENOUS at 07:45

## 2022-01-01 RX ADMIN — MIDAZOLAM 2 MG: 1 INJECTION INTRAMUSCULAR; INTRAVENOUS at 07:59

## 2022-01-01 RX ADMIN — Medication 10 ML: at 09:32

## 2022-01-01 RX ADMIN — METOPROLOL TARTRATE 25 MG: 25 TABLET, FILM COATED ORAL at 09:39

## 2022-01-01 RX ADMIN — METOPROLOL TARTRATE 25 MG: 25 TABLET, FILM COATED ORAL at 17:29

## 2022-01-01 RX ADMIN — CETIRIZINE HYDROCHLORIDE TABLETS 10 MG: 10 TABLET, FILM COATED ORAL at 18:20

## 2022-01-01 RX ADMIN — Medication 1000 UNITS: at 18:19

## 2022-01-01 RX ADMIN — ASPIRIN 81 MG: 81 TABLET, CHEWABLE ORAL at 08:12

## 2022-01-01 RX ADMIN — GLYCOPYRROLATE 0.1 MG: 0.2 INJECTION INTRAMUSCULAR; INTRAVENOUS at 20:29

## 2022-01-01 RX ADMIN — POTASSIUM CHLORIDE 40 MEQ: 20 TABLET, EXTENDED RELEASE ORAL at 09:21

## 2022-01-01 RX ADMIN — ENOXAPARIN SODIUM 30 MG: 30 INJECTION SUBCUTANEOUS at 17:43

## 2022-01-01 RX ADMIN — GLYCOPYRROLATE 0.1 MG: 0.2 INJECTION INTRAMUSCULAR; INTRAVENOUS at 08:12

## 2022-01-01 RX ADMIN — FUROSEMIDE 40 MG: 40 TABLET ORAL at 08:42

## 2022-01-01 RX ADMIN — Medication 3 ML: at 20:23

## 2022-01-01 RX ADMIN — PYRIDOSTIGMINE BROMIDE 60 MG: 60 TABLET ORAL at 20:29

## 2022-01-01 RX ADMIN — GLYCOPYRROLATE 0.1 MG: 0.2 INJECTION INTRAMUSCULAR; INTRAVENOUS at 18:39

## 2022-01-01 RX ADMIN — HYDROCODONE BITARTRATE AND ACETAMINOPHEN 1 TABLET: 5; 325 TABLET ORAL at 07:18

## 2022-01-01 RX ADMIN — GADOTERIDOL 15 ML: 279.3 INJECTION, SOLUTION INTRAVENOUS at 11:27

## 2022-01-01 RX ADMIN — METOPROLOL TARTRATE 25 MG: 25 TABLET, FILM COATED ORAL at 21:13

## 2022-01-01 RX ADMIN — HEPARIN SODIUM 18 UNITS/KG/HR: 10000 INJECTION, SOLUTION INTRAVENOUS at 16:39

## 2022-02-10 PROBLEM — I89.0 LYMPHEDEMA: Status: ACTIVE | Noted: 2021-09-28

## 2022-02-10 PROBLEM — Z95.818 PRESENCE OF WATCHMAN LEFT ATRIAL APPENDAGE CLOSURE DEVICE: Status: ACTIVE | Noted: 2020-10-30

## 2022-02-10 PROBLEM — R60.9 PERIPHERAL EDEMA: Chronic | Status: ACTIVE | Noted: 2022-01-01

## 2022-02-10 PROBLEM — N32.81 OVERACTIVE BLADDER: Chronic | Status: ACTIVE | Noted: 2022-01-01

## 2022-02-10 PROBLEM — Z98.890 S/P LEFT ATRIAL APPENDAGE LIGATION: Status: ACTIVE | Noted: 2020-09-09

## 2022-02-10 PROBLEM — I83.90 ASYMPTOMATIC RETICULAR VEINS 1 MM TO 3 MM IN DIAMETER: Status: ACTIVE | Noted: 2021-09-28

## 2022-02-10 PROBLEM — S72.002A LEFT DISPLACED FEMORAL NECK FRACTURE (HCC): Status: ACTIVE | Noted: 2022-01-01

## 2022-02-10 NOTE — H&P
[facility] Hospital Medicine Services      Patient Name: Marychuy Verdugo  : 1940  MRN: [id number]  Primary Care Physician:  Curt Ng MD  Date of admission: 2/10/2022      Subjective      Chief Complaint: Fall    History of Present Illness: Marychuy Verdugo is a 81 y.o. female who presented to [facility] on 2/10/2022 complaining of mechanical fall at home while walking back into her house and falling onto left hip with immediate left hip pain that radiates down to left ankle and patient was unable to immediately bear weight on the joint. Patient does report that she hit her head during the fall but denies any LOC, dizziness, N/V, or headaches since the fall. Patient also denies any chest pain, dyspnea, or lightheadedness prior to and after the fall.       Review of Systems   All other systems reviewed and are negative.       Personal History     Past Medical History:   Diagnosis Date   • Atrial fibrillation (HCC) 10/10/2013   • Chronic anticoagulation 2020   • GERD (gastroesophageal reflux disease) 2013   • HTN (hypertension) 2014   • Hypothyroid 2013       History reviewed. No pertinent surgical history.    Family History: family history includes Heart murmur in her father; No Known Problems in her mother. Otherwise pertinent FHx was reviewed and not pertinent to current issue.    Social History:  reports that she has never smoked. She does not have any smokeless tobacco history on file. She reports that she does not drink alcohol and does not use drugs.    Home Medications:  Prior to Admission Medications     Prescriptions Last Dose Informant Patient Reported? Taking?    aspirin 81 MG chewable tablet   No No    Chew 1 tablet Daily.    Calcium Carbonate-Vitamin D (OS-TATUM 500 + D PO)   Yes No    Take 1 tablet by mouth Daily.    estradiol (ESTRACE) 0.1 MG/GM vaginal cream   Yes No    Insert 2 g into the vagina 2 (Two) Times a Week.    furosemide (LASIX) 40 MG  tablet   Yes No    Take 40 mg by mouth 2 (Two) Times a Day.    levothyroxine (SYNTHROID, LEVOTHROID) 88 MCG tablet   Yes No    Take 88 mcg by mouth Daily.    loratadine (CLARITIN) 10 MG tablet   Yes No    Take 10 mg by mouth Daily.    metoprolol tartrate (LOPRESSOR) 25 MG tablet   Yes No    Take 25 mg by mouth 2 (Two) Times a Day.    Multiple Vitamins-Minerals (MULTIVITAMIN WITH MINERALS) tablet tablet   Yes No    Take 1 tablet by mouth Daily.    omeprazole (priLOSEC) 20 MG capsule   Yes No    Take 20 mg by mouth Daily.    oxybutynin XL (DITROPAN-XL) 10 MG 24 hr tablet   Yes No    Take 10 mg by mouth Daily.    potassium chloride (K-DUR,KLOR-CON) 20 MEQ CR tablet   Yes No    Take 20 mEq by mouth Daily.    rivaroxaban (XARELTO) 20 MG tablet   No No    Take 1 tablet by mouth Daily.            Allergies:  Allergies   Allergen Reactions   • Haloperidol Unknown - Low Severity       Objective      Vitals:   Temp:  [97.2 °F (36.2 °C)] 97.2 °F (36.2 °C)  Heart Rate:  [] 121  Resp:  [17] 17  BP: (117-165)/(80-99) 151/94    Physical Exam  Vitals reviewed.   Constitutional:       General: She is not in acute distress.     Appearance: Normal appearance. She is not ill-appearing, toxic-appearing or diaphoretic.   HENT:      Head: Normocephalic and atraumatic.      Right Ear: External ear normal.      Left Ear: External ear normal.      Nose: Nose normal.      Mouth/Throat:      Mouth: Mucous membranes are dry.      Pharynx: Oropharynx is clear.   Eyes:      Extraocular Movements: Extraocular movements intact.      Conjunctiva/sclera: Conjunctivae normal.   Cardiovascular:      Rate and Rhythm: Tachycardia present. Rhythm irregular.      Pulses: Normal pulses.      Heart sounds: Normal heart sounds. No murmur heard.      Pulmonary:      Effort: Pulmonary effort is normal. No respiratory distress.      Breath sounds: Normal breath sounds. No wheezing.      Comments: On RA  Abdominal:      General: Bowel sounds are normal.  There is no distension.      Palpations: Abdomen is soft.      Tenderness: There is no abdominal tenderness. There is no guarding.   Musculoskeletal:      Cervical back: Normal range of motion and neck supple.      Comments: Left leg shortened and external rotation   Skin:     General: Skin is warm and dry.   Neurological:      General: No focal deficit present.      Mental Status: She is alert and oriented to person, place, and time.   Psychiatric:         Mood and Affect: Mood normal.         Behavior: Behavior normal.         Thought Content: Thought content normal.         Judgment: Judgment normal.         Result Review    Result Review:  I have personally reviewed the results from the time of this admission to 2/10/2022 18:35 EST and agree with these findings:  [x]  Laboratory  []  Microbiology  [x]  Radiology  [x]  EKG/Telemetry   []  Cardiology/Vascular   []  Pathology  [x]  Old records  []  Other:  Most notable findings include:       Assessment/Plan        Active Hospital Problems:  Active Hospital Problems    Diagnosis    • **Left displaced femoral neck fracture (HCC)    • Peripheral edema    • Overactive bladder    • Presence of Watchman left atrial appendage closure device    • Atrial fibrillation (HCC)    • GERD (gastroesophageal reflux disease)    • Hypothyroid      Plan:   Fall with Left Femoral Neck Fracture:  - Fall precautions and ambulate with assistance  - Ortho consult ordered  - NPO at midnight and holding ASA for possible surgery in AM  - Cardiology consulted for cardiac clearance for surgery   - Complete Bedrest  - PT/OT to be consulted post op     Chronic Afib with Watchman:  - continue metoprolol for rate control  - Holding ASA for possible surgery in the AM    Hypothyroidism:  - Continue levothyroxine  - TSH ordered     Overactive bladder:  - Continue Myrbetriq    GERD:  - Continue PPI    Chronic peripheral edema:  - Continue Lasix     DVT prophylaxis:  SCDs  CODE STATUS:    Level Of  Support Discussed With: Patient; Next of Kin (If No Surrogate)  Code Status (Patient has no pulse and is not breathing): No CPR (Do Not Attempt to Resuscitate)  Medical Interventions (Patient has pulse or is breathing): Full Support    Admission Status:  I believe this patient meets inpatient status.    I discussed the patient's findings and my recommendations with patient and family.    This patient has been examined wearing appropriate Personal Protective Equipment and discussed with ED provider. 02/10/22      Signature: Electronically signed by Shahana Lizama PA-C, 02/10/22, 6:27 PM EST.    Hospitalist/attending note  Patient seen and examined, chart reviewed, agree with above.  81-year-old female came in with a fall resulting in left femoral neck fracture, patient has a history of chronic A. fib, off anticoagulation now, patient has a watchman.    Vitals reviewed  Chest, bilateral entry, normal vesicular breathing  Cardiovascular, S1-S2 there is no murmur  Abdomen soft nontender good sounds audible    Impression  Fall with left femoral neck fracture  Watchman  History of A. Fib  GERD    Plan  Agree with above  Ortho consult in place  Cardiology consulted to.  Okay to proceed with surgery without any further work-up, patient intermediate risk for surgery.    Shefali De Los Santos MD

## 2022-02-10 NOTE — ED PROVIDER NOTES
Subjective   History of Present Illness  Context: 81-year-old female presents after tripped and fell landing on her left side.  She presents with pain to left hip.  She was not able to ambulate.  She reports she did receive fentanyl and Zofran per EMS prior to arrival.  This did help some with pain although she still is having moderately severe pain.  This is worse with any movement.  She states she think she hit her head but does not complain of headache.  No neck or back pain.  No chest or abdominal pain.  She does not complain of other extremity pain.  Location: Left hip  Quality: Pain  Duration: Shortly before arrival  Timing: Constant  Severity: Severe   Modifying factors: Received fentanyl and Zofran prior to arrival  Associated signs and symptoms: States bumped head    Review of Systems   Constitutional: Negative for fever and unexpected weight change.   HENT: Negative for congestion and sore throat.    Eyes: Negative for pain and visual disturbance.        Reports had eye surgery 2 years ago left her with some double vision and she has felt a little off balance ever since   Respiratory: Negative for cough and shortness of breath.    Cardiovascular: Negative for chest pain and leg swelling.   Gastrointestinal: Negative for abdominal pain, diarrhea and vomiting.   Genitourinary: Negative for dysuria and urgency.   Musculoskeletal: Negative for back pain.        Left hip pain   Skin: Negative for rash.   Neurological: Negative for dizziness, syncope, weakness and headaches.   Psychiatric/Behavioral: Negative for confusion.       Past Medical History:   Diagnosis Date   • Atrial fibrillation (HCC) 10/10/2013   • Chronic anticoagulation 5/1/2020   • GERD (gastroesophageal reflux disease) 2/11/2013   • HTN (hypertension) 6/12/2014   • Hypothyroid 2/11/2013       Allergies   Allergen Reactions   • Haloperidol Unknown - Low Severity       No past surgical history on file.    Family History   Problem Relation Age of  Onset   • Heart murmur Father        Social History     Socioeconomic History   • Marital status:    Tobacco Use   • Smoking status: Never Smoker     Prior to Admission medications    Medication Sig Start Date End Date Taking? Authorizing Provider   aspirin 81 MG chewable tablet Chew 1 tablet Daily. 5/7/20   Terrance Tyler MD   Calcium Carbonate-Vitamin D (OS-TATUM 500 + D PO) Take 1 tablet by mouth Daily.    Petar Gimenez MD   estradiol (ESTRACE) 0.1 MG/GM vaginal cream Insert 2 g into the vagina 2 (Two) Times a Week.    Petar Gimenez MD   furosemide (LASIX) 40 MG tablet Take 40 mg by mouth 2 (Two) Times a Day.    Petar Gimenez MD   levothyroxine (SYNTHROID, LEVOTHROID) 88 MCG tablet Take 88 mcg by mouth Daily.    Petar Gimenez MD   loratadine (CLARITIN) 10 MG tablet Take 10 mg by mouth Daily.    Petar Gimenez MD   metoprolol tartrate (LOPRESSOR) 25 MG tablet Take 25 mg by mouth 2 (Two) Times a Day.    Petar Gimenez MD   Multiple Vitamins-Minerals (MULTIVITAMIN WITH MINERALS) tablet tablet Take 1 tablet by mouth Daily.    Petar Gimenez MD   omeprazole (priLOSEC) 20 MG capsule Take 20 mg by mouth Daily.    Petar Gimenez MD   oxybutynin XL (DITROPAN-XL) 10 MG 24 hr tablet Take 10 mg by mouth Daily.    Petar Gimenez MD   potassium chloride (K-DUR,KLOR-CON) 20 MEQ CR tablet Take 20 mEq by mouth Daily.    Petar Gimenez MD   rivaroxaban (XARELTO) 20 MG tablet Take 1 tablet by mouth Daily. 5/7/20   Terrance Tyler MD     She reports she is no longer taking the Xarelto she is just on a baby aspirin since having the watchman placed last year      Objective   Physical Exam  81-year-old female awake alert.  Generally well-developed well-nourished.  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  She reports this is chronic.  There is no obvious extraocular muscle movement abnormality.  There is no facial asymmetry.  Neck supple  nontender chest clear equal breath sounds cardiovascular irregular rate and rhythm abdomen soft nontender.  She does not complain of thoracic or lumbar spine tenderness.  She complains of pain in the left hip and buttock.  She is noted to have shortening of left leg. knee and ankle were nontender and she has neuro vasc intact distally.  She has no complaints of other extremity pain.  Neurologic exam GCS 15.  Psychiatric valuation cooperative.  Skin without rash noted.  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  Procedures           ED Course      Results for orders placed or performed during the hospital encounter of 02/10/22   Comprehensive Metabolic Panel    Specimen: Blood   Result Value Ref Range    Glucose 108 (H) 65 - 99 mg/dL    BUN 23 8 - 23 mg/dL    Creatinine 0.95 0.57 - 1.00 mg/dL    Sodium 141 136 - 145 mmol/L    Potassium 3.8 3.5 - 5.2 mmol/L    Chloride 103 98 - 107 mmol/L    CO2 24.0 22.0 - 29.0 mmol/L    Calcium 9.8 8.6 - 10.5 mg/dL    Total Protein 7.2 6.0 - 8.5 g/dL    Albumin 4.10 3.50 - 5.20 g/dL    ALT (SGPT) 13 1 - 33 U/L    AST (SGOT) 26 1 - 32 U/L    Alkaline Phosphatase 69 39 - 117 U/L    Total Bilirubin 0.5 0.0 - 1.2 mg/dL    eGFR Non African Amer 57 (L) >60 mL/min/1.73    Globulin 3.1 gm/dL    A/G Ratio 1.3 g/dL    BUN/Creatinine Ratio 24.2 7.0 - 25.0    Anion Gap 14.0 5.0 - 15.0 mmol/L   Protime-INR    Specimen: Blood   Result Value Ref Range    Protime 10.7 9.6 - 11.7 Seconds    INR 0.96 0.93 - 1.10   CBC Auto Differential    Specimen: Blood   Result Value Ref Range    WBC 12.20 (H) 3.40 - 10.80 10*3/mm3    RBC 4.10 3.77 - 5.28 10*6/mm3    Hemoglobin 12.3 12.0 - 15.9 g/dL    Hematocrit 37.2 34.0 - 46.6 %    MCV 90.7 79.0 - 97.0 fL    MCH 30.0 26.6 - 33.0 pg    MCHC 33.1 31.5 - 35.7 g/dL    RDW 15.0 12.3 - 15.4 %    RDW-SD 48.6 37.0 - 54.0 fl    MPV 8.1 6.0 - 12.0 fL    Platelets 264 140 - 450 10*3/mm3    Neutrophil % 86.1 (H) 42.7 - 76.0 %    Lymphocyte % 5.8 (L) 19.6 - 45.3 %     "Monocyte % 6.8 5.0 - 12.0 %    Eosinophil % 0.4 0.3 - 6.2 %    Basophil % 0.9 0.0 - 1.5 %    Neutrophils, Absolute 10.50 (H) 1.70 - 7.00 10*3/mm3    Lymphocytes, Absolute 0.70 0.70 - 3.10 10*3/mm3    Monocytes, Absolute 0.80 0.10 - 0.90 10*3/mm3    Eosinophils, Absolute 0.00 0.00 - 0.40 10*3/mm3    Basophils, Absolute 0.10 0.00 - 0.20 10*3/mm3    nRBC 0.0 0.0 - 0.2 /100 WBC   Type & Screen    Specimen: Blood   Result Value Ref Range    ABO Type A     RH type Negative     Antibody Screen Negative     T&S Expiration Date 2/13/2022 11:59:59 PM      XR Hip With or Without Pelvis 2 - 3 View Left    Result Date: 2/10/2022  Left femoral neck fracture.      Electronically Signed By-Gilberto Ng On:2/10/2022 3:46 PM This report was finalized on [id number] by  Gilberto Ng, .    Medications   lactated ringers infusion (75 mL/hr Intravenous New Bag 2/10/22 1437)   HYDROmorphone (DILAUDID) injection 0.5 mg (0.5 mg Intravenous Given 2/10/22 1436)     /89   Pulse 105   Temp 97.2 °F (36.2 °C) (Oral)   Resp 17   Ht 175.3 cm (69\")   Wt 82.6 kg (182 lb)   SpO2 98%   Breastfeeding No   BMI 26.88 kg/m²                                              MDM  Chart review: Patient is had previous vascular surgery evaluation for lymphedema and asymptomatic reticular veins.  She had echocardiogram October of last year that showed severely dilated left atrium and severe right atrial enlargement.  Moderate to severe tricuspid regurgitation and mild to moderate pulmonary hypertension.  She was noted to have watchman implant placed last year.  Comorbidity: As per past history  Differential: Intertrochanteric hip fracture, hip dislocation, pelvic fracture, femoral neck fracture  My EKG interpretation:   Lab: Comprehensive metabolic panel glucose 108, INR 0.96 CBC white count 12.2 with hemoglobin 12.3 platelet count 264 with 86 segs no bands  Radiology: Reviewed x-ray of left hip and pelvis.  There is subcapital hip " fracture.  Discussion/treatment: Patient had IV placed.  She was on IV fluids.  She was given Dilaudid for pain.  Patient's findings were discussed with her and family.  She reports her pain is improved after Dilaudid.  She was discussed with Dr. Carver for orthopedic consultation and nurse practitioner the hospitalist.  She will be admitted to the hospitalist service with orthopedic consultation.  She will require operative intervention for this fracture.  Patient was evaluated using appropriate PPE      Final diagnoses:   Left displaced femoral neck fracture (HCC)       ED Disposition  ED Disposition     ED Disposition Condition Comment    Decision to Admit            No follow-up provider specified.       Medication List      No changes were made to your prescriptions during this visit.          Javier Gonzalez MD  02/10/22 6918

## 2022-02-11 NOTE — CONSULTS
[facility]   Consult Note    Patient Name: Marychuy Verdugo  : 1940  MRN: [id number]  Primary Care Physician:  Curt Ng MD  Referring Physician: Curt Ng MD  Date of admission: 2/10/2022  Consulting physician: Yair Carver MD orthopedic surgery  Consults  Subjective   Subjective     Reason for Consult/ Chief Complaint: Fall with left hip fracture and shortening with external rotation deformity    History of Present Illness  Marychuy Verdugo is a 81 y.o. female who fell awkwardly and landed on her left side and sustained an injury to the left hip.  She has been unable to get up and walk and was seen in the emergency room.  She was diagnosed with a femoral neck fracture on the left side.  She does have a slight visual disturbance because she had fallen on a trip to [place] and fractured the orbit of her right eye.  This possibly gives her some amount of diplopia and balance issues.    Review of Systems     Personal History     Past Medical History:   Diagnosis Date   • Atrial fibrillation (HCC) 10/10/2013   • Chronic anticoagulation 2020   • GERD (gastroesophageal reflux disease) 2013   • HTN (hypertension) 2014   • Hypothyroid 2013       History reviewed. No pertinent surgical history.    Family History: family history includes Heart murmur in her father; No Known Problems in her mother. Otherwise pertinent FHx was reviewed and not pertinent to current issue.    Social History:  reports that she has never smoked. She does not have any smokeless tobacco history on file. She reports that she does not drink alcohol and does not use drugs.    Home Medications:   Calcium Carbonate-Vitamin D, Mirabegron ER, aspirin, docusate sodium, furosemide, levothyroxine, loratadine, metoprolol tartrate, multivitamin with minerals, pantoprazole, and potassium chloride    Allergies:  Allergies   Allergen Reactions   • Haloperidol Unknown - Low Severity       Objective    Objective      Vitals:  Temp:  [97.2 °F (36.2 °C)] 97.2 °F (36.2 °C)  Heart Rate:  [] 117  Resp:  [17] 17  BP: (117-165)/(80-99) 158/95    Physical Exam    Result Review    Result Review:  I have personally reviewed the results from the time of this admission to 2/10/2022 20:35 EST and agree with these findings:  []  Laboratory  []  Microbiology  []  Radiology  []  EKG/Telemetry   []  Cardiology/Vascular   []  Pathology  []  Old records  []  Other:  Most notable findings include: Left Hip. Skin and soft tissues are swollen and bruised consistent with fracture. There is a shortening with external rotation deformity. Patient is unable to perform a straight leg raise exam actively or passively. Any rotation or flexion of the hip bothers the patient very significantly. The anterior joint line is exquisitely tender. IT band is painful and tender. Greater trochanter is tender. Patient is unable to bear weight on this affected extremity and has a very marked limp. There is no evidence of compartment syndrome. Dorsalis pedis and posterior tibial artery pulses are palpable. Joint line is exquisitely tender. There is no evidence of a compartmental syndrome.        Assessment/Plan   Assessment / Plan     Brief Patient Summary:  Marychuy Verdugo is a 81 y.o. female who fell and has a displaced left femoral neck fracture.  She is most likely going to need surgical care for this fracture.    Active Hospital Problems:  Active Hospital Problems    Diagnosis    • **Left displaced femoral neck fracture (HCC)    • Peripheral edema    • Overactive bladder    • Presence of Watchman left atrial appendage closure device    • Atrial fibrillation (HCC)    • GERD (gastroesophageal reflux disease)    • Hypothyroid      Plan: Admit the patient to the hospitalist.  Santos catheter for comfort.  Start IV lactated Ringer's at 50 cc an hour.  Injection morphine 1 mg IV every 2-4 hours as needed pain.  Injection Zofran 4 mg IV every 6 as needed  nausea  Regular diet for tonight.  SHe is going to need medical clearance for surgical intervention.  Plan will be for hip replacement surgery through a direct anterior approach.  This is a relatively risky procedure in an 81-year-old female patient but it is the standard of care and therefore once she is medically cleared we will proceed with surgical intervention.  All risks and benefits have been discussed with the patient at length.  The patient was seen today for preoperative discussion.    The details of the surgical procedure were explained including the location of probable incisions and a description of the likely hardware/grafts to be used. The patient understands the likely convalescence after surgery as well as the rehabilitation required.  Also, we have thoroughly discussed with the patient the risks, benefits and alternatives to surgery.  Risks include but are not limited to the risk of infection, joint stiffness, limited range of motion, wound healing problems, scar tissue build up, myocardial infarction, stroke, blood clots (including DVT and/or pulmonary embolus along with the risk of death) neurologic and/or vascular injury, limb length discrepancy, fracture, dislocation, nonunion, malunion, continued pain and need for further surgery including hardware failure requiring revision.       Yair Carver MD

## 2022-02-11 NOTE — ED NOTES
Pt son cell placed in FYI tab of ruth ann- ashanti frederick- [phone]     Christina Kaye, LPN  02/11/22 1602

## 2022-02-11 NOTE — ED NOTES
Pt taken off bedpan- no BM at this time- reports called to [facility] will transport pt when room clean and ready     Christina Kaye, ABNER  02/11/22 2665

## 2022-02-12 NOTE — PROGRESS NOTES
[facility] Medicine Services Daily Progress Note    Patient Name: Marychuy Verdugo  : 1940  MRN: [id number]  Primary Care Physician:  Curt Ng MD  Date of admission: 2/10/2022      Subjective      Chief Complaint: Fall with left femur fracture    Subjective  Patient noted sleepy, from sedation.  Patient tolerated the procedure well.  Noted otherwise comfortable    Review of Systems   All other systems reviewed and are negative.        Objective      Vitals:   Temp:  [97.3 °F (36.3 °C)-98.1 °F (36.7 °C)] 97.5 °F (36.4 °C)  Heart Rate:  [] 108  Resp:  [17-19] 18  BP: (132-163)/() 143/87  Flow (L/min):  [2-10] 2  FiO2 (%):  [50 %-100 %] 100 %    Physical Exam  Vitals and nursing note reviewed.   Constitutional:       General: She is not in acute distress.     Appearance: Normal appearance. She is well-developed. She is not ill-appearing, toxic-appearing or diaphoretic.   HENT:      Head: Normocephalic and atraumatic.      Right Ear: Ear canal and external ear normal.      Left Ear: Ear canal and external ear normal.      Nose: Nose normal. No congestion or rhinorrhea.      Mouth/Throat:      Mouth: Mucous membranes are moist.      Pharynx: No oropharyngeal exudate.   Eyes:      General: No scleral icterus.        Right eye: No discharge.         Left eye: No discharge.      Extraocular Movements: Extraocular movements intact.      Conjunctiva/sclera: Conjunctivae normal.      Pupils: Pupils are equal, round, and reactive to light.   Neck:      Thyroid: No thyromegaly.      Vascular: No carotid bruit or JVD.      Trachea: No tracheal deviation.   Cardiovascular:      Rate and Rhythm: Normal rate and regular rhythm.      Pulses: Normal pulses.      Heart sounds: Normal heart sounds. No murmur heard.  No friction rub. No gallop.    Pulmonary:      Effort: Pulmonary effort is normal. No respiratory distress.      Breath sounds: Normal breath sounds. No stridor. No wheezing,  rhonchi or rales.   Chest:      Chest wall: No tenderness.   Abdominal:      General: Bowel sounds are normal. There is no distension.      Palpations: Abdomen is soft. There is no mass.      Tenderness: There is no abdominal tenderness. There is no guarding or rebound.      Hernia: No hernia is present.   Musculoskeletal:         General: No swelling, tenderness, deformity or signs of injury. Normal range of motion.      Cervical back: Normal range of motion and neck supple. No rigidity. No muscular tenderness.      Right lower leg: No edema.      Left lower leg: No edema.   Lymphadenopathy:      Cervical: No cervical adenopathy.   Skin:     General: Skin is warm and dry.      Coloration: Skin is not jaundiced or pale.      Findings: No bruising, erythema or rash.   Neurological:      General: No focal deficit present.      Mental Status: She is alert and oriented to person, place, and time. Mental status is at baseline.      Cranial Nerves: No cranial nerve deficit.      Sensory: No sensory deficit.      Motor: No weakness or abnormal muscle tone.      Coordination: Coordination normal.   Psychiatric:         Mood and Affect: Mood normal.         Behavior: Behavior normal.         Thought Content: Thought content normal.         Judgment: Judgment normal.             Result Review    Result Review:  I have personally reviewed the results from the time of this admission to 2/12/2022 14:36 EST and agree with these findings:  [x]  Laboratory  [x]  Microbiology  [x]  Radiology  []  EKG/Telemetry   []  Cardiology/Vascular   []  Pathology  []  Old records  []  Other:  Most notable findings include:     Wounds (last 24 hours)     LDA Wound     Row Name 02/12/22 1229 02/12/22 1125 02/12/22 1110       Wound 02/12/22 0000 Bilateral gluteal Pressure Injury    Wound - Properties Group Placement Date: 02/12/22  -JE Placement Time: 0000  -JE Side: Bilateral  -JE Location: gluteal  -JE Primary Wound Type: Pressure inj  -JE  Stage, Pressure Injury : Stage 1; deep tissue injury  -JE, Possible DTI     Dressing Appearance open to air  -JM -- --    Retired Wound - Properties Group Date first assessed: 02/12/22  -JE Time first assessed: 0000  -JE Side: Bilateral  -JE Location: gluteal  -JE Primary Wound Type: Pressure inj  -JE       Wound 02/12/22 0855 Left anterior greater trochanter Incision    Wound - Properties Group Placement Date: 02/12/22  -LM Placement Time: 0855  -LM Side: Left  -LM Orientation: anterior  -LM Location: greater trochanter  -LM Primary Wound Type: Incision  -LM    Dressing Appearance dry; intact  -JM -- --    Closure JIMMY  -JM JIMMY  -KR JIMMY  -KR    Base dressing in place, unable to visualize  -JM -- --    Drainage Amount -- none  -KR none  -KR    Retired Wound - Properties Group Date first assessed: 02/12/22  -LM Time first assessed: 0855  -LM Side: Left  -LM Location: greater trochanter  -LM Primary Wound Type: Incision  -LM    Row Name 02/12/22 1055 02/12/22 1040 02/12/22 1026       Wound 02/12/22 0000 Bilateral gluteal Pressure Injury    Wound - Properties Group Placement Date: 02/12/22  -JE Placement Time: 0000  -JE Side: Bilateral  -JE Location: gluteal  -JE Primary Wound Type: Pressure inj  -JE Stage, Pressure Injury : Stage 1; deep tissue injury  -JE, Possible DTI     Retired Wound - Properties Group Date first assessed: 02/12/22  -JE Time first assessed: 0000  -JE Side: Bilateral  -JE Location: gluteal  -JE Primary Wound Type: Pressure inj  -JE       Wound 02/12/22 0855 Left anterior greater trochanter Incision    Wound - Properties Group Placement Date: 02/12/22  -LM Placement Time: 0855  -LM Side: Left  -LM Orientation: anterior  -LM Location: greater trochanter  -LM Primary Wound Type: Incision  -LM    Dressing Appearance -- -- dry; intact; no drainage  -KR    Closure JIMMY  -KR JIMMY  -KR JIMMY  -KR    Drainage Amount none  -KR none  -KR none  -KR    Retired Wound - Properties Group Date first assessed: 02/12/22   -LM Time first assessed: 0855  -LM Side: Left  -LM Location: greater trochanter  -LM Primary Wound Type: Incision  -LM          User Key  (r) = Recorded By, (t) = Taken By, (c) = Cosigned By    Initials Name Provider Type    LM Mamta Easton RN Registered Nurse    Ratna Nelson RN Registered Nurse    Lit Sagastume RN Registered Nurse    Maritza Pham LPN Licensed Nurse                  Assessment/Plan      Brief Patient Summary:  Marychuy Verdugo is a 81 y.o. female who     furosemide, 40 mg, Oral, BID  levothyroxine, 88 mcg, Oral, Q AM  lidocaine, 1 patch, Transdermal, Q24H  metoprolol tartrate, 25 mg, Oral, Q12H  pantoprazole, 40 mg, Oral, QAM  polyethylene glycol, 17 g, Oral, Daily  sodium chloride, 3 mL, Intravenous, Q12H       lactated ringers, 75 mL/hr, Last Rate: 75 mL/hr (02/12/22 0812)         Active Hospital Problems:  Active Hospital Problems    Diagnosis    • **Left displaced femoral neck fracture (HCC)    • Peripheral edema    • Overactive bladder    • Presence of Watchman left atrial appendage closure device    • Atrial fibrillation (HCC)    • GERD (gastroesophageal reflux disease)    • Hypothyroid      Plan:     Fall with Left Femoral Neck Fracture status post Left total hip arthroplasty, direct anterior.  - Fall precautions and ambulate with assistance  - Ortho consulted  - Cardiology consulted   - Complete Bedrest  - PT/OT to be consulted post op   -Postop care as per orthopedic  -DVT prophylaxis with Lovenox subcu from tomorrow.    Chronic Afib with Watchman:  - continue metoprolol for rate control       Hypothyroidism:  - Continue levothyroxine  - TSH ordered      Overactive bladder:  - Continue Myrbetriq     GERD:  - Continue PPI     Chronic peripheral edema:  - Continue Lasix     Level Of Support Discussed With: Patient; Next of Kin (If No Surrogate)  Code Status (Patient has no pulse and is not breathing): No CPR (Do Not Attempt to Resuscitate)  Medical  Interventions (Patient has pulse or is breathing): Full Support    DVT prophylaxis:  Mechanical DVT prophylaxis orders are present.    CODE STATUS:    Level Of Support Discussed With: Patient; Next of Kin (If No Surrogate)  Code Status (Patient has no pulse and is not breathing): No CPR (Do Not Attempt to Resuscitate)  Medical Interventions (Patient has pulse or is breathing): Full Support      Disposition:  I expect patient to be discharged as per clinical course    This patient has been examined wearing appropriate Personal Protective Equipment and discussed with hospital infection control department. 02/12/22      Electronically signed by Shefali De Los Santos MD, 02/12/22, 14:36 EST.  [facility] Hospitalist Team
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
## 2022-02-12 NOTE — ANESTHESIA PROCEDURE NOTES
Airway  Urgency: elective    Date/Time: 2/12/2022 8:20 AM  Airway not difficult    General Information and Staff    Patient location during procedure: OR    Indications and Patient Condition  Indications for airway management: airway protection    Preoxygenated: yes  MILS maintained throughout  Mask difficulty assessment: 1 - vent by mask    Final Airway Details  Final airway type: endotracheal airway      Successful airway: ETT  Cuffed: yes   Successful intubation technique: direct laryngoscopy  Endotracheal tube insertion site: oral  Blade: Cielo  Blade size: 3  ETT size (mm): 7.0  Cormack-Lehane Classification: grade I - full view of glottis  Placement verified by: chest auscultation   Measured from: gums  Number of attempts at approach: 1  Assessment: lips, teeth, and gum same as pre-op and atraumatic intubation

## 2022-02-12 NOTE — ANESTHESIA POSTPROCEDURE EVALUATION
Patient: Marychuy Verdugo    Procedure Summary     Date: 02/12/22 Room / Location: [facility] OR [address] [place] [address] MAIN OR    Anesthesia Start: 0812 Anesthesia Stop: 1026    Procedure: TOTAL HIP ARTHROPLASTY ANTERIOR (Left Hip) Diagnosis:     Surgeons: Yair Carver MD Provider:     Anesthesia Type: general with block ASA Status: 3          Anesthesia Type: general with block    Vitals  Vitals Value Taken Time   /80 02/12/22 1123   Temp 97.5 °F (36.4 °C) 02/12/22 1026   Pulse 119 02/12/22 1125   Resp     SpO2 100 % 02/12/22 1125   Vitals shown include unvalidated device data.        Post Anesthesia Care and Evaluation    Patient location during evaluation: PACU  Patient participation: complete - patient participated  Level of consciousness: awake  Pain score: 0  Pain management: adequate  Airway patency: patent  Anesthetic complications: No anesthetic complications  PONV Status: none  Cardiovascular status: acceptable  Respiratory status: acceptable  Hydration status: acceptable    Comments: Patient seen and examined postoperatively; vital signs stable; SpO2 greater than or equal to 90%; cardiopulmonary status stable; nausea/vomiting adequately controlled; pain adequately controlled; no apparent anesthesia complications; patient discharged from anesthesia care when discharge criteria were met

## 2022-02-12 NOTE — ED NOTES
Called [facility] to inform bed is clean and pt is coming upstairs.      Loan Reaves RN  02/11/22 7100

## 2022-02-12 NOTE — ED NOTES
Per request of pt, called son, Soren, and updated on pt condition. Informed him that pt will be moved to [id number] when it is clean     Loan Reaves RN  02/11/22 2028

## 2022-02-12 NOTE — PLAN OF CARE
Goal Outcome Evaluation:               Orders received for swallow evaluation. Unable to complete this date d/t pt sleeping/drowsy from surgery earlier, will follow up tomorrow and complete eval as appropriate. Thank you.

## 2022-02-12 NOTE — OP NOTE
TOTAL HIP ARTHROPLASTY     PATIENT NAME:  Marychuy Verdugo     YOB: 1940     ATTENDING PHYSICIAN: Shefali De Los Santos MD     DATE OF PROCEDURE: 2/12/2022     PREOPERATIVE DIAGNOSIS: Displaced femur neck fracture intracapsular, comminuted left hip.     Post-Op Diagnosis Codes:  Displaced left femoral neck fracture intracapsular.    PRINCIPAL DIAGNOSIS: Displaced left femur neck fracture intracapsular comminuted left hip.    PROCEDURE: Left total hip arthroplasty, direct anterior.    SURGEON:  Yair Carver M.D.    ASSISTANT: Sherrie Foster first assistant was responsible for performing the following activities: Retraction, Suction, Irrigation, Suturing, Closing and Placing Dressing and their skilled assistance was necessary for the success of this case.       ANESTHESIOLOGIST: Dr. Arnulfo Jefferson MD    ANESTHESIA: General with an LMA with a iliac fascial block for postop pain control.    POSITION: Supine on a [place] table.    DRAINS: None.    COMPLICATIONS: None.    ESTIMATED BLOOD LOSS: 200ml    SPECIMENS: * No orders in the log *    IMPLANT USED: Nora press-fit total hip replacement system, #6.5 Avenir collared complete HA-coated press-fit stem for the femur with a standard neck length and an extended offset with a 40 mm diameter ceramic Biolox head, -3.5 mm neck length, 54 mm Nora G-7 Osseoti TM coated multi-hole cup with a single dome screw in the posterosuperior quadrant with a highly cross-linked, Vitamin E impregnated, polyethylene liner neutral.    INDICATION: Patient had fallen and sustained a left femoral neck fracture.  She was evaluated by the hospitalist service and then cleared for surgical intervention.  She is a very active woman and we discussed the option of total hip arthroplasty with the patient at length.  All risks and benefits, potential complications, possibility of death, infection, myocardial infarction, DVT, pulmonary embolism, wound and soft tissue breakdown, dislocation  of the prosthesis, limb length discrepancy, etc. were all discussed with the patient and an informed consent was signed.     DETAILS OF PROCEDURE: Surgical timeout was called. Operative extremity was correctly identified in the operating room suite. Patient was given antibiotics per the SCIP protocol.  Patient was placed under appropriate anesthetic. C-arm was used through the entire procedure to accurately evaluate the limb lengths and the stability of the components as well as appropriate positioning of the components in the proximal femur and the acetabulum.  Patient was placed on the Irving table, prepped and draped in a standard fashion.  A direct anterior approach was carried out.      The fascia over the TFL was incised. The TFL was retracted laterally. A Cobra retractor was placed on the superior aspect of the femoral neck. A second Cobra was placed on the inferior aspect of the femoral neck. The leg was manipulated and the anterior capsule was carefully identified. The rectus femoris was retracted medially. Distally, the vastus lateralis was mobilized with a Perez elevator and L-shaped capsulotomy was carried out and the flaps of the capsule were developed. FiberWire sutures were placed in the flaps of the capsule to allow for mobilization and subsequent closure of the soft tissue envelope. The Cobra retractors were placed subperiosteally, one inferior to the femoral neck and one superior to the femoral neck. The dissection was carried out up to the saddle of the greater trochanter laterally. The femoral osteotomy was carried out along the line of the broach. A napkin ring segment of femoral neck was removed to allow easy removal of the femoral head. A motorized corkscrew was used and the femoral head was removed from the acetabulum.    Retractors were placed around the acetabulum at the 4 o’clock to 7 o’clock and the 11 o’clock positions. Acetabular labrum was resected. The ligamentum teres was resected.  Reaming was commenced under direct C-arm control.  Appropriate amounts of flexion, abduction and anteversion were built into the reaming process. Smaller sized reamer was used and the smaller sized reamer was used to medialize the acetabulum up to the teardrop, which was visualized on the C-arm monitors. The reaming size went up progressively up until a good bed of bleeding subchondral bone was exposed. The acetabulum was then lavaged with bacitracin irrigating solution. Diluted Betadine was used in antibiotic solution and the 54 mm diameter, TM coated multi-hole cup, G-7 Osseoti design was then impacted and locked into position on the native acetabulum. Appropriate amounts of flexion, abduction and anteversion were built into the positioning of the cup. A screw was placed in the posterosuperior quadrant to augment the stability of the cup. The neutral polyethylene liner, Vitamin E impregnated, highly cross-linked was impacted and locked into position as well.    Attention was then directed towards the proximal femur.  The proximal femur was delivered into the wound with a combination of adduction, extension and external rotation.  The pigtail device was placed subperiosteally under the proximal femur and attached to the [place] table to stabilize the femur for instrumentation.  The piriformis fossa was cleared of all soft tissue.  The cancellous bone on the internal aspect of the lateral femur was removed with a curette to minimize the malpositioning of the broaches into varus.  The rat tailed device was then used to enter into the femoral canal.  We then commenced broaching with the smallest broach.  The broach was leaned up against the lateral cortex to minimize varus-valgus malposition.  We broached up to the point that the proximal metaphysis was well filled and there was good torsional stability to the broach.  A #6.5 Avenir standard neck length extended offset broach was used as our final broach.  A trial head  and neck was placed and the femur was reduced into the acetabulum.  The limb lengths were checked and found to be anatomically accurate.  Intraoperative C-arm images were obtained to determine the limb lengths and the positioning of the components.  These were found to be anatomically accurate.  The femoral canal was then lavaged with bacitracin irrigating solution and dried.  The femoral component, #6.5 Avenir complete collared HA-coated stem with a standard neck length and an extended offset implant was impacted into position and a good fit was obtained.  There was good torsional stability.  Some bone graft was packed around the femoral component to promote ingrowth.  The trunnion of the femoral component was dried and the 40 mm diameter, -3.5 mm neck length ceramic Biolox head ball was impacted and locked onto the femoral component.  Final reduction was carried out.  The stability and limb length were checked one more time.  There was no tendency towards dislocation of the components in any position of the lower extremity.  Limb lengths were checked on C-arm images found to be anatomical.  The capsule and the subperiosteal structures were infiltrated with an analgesic for postoperative analgesia.  The anterior capsule was repaired with interrupted sutures.  The fascia over the TFL was repaired with interrupted suture.  Subcuticular sutures applied.  Occlusive dressing was applied.  Sponge gauze and needle count was correct.  No complications were encountered.  Patient was reversed from the anesthetic and taken from the operating room to the recovery room in stable condition.  No complications encountered.  I discussed the satisfactory performance of this procedure with the patient's family and answered all questions for them.     Yair Carver MD  2/12/2022  10:50 EST

## 2022-02-12 NOTE — ANESTHESIA PREPROCEDURE EVALUATION
Anesthesia Evaluation     Patient summary reviewed and Nursing notes reviewed   no history of anesthetic complications:  NPO Solid Status: > 8 hours  NPO Liquid Status: > 2 hours           Airway   Mallampati: II  TM distance: >3 FB  Neck ROM: full  No difficulty expected  Dental - normal exam     Pulmonary - negative pulmonary ROS and normal exam   Cardiovascular - normal exam    ECG reviewed    (+) hypertension well controlled, valvular problems/murmurs, dysrhythmias Atrial Fib,     ROS comment: Afib s/p watchman in 2020  Rate controlled on ASA - held    Neuro/Psych- negative ROS  GI/Hepatic/Renal/Endo    (+) obesity,  GERD,  thyroid problem hypothyroidism    Musculoskeletal     (+) arthralgias, gait problem, joint swelling,   Abdominal  - normal exam   Substance History - negative use     OB/GYN negative ob/gyn ROS         Other   arthritis,                      Anesthesia Plan    ASA 3     general with block   total IV anesthesia  intravenous induction     Anesthetic plan, all risks, benefits, and alternatives have been provided, discussed and informed consent has been obtained with: patient.  Use of blood products discussed with patient  Consented to blood products.   Plan discussed with [facility].        CODE STATUS:    Level Of Support Discussed With: Patient; Next of Kin (If No Surrogate)  Code Status (Patient has no pulse and is not breathing): No CPR (Do Not Attempt to Resuscitate)  Medical Interventions (Patient has pulse or is breathing): Full Support

## 2022-02-12 NOTE — PLAN OF CARE
Admitted from ED.     Problem: Fall Injury Risk  Goal: Absence of Fall and Fall-Related Injury  Outcome: Ongoing, Progressing  Intervention: Promote Injury-Free Environment  Description: Provide a safe, barrier-free environment that encourages independent activity.  Keep care area uncluttered and well-lighted.  Determine need for increased observation or auditory alerts (e.g., bed or chair alarm).  Assess equipment and environmental modification needs (e.g., low bed, signage, nonskid footwear, grab bars).  Avoid use of restraints.  Recent Flowsheet Documentation  Taken 2/12/2022 0013 by Lit Wan RN  Safety Promotion/Fall Prevention: safety round/check completed     Problem: Skin Injury Risk Increased  Goal: Skin Health and Integrity  Outcome: Ongoing, Progressing     Problem: Bleeding (Hip Arthroplasty)  Goal: Absence of Bleeding  Outcome: Ongoing, Progressing     Problem: Bowel Elimination Impaired (Hip Arthroplasty)  Goal: Effective Bowel Elimination  Outcome: Ongoing, Progressing     Problem: Infection (Hip Arthroplasty)  Goal: Absence of Infection Signs and Symptoms  Outcome: Ongoing, Progressing     Problem: Joint Function Impaired (Hip Arthroplasty)  Goal: Optimal Functional Ability  Outcome: Ongoing, Progressing     Problem: Ongoing Anesthesia Effects (Hip Arthroplasty)  Goal: Anesthesia/Sedation Recovery  Outcome: Ongoing, Progressing  Intervention: Optimize Anesthesia Recovery  Description: Maintain patent airway; position to minimize risk of obstruction and aspiration.  Monitor respiratory status for signs of hypoventilation; provide oxygen therapy judiciously if hypoxemia present.  Monitor level of consciousness and response to verbal and physical stimulation; protect areas of decreased sensation (e.g., anatomical positioning, heat and cold avoidance, medical device or object positioning).  Individualize frequency and intensity of monitoring based on sedation or anesthesia administered,  patient risk factors, ongoing assessment and organizational protocol.  Optimize fluid balance; anticipate need for fluid resuscitation.  Prepare for administration of pharmacologic therapy to manage sedation or anesthesia effects (e.g., antiemetic, reversal agent).  Consider risk for alterations in behavior or cognition; offer reassurance; answer questions.  Adjust environment to maintain safety (e.g., fall precautions, safety equipment).  Recent Flowsheet Documentation  Taken 2/12/2022 0013 by Lit Wan RN  Safety Promotion/Fall Prevention: safety round/check completed     Problem: Pain (Hip Arthroplasty)  Goal: Acceptable Pain Control  Outcome: Ongoing, Progressing     Problem: Postoperative Nausea and Vomiting (Hip Arthroplasty)  Goal: Nausea and Vomiting Relief  Outcome: Ongoing, Progressing     Problem: Postoperative Urinary Retention (Hip Arthroplasty)  Goal: Effective Urinary Elimination  Outcome: Ongoing, Progressing     Problem: Adjustment to Injury (Hip Fracture)  Goal: Optimal Coping with Change in Health Status  Outcome: Ongoing, Progressing     Problem: Delayed Union/Nonunion (Hip Fracture)  Goal: Fracture Stability  Outcome: Ongoing, Progressing     Problem: Embolism (Hip Fracture)  Goal: Absence of Embolism  Outcome: Ongoing, Progressing     Problem: Functional Ability Impaired (Hip Fracture)  Goal: Optimal Functional Performance  Outcome: Ongoing, Progressing     Problem: Pain (Hip Fracture)  Goal: Acceptable Pain Level  Outcome: Ongoing, Progressing     Problem: Urinary Elimination Impaired (Hip Fracture)  Goal: Effective Urinary Elimination  Outcome: Ongoing, Progressing     Problem: Hypertension Comorbidity  Goal: Blood Pressure in Desired Range  Outcome: Ongoing, Progressing   Goal Outcome Evaluation:

## 2022-02-12 NOTE — ANESTHESIA PROCEDURE NOTES
Peripheral Block    Pre-sedation assessment completed: 2/12/2022 7:43 AM    Patient reassessed immediately prior to procedure    Patient location during procedure: pre-op  Start time: 2/12/2022 7:43 AM  Stop time: 2/12/2022 7:59 AM  Reason for block: at surgeon's request and post-op pain management  Performed by  Anesthesiologist: Arnulfo Jefferson MD  Assisted by: Ratna Valenzuela RN  Preanesthetic Checklist  Completed: patient identified, IV checked, site marked, risks and benefits discussed, surgical consent, monitors and equipment checked, pre-op evaluation and timeout performed  Prep:  Pt Position: supine  Sterile barriers:alcohol skin prep, cap, gloves, gown, mask and washed/disinfected hands  Prep: ChloraPrep  Patient monitoring: blood pressure monitoring, continuous pulse oximetry and EKG  Procedure    Sedation: yes  Performed under: local infiltration  Guidance:ultrasound guided    ULTRASOUND INTERPRETATION.  Using ultrasound guidance a 20 G gauge needle was placed in close proximity to the femoral nerve, at which point, under ultrasound guidance anesthetic was injected in the area of the nerve and spread of the anesthesia was seen on ultrasound in close proximity thereto.  There were no abnormalities seen on ultrasound; a digital image was taken; and the patient tolerated the procedure with no complications. Images:still images obtained, printed/placed on chart    Laterality:left  Block Type:fascia iliaca compartment  Injection Technique:single-shot  Needle Type:echogenic  Needle Gauge:20 G  Resistance on Injection: none  Sedation medications used: midazolam (VERSED) injection, 2 mg  Medications Used: dexamethasone (DECADRON) injection, 4 mg  ropivacaine (NAROPIN) 0.5 % injection, 30 mL  Med administered at 2/12/2022 7:59 AM      Medications  Comment:60cc 0.25% ropi with 4mg decadron    Post Assessment  Injection Assessment: negative aspiration for heme, no paresthesia on injection and incremental  injection  Patient Tolerance:comfortable throughout block  Complications:no

## 2022-02-12 NOTE — ED NOTES
Went in room to check on pt. Pt heart rate in 90's at  This time. Pt has pillow under feet. Pt asleep at this time. Room being cleaned     Loan Reaves RN  02/11/22 2228       Loan Reaves RN  02/11/22 2228

## 2022-02-12 NOTE — ED NOTES
Pt sleeping in bed in no acute distress. Pt still waiting for room to be cleaned.      Loan Reaves RN  02/11/22 2012

## 2022-02-12 NOTE — SIGNIFICANT NOTE
02/12/22 1032   OTHER   Discipline physical therapist   Rehab Time/Intention   Session Not Performed unable to evaluate, medical status change; other (see comments)  (Pt is currently in the OR and undergoing sx for left femoral neck fx.)   Recommendation   PT - Next Appointment 02/13/22

## 2022-02-12 NOTE — ED NOTES
HR showing above 100. EKG released prn order. Will notify provider per parameters if HR above 120     Loan Reaves RN  02/11/22 2025

## 2022-02-12 NOTE — PROGRESS NOTES
Cardiology Progress Note    Patient Identification:  Name: Marychuy Verdugo  Age: 81 y.o.  Sex: female  :  1940  MRN: [id number]                 Follow Up / Chief Complaint: Paroxysmal A. fib, watchman, preop  Chief Complaint   Patient presents with   • Fall       Interval History:  Patient underwent hip surgery 2022.       Subjective: Patient seen and examined.  Chart reviewed.  Labs reviewed.  Discussed with RN taking care of patient.      Objective:  2022: Opponent is negative glucose elevated    History of present illness:  Patient presented to the emergency department at [facility] 2/10/2022 after mechanical fall at home with left hip pain.  Patient reported striking her head but denies any loss of consciousness.  She reports no chest pain, dizziness, shortness of breath or lightheadedness prior to the event.  X-ray confirmed left femoral neck fracture.  Aspirin currently on hold for possible surgery this morning.  Upon my evaluation, patient is resting comfortably in bed.  She denies any recent or current chest pain, pressure or tightness.  She has had no palpitations.  She denies any shortness of breath, dyspnea with exertion, lower extremity edema.  Rhythm atrial fibrillation.    Assessment:    Mechanical fall  Left femoral neck fracture  Presurgical cardiac risk assessment  Permanent atrial fibrillation  Status post watchman implant with complete seal noted follow-up BRADLEY  Hypothyroidism  Essential hypertension    Plan:      Patient has no known history of coronary disease.  Permanent atrial fibrillation status post left atrial appendage implant with follow-up BRADLEY and no leak, endothelialized.  No anticoagulation indicated  Recent echo with normal LV systolic function, patient does have mod-severe TR with severely dilated left atrium, pulmonary hypertension.  Recommend monitoring for signs/symptoms fluid overload given valvular history and pulmonary hypertension.  Continue  "beta-blocker therapy pre and post op  Patient should be low risk for perioperative cardiac event  Patient underwent hip surgery is doing well  We will continue to monitor rhythm    Past Medical History:  Past Medical History:   Diagnosis Date   • Atrial fibrillation (HCC) 10/10/2013   • Chronic anticoagulation 5/1/2020   • GERD (gastroesophageal reflux disease) 2/11/2013   • HTN (hypertension) 6/12/2014   • Hypothyroid 2/11/2013     Past Surgical History:  History reviewed. No pertinent surgical history.     Social History:   Social History     Tobacco Use   • Smoking status: Never Smoker   • Smokeless tobacco: Not on file   Substance Use Topics   • Alcohol use: Never      Family History:  Family History   Problem Relation Age of Onset   • Heart murmur Father    • No Known Problems Mother           Allergies:  Allergies   Allergen Reactions   • Haloperidol Unknown - Low Severity     Scheduled Meds:  furosemide, 40 mg, BID  levothyroxine, 88 mcg, Q AM  [MAR Hold] lidocaine, 1 patch, Q24H  metoprolol tartrate, 25 mg, Q12H  pantoprazole, 40 mg, QAM  [MAR Hold] polyethylene glycol, 17 g, Daily  [MAR Hold] sodium chloride, 3 mL, Q12H          Review of Systems:   ROS  Review of Systems   Constitution: Negative for chills and fever.   Cardiovascular: Negative for chest pain and palpitations.   Respiratory: Negative for cough and hemoptysis.    Gastrointestinal: Negative for nausea.        Constitutional:  Temp:  [97.3 °F (36.3 °C)-98.1 °F (36.7 °C)] 98.1 °F (36.7 °C)  Heart Rate:  [] 100  Resp:  [16-19] 18  BP: (132-170)/() 137/78  FiO2 (%):  [50 %] 50 %    Physical Exam   /78   Pulse 100   Temp 98.1 °F (36.7 °C)   Resp 18   Ht 175.3 cm (69\")   Wt 92.7 kg (204 lb 5.9 oz)   SpO2 97%   Breastfeeding No   BMI 30.18 kg/m²   General:  Appears in no acute distress  Eyes: Sclera is anicteric,  conjunctiva is clear   HEENT:  No JVD. Thyroid not visibly enlarged. No mucosal pallor or " cyanosis  Respiratory: Respirations regular and unlabored at rest.  Bilaterally good breath sounds, with good air entry in all fields. No crackles, rubs or wheezes auscultated  Cardiovascular: S1,S2 Regular rate and rhythm. No murmur, rub or gallop auscultated.  . No pretibial pitting edema  Gastrointestinal: Abdomen nondistended, soft  Musculoskeletal:  No abnormal movements  Extremities: Hip is under dressing.  Skin: Color pink. Skin warm and dry to touch. No rashes  No xanthoma  Neuro: Alert and awake, no lateralizing deficits appreciated    INTAKE AND OUTPUT:  No intake or output data in the 24 hours ending 02/12/22 0948    Cardiographics  Telemetry: Sinus rhythm    ECG:   ECG 12 Lead   Preliminary Result   HEART RATE= 120  bpm   RR Interval= 498  ms   DE Interval=   ms   P Horizontal Axis=   deg   P Front Axis=   deg   QRSD Interval= 91  ms   QT Interval= 351  ms   QRS Axis= -44  deg   T Wave Axis=   deg   - ABNORMAL ECG -   Atrial fibrillation   Low voltage, extremity leads   Electronically Signed By:    Date and Time of Study: 2022-02-11 20:26:40        I have personally reviewed EKG    Echocardiogram: Results for orders placed during the hospital encounter of 05/01/20    Adult Transthoracic Echo Complete W/ Cont if Necessary Per Protocol    Interpretation Summary  · Mild tricuspid valve regurgitation is present.  · Left atrial cavity size is severely dilated.  · Estimated EF = 50%.  · Left ventricular systolic function is normal.  · Mild aortic valve regurgitation is present.  · Mild mitral valve regurgitation is present  · Right ventricular cavity is mildly dilated.      Lab Review   I have reviewed the labs  Results from last 7 days   Lab Units 02/11/22  0712   TROPONIN T ng/mL 0.015     Results from last 7 days   Lab Units 02/11/22  0712   MAGNESIUM mg/dL 2.1     Results from last 7 days   Lab Units 02/11/22  0712   SODIUM mmol/L 140   POTASSIUM mmol/L 3.8   BUN mg/dL 19   CREATININE mg/dL 0.83   CALCIUM  "mg/dL 9.4         Results from last 7 days   Lab Units 02/11/22  0712 02/10/22  1432   WBC 10*3/mm3 8.40 12.20*   HEMOGLOBIN g/dL 11.2* 12.3   HEMATOCRIT % 33.5* 37.2   PLATELETS 10*3/mm3 237 264     Results from last 7 days   Lab Units 02/10/22  1432   INR  0.96       RADIOLOGY:  Imaging Results (Last 24 Hours)     ** No results found for the last 24 hours. **                )2/12/2022  MD ANTONINO Zelaya/Transcription:   \"Dictated utilizing Dragon dictation\".   "

## 2022-02-13 NOTE — THERAPY EVALUATION
Patient Name: Marychuy Verdugo  : 1940    MRN: [id number]                              Today's Date: 2022       Admit Date: 2/10/2022    Visit Dx:     ICD-10-CM ICD-9-CM   1. 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 No pertinent surgical history.   General Information     Row Name 22 0925          General Information    Prior Level of Function independent:; home management; driving; ADL's; community mobility  -     Existing Precautions/Restrictions fall; weight bearing  LLE WBAT  -     Barriers to Rehab none identified  -     Row Name 2225          Living Environment    Lives With alone  -     Row Name 2225          Home Main Entrance    Number of Stairs, Main Entrance one  -     Stair Railings, Main Entrance none  -     Row Name 2225          Stairs Within Home, Primary    Number of Stairs, Within Home, Primary none  -     Row Name 2225          Cognition     Orientation Status (Cognition) oriented x 4  -[facility] Name 02/13/22 0925          Safety Issues, Functional Mobility    Safety Issues Affecting Function (Mobility) steps too close to assistive device; problem-solving  -     Impairments Affecting Function (Mobility) balance; endurance/activity tolerance; strength; range of motion (ROM)  -           User Key  (r) = Recorded By, (t) = Taken By, (c) = Cosigned By    Initials Name Provider Type     Molly Gamez OT Occupational Therapist                 Mobility/ADL's     [facility] Name 02/13/22 0927          Bed Mobility    Bed Mobility supine-sit  -     Supine-Sit [place] (Bed Mobility) minimum assist (75% patient effort)  -     Assistive Device (Bed Mobility) bed rails; head of bed elevated  -MH     Row Name 02/13/22 0927          Transfers    Transfers sit-stand transfer  -     Sit-Stand [place] (Transfers) 2 person assist; minimum assist (75% patient effort); verbal cues; nonverbal cues (demo/gesture)  -[facility] Name 02/13/22 0927          Sit-Stand Transfer    Assistive Device (Sit-Stand Transfers) walker, front-wheeled  -MH     Row Name 02/13/22 0927          Functional Mobility    Functional Mobility- Ind. Level minimum assist (75% patient effort); verbal cues required; nonverbal cues required (demo/gesture); 1 person + 1 person to manage equipment  -     Functional Mobility- Device rolling walker  -[facility] Name 02/13/22 0927          Activities of Daily Living    BADL Assessment/Intervention lower body dressing; toileting; feeding  -MH     Row Name 02/13/22 0927          Mobility    Extremity Weight-bearing Status left lower extremity  -     Left Lower Extremity (Weight-bearing Status) weight-bearing as tolerated (WBAT)  -MH     Row Name 02/13/22 0927          Lower Body Dressing Assessment/Training    [place] Level (Lower Body Dressing) don; pants/bottoms; maximum assist (25% patient effort)  -     Position (Lower Body  Dressing) supported sitting; supported standing  -     Comment (Lower Body Dressing) able to pull up brief w/ CGA for balance in standing & gown mgmt.  -     Row Name 02/13/22 0927          Toileting Assessment/Training    Show Low Level (Toileting) toileting skills; dependent (less than 25% patient effort)  -     Position (Toileting) supine  -     Comment (Toileting) pt requests use of periwick due to chronic dribbling & urgency issues. Placed BSC, new osmar wick, and assisted pt don brief once up in chair. Assisted pt to remove old osmar wick & she was able w/ setup to clean osmar area in standing w/ GCA for balance & max (A) gown mgmt. Encoureged pt to use call button & BSC for toileting & reported to RN that pt requested backup use of periwick in the instance her call light was not answered fast enough.  -[facility] Name 02/13/22 0927          Self-Feeding Assessment/Training    Show Low Level (Feeding) feeding skills; independent  -     Position (Self-Feeding) sitting up in bed  -           User Key  (r) = Recorded By, (t) = Taken By, (c) = Cosigned By    Initials Name Provider Type     Molly Gamez OT Occupational Therapist               Obj/Interventions     [facility] Name 02/13/22 0931          Sensory Assessment (Somatosensory)    Sensory Assessment (Somatosensory) sensation intact  -[facility] Name 02/13/22 0931          Vision Assessment/Intervention    Visual Impairment/Limitations WFL  -[facility] Name 02/13/22 0931          Range of Motion Comprehensive    Comment, General Range of Motion hip flex on Lt 50%  -[facility] Name 02/13/22 0931          Strength Comprehensive (MMT)    Comment, General Manual Muscle Testing (MMT) Assessment LLE hip 2+/5; RLE 4/5; BUE grossly 4/5  -[facility] Name 02/13/22 0931          Balance    Balance Assessment sitting static balance; sitting dynamic balance; standing static balance; standing dynamic balance  -     Static Sitting Balance WFL; unsupported   -     Dynamic Sitting Balance WFL; unsupported  -     Static Standing Balance mild impairment; supported  -     Dynamic Standing Balance mild impairment; supported  -     Row Name 02/13/22 0931          Therapeutic Exercise    Therapeutic Exercise --  placed exercise program in room & precautoins sheet & educated on anterior hip prec.  -           User Key  (r) = Recorded By, (t) = Taken By, (c) = Cosigned By    Initials Name Provider Type     Molly Gamez, OT Occupational Therapist               Goals/Plan     Row Name 02/13/22 0939          Bed Mobility Goal 1 (OT)    Activity/Assistive Device (Bed Mobility Goal 1, OT) bed mobility activities, all  -     [place] Level/Cues Needed (Bed Mobility Goal 1, OT) independent  -     Time Frame (Bed Mobility Goal 1, OT) 2 weeks  -     Row Name 02/13/22 0939          Transfer Goal 1 (OT)    Activity/Assistive Device (Transfer Goal 1, OT) transfers, all; walker, rolling  -     [place] Level/Cues Needed (Transfer Goal 1, OT) standby assist  -     Time Frame (Transfer Goal 1, OT) 2 weeks  -     Row Name 02/13/22 0939          Dressing Goal 1 (OT)    Activity/Device (Dressing Goal 1, OT) dressing skills, all  -     [place]/Cues Needed (Dressing Goal 1, OT) minimum assist (75% or more patient effort)  -     Time Frame (Dressing Goal 1, OT) 2 weeks  -     Row Name 02/13/22 0939          Toileting Goal 1 (OT)    Activity/Device (Toileting Goal 1, OT) toileting skills, all  -     [place] Level/Cues Needed (Toileting Goal 1, OT) modified independence  -     Time Frame (Toileting Goal 1, OT) 2 weeks  -     Row Name 02/13/22 0939          Therapy Assessment/Plan (OT)    Planned Therapy Interventions (OT) activity tolerance training; adaptive equipment training; BADL retraining; functional balance retraining; cognitive/visual perception retraining; occupation/activity based interventions; patient/caregiver education/training;  transfer/mobility retraining; ROM/therapeutic exercise  -           User Key  (r) = Recorded By, (t) = Taken By, (c) = Cosigned By    Initials Name Provider Type     Molly Gamez, CHEL Occupational Therapist               Clinical Impression     Row Name 02/13/22 0933          Pain Scale: FACES Pre/Post-Treatment    Pain: FACES Scale, Pretreatment 0-->no hurt  -     Posttreatment Pain Rating 0-->no hurt  -     Row Name 02/13/22 0933          Plan of Care Review    Plan of Care Reviewed With patient; son; friend  -     Progress improving  -     Outcome Summary Pt is POD 1 S/P Lt anterior ABIGAIL after fall resulting in a femoral neck Fx. She has fallen twice, one inside & once outside her home. She lives alone & was (I) with all her I/ADL though she does continually refer to her son for retaining inforamtion & planning, reporting she is forgetful. In any case, Pt has her own RW in the room which her son has aquired for her but she has not had to use. She needed min (A) of 2 persons to safely axit bed, walk to the door & back to her recliner w/ RW. Her LBD/LBB & toileting skill is greatly affected as well but her hip Fx. She has anterior hip precautions & is WBAT. Would benefit from  acute rehab as she appears to tolerate therapy well, was fully (I) prior to recent admission, & does not appear limited by post-op pain.  -     Row Name 02/13/22 0933          Therapy Assessment/Plan (OT)    Rehab Potential (OT) good, to achieve stated therapy goals  -     Criteria for Skilled Therapeutic Interventions Met (OT) skilled treatment is necessary  -     Therapy Frequency (OT) 3 times/wk  -     Predicted Duration of Therapy Intervention (OT) until D/C  -     Row Name 02/13/22 0933          Therapy Plan Review/Discharge Plan (OT)    Anticipated Discharge Disposition (OT) inpatient rehabilitation facility  -     Row Name 02/13/22 0933          Vital Signs    Pretreatment Heart Rate (beats/min) 90  -      [facility]at[facility] asymptomatic but rate normally lower.  -     Posttreatment Heart Rate (beats/min) 113  -MH     O2 Delivery Pre Treatment room air  -MH     O2 Delivery Intra Treatment room air  -     Post SpO2 (%) 95  -MH     O2 Delivery Post Treatment room air  -MH     Pre Patient Position Supine  -MH     Intra Patient Position Standing  -MH     Post Patient Position Sitting  -     Row Name 02/13/22 0933          Positioning and Restraints    Pre-Treatment Position in bed  -     Post Treatment Position chair  -     In Chair notified nsg; sitting; call light within reach; encouraged to call for assist; exit alarm on; legs elevated; with family/caregiver  -           User Key  (r) = Recorded By, (t) = Taken By, (c) = Cosigned By    Initials Name Provider Type     Molly Gamez, OT Occupational Therapist               Outcome Measures     Row Name 02/13/22 0940          How much help from another is currently needed...    Putting on and taking off regular lower body clothing? 2  -MH     Bathing (including washing, rinsing, and drying) 2  -MH     Toileting (which includes using toilet bed pan or urinal) 2  -MH     Putting on and taking off regular upper body clothing 2  -MH     Taking care of personal grooming (such as brushing teeth) 3  -MH     Eating meals 4  -MH     AM-PAC 6 Clicks Score (OT) 15  -     Row Name 02/13/22 0907          How much help from another person do you currently need...    Turning from your back to your side while in flat bed without using bedrails? 3  -ROSS     Moving from lying on back to sitting on the side of a flat bed without bedrails? 3  -ROSS     Moving to and from a bed to a chair (including a wheelchair)? 3  -ROSS     Standing up from a chair using your arms (e.g., wheelchair, bedside chair)? 2  -ROSS     Climbing 3-5 steps with a railing? 2  -ROSS     To walk in hospital room? 3  -ROSS     AM-PAC 6 Clicks Score (PT) 16   -ROSS     Row Name 02/13/22 0940 02/13/22 0907       Functional Assessment    Outcome Measure Options AM-PAC 6 Clicks Daily Activity (OT)  - AM-PAC 6 Clicks Basic Mobility (PT)  -          User Key  (r) = Recorded By, (t) = Taken By, (c) = Cosigned By    Initials Name Provider Type     Molly Gamez, OT Occupational Therapist    Genevieve Vann, PT Physical Therapist                Occupational Therapy Education                 Title: PT OT SLP Therapies (Done)     Topic: Occupational Therapy (Done)     Point: ADL training (Done)     Description:   Instruct learner(s) on proper safety adaptation and remediation techniques during self care or transfers.   Instruct in proper use of assistive devices.              Learning Progress Summary           Patient Acceptance, E,TB,D,H, VU,DU,NR by  at 2/13/2022 0940   Family Acceptance, E,TB,D,H, VU,DU,NR by  at 2/13/2022 0940                   Point: Home exercise program (Done)     Description:   Instruct learner(s) on appropriate technique for monitoring, assisting and/or progressing therapeutic exercises/activities.              Learning Progress Summary           Patient Acceptance, E,TB,D,H, VU,DU,NR by  at 2/13/2022 0940   Family Acceptance, E,TB,D,H, VU,DU,NR by  at 2/13/2022 0940                   Point: Precautions (Done)     Description:   Instruct learner(s) on prescribed precautions during self-care and functional transfers.              Learning Progress Summary           Patient Acceptance, E,TB,D,H, VU,DU,NR by  at 2/13/2022 0940   Family Acceptance, E,TB,D,H, VU,DU,NR by  at 2/13/2022 0940                   Point: Body mechanics (Done)     Description:   Instruct learner(s) on proper positioning and spine alignment during self-care, functional mobility activities and/or exercises.              Learning Progress Summary           Patient Acceptance, E,TB,D,H, VU,DU,NR by  at 2/13/2022 0940   Family Acceptance, E,TB,D,H, VU,DU,NR by  at  2/13/2022 0940                               User Key     Initials Effective Dates Name Provider Type Discipline     06/16/21 -  Molly Gamez OT Occupational Therapist OT              OT Recommendation and Plan  Planned Therapy Interventions (OT): activity tolerance training, adaptive equipment training, BADL retraining, functional balance retraining, cognitive/visual perception retraining, occupation/activity based interventions, patient/caregiver education/training, transfer/mobility retraining, ROM/therapeutic exercise  Therapy Frequency (OT): 3 times/wk  Plan of Care Review  Plan of Care Reviewed With: patient, son, friend  Progress: improving  Outcome Summary: Pt is POD 1 S/P Lt anterior ABIGAIL after fall resulting in a femoral neck Fx. She has fallen twice, one inside & once outside her home. She lives alone & was (I) with all her I/ADL though she does continually refer to her son for retaining inforamtion & planning, reporting she is forgetful. In any case, Pt has her own RW in the room which her son has aquired for her but she has not had to use. She needed min (A) of 2 persons to safely axit bed, walk to the door & back to her recliner w/ RW. Her LBD/LBB & toileting skill is greatly affected as well but her hip Fx. She has anterior hip precautions & is WBAT. Would benefit from  acute rehab as she appears to tolerate therapy well, was fully (I) prior to recent admission, & does not appear limited by post-op pain.     Time Calculation:    Time Calculation- OT     Row Name 02/13/22 0942 02/13/22 0941          Time Calculation-     OT Start Time -- 0830  -     OT Stop Time -- 0900  -     OT Time Calculation (min) -- 30 min  -     Total Timed Code Minutes- OT 18 minute(s)  - 23 minute(s)  -     OT Received On -- 02/13/22  -     OT - Next Appointment -- 02/14/22  -     OT Goal Re-Cert Due Date -- 02/27/22  -           User Key  (r) = Recorded By, (t) = Taken By, (c) = Cosigned By    Initials  Name Provider Type     Molly Gamez OT Occupational Therapist              Therapy Charges for Today     Code Description Service Date Service Provider Modifiers Qty    68810941952 HC OT SELF CARE/MGMT/TRAIN EA 15 MIN 2/13/2022 Molly Gamez OT GO 1    [id number]  OT EVAL MOD COMPLEXITY 3 2/13/2022 Molly Gamez OT GO 1               Molly Gamez OT  2/13/2022

## 2022-02-13 NOTE — PROGRESS NOTES
[facility]     Progress Note    Patient Name: Marychuy Verdugo  : 1940  MRN: [id number]  Primary Care Physician:  Curt Ng MD  Date of admission: 2/10/2022    Subjective   Subjective     Chief Complaint: Left hip fracture status post total hip arthroplasty    History of Present Illness  Patient Reports significant improvement in her pain level.  She is taking minimal amounts of pain medication at this point.  She does not report a limb length discrepancy.  She is neurovascularly intact.  She has been able to tolerate physical therapy with assistance this morning.  She has been considered a candidate to go to inpatient rehabilitation in the next day or 2 and I agree with that assessment.    Review of Systems    Objective   Objective     Vitals:   Temp:  [97.5 °F (36.4 °C)-98.6 °F (37 °C)] 97.5 °F (36.4 °C)  Heart Rate:  [] 95  Resp:  [16-20] 20  BP: (119-148)/(56-98) 119/62  Flow (L/min):  [2] 2    Physical Exam   Left hip swelling and bruising is consistent with both the trauma and the surgical intervention.  There is no limb length discrepancy.  She is neurovascularly intact.  Result Review    Result Review:  I have personally reviewed the results from the time of this admission to 2022 16:31 EST and agree with these findings:  []  Laboratory  []  Microbiology  []  Radiology  []  EKG/Telemetry   []  Cardiology/Vascular   []  Pathology  []  Old records  []  Other:  Most notable findings include: Appropriate amounts of swelling and bruising following left hip fracture repair with a total hip arthroplasty.    Assessment/Plan   Assessment / Plan     Brief Patient Summary:  Marychuy Verdugo is a 81 y.o. female who fell and had a femur neck fracture treated with total hip arthroplasty.  She is doing well postoperatively.    Active Hospital Problems:  Active Hospital Problems    Diagnosis    • **Left displaced femoral neck fracture (HCC)    • Peripheral edema    • Overactive bladder    •  Presence of Watchman left atrial appendage closure device    • Atrial fibrillation (HCC)    • GERD (gastroesophageal reflux disease)    • Hypothyroid      Plan:       DVT prophylaxis:  Medical and mechanical DVT prophylaxis orders are present.    CODE STATUS:    Level Of Support Discussed With: Patient; Next of Kin (If No Surrogate)  Code Status (Patient has no pulse and is not breathing): No CPR (Do Not Attempt to Resuscitate)  Medical Interventions (Patient has pulse or is breathing): Full Support    Disposition:  I expect patient to be discharged to go to rehabilitation for inpatient rehab tomorrow that is Monday, 14 February.    Yair Carver MD

## 2022-02-13 NOTE — PLAN OF CARE
Goal Outcome Evaluation:      Patient was seen for dysphagia evaluation per MD orders. Patient is currently on regular diet. Patient has her own natural teeth with no missing. Oral mechanism examination revealed adequate ROM and mobility of lingual and labial structures. Provided trials of thins via straw x 4 with no overt s/s of aspiration or complications. Provided trials of regular x 3. Patient demonstrated adequate rotary chewing. Patient did not exhibit any labial spillage. Patient cleared between bites. No wet vocal quality was observed. Noted patient having a little difficulty with eating and talking at the same time as she displayed change in vocal quality. Encouraged her to not talk while chewing and swallowing to minimize risk of aspiration. Otherwise, patient tolerated regular consistency without any other complications. Recommend patient to remain on regular consistency with thin liquids. No further skilled speech therapy treatment is necessary as patient is at baseline

## 2022-02-13 NOTE — PROGRESS NOTES
[facility] Daily Progress Note    Patient Name: Marychuy Verdugo  : 1940  MRN: [id number]  Primary Care Physician:  Curt Ng MD  Date of admission: 2/10/2022      Subjective      Chief Complaint: Fall with left femur fracture    Subjective  Patient denies any new complaint, no nausea no vomiting.  Patient undergoing physical therapy.    Review of Systems   All other systems reviewed and are negative.        Objective      Vitals:   Temp:  [97.2 °F (36.2 °C)-98.6 °F (37 °C)] 97.5 °F (36.4 °C)  Heart Rate:  [] 95  Resp:  [10-20] 20  BP: (119-148)/(56-98) 119/62  Flow (L/min):  [2] 2    Physical Exam  Vitals and nursing note reviewed.   Constitutional:       General: She is not in acute distress.     Appearance: Normal appearance. She is well-developed. She is not ill-appearing, toxic-appearing or diaphoretic.   HENT:      Head: Normocephalic and atraumatic.      Right Ear: Ear canal and external ear normal.      Left Ear: Ear canal and external ear normal.      Nose: Nose normal. No congestion or rhinorrhea.      Mouth/Throat:      Mouth: Mucous membranes are moist.      Pharynx: No oropharyngeal exudate.   Eyes:      General: No scleral icterus.        Right eye: No discharge.         Left eye: No discharge.      Extraocular Movements: Extraocular movements intact.      Conjunctiva/sclera: Conjunctivae normal.      Pupils: Pupils are equal, round, and reactive to light.   Neck:      Thyroid: No thyromegaly.      Vascular: No carotid bruit or JVD.      Trachea: No tracheal deviation.   Cardiovascular:      Rate and Rhythm: Normal rate and regular rhythm.      Pulses: Normal pulses.      Heart sounds: Normal heart sounds. No murmur heard.  No friction rub. No gallop.    Pulmonary:      Effort: Pulmonary effort is normal. No respiratory distress.      Breath sounds: Normal breath sounds. No stridor. No wheezing, rhonchi or rales.   Chest:      Chest wall: No  tenderness.   Abdominal:      General: Bowel sounds are normal. There is no distension.      Palpations: Abdomen is soft. There is no mass.      Tenderness: There is no abdominal tenderness. There is no guarding or rebound.      Hernia: No hernia is present.   Musculoskeletal:         General: No swelling, tenderness, deformity or signs of injury. Normal range of motion.      Cervical back: Normal range of motion and neck supple. No rigidity. No muscular tenderness.      Right lower leg: No edema.      Left lower leg: No edema.   Lymphadenopathy:      Cervical: No cervical adenopathy.   Skin:     General: Skin is warm and dry.      Coloration: Skin is not jaundiced or pale.      Findings: No bruising, erythema or rash.   Neurological:      General: No focal deficit present.      Mental Status: She is alert and oriented to person, place, and time. Mental status is at baseline.      Cranial Nerves: No cranial nerve deficit.      Sensory: No sensory deficit.      Motor: No weakness or abnormal muscle tone.      Coordination: Coordination normal.   Psychiatric:         Mood and Affect: Mood normal.         Behavior: Behavior normal.         Thought Content: Thought content normal.         Judgment: Judgment normal.             Result Review    Result Review:  I have personally reviewed the results from the time of this admission to 2/13/2022 13:23 EST and agree with these findings:  [x]  Laboratory  [x]  Microbiology  [x]  Radiology  []  EKG/Telemetry   []  Cardiology/Vascular   []  Pathology  []  Old records  []  Other:  Most notable findings include:     Wounds (last 24 hours)     LDA Wound     Row Name 02/13/22 0710 02/12/22 1915 02/12/22 1455       Wound 02/12/22 0000 Bilateral gluteal Pressure Injury    Wound - Properties Group Placement Date: 02/12/22  -JE Placement Time: 0000 -JE Side: Bilateral  -JE Location: gluteal  -JE Primary Wound Type: Pressure inj  -JE Stage, Pressure Injury : Stage 1; deep tissue injury   -JE, Possible DTI     Dressing Appearance -- open to air  -JR open to air  -RS    Retired Wound - Properties Group Date first assessed: 02/12/22  -JE Time first assessed: 0000  -JE Side: Bilateral  -JE Location: gluteal  -JE Primary Wound Type: Pressure inj  -JE       Wound 02/12/22 0855 Left anterior greater trochanter Incision    Wound - Properties Group Placement Date: 02/12/22  -LM Placement Time: 0855  -LM Side: Left  -LM Orientation: anterior  -LM Location: greater trochanter  -LM Primary Wound Type: Incision  -LM    Dressing Appearance dry; intact  -BC dry; intact  -JR dry; intact; no drainage  -RS    Closure JIMMY  -BC JIMMY  -JR JIMMY  -RS    Base dressing in place, unable to visualize  -BC dressing in place, unable to visualize  -JR dressing in place, unable to visualize  -RS    Drainage Amount -- -- none  -RS    Retired Wound - Properties Group Date first assessed: 02/12/22  -LM Time first assessed: 0855  -LM Side: Left  -LM Location: greater trochanter  -LM Primary Wound Type: Incision  -LM          User Key  (r) = Recorded By, (t) = Taken By, (c) = Cosigned By    Initials Name Provider Type     Mamta Easton RN Registered Nurse    Lit Sagastume RN Registered Nurse    Jackie Patricio RN Registered Nurse    Dodie White LPN Licensed Nurse    Yue Lowe LPN Licensed Nurse                  Assessment/Plan      Brief Patient Summary:  Marychuy Verdugo is a 81 y.o. female who     enoxaparin, 30 mg, Subcutaneous, Q24H  furosemide, 40 mg, Oral, BID  levothyroxine, 88 mcg, Oral, Q AM  lidocaine, 1 patch, Transdermal, Q24H  metoprolol tartrate, 25 mg, Oral, TID  pantoprazole, 40 mg, Oral, QAM  polyethylene glycol, 17 g, Oral, Daily  sodium chloride, 3 mL, Intravenous, Q12H             Active Hospital Problems:  Active Hospital Problems    Diagnosis    • **Left displaced femoral neck fracture (HCC)    • Peripheral edema    • Overactive bladder    • Presence of Watchman left atrial appendage  closure device    • Atrial fibrillation (HCC)    • GERD (gastroesophageal reflux disease)    • Hypothyroid      Plan:     Fall with Left Femoral Neck Fracture status post Left total hip arthroplasty, direct anterior.  - Fall precautions and ambulate with assistance  - Ortho consulted  - Cardiology consulted   - Complete Bedrest  - PT/OT  consulted post op   -Postop care as per orthopedic  -DVT prophylaxis with Lovenox subcu     Chronic Afib with Watchman:  - continue metoprolol for rate control       Hypothyroidism:  - Continue levothyroxine  - TSH ordered      Overactive bladder:  - Continue Myrbetriq     GERD:  - Continue PPI     Chronic peripheral edema:  - Continue Lasix     Disposition to rehab once arranged.    Level Of Support Discussed With: Patient; Next of Kin (If No Surrogate)  Code Status (Patient has no pulse and is not breathing): No CPR (Do Not Attempt to Resuscitate)  Medical Interventions (Patient has pulse or is breathing): Full Support    DVT prophylaxis:  Medical and mechanical DVT prophylaxis orders are present.    CODE STATUS:    Level Of Support Discussed With: Patient; Next of Kin (If No Surrogate)  Code Status (Patient has no pulse and is not breathing): No CPR (Do Not Attempt to Resuscitate)  Medical Interventions (Patient has pulse or is breathing): Full Support      Disposition:  I expect patient to be discharged as per clinical course    This patient has been examined wearing appropriate Personal Protective Equipment and discussed with hospital infection control department. 02/13/22      Electronically signed by Shefali De Los Santos MD, 02/13/22, 13:23 EST.  [facility] Hospitalist Team

## 2022-02-13 NOTE — PLAN OF CARE
Goal Outcome Evaluation     Outcome Summary  Pt is an 82 y/o female who presents to [facility] following a fall resulting in a L femoral neck fx. She is s/p L anterior ABIGAIL. PMH includes but is not limited to chronic A-fib w/ watchman, overactive bladder, chronic peripheral edema. At OF pt lives alone in a SSH and one stepped entry. She was independent w/ all community ambulation w/out an AD and driving. At this time pt requires min A for bed mobility w/ use of bed rails, min A x2 w/ RW, and CGA for ambulation x50ft w/ RW. Pt demonstrates posterior weight shift upon initial stand and required max v/c for sequencing w/ RW. Pt has supportive son. Will recommend acute inpatient rehab to decrease risk of falls at home in the future.

## 2022-02-13 NOTE — PROGRESS NOTES
Cardiology Progress Note    Patient Identification:  Name: Marychuy Verdugo  Age: 81 y.o.  Sex: female  :  1940  MRN: [id number]                 Follow Up / Chief Complaint: Paroxysmal A. fib, watchman, preop  Chief Complaint   Patient presents with   • Fall       Interval History:  Patient underwent hip surgery 2022.       Subjective: Patient seen and examined.  Chart reviewed.  Labs reviewed.  Discussed with RN taking care of patient.      Objective: Patient is tachycardic at 102      History of present illness:  Patient presented to the emergency department at [facility] 2/10/2022 after mechanical fall at home with left hip pain.  Patient reported striking her head but denies any loss of consciousness.  She reports no chest pain, dizziness, shortness of breath or lightheadedness prior to the event.  X-ray confirmed left femoral neck fracture.  Aspirin currently on hold for possible surgery this morning.  Upon my evaluation, patient is resting comfortably in bed.  She denies any recent or current chest pain, pressure or tightness.  She has had no palpitations.  She denies any shortness of breath, dyspnea with exertion, lower extremity edema.  Rhythm atrial fibrillation.    Assessment:    Mechanical fall  Left femoral neck fracture  Presurgical cardiac risk assessment  Permanent atrial fibrillation  Status post watchman implant with complete seal noted follow-up BRADLEY  Hypothyroidism  Essential hypertension    Plan:      Patient has no known history of coronary disease.  Permanent atrial fibrillation status post left atrial appendage implant with follow-up BRADLEY and no leak, endothelialized.  No anticoagulation indicated  Recent echo with normal LV systolic function, patient does have mod-severe TR with severely dilated left atrium, pulmonary hypertension.  Recommend monitoring for signs/symptoms fluid overload given valvular history and pulmonary hypertension.  Continue beta-blocker therapy pre  "and post op  Patient is tachycardic will increase Lopressor  We will check labs including CBC and BMP  We will continue to monitor rhythm  Patient again is getting Lovenox DVT prophylaxis.  Patient's primary cardiologist will follow up in a.m.  Discussed with RN taking care of patient to coordinate care      Past Medical History:  Past Medical History:   Diagnosis Date   • Atrial fibrillation (HCC) 10/10/2013   • Chronic anticoagulation 5/1/2020   • GERD (gastroesophageal reflux disease) 2/11/2013   • HTN (hypertension) 6/12/2014   • Hypothyroid 2/11/2013     Past Surgical History:  History reviewed. No pertinent surgical history.     Social History:   Social History     Tobacco Use   • Smoking status: Never Smoker   • Smokeless tobacco: Not on file   Substance Use Topics   • Alcohol use: Never      Family History:  Family History   Problem Relation Age of Onset   • Heart murmur Father    • No Known Problems Mother           Allergies:  Allergies   Allergen Reactions   • Haloperidol Unknown - Low Severity     Scheduled Meds:  enoxaparin, 30 mg, Q24H  furosemide, 40 mg, BID  levothyroxine, 88 mcg, Q AM  lidocaine, 1 patch, Q24H  metoprolol tartrate, 25 mg, Q12H  pantoprazole, 40 mg, QAM  polyethylene glycol, 17 g, Daily  sodium chloride, 3 mL, Q12H          Review of Systems:   ROS    Constitution: Negative for chills and fever.   Cardiovascular: Negative for chest pain and palpitations.   Respiratory: Negative for cough and hemoptysis.    Gastrointestinal: Negative for nausea.        Constitutional:  Temp:  [97.2 °F (36.2 °C)-98.6 °F (37 °C)] 97.9 °F (36.6 °C)  Heart Rate:  [] 92  Resp:  [10-18] 16  BP: (123-158)/(56-98) 134/78    Physical Exam   /78 (BP Location: Right arm, Patient Position: Lying)   Pulse 92   Temp 97.9 °F (36.6 °C) (Oral)   Resp 16   Ht 175.3 cm (69\")   Wt 93.2 kg (205 lb 7.5 oz)   SpO2 97%   Breastfeeding No   BMI 30.34 kg/m²   General:  Appears in no acute distress  Eyes: " Sclera is anicteric,  conjunctiva is clear   HEENT:  No JVD. Thyroid not visibly enlarged. No mucosal pallor or cyanosis  Respiratory: Respirations regular and unlabored at rest.  CTA  Cardiovascular: S1,S2 Regular rate and rhythm. No murmur, rub or gallop auscultated.    Gastrointestinal: Abdomen nondistended.  Musculoskeletal:  No abnormal movements  Extremities: Hip is under dressing.  Skin: Color pink. Skin warm and dry to touch. No rashes  No xanthoma  Neuro: Alert and awake.    INTAKE AND OUTPUT:    Intake/Output Summary (Last 24 hours) at 2/13/2022 1035  Last data filed at 2/13/2022 0800  Gross per 24 hour   Intake 940 ml   Output 700 ml   Net 240 ml       Cardiographics  Telemetry: Sinus rhythm    ECG:   ECG 12 Lead   Final Result   HEART RATE= 120  bpm   RR Interval= 498  ms   CO Interval=   ms   P Horizontal Axis=   deg   P Front Axis=   deg   QRSD Interval= 91  ms   QT Interval= 351  ms   QRS Axis= -44  deg   T Wave Axis=   deg   - ABNORMAL ECG -   Atrial fibrillation   Low voltage, extremity leads   When compared with ECG of 01-May-2020 19:18:31,   NO SIGNIFICANT CHANGE FROM PREVIOUS ECG   Electronically Signed By: Amelie Koch (DARYL) 12-Feb-2022 20:58:14   Date and Time of Study: 2022-02-11 20:26:40      SCANNED - TELEMETRY     Final Result      SCANNED - TELEMETRY     Final Result      SCANNED - TELEMETRY     Final Result      SCANNED - TELEMETRY     Final Result      SCANNED - TELEMETRY     Final Result      SCANNED - TELEMETRY     Final Result        I have personally reviewed EKG    Echocardiogram: Results for orders placed during the hospital encounter of 05/01/20    Adult Transthoracic Echo Complete W/ Cont if Necessary Per Protocol    Interpretation Summary  · Mild tricuspid valve regurgitation is present.  · Left atrial cavity size is severely dilated.  · Estimated EF = 50%.  · Left ventricular systolic function is normal.  · Mild aortic valve regurgitation is present.  · Mild mitral valve  "regurgitation is present  · Right ventricular cavity is mildly dilated.      Lab Review   I have reviewed the labs  Results from last 7 days   Lab Units 02/11/22  0712   TROPONIN T ng/mL 0.015     Results from last 7 days   Lab Units 02/11/22  0712   MAGNESIUM mg/dL 2.1     Results from last 7 days   Lab Units 02/11/22  0712   SODIUM mmol/L 140   POTASSIUM mmol/L 3.8   BUN mg/dL 19   CREATININE mg/dL 0.83   CALCIUM mg/dL 9.4         Results from last 7 days   Lab Units 02/11/22  0712 02/10/22  1432   WBC 10*3/mm3 8.40 12.20*   HEMOGLOBIN g/dL 11.2* 12.3   HEMATOCRIT % 33.5* 37.2   PLATELETS 10*3/mm3 237 264     Results from last 7 days   Lab Units 02/10/22  1432   INR  0.96       RADIOLOGY:  Imaging Results (Last 24 Hours)     ** No results found for the last 24 hours. **                )2/13/2022  MD ANTONINO Zelaya Dragon/Transcription:   \"Dictated utilizing Dragon dictation\".   "

## 2022-02-13 NOTE — THERAPY EVALUATION
Acute Care - Speech Language Pathology   Swallow Initial Evaluation  [facility]     Patient Name: Marychuy Verdugo  : 1940  MRN: [id number]  Today's Date: 2022               Admit Date: 2/10/2022  Patient was not wearing a face mask during this therapy encounter. Therapist used appropriate personal protective equipment including mask, eye protection and gloves.  Mask used was standard procedure mask. Appropriate PPE was worn during the entire therapy session. Hand hygiene was completed before and after therapy session. Patient is not in enhanced droplet precautions.     Visit Dx:     ICD-10-CM ICD-9-CM   1. 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 No pertinent surgical history.    SLP Recommendation and Plan  SLP Swallowing Diagnosis: swallow WFL (22 1600)  SLP Diet Recommendation: regular textures, thin liquids (22 1600)                          Therapy Frequency (Swallow): evaluation only (22  1600)          SWALLOW EVALUATION (last 72 hours)     SLP Adult Swallow Evaluation     Row Name 02/13/22 1600          Pertinent History Of Current Problem Marychuy Verdugo is a 81 y.o. female who presented [facility] on [facility]/10/2022 complaining of mechanical fall at home while walking back into her house and falling onto left hip with immediate left hip pain that radiates down to left ankle and patient was unable to immediately bear weight on the joint. Patient does report that she hit her head during the fall but denies any LOC, dizziness, N/V, or headaches since the fall. Patient also denies any chest pain, dyspnea, or lightheadedness prior to and after the fall  -CB    Current Method of Nutrition regular textures; thin liquids  -CB               Pain: FACES Scale, Pretreatment 0-->no hurt  -CB    Posttreatment Pain Rating 0-->no hurt  -CB               Dentition Assessment natural, present and adequate  -CB    Secretion Management WNL/WFL  -CB    Mucosal Quality moist, healthy  -CB               Oral Motor General Assessment WFL  -CB    Oral Motor, Comment Oral mechanism examination revealed adequate ROM and mobility of lingual and labial structures.  -CB               Respiratory Support Currently in Use room air  -CB    Eating/Swallowing Skills self-fed  -CB    Positioning During Eating upright in chair  -CB    Utensils Used straw  -CB    Consistencies Trialed regular textures; thin liquids  -CB               Clinical Swallow Evaluation Summary Patient was seen for dysphagia evaluation per MD orders. Patient is currently on regular diet. Patient has her own natural teeth with no missing. Oral mechanism examination revealed adequate ROM and mobility of lingual and labial structures. Provided trials of thins via straw x 4 with no overt s/s of aspiration or complications. Provided trials of regular x 3. Patient demonstrated adequate rotary chewing. Patient did not exhibit any labial spillage. Patient  cleared between bites. No wet vocal quality was observed. Noted patient having a little difficulty with eating and talking at the same time as she displayed change in vocal quality. Encouraged her to not talk while chewing and swallowing to minimize risk of aspiration. Otherwise, patient tolerated regular consistency without any other complications. Recommend patient to remain on regular consistency with thin liquids. No further skilled speech therapy treatment is necessary as patient is at baseline.   -CB               SLP Swallowing Diagnosis swallow WFL  -CB               Therapy Frequency (Swallow) evaluation only  -CB    SLP Diet Recommendation regular textures; thin liquids  -CB          User Key  (r) = Recorded By, (t) = Taken By, (c) = Cosigned By    Initials Name Effective Dates    Esperanza Holland SLP 09/21/21 -                 EDUCATION  The patient has been educated in the following areas:   Dysphagia (Swallowing Impairment) Oral Care/Hydration.              Time Calculation:                BALDEMAR Bullard  2/13/2022

## 2022-02-13 NOTE — DISCHARGE PLACEMENT REQUEST
"Marychuy Breen (81 y.o. Female)             Date of Birth Social Security Number Address Home Phone MRN    1940  [id number] [place] [address] [phone] [id number]    Worship Marital Status             Non-Voodoo        Admission Date Admission Type Admitting Provider Attending Provider Department, Room/Bed    2/10/22 Emergency Shefali De Los Santos MD Jaisinghani, Salgram, MD [facility] SURGICAL INPATIENT, 4113/1    Discharge Date Discharge Disposition Discharge Destination                         Attending Provider: Shefali De Los Santos MD    Allergies: Haloperidol    Isolation: None   Infection: None   Code Status: No CPR   Advance Care Planning Activity    Ht: 175.3 cm (69\")   Wt: 93.2 kg (205 lb 7.5 oz)    Admission Cmt: None   Principal Problem: Left displaced femoral neck fracture (HCC) [S72.002A]                 Active Insurance as of 2/10/2022     Primary Coverage     Payor Plan Insurance Group Employer/Plan Group    MEDICARE MEDICARE A & B      Payor Plan Address Payor Plan Phone Number Payor Plan Fax Number Effective Dates    [address] [phone]  2/1/2006 - None Entered    [facility] [id number]       Subscriber Name Subscriber Birth Date Member ID       MARYCHUY BREEN 1940 [id number]           Secondary Coverage     Payor Plan Insurance Group Employer/Plan Group    [facility] SUPP [id number]     Payor Plan Address Payor Plan Phone Number Payor Plan Fax Number Effective Dates    [address]   8/1/2018 - None Entered    [facility] [id number]       Subscriber Name Subscriber Birth Date Member ID       MARYCHUY BREEN 1940 [id number]                 Emergency Contacts      (Rel.) Home Phone Work Phone Mobile Phone    moni frederick (Son) [phone] -- [phone]               History & Physical      Shefali De Los Santos MD at 02/10/22 1706              [facility] Medicine " Services      Patient Name: Marychuy Verdugo  : 1940  MRN: [id number]  Primary Care Physician:  Curt Ng MD  Date of admission: 2/10/2022      Subjective      Chief Complaint: Fall    History of Present Illness: Marychuy Verdugo is a 81 y.o. female who presented to [facility] on 2/10/2022 complaining of mechanical fall at home while walking back into her house and falling onto left hip with immediate left hip pain that radiates down to left ankle and patient was unable to immediately bear weight on the joint. Patient does report that she hit her head during the fall but denies any LOC, dizziness, N/V, or headaches since the fall. Patient also denies any chest pain, dyspnea, or lightheadedness prior to and after the fall.       Review of Systems   All other systems reviewed and are negative.       Personal History     Past Medical History:   Diagnosis Date   • Atrial fibrillation (HCC) 10/10/2013   • Chronic anticoagulation 2020   • GERD (gastroesophageal reflux disease) 2013   • HTN (hypertension) 2014   • Hypothyroid 2013       History reviewed. No pertinent surgical history.    Family History: family history includes Heart murmur in her father; No Known Problems in her mother. Otherwise pertinent FHx was reviewed and not pertinent to current issue.    Social History:  reports that she has never smoked. She does not have any smokeless tobacco history on file. She reports that she does not drink alcohol and does not use drugs.    Home Medications:  Prior to Admission Medications     Prescriptions Last Dose Informant Patient Reported? Taking?    aspirin 81 MG chewable tablet   No No    Chew 1 tablet Daily.    Calcium Carbonate-Vitamin D (OS-TATUM 500 + D PO)   Yes No    Take 1 tablet by mouth Daily.    estradiol (ESTRACE) 0.1 MG/GM vaginal cream   Yes No    Insert 2 g into the vagina 2 (Two) Times a Week.    furosemide (LASIX) 40 MG tablet   Yes No    Take 40 mg by mouth 2  (Two) Times a Day.    levothyroxine (SYNTHROID, LEVOTHROID) 88 MCG tablet   Yes No    Take 88 mcg by mouth Daily.    loratadine (CLARITIN) 10 MG tablet   Yes No    Take 10 mg by mouth Daily.    metoprolol tartrate (LOPRESSOR) 25 MG tablet   Yes No    Take 25 mg by mouth 2 (Two) Times a Day.    Multiple Vitamins-Minerals (MULTIVITAMIN WITH MINERALS) tablet tablet   Yes No    Take 1 tablet by mouth Daily.    omeprazole (priLOSEC) 20 MG capsule   Yes No    Take 20 mg by mouth Daily.    oxybutynin XL (DITROPAN-XL) 10 MG 24 hr tablet   Yes No    Take 10 mg by mouth Daily.    potassium chloride (K-DUR,KLOR-CON) 20 MEQ CR tablet   Yes No    Take 20 mEq by mouth Daily.    rivaroxaban (XARELTO) 20 MG tablet   No No    Take 1 tablet by mouth Daily.            Allergies:  Allergies   Allergen Reactions   • Haloperidol Unknown - Low Severity       Objective      Vitals:   Temp:  [97.2 °F (36.2 °C)] 97.2 °F (36.2 °C)  Heart Rate:  [] 121  Resp:  [17] 17  BP: (117-165)/(80-99) 151/94    Physical Exam  Vitals reviewed.   Constitutional:       General: She is not in acute distress.     Appearance: Normal appearance. She is not ill-appearing, toxic-appearing or diaphoretic.   HENT:      Head: Normocephalic and atraumatic.      Right Ear: External ear normal.      Left Ear: External ear normal.      Nose: Nose normal.      Mouth/Throat:      Mouth: Mucous membranes are dry.      Pharynx: Oropharynx is clear.   Eyes:      Extraocular Movements: Extraocular movements intact.      Conjunctiva/sclera: Conjunctivae normal.   Cardiovascular:      Rate and Rhythm: Tachycardia present. Rhythm irregular.      Pulses: Normal pulses.      Heart sounds: Normal heart sounds. No murmur heard.      Pulmonary:      Effort: Pulmonary effort is normal. No respiratory distress.      Breath sounds: Normal breath sounds. No wheezing.      Comments: On RA  Abdominal:      General: Bowel sounds are normal. There is no distension.      Palpations:  Abdomen is soft.      Tenderness: There is no abdominal tenderness. There is no guarding.   Musculoskeletal:      Cervical back: Normal range of motion and neck supple.      Comments: Left leg shortened and external rotation   Skin:     General: Skin is warm and dry.   Neurological:      General: No focal deficit present.      Mental Status: She is alert and oriented to person, place, and time.   Psychiatric:         Mood and Affect: Mood normal.         Behavior: Behavior normal.         Thought Content: Thought content normal.         Judgment: Judgment normal.         Result Review    Result Review:  I have personally reviewed the results from the time of this admission to 2/10/2022 18:35 EST and agree with these findings:  [x]  Laboratory  []  Microbiology  [x]  Radiology  [x]  EKG/Telemetry   []  Cardiology/Vascular   []  Pathology  [x]  Old records  []  Other:  Most notable findings include:       Assessment/Plan        Active Hospital Problems:  Active Hospital Problems    Diagnosis    • **Left displaced femoral neck fracture (HCC)    • Peripheral edema    • Overactive bladder    • Presence of Watchman left atrial appendage closure device    • Atrial fibrillation (HCC)    • GERD (gastroesophageal reflux disease)    • Hypothyroid      Plan:   Fall with Left Femoral Neck Fracture:  - Fall precautions and ambulate with assistance  - Ortho consult ordered  - NPO at midnight and holding ASA for possible surgery in AM  - Cardiology consulted for cardiac clearance for surgery   - Complete Bedrest  - PT/OT to be consulted post op     Chronic Afib with Watchman:  - continue metoprolol for rate control  - Holding ASA for possible surgery in the AM    Hypothyroidism:  - Continue levothyroxine  - TSH ordered     Overactive bladder:  - Continue Myrbetriq    GERD:  - Continue PPI    Chronic peripheral edema:  - Continue Lasix     DVT prophylaxis:  SCDs  CODE STATUS:    Level Of Support Discussed With: Patient; Next of Kin  (If No Surrogate)  Code Status (Patient has no pulse and is not breathing): No CPR (Do Not Attempt to Resuscitate)  Medical Interventions (Patient has pulse or is breathing): Full Support    Admission Status:  I believe this patient meets inpatient status.    I discussed the patient's findings and my recommendations with patient and family.    This patient has been examined wearing appropriate Personal Protective Equipment and discussed with ED provider. 02/10/22      Signature: Electronically signed by Shahana Lizama PA-C, 02/10/22, 6:27 PM EST.    Hospitalist/attending note  Patient seen and examined, chart reviewed, agree with above.  81-year-old female came in with a fall resulting in left femoral neck fracture, patient has a history of chronic A. fib, off anticoagulation now, patient has a watchman.    Vitals reviewed  Chest, bilateral entry, normal vesicular breathing  Cardiovascular, S1-S2 there is no murmur  Abdomen soft nontender good sounds audible    Impression  Fall with left femoral neck fracture  Watchman  History of A. Fib  GERD    Plan  Agree with above  Ortho consult in place  Cardiology consulted to.  Okay to proceed with surgery without any further work-up, patient intermediate risk for surgery.    Shefali De Los Santos MD      Electronically signed by Shefali De Los Santos MD at 22 1510          Physician Progress Notes (last 24 hours)      Shefali De Los Santos MD at 22 1436              [facility] Medicine Services Daily Progress Note    Patient Name: Marychuy Verdugo  : 1940  MRN: [id number]  Primary Care Physician:  Curt Ng MD  Date of admission: 2/10/2022      Subjective      Chief Complaint: Fall with left femur fracture    Subjective  Patient noted sleepy, from sedation.  Patient tolerated the procedure well.  Noted otherwise comfortable    Review of Systems   All other systems reviewed and are negative.        Objective      Vitals:   Temp:   [97.3 °F (36.3 °C)-98.1 °F (36.7 °C)] 97.5 °F (36.4 °C)  Heart Rate:  [] 108  Resp:  [17-19] 18  BP: (132-163)/() 143/87  Flow (L/min):  [2-10] 2  FiO2 (%):  [50 %-100 %] 100 %    Physical Exam  Vitals and nursing note reviewed.   Constitutional:       General: She is not in acute distress.     Appearance: Normal appearance. She is well-developed. She is not ill-appearing, toxic-appearing or diaphoretic.   HENT:      Head: Normocephalic and atraumatic.      Right Ear: Ear canal and external ear normal.      Left Ear: Ear canal and external ear normal.      Nose: Nose normal. No congestion or rhinorrhea.      Mouth/Throat:      Mouth: Mucous membranes are moist.      Pharynx: No oropharyngeal exudate.   Eyes:      General: No scleral icterus.        Right eye: No discharge.         Left eye: No discharge.      Extraocular Movements: Extraocular movements intact.      Conjunctiva/sclera: Conjunctivae normal.      Pupils: Pupils are equal, round, and reactive to light.   Neck:      Thyroid: No thyromegaly.      Vascular: No carotid bruit or JVD.      Trachea: No tracheal deviation.   Cardiovascular:      Rate and Rhythm: Normal rate and regular rhythm.      Pulses: Normal pulses.      Heart sounds: Normal heart sounds. No murmur heard.  No friction rub. No gallop.    Pulmonary:      Effort: Pulmonary effort is normal. No respiratory distress.      Breath sounds: Normal breath sounds. No stridor. No wheezing, rhonchi or rales.   Chest:      Chest wall: No tenderness.   Abdominal:      General: Bowel sounds are normal. There is no distension.      Palpations: Abdomen is soft. There is no mass.      Tenderness: There is no abdominal tenderness. There is no guarding or rebound.      Hernia: No hernia is present.   Musculoskeletal:         General: No swelling, tenderness, deformity or signs of injury. Normal range of motion.      Cervical back: Normal range of motion and neck supple. No rigidity. No muscular  tenderness.      Right lower leg: No edema.      Left lower leg: No edema.   Lymphadenopathy:      Cervical: No cervical adenopathy.   Skin:     General: Skin is warm and dry.      Coloration: Skin is not jaundiced or pale.      Findings: No bruising, erythema or rash.   Neurological:      General: No focal deficit present.      Mental Status: She is alert and oriented to person, place, and time. Mental status is at baseline.      Cranial Nerves: No cranial nerve deficit.      Sensory: No sensory deficit.      Motor: No weakness or abnormal muscle tone.      Coordination: Coordination normal.   Psychiatric:         Mood and Affect: Mood normal.         Behavior: Behavior normal.         Thought Content: Thought content normal.         Judgment: Judgment normal.             Result Review    Result Review:  I have personally reviewed the results from the time of this admission to 2/12/2022 14:36 EST and agree with these findings:  [x]  Laboratory  [x]  Microbiology  [x]  Radiology  []  EKG/Telemetry   []  Cardiology/Vascular   []  Pathology  []  Old records  []  Other:  Most notable findings include:     Wounds (last 24 hours)     LDA Wound     Row Name 02/12/22 1229 02/12/22 1125 02/12/22 1110       Wound 02/12/22 0000 Bilateral gluteal Pressure Injury    Wound - Properties Group Placement Date: 02/12/22  -JE Placement Time: 0000  -JE Side: Bilateral  -JE Location: gluteal  -JE Primary Wound Type: Pressure inj  -JE Stage, Pressure Injury : Stage 1; deep tissue injury  -JE, Possible DTI     Dressing Appearance open to air  -JM -- --    Retired Wound - Properties Group Date first assessed: 02/12/22  -JE Time first assessed: 0000  -JE Side: Bilateral  -JE Location: gluteal  -JE Primary Wound Type: Pressure inj  -JE       Wound 02/12/22 0855 Left anterior greater trochanter Incision    Wound - Properties Group Placement Date: 02/12/22  -LM Placement Time: 0855  -LM Side: Left  -LM Orientation: anterior  -LM Location:  greater trochanter  -LM Primary Wound Type: Incision  -LM    Dressing Appearance dry; intact  -JM -- --    Closure JIMMY  -JM JIMMY  -KR JIMMY  -KR    Base dressing in place, unable to visualize  -JM -- --    Drainage Amount -- none  -KR none  -KR    Retired Wound - Properties Group Date first assessed: 02/12/22  -LM Time first assessed: 0855  -LM Side: Left  -LM Location: greater trochanter  -LM Primary Wound Type: Incision  -LM    Row Name 02/12/22 1055 02/12/22 1040 02/12/22 1026       Wound 02/12/22 0000 Bilateral gluteal Pressure Injury    Wound - Properties Group Placement Date: 02/12/22  -JE Placement Time: 0000  -JE Side: Bilateral  -JE Location: gluteal  -JE Primary Wound Type: Pressure inj  -JE Stage, Pressure Injury : Stage 1; deep tissue injury  -JE, Possible DTI     Retired Wound - Properties Group Date first assessed: 02/12/22  -JE Time first assessed: 0000  -JE Side: Bilateral  -JE Location: gluteal  -JE Primary Wound Type: Pressure inj  -JE       Wound 02/12/22 0855 Left anterior greater trochanter Incision    Wound - Properties Group Placement Date: 02/12/22  -LM Placement Time: 0855  -LM Side: Left  -LM Orientation: anterior  -LM Location: greater trochanter  -LM Primary Wound Type: Incision  -LM    Dressing Appearance -- -- dry; intact; no drainage  -KR    Closure JIMMY  -KR JIMMY  -KR JIMMY  -KR    Drainage Amount none  -KR none  -KR none  -KR    Retired Wound - Properties Group Date first assessed: 02/12/22  -LM Time first assessed: 0855  -LM Side: Left  -LM Location: greater trochanter  -LM Primary Wound Type: Incision  -LM          User Key  (r) = Recorded By, (t) = Taken By, (c) = Cosigned By    Initials Name Provider Type    Mamta Baird RN Registered Nurse    Ratna Nelson RN Registered Nurse    Lit Sagastume RN Registered Nurse    Maritza Pham LPN Licensed Nurse                  Assessment/Plan      Brief Patient Summary:  Marychuy Verdugo is a 81 y.o. female who      furosemide, 40 mg, Oral, BID  levothyroxine, 88 mcg, Oral, Q AM  lidocaine, 1 patch, Transdermal, Q24H  metoprolol tartrate, 25 mg, Oral, Q12H  pantoprazole, 40 mg, Oral, QAM  polyethylene glycol, 17 g, Oral, Daily  sodium chloride, 3 mL, Intravenous, Q12H       lactated ringers, 75 mL/hr, Last Rate: 75 mL/hr (02/12/22 0812)         Active Hospital Problems:  Active Hospital Problems    Diagnosis    • **Left displaced femoral neck fracture (HCC)    • Peripheral edema    • Overactive bladder    • Presence of Watchman left atrial appendage closure device    • Atrial fibrillation (HCC)    • GERD (gastroesophageal reflux disease)    • Hypothyroid      Plan:     Fall with Left Femoral Neck Fracture status post Left total hip arthroplasty, direct anterior.  - Fall precautions and ambulate with assistance  - Ortho consulted  - Cardiology consulted   - Complete Bedrest  - PT/OT to be consulted post op   -Postop care as per orthopedic  -DVT prophylaxis with Lovenox subcu from tomorrow.    Chronic Afib with Watchman:  - continue metoprolol for rate control       Hypothyroidism:  - Continue levothyroxine  - TSH ordered      Overactive bladder:  - Continue Myrbetriq     GERD:  - Continue PPI     Chronic peripheral edema:  - Continue Lasix     Level Of Support Discussed With: Patient; Next of Kin (If No Surrogate)  Code Status (Patient has no pulse and is not breathing): No CPR (Do Not Attempt to Resuscitate)  Medical Interventions (Patient has pulse or is breathing): Full Support    DVT prophylaxis:  Mechanical DVT prophylaxis orders are present.    CODE STATUS:    Level Of Support Discussed With: Patient; Next of Kin (If No Surrogate)  Code Status (Patient has no pulse and is not breathing): No CPR (Do Not Attempt to Resuscitate)  Medical Interventions (Patient has pulse or is breathing): Full Support      Disposition:  I expect patient to be discharged as per clinical course    This patient has been examined wearing  appropriate Personal Protective Equipment and discussed with hospital infection control department. 22      Electronically signed by Shefali De Los Santos MD, 22, 14:36 EST.  [facility] Hospitalist Team             Electronically signed by Shefali De Los Santos MD at 22 1439          Physical Therapy Notes (last 24 hours)      Genevieve Colorado, PT at 22 0908  Version 1 of 1       Goal Outcome Evaluation     Outcome Summary  Pt is an 82 y/o female who presents to [facility] following a fall resulting in a L femoral neck fx. She is s/p L anterior ABIGAIL. PMH includes but is not limited to chronic A-fib w/ watchman, overactive bladder, chronic peripheral edema. At [facility] pt lives alone in a SSH and one stepped entry. She was independent w/ all community ambulation w/out an AD and driving. At this time pt requires min A for bed mobility w/ use of bed rails, min A x2 w/ RW, and CGA for ambulation x50ft w/ RW. Pt demonstrates posterior weight shift upon initial stand and required max v/c for sequencing w/ RW. Pt has supportive son. Will recommend acute inpatient rehab to decrease risk of falls at home in the future.    Electronically signed by Genevieve Colorado PT at 22 0909     Genevieve Colorado PT at 22 0910  Version 1 of 1         Patient Name: Marychuy Verdugo  : 1940    MRN: [id number]                              Today's Date: 2022       Admit Date: 2/10/2022    Visit Dx:     ICD-10-CM ICD-9-CM   1. 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 No pertinent surgical history.   General Information     [facility] Name 02/13/22 0852          Physical Therapy Time and Intention    Document Type evaluation  -     Mode of Treatment physical therapy  -     Row Name 02/13/22 0852          General Information    Prior Level of Function independent:; community mobility; transfer; bed mobility; ADL's; cleaning; driving  Pt reports she was independent w/ all community ambulation w/out an AD. Son has a RW for her to use and pt owns a straight cane.  -     Existing Precautions/Restrictions other (see comments)  LLE WBAT  -[facility] Name 02/13/22 0852          Living Environment    Lives With alone  -[facility] Name 02/13/22 0852          Home Main Entrance    Number of Stairs, Main Entrance one  -     Stair Railings, Main Entrance none  -[facility] Name 02/13/22 0852          Stairs Within Home, Primary    Number of Stairs, Within Home, Primary none  -[facility] Name 02/13/22 0852          Cognition    Orientation Status (Cognition) oriented x 4  -[facility] Name 02/13/22 0852          Safety Issues, Functional Mobility    Safety Issues Affecting Function (Mobility) steps too close to assistive device; positioning of assistive device  -     Impairments Affecting Function (Mobility) balance; endurance/activity tolerance; strength  -           User Key  (r) = Recorded By, (t) = Taken By, (c) = Cosigned By     Initials Name Provider Type    Genevieve Vann, DILEEP Physical Therapist               Mobility     Row Name 02/13/22 0854          Bed Mobility    Bed Mobility supine-sit  -ROSS     Supine-Sit Oak (Bed Mobility) minimum assist (75% patient effort)  -ROSS     Assistive Device (Bed Mobility) bed rails; head of bed elevated  -ROSS     Comment (Bed Mobility) Pt requires min A at LLE, but was able to pull herself into sitting using the R hand rail and HOB moderately elevated.  -     Row Name 02/13/22 0854          Sit-Stand Transfer    Sit-Stand Oak (Transfers) minimum assist (75% patient effort); 2 person assist  -     Assistive Device (Sit-Stand Transfers) walker, front-wheeled  -     Row Name 02/13/22 0854          Gait/Stairs (Locomotion)    Oak Level (Gait) contact guard  -     Assistive Device (Gait) walker, front-wheeled  -     Distance in Feet (Gait) 50ft  -     Deviations/Abnormal Patterns (Gait) left sided deviations; weight shifting decreased; antalgic; bilateral deviations; gait speed decreased  -     Bilateral Gait Deviations heel strike decreased  -     Comment (Gait/Stairs) Pt ambulates w/in room this date and no further secondary to increased HR upon standing. Pt required max v/c for assist/sequencing w/ RW, but thereafter requires CGA.  -     Row Name 02/13/22 0854          Mobility    Extremity Weight-bearing Status left lower extremity  -     Left Lower Extremity (Weight-bearing Status) weight-bearing as tolerated (WBAT)  -           User Key  (r) = Recorded By, (t) = Taken By, (c) = Cosigned By    Initials Name Provider Type    Genevieve Vann PT Physical Therapist               Obj/Interventions     Row Name 02/13/22 0856          Range of Motion Comprehensive    General Range of Motion bilateral lower extremity ROM WFL  -     Row Name 02/13/22 0856          Strength Comprehensive (MMT)    General Manual Muscle Testing (MMT) Assessment lower extremity  strength deficits identified  -     Comment, General Manual Muscle Testing (MMT) Assessment LLE hip flexion 2+/5, knee ext 3+/5, knee flex 4/5. RLE grossly 4-/5  -[facility] Name 02/13/22 0856          Motor Skills    Therapeutic Exercise hip  -[facility] Name 02/13/22 0856          Hip (Therapeutic Exercise)    Hip (Therapeutic Exercise) AROM (active range of motion)  Pt given initial HEP and provided education sheet on precautions/expectations/pain and swelling management.  -[facility] Name 02/13/22 0856          Balance    Balance Assessment sitting static balance; sitting dynamic balance; standing static balance  -     Static Sitting Balance WFL; unsupported; sitting, edge of bed  -     Dynamic Sitting Balance WFL; unsupported; sitting, edge of bed  -     Static Standing Balance mild impairment; supported; standing  -     Comment, Balance Upon standing pt required min A to maintain standing balance at RW secondary to posterior weight shift. Following v/c pt was able to maintain static standing balance at RW, CGA.  -[facility] Name 02/13/22 0856          Sensory Assessment (Somatosensory)    Sensory Assessment (Somatosensory) LE sensation intact  -           User Key  (r) = Recorded By, (t) = Taken By, (c) = Cosigned By    Initials Name Provider Type    ROSS Genevieve [place], PT Physical Therapist               Goals/Plan     Row Name 02/13/22 0906          Bed Mobility Goal 1 (PT)    Activity/Assistive Device (Bed Mobility Goal 1, PT) bed mobility activities, all  -ROSS     [place] Level/Cues Needed (Bed Mobility Goal 1, PT) modified independence  -     Time Frame (Bed Mobility Goal 1, PT) long term goal (LTG); 2 weeks  -[facility] Name 02/13/22 0906          Transfer Goal 1 (PT)    Activity/Assistive Device (Transfer Goal 1, PT) transfers, all  -     [place] Level/Cues Needed (Transfer Goal 1, PT) modified independence  -     Time Frame (Transfer Goal 1, PT) long term goal (LTG); 2 weeks   -ROSS     Row Name 02/13/22 0906          Gait Training Goal 1 (PT)    Activity/Assistive Device (Gait Training Goal 1, PT) gait (walking locomotion)  -ROSS     [place] Level (Gait Training Goal 1, PT) modified independence  -ROSS     Distance (Gait Training Goal 1, PT) 100ft  -ROSS     Time Frame (Gait Training Goal 1, PT) long term goal (LTG); 2 weeks  -ROSS     Row Name 02/13/22 0906          Patient Education Goal (PT)    Activity (Patient Education Goal, PT) Pt will be independent w/ her initial HEP.  -ROSS     [place]/Cues/Accuracy (Memory Goal 2, PT) demonstrates adequately  -ROSS     Time Frame (Patient Education Goal, PT) long term goal (LTG); 2 weeks  -ROSS           User Key  (r) = Recorded By, (t) = Taken By, (c) = Cosigned By    Initials Name Provider Type    Genevieve Vann, PT Physical Therapist               Clinical Impression     Row Name 02/13/22 0859          Pain    Additional Documentation Pain Scale: FACES Pre/Post-Treatment (Group)  -ROSS     Row Name 02/13/22 0859          Pain Scale: FACES Pre/Post-Treatment    Pain: FACES Scale, Pretreatment 2-->hurts little bit  -ROSS     Posttreatment Pain Rating 2-->hurts little bit  -ROSS     Pain Location - Side Left  -ROSS     Pain Location hip  -ROSS     Row Name 02/13/22 0859          Plan of Care Review    Plan of Care Reviewed With patient  -ROSS     Outcome Summary Pt is an 80 y/o female who presents to [facility] following a fall resulting in a L femoral neck fx. She is s/p L anterior ABIGAIL. PMH includes but is not limited to chronic A-fib w/ watchman, overactive bladder, chronic peripheral edema. At [facility] pt lives alone in a H and one stepped entry. She was independent w/ all community ambulation w/out an AD and driving. At this time pt requires min A for bed mobility w/ use of bed rails, min A x2 w/ RW, and CGA for ambulation x50ft w/ RW. Pt demonstrates posterior weight shift upon initial stand and required max v/c for sequencing w/ RW. Will recommend acute  inpatient rehab to decrease risk of falls in the future.  -     Row Name 02/13/22 0859          Therapy Assessment/Plan (PT)    Predicted Duration of Therapy Intervention (PT) Until d/c  -     Row Name 02/13/22 0859          Vital Signs    Intratreatment Heart Rate (beats/min) 160  -ROSS     Posttreatment Heart Rate (beats/min) 120  -ROSS     O2 Delivery Pre Treatment room air  -ROSS     O2 Delivery Intra Treatment room air  -ROSS     Post SpO2 (%) 95  -ROSS     O2 Delivery Post Treatment room air  -     Row Name 02/13/22 0859          Positioning and Restraints    Pre-Treatment Position in bed  -ROSS     Post Treatment Position chair  -ROSS     In Chair reclined; call light within reach; encouraged to call for assist; exit alarm on; with family/caregiver; with OT; notified nsg  -           User Key  (r) = Recorded By, (t) = Taken By, (c) = Cosigned By    Initials Name Provider Type    Genevieve Vann PT Physical Therapist               Outcome Measures     Row Name 02/13/22 0907          How much help from another person do you currently need...    Turning from your back to your side while in flat bed without using bedrails? 3  -ROSS     Moving from lying on back to sitting on the side of a flat bed without bedrails? 3  -ROSS     Moving to and from a bed to a chair (including a wheelchair)? 3  -ROSS     Standing up from a chair using your arms (e.g., wheelchair, bedside chair)? 2  -ROSS     Climbing 3-5 steps with a railing? 2  -ROSS     To walk in hospital room? 3  -ROSS     AM-PAC 6 Clicks Score (PT) 16  -[facility] Name 02/13/22 0907          Functional Assessment    Outcome Measure Options AM-PAC 6 Clicks Basic Mobility (PT)  -           User Key  (r) = Recorded By, (t) = Taken By, (c) = Cosigned By    Initials Name Provider Type    Genevieve Vann PT Physical Therapist                             Physical Therapy Education                 Title: PT OT SLP Therapies (In Progress)     Topic: Physical Therapy (Done)      Point: Mobility training (Done)     Learning Progress Summary           Patient Acceptance, E,TB, VU by  at 2/13/2022 0908                   Point: Home exercise program (Done)     Learning Progress Summary           Patient Acceptance, E,TB, VU by  at 2/13/2022 0908                   Point: Body mechanics (Done)     Learning Progress Summary           Patient Acceptance, E,TB, VU by  at 2/13/2022 0908                   Point: Precautions (Done)     Learning Progress Summary           Patient Acceptance, E,TB, VU by  at 2/13/2022 0908                               User Key     Initials Effective Dates Name Provider Type Discipline     08/23/21 -  Genevieve Colorado, PT Physical Therapist PT              PT Recommendation and Plan  Planned Therapy Interventions (PT): balance training, bed mobility training, gait training, home exercise program, motor coordination training, neuromuscular re-education, patient/family education, postural re-education, ROM (range of motion), strengthening, transfer training  Plan of Care Reviewed With: patient  Outcome Summary: Pt is an 80 y/o female who presents to [facility] following a fall resulting in a L femoral neck fx. She is s/p L anterior ABIGAIL. PMH includes but is not limited to chronic A-fib w/ watchman, overactive bladder, chronic peripheral edema. At [facility] pt lives alone in a SSH and one stepped entry. She was independent w/ all community ambulation w/out an AD and driving. At this time pt requires min A for bed mobility w/ use of bed rails, min A x2 w/ RW, and CGA for ambulation x50ft w/ RW. Pt demonstrates posterior weight shift upon initial stand and required max v/c for sequencing w/ RW. Will recommend acute inpatient rehab to decrease risk of falls in the future.     Time Calculation:    PT Charges     Row Name 02/13/22 0909             Time Calculation    Start Time 0811  -      Stop Time 0846  -      Time Calculation (min) 35 min  -      PT Received On 02/13/22   -ROSS      PT - Next Appointment 22  -ROSS      PT Goal Re-Cert Due Date 22  -ROSS            User Key  (r) = Recorded By, (t) = Taken By, (c) = Cosigned By    Initials Name Provider Type    Genevieve Vann, PT Physical Therapist              Therapy Charges for Today     Code Description Service Date Service Provider Modifiers Qty    [id number] HC PT EVAL MOD COMPLEXITY 4 2022 Genevieve Colorado, PT GP 1          PT G-Codes  Outcome Measure Options: AM-PAC 6 Clicks Basic Mobility (PT)  AM-PAC 6 Clicks Score (PT): 16    Genevieve Colorado PT  2022      Electronically signed by Genevieve Colorado, PT at 22 0910          Occupational Therapy Notes (last 24 hours)      Molly Gamez, OT at 22 0942          Patient Name: Marychuy Verdugo  : 1940    MRN: [id number]                              Today's Date: 2022       Admit Date: 2/10/2022    Visit Dx:     ICD-10-CM ICD-9-CM   1. 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 No pertinent surgical history.   General Information     [facility] Name 02/13/22 0925          General Information    Prior Level of Function independent:; home management; driving; ADL's; community mobility  -     Existing Precautions/Restrictions fall; weight bearing  LLE WBAT  -     Barriers to Rehab none identified  -[facility] Name 02/13/22 0925          Living Environment    Lives With alone  -[facility] Name 02/13/22 0925          Home Main Entrance    Number of Stairs, Main Entrance one  -     Stair Railings, Main Entrance none  -[facility] Name 02/13/22 0925          Stairs Within Home, Primary    Number of Stairs, Within Home, Primary none  -[facility] Name 02/13/22 0925          Cognition    Orientation Status (Cognition) oriented x 4  -[facility] Name 02/13/22 0925          Safety Issues, Functional Mobility    Safety Issues Affecting Function (Mobility) steps too close to assistive device; problem-solving  -     Impairments Affecting Function (Mobility) balance; endurance/activity tolerance; strength; range of motion (ROM)  -           User Key  (r) = Recorded By, (t) = Taken By, (c) = Cosigned By    Initials Name Provider Type     Molly Gamez, OT Occupational Therapist                 Mobility/ADL's     [facility] Name 02/13/22 0927          Bed Mobility    Bed Mobility supine-sit  -     Supine-Sit [place] (Bed Mobility) minimum assist (75% patient effort)  -     Assistive Device (Bed Mobility) bed rails; head of bed elevated  -[facility] Name 02/13/22 0927          Transfers    Transfers sit-stand transfer  -     Sit-Stand [place] (Transfers) 2 person assist; minimum assist (75% patient effort); verbal cues; nonverbal cues (demo/gesture)  -[facility] Name 02/13/22 0927          Sit-Stand Transfer    Assistive Device (Sit-Stand Transfers) walker, front-wheeled  -[facility] Name 02/13/22 0927          Functional Mobility    Functional Mobility- Ind.  Level minimum assist (75% patient effort); verbal cues required; nonverbal cues required (demo/gesture); 1 person + 1 person to manage equipment  -     Functional Mobility- Device rolling walker  -     Row Name 02/13/22 0927          Activities of Daily Living    BADL Assessment/Intervention lower body dressing; toileting; feeding  -[facility] Name 02/13/22 0927          Mobility    Extremity Weight-bearing Status left lower extremity  -     Left Lower Extremity (Weight-bearing Status) weight-bearing as tolerated (WBAT)  -[facility] Name 02/13/22 0927          Lower Body Dressing Assessment/Training    [place] Level (Lower Body Dressing) don; pants/bottoms; maximum assist (25% patient effort)  -     Position (Lower Body Dressing) supported sitting; supported standing  -     Comment (Lower Body Dressing) able to pull up brief w/ CGA for balance in standing & gown mgmt.  -[facility] Name 02/13/22 0927          Toileting Assessment/Training    [place] Level (Toileting) toileting skills; dependent (less than 25% patient effort)  -     Position (Toileting) supine  -     Comment (Toileting) pt requests use of periwick due to chronic dribbling & urgency issues. Placed BSC, new osmar wick, and assisted pt don brief once up in chair. Assisted pt to remove old osmar wick & she was able w/ setup to clean osmar area in standing w/ GCA for balance & max (A) gown mgmt. Encoureged pt to use call button & BSC for toileting & reported to RN that pt requested backup use of periwick in the instance her call light was not answered fast enough.  -[facility] Name 02/13/22 0927          Self-Feeding Assessment/Training    [place] Level (Feeding) feeding skills; independent  -     Position (Self-Feeding) sitting up in bed  -           User Key  (r) = Recorded By, (t) = Taken By, (c) = Cosigned By    Initials Name Provider Type    Molly Berrios OT Occupational Therapist               Obj/Interventions      [facility] Name 02/13/22 0931          Sensory Assessment (Somatosensory)    Sensory Assessment (Somatosensory) sensation intact  -MH     Row Name 02/13/22 0931          Vision Assessment/Intervention    Visual Impairment/Limitations WFL  -MH     Row Name 02/13/22 0931          Range of Motion Comprehensive    Comment, General Range of Motion hip flex on Lt 50%  -MH     Row Name 02/13/22 0931          Strength Comprehensive (MMT)    Comment, General Manual Muscle Testing (MMT) Assessment LLE hip 2+/5; RLE 4/5; BUE grossly 4/5  -MH     Row Name 02/13/22 0931          Balance    Balance Assessment sitting static balance; sitting dynamic balance; standing static balance; standing dynamic balance  -     Static Sitting Balance WFL; unsupported  -     Dynamic Sitting Balance WFL; unsupported  -     Static Standing Balance mild impairment; supported  -     Dynamic Standing Balance mild impairment; supported  -MH     Row Name 02/13/22 0931          Therapeutic Exercise    Therapeutic Exercise --  placed exercise program in room & precautoins sheet & educated on anterior hip prec.  -           User Key  (r) = Recorded By, (t) = Taken By, (c) = Cosigned By    Initials Name Provider Type     Molly Gamez OT Occupational Therapist               Goals/Plan     Row Name 02/13/22 0939          Bed Mobility Goal 1 (OT)    Activity/Assistive Device (Bed Mobility Goal 1, OT) bed mobility activities, all  -     [place] Level/Cues Needed (Bed Mobility Goal 1, OT) independent  -     Time Frame (Bed Mobility Goal 1, OT) 2 weeks  -MH     Row Name 02/13/22 0939          Transfer Goal 1 (OT)    Activity/Assistive Device (Transfer Goal 1, OT) transfers, all; walker, rolling  -     [place] Level/Cues Needed (Transfer Goal 1, OT) standby assist  -     Time Frame (Transfer Goal 1, OT) 2 weeks  -MH     Row Name 02/13/22 0939          Dressing Goal 1 (OT)    Activity/Device (Dressing Goal 1, OT) dressing skills, all  -      [place]/Cues Needed (Dressing Goal 1, OT) minimum assist (75% or more patient effort)  -     Time Frame (Dressing Goal 1, OT) 2 weeks  -     Row Name 02/13/22 0939          Toileting Goal 1 (OT)    Activity/Device (Toileting Goal 1, OT) toileting skills, all  -     [place] Level/Cues Needed (Toileting Goal 1, OT) modified independence  -     Time Frame (Toileting Goal 1, OT) 2 weeks  -     Row Name 02/13/22 0939          Therapy Assessment/Plan (OT)    Planned Therapy Interventions (OT) activity tolerance training; adaptive equipment training; BADL retraining; functional balance retraining; cognitive/visual perception retraining; occupation/activity based interventions; patient/caregiver education/training; transfer/mobility retraining; ROM/therapeutic exercise  -           User Key  (r) = Recorded By, (t) = Taken By, (c) = Cosigned By    Initials Name Provider Type     Molly Gamez, OT Occupational Therapist               Clinical Impression     Row Name 02/13/22 0933          Pain Scale: FACES Pre/Post-Treatment    Pain: FACES Scale, Pretreatment 0-->no hurt  -     Posttreatment Pain Rating 0-->no hurt  -     Row Name 02/13/22 0933          Plan of Care Review    Plan of Care Reviewed With patient; son; friend  -     Progress improving  -     Outcome Summary Pt is POD 1 S/P Lt anterior ABIGAIL after fall resulting in a femoral neck Fx. She has fallen twice, one inside & once outside her home. She lives alone & was (I) with all her I/ADL though she does continually refer to her son for retaining inforamtion & planning, reporting she is forgetful. In any case, Pt has her own RW in the room which her son has aquired for her but she has not had to use. She needed min (A) of 2 persons to safely axit bed, walk to the door & back to her recliner w/ RW. Her LBD/LBB & toileting skill is greatly affected as well but her hip Fx. She has anterior hip precautions & is WBAT. Would benefit from   acute rehab as she appears to tolerate therapy well, was fully (I) prior to recent admission, & does not appear limited by post-op pain.  -     Row Name 02/13/22 0933          Therapy Assessment/Plan (OT)    Rehab Potential (OT) good, to achieve stated therapy goals  -     Criteria for Skilled Therapeutic Interventions Met (OT) skilled treatment is necessary  -     Therapy Frequency (OT) 3 times/wk  -     Predicted Duration of Therapy Intervention (OT) until D/C  -     Row Name 02/13/22 0933          Therapy Plan Review/Discharge Plan (OT)    Anticipated Discharge Disposition (OT) inpatient rehabilitation facility  -     Row Name 02/13/22 0933          Vital Signs    Pretreatment Heart Rate (beats/min) 90  -     Intratreatment Heart Rate (beats/min) 162  chroinc afib w/ watchman device; asymptomatic but rate normally lower.  -     Posttreatment Heart Rate (beats/min) 113  -     O2 Delivery Pre Treatment room air  -     O2 Delivery Intra Treatment room air  -     Post SpO2 (%) 95  -     O2 Delivery Post Treatment room air  -     Pre Patient Position Supine  -     Intra Patient Position Standing  -     Post Patient Position Sitting  -     Row Name 02/13/22 0933          Positioning and Restraints    Pre-Treatment Position in bed  -     Post Treatment Position chair  -     In Chair notified nsg; sitting; call light within reach; encouraged to call for assist; exit alarm on; legs elevated; with family/caregiver  -           User Key  (r) = Recorded By, (t) = Taken By, (c) = Cosigned By    Initials Name Provider Type     Molly Gamez, CHEL Occupational Therapist               Outcome Measures     Row Name 02/13/22 0940          How much help from another is currently needed...    Putting on and taking off regular lower body clothing? 2  -MH     Bathing (including washing, rinsing, and drying) 2  -MH     Toileting (which includes using toilet bed pan or urinal) 2  -MH     Putting  on and taking off regular upper body clothing 2  -     Taking care of personal grooming (such as brushing teeth) 3  -     Eating meals 4  -     AM-PAC 6 Clicks Score (OT) 15  -     Row Name 02/13/22 0907          How much help from another person do you currently need...    Turning from your back to your side while in flat bed without using bedrails? 3  -ROSS     Moving from lying on back to sitting on the side of a flat bed without bedrails? 3  -ROSS     Moving to and from a bed to a chair (including a wheelchair)? 3  -ROSS     Standing up from a chair using your arms (e.g., wheelchair, bedside chair)? 2  -ROSS     Climbing 3-5 steps with a railing? 2  -ROSS     To walk in hospital room? 3  -     AM-PAC 6 Clicks Score (PT) 16  -     Row Name 02/13/22 0940 02/13/22 0907       Functional Assessment    Outcome Measure Options AM-PAC 6 Clicks Daily Activity (OT)  - AM-PAC 6 Clicks Basic Mobility (PT)  -          User Key  (r) = Recorded By, (t) = Taken By, (c) = Cosigned By    Initials Name Provider Type     Molly Gamez, OT Occupational Therapist    Genevieve Vann, PT Physical Therapist                Occupational Therapy Education                 Title: PT OT SLP Therapies (Done)     Topic: Occupational Therapy (Done)     Point: ADL training (Done)     Description:   Instruct learner(s) on proper safety adaptation and remediation techniques during self care or transfers.   Instruct in proper use of assistive devices.              Learning Progress Summary           Patient Acceptance, E,TB,D,H, VU,DU,NR by  at 2/13/2022 0940   Family Acceptance, E,TB,D,H, VU,DU,NR by  at 2/13/2022 0940                   Point: Home exercise program (Done)     Description:   Instruct learner(s) on appropriate technique for monitoring, assisting and/or progressing therapeutic exercises/activities.              Learning Progress Summary           Patient Acceptance, E,TB,D,H, VU,DU,NR by  at 2/13/2022 0940   Family  Acceptance, E,TB,D,H, VU,DU,NR by  at 2/13/2022 0940                   Point: Precautions (Done)     Description:   Instruct learner(s) on prescribed precautions during self-care and functional transfers.              Learning Progress Summary           Patient Acceptance, E,TB,D,H, VU,DU,NR by  at 2/13/2022 0940   Family Acceptance, E,TB,D,H, VU,DU,NR by  at 2/13/2022 0940                   Point: Body mechanics (Done)     Description:   Instruct learner(s) on proper positioning and spine alignment during self-care, functional mobility activities and/or exercises.              Learning Progress Summary           Patient Acceptance, E,TB,D,H, VU,DU,NR by  at 2/13/2022 0940   Family Acceptance, E,TB,D,H, VU,DU,NR by  at 2/13/2022 0940                               User Key     Initials Effective Dates Name Provider Type Discipline     06/16/21 -  Molly Gamez OT Occupational Therapist OT              OT Recommendation and Plan  Planned Therapy Interventions (OT): activity tolerance training, adaptive equipment training, BADL retraining, functional balance retraining, cognitive/visual perception retraining, occupation/activity based interventions, patient/caregiver education/training, transfer/mobility retraining, ROM/therapeutic exercise  Therapy Frequency (OT): 3 times/wk  Plan of Care Review  Plan of Care Reviewed With: patient, son, friend  Progress: improving  Outcome Summary: Pt is POD 1 S/P Lt anterior ABIGAIL after fall resulting in a femoral neck Fx. She has fallen twice, one inside & once outside her home. She lives alone & was (I) with all her I/ADL though she does continually refer to her son for retaining inforamtion & planning, reporting she is forgetful. In any case, Pt has her own RW in the room which her son has aquired for her but she has not had to use. She needed min (A) of 2 persons to safely axit bed, walk to the door & back to her recliner w/ RW. Her LBD/LBB & toileting skill is  greatly affected as well but her hip Fx. She has anterior hip precautions & is WBAT. Would benefit from  acute rehab as she appears to tolerate therapy well, was fully (I) prior to recent admission, & does not appear limited by post-op pain.     Time Calculation:    Time Calculation- OT     Row Name 02/13/22 0942 02/13/22 0941          Time Calculation- OT    OT Start Time -- 0830  -     OT Stop Time -- 0900  -     OT Time Calculation (min) -- 30 min  -     Total Timed Code Minutes- OT 18 minute(s)  - 23 minute(s)  -     OT Received On -- 02/13/22  -     OT - Next Appointment -- 02/14/22  -     OT Goal Re-Cert Due Date -- 02/27/22  -           User Key  (r) = Recorded By, (t) = Taken By, (c) = Cosigned By    Initials Name Provider Type     Molly Gamez OT Occupational Therapist              Therapy Charges for Today     Code Description Service Date Service Provider Modifiers Qty    [id number]  OT SELF CARE/MGMT/TRAIN EA 15 MIN 2/13/2022 Molly Gamez OT GO 1    [id number]  OT EVAL MOD COMPLEXITY 3 2/13/2022 Molly Gamez OT GO 1               Molly Gamez OT  2/13/2022    Electronically signed by Molly Gamez OT at 02/13/22 0942     Molly Gamez OT at 02/13/22 0941        Goal Outcome Evaluation:  Plan of Care Reviewed With: patient, son, friend        Progress: improving  Outcome Summary: Pt is POD 1 S/P Lt anterior ABIGAIL after fall resulting in a femoral neck Fx. She has fallen twice, one inside & once outside her home. She lives alone & was (I) with all her I/ADL though she does continually refer to her son for retaining inforamtion & planning, reporting she is forgetful. In any case, Pt has her own RW in the room which her son has aquired for her but she has not had to use. She needed min (A) of 2 persons to safely exit bed, walk to the door & back to her recliner w/ RW. Her LBD/LBB & toileting skill is greatly affected as well by her hip Fx. She has anterior hip  precautions & is WBAT. Would benefit from  acute rehab as she appears to tolerate therapy well, was fully (I) prior to recent admission, & does not appear limited by post-op pain.    Electronically signed by Molly Gamez OT at 02/13/22 0465

## 2022-02-13 NOTE — PLAN OF CARE
Goal Outcome Evaluation:  Patient doing well still feeling numb in lower extremities from block from surgery, able to wiggle toes without issue, will wait til am to ambulate since had block and still numb, vitals wnl

## 2022-02-13 NOTE — CASE MANAGEMENT/SOCIAL WORK
Discharge Planning Assessment   Alejandro     Patient Name: Marychuy Verdugo  MRN: [id number]  Today's Date: 2/13/2022    Admit Date: 2/10/2022     Discharge Needs Assessment     Row Name 02/13/22 1446       Living Environment    Lives With alone    Current Living Arrangements home/apartment/condo    Primary Care Provided by self    Provides Primary Care For no one    Able to Return to Prior Arrangements yes       Resource/Environmental Concerns    Resource/Environmental Concerns none       Transition Planning    Patient/Family Anticipates Transition to inpatient rehabilitation facility    Patient/Family Anticipated Services at Transition     Transportation Anticipated family or friend will provide       Discharge Needs Assessment    Equipment Currently Used at Home walker, rolling; cane, straight    Equipment Needed After Discharge none    Discharge Facility/Level of Care Needs rehabilitation facility               Discharge Plan     Row Name 02/13/22 1448       Plan    Plan DC Plan: [facility]- pending acceptance.    Provided Post Acute Provider List? Yes    Post Acute Provider List Inpatient Rehab    Delivered To Patient; Support Person    Support Person soren    Method of Delivery In person    Patient/Family in Agreement with Plan yes    Plan Comments Met with pt and family/friends at bedside; extensive conversation about ip rehab/sub acute.  discussed reviews on medicare.gov.  1st choice: [facility]; 2nd choice [facility]; referral sent in epic and notified stephen Leal.  Soren Laverne [phone] is contact for any questions.  pt lives home alones, I wiht ADLs, drives. no financial concerns at this time.  pcp and pharm verified.              Continued Care and Services - Admitted Since 2/10/2022     Destination     Service Provider Request Status Selected Services Address Phone Fax Patient Preferred    [facility]  Pending - Request Sent N/A [id number] [facility] [place] [address]  [phone] --                 Demographic Summary     Row Name 02/13/22 1446       General Information    Admission Type inpatient    Arrived From emergency department    Referral Source admission list    Reason for Consult discharge planning    Preferred Language English               Functional Status     Row Name 02/13/22 1446       Functional Status    Usual Activity Tolerance excellent    Current Activity Tolerance moderate       Functional Status, IADL    Medications independent    Meal Preparation independent    Housekeeping independent    Laundry independent    Shopping independent              Met with patient in room wearing PPE: mask.      Maintained distance greater than six feet and spent greater than 15 minutes in the room.        Angela Plaza RN

## 2022-02-13 NOTE — PLAN OF CARE
Goal Outcome Evaluation:              Outcome Summary: Pt evaluated per PT/OT today. VSS. Pt ambulating x 1 assist with walker and gait belt. Pain treated with PRN PO orders. Pt referred to [facility] per Case Mgmt. Plan ongoing.

## 2022-02-13 NOTE — PLAN OF CARE
Goal Outcome Evaluation:   Pt returned from surgery, VSS. Pt is very sleepy but easily arouses to touch. Has not complained of any pain. AAOx4. External catheter in place. Dressing clean, dry, and intact. Will continue to monitor.

## 2022-02-13 NOTE — THERAPY EVALUATION
Patient Name: Marychuy Verdugo  : 1940    MRN: [id number]                              Today's Date: 2022       Admit Date: 2/10/2022    Visit Dx:     ICD-10-CM ICD-9-CM   1. 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 No pertinent surgical history.   General Information     Row Name 22 0852          Physical Therapy Time and Intention    Document Type evaluation  -     Mode of Treatment physical therapy  -     Row Name 22 0852          General Information    Prior Level of Function independent:; community mobility; transfer; bed mobility; ADL's; cleaning; driving  Pt reports she was independent w/ all community ambulation w/out an AD. Son has a RW for her to use and pt owns a straight cane.  -     Existing Precautions/Restrictions other (see comments)  LLE WBAT  -     Row Name 22 0852          Living Environment    Lives With alone  -     Row Name 22 0852          Home Main  Entrance    Number of Stairs, Main Entrance one  -     Stair Railings, Main Entrance none  -     Row Name 02/13/22 0852          Stairs Within Home, Primary    Number of Stairs, Within Home, Primary none  -     Row Name 02/13/22 0852          Cognition    Orientation Status (Cognition) oriented x 4  -     Row Name 02/13/22 0852          Safety Issues, Functional Mobility    Safety Issues Affecting Function (Mobility) steps too close to assistive device; positioning of assistive device  -     Impairments Affecting Function (Mobility) balance; endurance/activity tolerance; strength  -           User Key  (r) = Recorded By, (t) = Taken By, (c) = Cosigned By    Initials Name Provider Type    ROSS Genevieve Colorado, DILEEP Physical Therapist               Mobility     Row Name 02/13/22 0854          Bed Mobility    Bed Mobility supine-sit  -     Supine-Sit Wilkinson (Bed Mobility) minimum assist (75% patient effort)  -     Assistive Device (Bed Mobility) bed rails; head of bed elevated  -     Comment (Bed Mobility) Pt requires min A at LLE, but was able to pull herself into sitting using the R hand rail and HOB moderately elevated.  -     Row Name 02/13/22 0854          Sit-Stand Transfer    Sit-Stand Wilkinson (Transfers) minimum assist (75% patient effort); 2 person assist  -     Assistive Device (Sit-Stand Transfers) walker, front-wheeled  -     Row Name 02/13/22 0854          Gait/Stairs (Locomotion)    Wilkinson Level (Gait) contact guard  -     Assistive Device (Gait) walker, front-wheeled  -     Distance in Feet (Gait) 50ft  -     Deviations/Abnormal Patterns (Gait) left sided deviations; weight shifting decreased; antalgic; bilateral deviations; gait speed decreased  -     Bilateral Gait Deviations heel strike decreased  -     Comment (Gait/Stairs) Pt ambulates w/in room this date and no further secondary to increased HR upon standing. Pt required max v/c for assist/sequencing  w/ RW, but thereafter requires CGA.  -     Row Name 02/13/22 0854          Mobility    Extremity Weight-bearing Status left lower extremity  -     Left Lower Extremity (Weight-bearing Status) weight-bearing as tolerated (WBAT)  -           User Key  (r) = Recorded By, (t) = Taken By, (c) = Cosigned By    Initials Name Provider Type    ROSS Genevieve Colorado, PT Physical Therapist               Obj/Interventions     [facility] Name 02/13/22 0856          Range of Motion Comprehensive    General Range of Motion bilateral lower extremity ROM WFL  -[facility] Name 02/13/22 0856          Strength Comprehensive (MMT)    General Manual Muscle Testing (MMT) Assessment lower extremity strength deficits identified  -     Comment, General Manual Muscle Testing (MMT) Assessment LLE hip flexion 2+/5, knee ext 3+/5, knee flex 4/5. RLE grossly 4-/5  -[facility] Name 02/13/22 0856          Motor Skills    Therapeutic Exercise hip  -[facility] Name 02/13/22 0856          Hip (Therapeutic Exercise)    Hip (Therapeutic Exercise) AROM (active range of motion)  Pt given initial HEP and provided education sheet on precautions/expectations/pain and swelling management.  -[facility] Name 02/13/22 0856          Balance    Balance Assessment sitting static balance; sitting dynamic balance; standing static balance  -     Static Sitting Balance WFL; unsupported; sitting, edge of bed  -     Dynamic Sitting Balance WFL; unsupported; sitting, edge of bed  -     Static Standing Balance mild impairment; supported; standing  -     Comment, Balance Upon standing pt required min A to maintain standing balance at RW secondary to posterior weight shift. Following v/c pt was able to maintain static standing balance at RW, CGA.  -[facility] Name 02/13/22 0856          Sensory Assessment (Somatosensory)    Sensory Assessment (Somatosensory) LE sensation intact  -           User Key  (r) = Recorded By, (t) = Taken By, (c) = Cosigned By    Initials  Name Provider Type    Genevieve Vann, PT Physical Therapist               Goals/Plan     Row Name 02/13/22 0906          Bed Mobility Goal 1 (PT)    Activity/Assistive Device (Bed Mobility Goal 1, PT) bed mobility activities, all  -ROSS     Winston Level/Cues Needed (Bed Mobility Goal 1, PT) modified independence  -ROSS     Time Frame (Bed Mobility Goal 1, PT) long term goal (LTG); 2 weeks  -ROSS     Row Name 02/13/22 0906          Transfer Goal 1 (PT)    Activity/Assistive Device (Transfer Goal 1, PT) transfers, all  -ROSS     Winston Level/Cues Needed (Transfer Goal 1, PT) modified independence  -ROSS     Time Frame (Transfer Goal 1, PT) long term goal (LTG); 2 weeks  -ROSS     Row Name 02/13/22 0906          Gait Training Goal 1 (PT)    Activity/Assistive Device (Gait Training Goal 1, PT) gait (walking locomotion)  -ROSS     Winston Level (Gait Training Goal 1, PT) modified independence  -ROSS     Distance (Gait Training Goal 1, PT) 100ft  -ROSS     Time Frame (Gait Training Goal 1, PT) long term goal (LTG); 2 weeks  -ROSS     Row Name 02/13/22 0906          Patient Education Goal (PT)    Activity (Patient Education Goal, PT) Pt will be independent w/ her initial HEP.  -ROSS     Winston/Cues/Accuracy (Memory Goal 2, PT) demonstrates adequately  -ROSS     Time Frame (Patient Education Goal, PT) long term goal (LTG); 2 weeks  -ROSS           User Key  (r) = Recorded By, (t) = Taken By, (c) = Cosigned By    Initials Name Provider Type    Genevieve Vann, PT Physical Therapist               Clinical Impression     Row Name 02/13/22 0859          Pain    Additional Documentation Pain Scale: FACES Pre/Post-Treatment (Group)  -     Row Name 02/13/22 0859          Pain Scale: FACES Pre/Post-Treatment    Pain: FACES Scale, Pretreatment 2-->hurts little bit  -ROSS     Posttreatment Pain Rating 2-->hurts little bit  -ROSS     Pain Location - Side Left  -     Pain Location hip  -     Row Name 02/13/22 0859          Plan  of Care Review    Plan of Care Reviewed With patient  -     Outcome Summary Pt is an 80 y/o female who presents to [facility] following a fall resulting in a L femoral neck fx. She is s/p L anterior ABIGAIL. PMH includes but is not limited to chronic A-fib w/ watchman, overactive bladder, chronic peripheral edema. At [facility] pt lives alone in a SSH and one stepped entry. She was independent w/ all community ambulation w/out an AD and driving. At this time pt requires min A for bed mobility w/ use of bed rails, min A x2 w/ RW, and CGA for ambulation x50ft w/ RW. Pt demonstrates posterior weight shift upon initial stand and required max v/c for sequencing w/ RW. Will recommend acute inpatient rehab to decrease risk of falls in the future.  -     Row Name 02/13/22 0859          Therapy Assessment/Plan (PT)    Predicted Duration of Therapy Intervention (PT) Until d/c  -     Row Name 02/13/22 0859          Vital Signs    Intratreatment Heart Rate (beats/min) 160  -ROSS     Posttreatment Heart Rate (beats/min) 120  -ROSS     O2 Delivery Pre Treatment room air  -ROSS     O2 Delivery Intra Treatment room air  -ROSS     Post SpO2 (%) 95  -ROSS     O2 Delivery Post Treatment room air  -ROSS     Row Name 02/13/22 0859          Positioning and Restraints    Pre-Treatment Position in bed  -ROSS     Post Treatment Position chair  -ROSS     In Chair reclined; call light within reach; encouraged to call for assist; exit alarm on; with family/caregiver; with OT; notified [facility]  -ROSS           User Key  (r) = Recorded By, (t) = Taken By, (c) = Cosigned By    Initials Name Provider Type    Genevieve Vann, PT Physical Therapist               Outcome Measures     Row Name 02/13/22 0907          How much help from another person do you currently need...    Turning from your back to your side while in flat bed without using bedrails? 3  -ROSS     Moving from lying on back to sitting on the side of a flat bed without bedrails? 3  -ROSS     Moving to and from a bed  to a chair (including a wheelchair)? 3  -ROSS     Standing up from a chair using your arms (e.g., wheelchair, bedside chair)? 2  -ROSS     Climbing 3-5 steps with a railing? 2  -ROSS     To walk in hospital room? 3  -ROSS     AM-PAC 6 Clicks Score (PT) 16  -     Row Name 02/13/22 0907          Functional Assessment    Outcome Measure Options AM-PAC 6 Clicks Basic Mobility (PT)  -           User Key  (r) = Recorded By, (t) = Taken By, (c) = Cosigned By    Initials Name Provider Type    Genevieve Vann, DILEEP Physical Therapist                             Physical Therapy Education                 Title: PT OT SLP Therapies (In Progress)     Topic: Physical Therapy (Done)     Point: Mobility training (Done)     Learning Progress Summary           Patient Acceptance, E,TB, VU by  at 2/13/2022 0908                   Point: Home exercise program (Done)     Learning Progress Summary           Patient Acceptance, E,TB, VU by  at 2/13/2022 0908                   Point: Body mechanics (Done)     Learning Progress Summary           Patient Acceptance, E,TB, VU by ROSS at 2/13/2022 0908                   Point: Precautions (Done)     Learning Progress Summary           Patient Acceptance, E,TB, VU by  at 2/13/2022 0908                               User Key     Initials Effective Dates Name Provider Type [facility] 08/23/21 -  Genevieve Colorado, DILEEP Physical Therapist PT              PT Recommendation and Plan  Planned Therapy Interventions (PT): balance training, bed mobility training, gait training, home exercise program, motor coordination training, neuromuscular re-education, patient/family education, postural re-education, ROM (range of motion), strengthening, transfer training  Plan of Care Reviewed With: patient  Outcome Summary: Pt is an 80 y/o female who presents to [facility] following a fall resulting in a L femoral neck fx. She is s/p L anterior ABIGAIL. PMH includes but is not limited to chronic A-fib w/ watchman,  overactive bladder, chronic peripheral edema. At [facility] pt lives alone in a SSH and one stepped entry. She was independent w/ all community ambulation w/out an AD and driving. At this time pt requires min A for bed mobility w/ use of bed rails, min A x2 w/ RW, and CGA for ambulation x50ft w/ RW. Pt demonstrates posterior weight shift upon initial stand and required max v/c for sequencing w/ RW. Will recommend acute inpatient rehab to decrease risk of falls in the future.     Time Calculation:    PT Charges     Row Name 02/13/22 0909             Time Calculation    Start Time 0811  -      Stop Time 0846  -      Time Calculation (min) 35 min  -ROSS      PT Received On 02/13/22  -ROSS      PT - Next Appointment 02/14/22  -ROSS      PT Goal Re-Cert Due Date 02/27/22  -ROSS            User Key  (r) = Recorded By, (t) = Taken By, (c) = Cosigned By    Initials Name Provider Type     Genevieve Colorado, PT Physical Therapist              Therapy Charges for Today     Code Description Service Date Service Provider Modifiers Qty    [id number] HC PT EVAL MOD COMPLEXITY 4 2/13/2022 Genevieve Colorado, PT GP 1          PT G-Codes  Outcome Measure Options: AM-PAC 6 Clicks Basic Mobility (PT)  AM-PAC 6 Clicks Score (PT): 16    Genevieve Colorado PT  2/13/2022

## 2022-02-13 NOTE — PLAN OF CARE
Goal Outcome Evaluation:  Plan of Care Reviewed With: patient, son, friend        Progress: improving  Outcome Summary: Pt is POD 1 S/P Lt anterior ABIGAIL after fall resulting in a femoral neck Fx. She has fallen twice, one inside & once outside her home. She lives alone & was (I) with all her I/ADL though she does continually refer to her son for retaining inforamtion & planning, reporting she is forgetful. In any case, Pt has her own RW in the room which her son has aquired for her but she has not had to use. She needed min (A) of 2 persons to safely exit bed, walk to the door & back to her recliner w/ RW. Her LBD/LBB & toileting skill is greatly affected as well by her hip Fx. She has anterior hip precautions & is WBAT. Would benefit from  acute rehab as she appears to tolerate therapy well, was fully (I) prior to recent admission, & does not appear limited by post-op pain.

## 2022-02-14 PROBLEM — L30.8 DERMATITIS ASSOCIATED WITH MOISTURE: Status: ACTIVE | Noted: 2022-01-01

## 2022-02-14 NOTE — DISCHARGE PLACEMENT REQUEST
"Marychuy Breen (82 y.o. Female)             Date of Birth Social Security Number Address Home Phone MRN    1940  [address] [place] [address] [phone] [id number]    Oriental orthodox Marital Status             Non-Hinduism        Admission Date Admission Type Admitting Provider Attending Provider Department, Room/Bed    2/10/22 Emergency Brea Sr MD Hall, Kelli G, MD [facility] SURGICAL INPATIENT, 4113/1    Discharge Date Discharge Disposition Discharge Destination                         Attending Provider: Brea Sr MD    Allergies: Haloperidol    Isolation: None   Infection: None   Code Status: No CPR   Advance Care Planning Activity    Ht: 175.3 cm (69\")   Wt: 94 kg (207 lb 3.7 oz)    Admission Cmt: None   Principal Problem: Left displaced femoral neck fracture (HCC) [S72.002A]                 Active Insurance as of 2/10/2022     Primary Coverage     Payor Plan Insurance Group Employer/Plan Group    MEDICARE MEDICARE A & B      Payor Plan Address Payor Plan Phone Number Payor Plan Fax Number Effective Dates    [address] [phone]  2/1/2006 - None Entered    [facility] [id number]       Subscriber Name Subscriber Birth Date Member ID       MARYCHUY BREEN 1940 [id number]           Secondary Coverage     Payor Plan Insurance Group Employer/Plan Group    [id number]     Payor Plan Address Payor Plan Phone Number Payor Plan Fax Number Effective Dates    [address]   8/1/2018 - None Entered    [facility] [id number]       Subscriber Name Subscriber Birth Date Member ID       MARYCHUY BREEN 1940 [id number]                 Emergency Contacts      (Rel.) Home Phone Work Phone Mobile Phone    moni frederick (Son) [phone] -- [phone]                "

## 2022-02-14 NOTE — PLAN OF CARE
Goal Outcome Evaluation:  Patient doing well, did ambulate to bathroom and bsc, pain well controlled with prn medications

## 2022-02-14 NOTE — CASE MANAGEMENT/SOCIAL WORK
Continued Stay Note   Alejandro     Patient Name: Marychuy Verdugo  MRN: [id number]  Today's Date: 2/14/2022    Admit Date: 2/10/2022     Discharge Plan     Row Name 02/14/22 1553       Plan    Plan New referral to [facility] pending acceptance.  pending referral to [facility] pending bed availability    Plan Comments Referral to [facility] pending acceptance. Also referral to [facility] pending bed availability.                                    Loan Hopkins RN   Phone communication or documentation only - no physical contact with patient or family.

## 2022-02-14 NOTE — PROGRESS NOTES
Wound Initial Evaluation   [facility]     Patient Name: Marychuy Verdugo  : 1940  MRN: [id number]  Today's Date: 2022 Room Number: 4113/1      Admit Date: 2/10/2022  Attending: Brea Sr MD    Consult Requested By: Dr Sr    Reason For Consult: MAD    Chief Complaint: 81-year-old female presents to the hospital after tripping and falling and landing on her left side.  She presents to the hospital with left hip pain.  Consult received to assess patient sacral area pressure injury versus moisture associated dermatitis.  Per patient she reports that she fell and laid in stool for a couple of hours before getting help.  She denies any burning or pain to her  Norma rectal area    Visit Dx:    ICD-10-CM ICD-9-CM   1. Left displaced femoral neck fracture (HCC)  S72.002A 820.8     Patient Active Problem List   Diagnosis   • Abnormal cardiovascular stress test   • Atrial fibrillation (HCC)   • Chronic anticoagulation   • GERD (gastroesophageal reflux disease)   • Essential hypertension   • Hypothyroid   • Knee osteoarthritis   • Moderate mitral regurgitation   • Pulmonary hypertension (HCC)   • Permanent atrial fibrillation with rapid ventricular response (HCC)   • Right-sided nosebleed   • Orbit fracture, right (HCC)   • Asymptomatic reticular veins 1 mm to 3 mm in diameter   • Lymphedema   • Presence of Watchman left atrial appendage closure device   • S/P left atrial appendage ligation   • Mitral regurgitation   • Left displaced femoral neck fracture (HCC)   • Peripheral edema   • Overactive bladder   • Dermatitis associated with moisture       History:   Past Medical History:   Diagnosis Date   • Atrial fibrillation (HCC) 10/10/2013   • Chronic anticoagulation 2020   • GERD (gastroesophageal reflux disease) 2013   • HTN (hypertension) 2014   • Hypothyroid 2013     History reviewed. No pertinent surgical history.  Social History     Socioeconomic History   • Marital status:     Tobacco Use   • Smoking status: Never Smoker   Substance and Sexual Activity   • Alcohol use: Never   • Drug use: Never   • Sexual activity: Defer       Allergies:  Allergies   Allergen Reactions   • Haloperidol Unknown - Low Severity       Medications:    Current Facility-Administered Medications:   •  acetaminophen (TYLENOL) tablet 650 mg, 650 mg, Oral, Q4H PRN **OR** acetaminophen (TYLENOL) 160 MG/5ML solution 650 mg, 650 mg, Oral, Q4H PRN **OR** acetaminophen (TYLENOL) suppository 650 mg, 650 mg, Rectal, Q4H PRN, Yair Carver MD  •  aluminum-magnesium hydroxide-simethicone (MAALOX MAX) 400-400-40 MG/5ML suspension 15 mL, 15 mL, Oral, Q6H PRN, Yair Carver MD  •  calcium carbonate (TUMS) chewable tablet 500 mg (200 mg elemental), 1 tablet, Oral, BID PRN, Yair Carver MD  •  enoxaparin (LOVENOX) syringe 30 mg, 30 mg, Subcutaneous, Q24H, Shefali De Los Santos MD, 30 mg at 02/13/22 1617  •  furosemide (LASIX) tablet 40 mg, 40 mg, Oral, BID, Yair Carver MD, 40 mg at 02/14/22 0900  •  HYDROcodone-acetaminophen (NORCO) 5-325 MG per tablet 1 tablet, 1 tablet, Oral, Q4H PRN, Yair Carver MD, 1 tablet at 02/14/22 0729  •  levothyroxine (SYNTHROID, LEVOTHROID) tablet 88 mcg, 88 mcg, Oral, Q AM, Yair Carver MD, 88 mcg at 02/14/22 0601  •  lidocaine (LIDODERM) 5 % 1 patch, 1 patch, Transdermal, Q24H, Yair Carver MD, 1 patch at 02/14/22 0900  •  melatonin tablet 5 mg, 5 mg, Oral, Nightly PRN, Yair Carver MD  •  metoprolol tartrate (LOPRESSOR) tablet 25 mg, 25 mg, Oral, TID, Jethro Gonzalez MD, 25 mg at 02/14/22 0900  •  nitroglycerin (NITROSTAT) SL tablet 0.4 mg, 0.4 mg, Sublingual, Q5 Min PRN, Yair Carver MD  •  ondansetron (ZOFRAN) tablet 4 mg, 4 mg, Oral, Q6H PRN **OR** ondansetron (ZOFRAN) injection 4 mg, 4 mg, Intravenous, Q6H PRN, Yair Carver MD, 4 mg at 02/12/22 0956  •  pantoprazole (PROTONIX) EC tablet 40 mg, 40 mg, Oral, [facility], Yair Carver MD, 40 mg at 02/14/22 0601  •   polyethylene glycol (MIRALAX) packet 17 g, 17 g, Oral, Daily, Yair Carver MD, 17 g at 02/14/22 0900  •  sodium chloride 0.9 % flush 3 mL, 3 mL, Intravenous, Q12H, Yair Carver MD, 3 mL at 02/14/22 0900  •  sodium chloride 0.9 % flush 3-10 mL, 3-10 mL, Intravenous, PRN, Yair Carver MD    Results Review:  Lab Results (last 48 hours)     Procedure Component Value Units Date/Time    Basic Metabolic Panel [[id number]]  (Abnormal) Collected: 02/14/22 0301    Specimen: Blood Updated: 02/14/22 0415     Glucose 95 mg/dL      BUN 28 mg/dL      Creatinine 1.02 mg/dL      Sodium 139 mmol/L      Potassium 4.0 mmol/L      Chloride 103 mmol/L      CO2 26.0 mmol/L      Calcium 8.6 mg/dL      eGFR Non African Amer 52 mL/min/1.73      BUN/Creatinine Ratio 27.5     Anion Gap 10.0 mmol/L     Narrative:      GFR Normal >60  Chronic Kidney Disease <60  Kidney Failure <15      CBC (No Diff) [[id number]]  (Abnormal) Collected: 02/14/22 0301    Specimen: Blood Updated: 02/14/22 0354     WBC 8.50 10*3/mm3      RBC 3.25 10*6/mm3      Hemoglobin 9.9 g/dL      Hematocrit 29.3 %      MCV 90.2 fL      MCH 30.5 pg      MCHC 33.8 g/dL      RDW 14.8 %      RDW-SD 46.8 fl      MPV 8.8 fL      Platelets 216 10*3/mm3         Imaging Results (Last 72 Hours)     Procedure Component Value Units Date/Time    XR Hip 1 View Without Pelvis Left (Surgery Only) [[id number]] Resulted: 02/12/22 1031     Updated: 02/12/22 1032    FL C Arm During Surgery [[id number]] Resulted: 02/12/22 1030     Updated: 02/12/22 1030    Narrative:      This procedure was auto-finalized with no dictation required.          Review of Systems:  Review of Systems   Constitutional: Negative.    HENT: Negative.    Respiratory: Negative.    Cardiovascular: Negative for chest pain and leg swelling.   Gastrointestinal: Negative.    Genitourinary: Negative.    Musculoskeletal: Positive for gait problem.        Able to stand with walker   Skin: Positive for rash.   Psychiatric/Behavioral:  Negative.        Physical Assessment:  Wound 02/12/22 0000 Bilateral gluteal Pressure Injury (Active)   Dressing Appearance open to air 02/14/22 0729   Closure None 02/14/22 0729   Base other (see comments); blanchable 02/14/22 0729       Wound 02/12/22 0855 Left anterior greater trochanter Incision (Active)   Dressing Appearance dry; intact; no drainage 02/14/22 0729   Closure JIMMY 02/14/22 0729   Base dressing in place, unable to visualize 02/14/22 0729      Oyster associated dermatitis: Patient has a linear slit to the buttock cleft reports having stool for an unknown amount of time on her at the time of the fall the area has a slight denuded area which is superficial it is linear in nature it is moisture related not pressure related.    Final diagnosis  Moisture  associated dermatitis    Recommendation and Plan  We will recommend treating with a zinc oxide-based moisture barrier also continue with pressure injury prevention strategies such as using a waffle chair cushion when patient is out of bed frequent turning position changes.    Lindsay Mckenna, APRN   2/14/2022   10:33 EST

## 2022-02-14 NOTE — PROGRESS NOTES
[facility] Medicine Services Daily Progress Note    Patient Name: Marychuy Verdugo  : 1940  MRN: [id number]  Primary Care Physician:  Curt Ng MD  Date of admission: 2/10/2022      Subjective      Chief Complaint: Fall with left femur fracture      Patient Reports   2022: The patient reports some affected postoperative pain that is adequately controlled with the pain medication.  She has ambulated since surgery.  She is tolerating p.o. and reports having normal bowel movements and urination.  She reports that she has chronic ankle edema.    ROS   12 point review of systems was reviewed and was negative except left hip pain and chronic bilateral ankle edema.      Objective      Vitals:   Temp:  [97.5 °F (36.4 °C)-98.6 °F (37 °C)] 98.2 °F (36.8 °C)  Heart Rate:  [84-86] 84  Resp:  [10-19] 19  BP: (102-115)/(52-56) 106/53  Flow (L/min):  [0] 0    Physical Exam   Vital signs and nurses notes reviewed.  Well-developed well-nourished elderly female comfortable on room air sitting up in the chair in no acute distress awake and alert; mucous membranes moist; sclerae anicteric; lungs clear to auscultation bilaterally; CV regular rate and rhythm; abdomen soft nontender nondistended; extremities with 1+ bilateral ankle edema, no cyanosis or calf tenderness; palpable pedal pulses bilaterally; no Santos catheter.       Result Review    Result Review:  I have personally reviewed the results from the time of this admission to 2022 16:43 EST and agree with these findings:  [x]  Laboratory  [x]  Microbiology  [x]  Radiology  [x]  EKG/Telemetry   [x]  Cardiology/Vascular   []  Pathology  []  Old records  []  Other:  Most notable findings discussed in the assessment and plan.    Wounds (last 24 hours)     LDA Wound     Row Name 22 0729 22 191          Wound 22 0000 Bilateral gluteal Pressure Injury    Wound - Properties Group Placement Date: 22  -JE Placement  Time: 0000  -JE Side: Bilateral  -JE Location: gluteal  -JE Primary Wound Type: Pressure inj  -JE Stage, Pressure Injury : Stage 1; deep tissue injury  -JE, Possible DTI      Dressing Appearance open to air  -BC --     Closure None  -BC --     Base other (see comments); blanchable  skin intact, blanchable, without redness.  -BC --     Retired Wound - Properties Group Date first assessed: 02/12/22  -JE Time first assessed: 0000  -JE Side: Bilateral  -JE Location: gluteal  -JE Primary Wound Type: Pressure inj  -JE            Wound 02/12/22 0855 Left anterior greater trochanter Incision    Wound - Properties Group Placement Date: 02/12/22  -LM Placement Time: 0855  -LM Side: Left  -LM Orientation: anterior  -LM Location: greater trochanter  -LM Primary Wound Type: Incision  -LM     Dressing Appearance dry; intact; no drainage  -BC intact; dry  -JR     Closure JIMMY  -BC JIMMY  -JR     Base dressing in place, unable to visualize  -BC dressing in place, unable to visualize  -JR     Retired Wound - Properties Group Date first assessed: 02/12/22  -LM Time first assessed: 0855  -LM Side: Left  -LM Location: greater trochanter  -LM Primary Wound Type: Incision  -LM           User Key  (r) = Recorded By, (t) = Taken By, (c) = Cosigned By    Initials Name Provider Type     Mamta Easton RN Registered Nurse    Lit Sagastume RN Registered Nurse    Dodie White LPN Licensed Nurse    Yue Lowe LPN Licensed Nurse                  Assessment/Plan      Brief Patient Summary:  Marychuy Verdugo is a 82 y.o. female who presented to [facility] on 2/10/2022 complaining of mechanical fall at home while walking back into her house and falling onto left hip with immediate left hip pain. Patient does report that she hit her head during the fall but denies any LOC, dizziness, N/V, or headaches since the fall. Patient also denies any chest pain, dyspnea, or lightheadedness prior to or after the fall.   Orthopedic  surgery was consulted for surgical repair, and cardiology for preoperative clearance.    Inpatient medications include:  enoxaparin, 30 mg, Subcutaneous, Q24H  furosemide, 40 mg, Oral, BID  levothyroxine, 88 mcg, Oral, Q AM  lidocaine, 1 patch, Transdermal, Q24H  metoprolol tartrate, 25 mg, Oral, TID  pantoprazole, 40 mg, Oral, QAM  polyethylene glycol, 17 g, Oral, Daily  sodium chloride, 3 mL, Intravenous, Q12H             Active Hospital Problems:  Active Hospital Problems    Diagnosis    • **Left displaced femoral neck fracture (HCC)    • Dermatitis associated with moisture    • Peripheral edema    • Overactive bladder    • Presence of Watchman left atrial appendage closure device    • Atrial fibrillation (HCC)    • GERD (gastroesophageal reflux disease)    • Hypothyroid      Plan:   Mechanical fall   Displaced femur neck fracture intracapsular, comminuted left hip  -Status post direct anterior left total hip arthroplasty  -Continue postoperative care per orthopedic surgery with plans for inpatient rehab at discharge    DVT prophylaxis:  Medical and mechanical DVT prophylaxis orders are present.    CODE STATUS:    Level Of Support Discussed With: Patient; Next of Kin (If No Surrogate)  Code Status (Patient has no pulse and is not breathing): No CPR (Do Not Attempt to Resuscitate)  Medical Interventions (Patient has pulse or is breathing): Full Support      Disposition:  I expect patient to be discharged inpatient rehab in 1 to 2 days.    This patient has been examined wearing appropriate Personal Protective Equipment and discussed with hospital infection control department. 02/14/22      Electronically signed by Brea Sr MD, 02/14/22, 16:43 EST.  [facility] Hospitalist Team

## 2022-02-14 NOTE — PLAN OF CARE
Goal Outcome Evaluation:    Marychuy Verdugo presents with functional mobility impairments which indicate the need for skilled intervention. Tolerating session today without incident. On this date, pt completed STS transfer with CGA up to min A at times using RW. Mild decrease in eccentric control noted requiring moderate VC provided. Ambulated 50ft using RW and SBA. Also completed standing thera ex with RW. Pt demonstrated good technique and no c/o pain reported. Will continue to follow and progress as tolerated.

## 2022-02-14 NOTE — PLAN OF CARE
Goal Outcome Evaluation:              Outcome Summary: Pt without significant complaints today. VSS. Pt ambulating well with walker and 1 assist. Pain treated per PRN PO orders. Pt to dc when bed is available. Plan ongoing.

## 2022-02-14 NOTE — THERAPY TREATMENT NOTE
Subjective: Pt agreeable to therapeutic plan of care.    Objective:     Bed mobility - N/A or Not attempted.  Transfers - CGA, Min-A and with rolling walker , VC for hand placement and body mechanics   Ambulation - 50 feet SBA and with rolling walker      Standing Thera ex with RW (2 sets x 10) BL  - mini marching  - heel raises  - hamstring curls    Pain: 0/10 VAS  Education: Provided education on importance of mobility and skilled verbal / tactile cueing throughout intervention.     Assessment: Marychuy Verdugo presents with functional mobility impairments which indicate the need for skilled intervention. Tolerating session today without incident. On this date, pt completed STS transfer with CGA up to min A at times using RW. Mild decrease in eccentric control noted requiring moderate VC provided. Ambulated 50ft using RW and SBA. Also completed standing thera ex with RW. Pt demonstrated good technique and no c/o pain reported. Will continue to follow and progress as tolerated.     Plan/Recommendations:   Pt would benefit from Inpatient Rehabilitation placement at discharge from facility and requires no DME at discharge.   Pt desires Inpatient Rehabilitation placement at discharge. Pt cooperative; agreeable to therapeutic recommendations and plan of care.     Basic Mobility 6-click:  Rollin = Total, A lot = 2, A little = 3; 4 = None  Supine>Sit:   1 = Total, A lot = 2, A little = 3; 4 = None   Sit>Stand with arms:  1 = Total, A lot = 2, A little = 3; 4 = None  Bed>Chair:   1 = Total, A lot = 2, A little = 3; 4 = None  Ambulate in room:  1 = Total, A lot = 2, A little = 3; 4 = None  3-5 Steps with railin = Total, A lot = 2, A little = 3; 4 = None  Score: 18      Post-Tx Position: Up in Chair, Alarms activated and Call light and personal items within reach  PPE: gloves, surgical mask, eyewear protection

## 2022-02-14 NOTE — PROGRESS NOTES
"          Cardiology Progress  Note      Patient Care Team:  Curt Ng MD as PCP - General  Curt Ng MD as PCP - Family Medicine    PATIENT IDENTIFICATION  Name: Marychuy Verdugo  Age: 82 y.o.  Sex: female  :  1940  MRN: [id number]             REASON FOR FOLLOW-UP:  Presurgical cardiac risk assessment to undergo ORIF left femoral neck fracture  Permanent atrial fibrillation  Watchman in situ        SUBJECTIVE    Patient seen and examined, chart and labs reviewed.  Patient sitting in bedside chair and reports that she feels well.  Rhythm remains A. fib, controlled.  She denies any palpitations, shortness of breath.      REVIEW OF SYSTEMS:  Pertinent items are noted in HPI, all other systems reviewed and negative    OBJECTIVE       ASSESSMENT  Mechanical fall  Left femoral neck fracture  Presurgical cardiac risk assessment  Permanent atrial fibrillation  Status post watchman implant with complete seal noted follow-up BRADLEY  Hypothyroidism  Essential hypertension          RECOMMENDATIONS  Patient has no known history of coronary disease.  Permanent atrial fibrillation status post left atrial appendage implant with follow-up BRADLEY and no leak, endothelialized.  No anticoagulation indicated  Recent echo with normal LV systolic function, patient does have mod-severe TR with severely dilated left atrium, pulmonary hypertension.  Recommend monitoring for signs/symptoms fluid overload given valvular history and pulmonary hypertension.  Patient underwent total left hip arthroplasty 2022 with no perioperative or postoperative cardiac events reported.  No symptoms are of angina  No symptoms of fluid overload noted  Cardiology will sign off please call if needed          Vital Signs  Visit Vitals  /52   Pulse 86   Temp 97.5 °F (36.4 °C) (Oral)   Resp 10   Ht 175.3 cm (69\")   Wt 94 kg (207 lb 3.7 oz)   SpO2 97%   Breastfeeding No   BMI 30.60 kg/m²     Oxygen Therapy  SpO2: 97 %  Pulse Oximetry Type: " "Continuous  Device (Oxygen Therapy): room air  Flow (L/min): 0  Flowsheet Rows      First Filed Value   Admission Height 175.3 cm (69\") Documented at 02/10/2022 1309   Admission Weight 82.6 kg (182 lb) Documented at 02/10/2022 1309        Intake & Output (last 3 days)       02/11 0701  02/12 0700 02/12 0701  02/13 0700 02/13 0701 02/14 0700 02/14 0701  02/15 0700    P.O.  120 480 480    I.V. (mL/kg)  1680 (18)      Total Intake(mL/kg)  1800 (19.3) 480 (5.1) 480 (5.1)    Urine (mL/kg/hr)  700 (0.3)      Blood  200      Total Output  900      Net  +900 +480 +480            Urine Unmeasured Occurrence   2 x 1 x    Stool Unmeasured Occurrence   1 x 1 x        Lines, Drains & Airways     Active LDAs     Name Placement date Placement time Site Days    Peripheral IV 02/12/22 1623 Anterior; Left Forearm 02/12/22  1623  Forearm  1                       /52   Pulse 86   Temp 97.5 °F (36.4 °C) (Oral)   Resp 10   Ht 175.3 cm (69\")   Wt 94 kg (207 lb 3.7 oz)   SpO2 97%   Breastfeeding No   BMI 30.60 kg/m²   Intake/Output last 3 shifts:  I/O last 3 completed shifts:  In: 480 [P.O.:480]  Out: 700 [Urine:700]  Intake/Output this shift:  I/O this shift:  In: 480 [P.O.:480]  Out: -     PHYSICAL EXAM:    General: Alert, cooperative, no distress, appears stated age  Head:  Normocephalic, atraumatic, mucous membranes moist  Eyes:  Conjunctiva/corneas clear, EOM's intact     Neck:  Supple,  no adenopathy; no JVD or bruit  Lungs: Clear to auscultation bilaterally, no wheezes rhonchi rales are noted  Chest wall: No tenderness  Heart::  Regular rate and rhythm, S1 and S2 normal, no murmur, rub or gallop  Abdomen: Soft, non-tender, nondistended bowel sounds active  Extremities: No cyanosis, clubbing, or edema   Pulses: 2+ and symmetric all extremities  Skin:  No rashes or lesions  Neuro/psych: A&O x3. CN II through XII are grossly intact with appropriate affect      Scheduled Meds:      enoxaparin, 30 mg, Subcutaneous, " Q24H  furosemide, 40 mg, Oral, BID  levothyroxine, 88 mcg, Oral, Q AM  lidocaine, 1 patch, Transdermal, Q24H  metoprolol tartrate, 25 mg, Oral, TID  pantoprazole, 40 mg, Oral, QAM  polyethylene glycol, 17 g, Oral, Daily  sodium chloride, 3 mL, Intravenous, Q12H        Continuous Infusions:         PRN Meds:    •  acetaminophen **OR** acetaminophen **OR** acetaminophen  •  aluminum-magnesium hydroxide-simethicone  •  calcium carbonate  •  HYDROcodone-acetaminophen  •  melatonin  •  nitroglycerin  •  ondansetron **OR** ondansetron  •  sodium chloride        Results Review:     I reviewed the patient's new clinical results.    CBC    Results from last 7 days   Lab Units 02/14/22  0301 02/11/22  0712 02/10/22  1432   WBC 10*3/mm3 8.50 8.40 12.20*   HEMOGLOBIN g/dL 9.9* 11.2* 12.3   PLATELETS 10*3/mm3 216 237 264     Cr Clearance Estimated Creatinine Clearance: 52.8 mL/min (A) (by C-G formula based on SCr of 1.02 mg/dL (H)).  Coag   Results from last 7 days   Lab Units 02/10/22  1432   INR  0.96     HbA1C No results found for: HGBA1C  Blood Glucose No results found for: POCGLU  Infection     CMP   Results from last 7 days   Lab Units 02/14/22  0301 02/11/22  0712 02/10/22  1432   SODIUM mmol/L 139 140 141   POTASSIUM mmol/L 4.0 3.8 3.8   CHLORIDE mmol/L 103 105 103   CO2 mmol/L 26.0 25.0 24.0   BUN mg/dL 28* 19 23   CREATININE mg/dL 1.02* 0.83 0.95   GLUCOSE mg/dL 95 117* 108*   ALBUMIN g/dL  --  3.60 4.10   BILIRUBIN mg/dL  --  0.8 0.5   ALK PHOS U/L  --  61 69   AST (SGOT) U/L  --  26 26   ALT (SGPT) U/L  --  11 13     ABG      UA      BASIA  No results found for: POCMETH, POCAMPHET, POCBARBITUR, POCBENZO, POCCOCAINE, POCOPIATES, POCOXYCODO, POCPHENCYC, POCPROPOXY, POCTHC, POCTRICYC  Lysis Labs   Results from last 7 days   Lab Units 02/14/22  0301 02/11/22  0712 02/10/22  1432   INR   --   --  0.96   HEMOGLOBIN g/dL 9.9* 11.2* 12.3   PLATELETS 10*3/mm3 216 237 264   CREATININE mg/dL 1.02* 0.83 0.95     Radiology(recent)  No radiology results for the last day      Results from last 7 days   Lab Units 02/11/22  0712   TROPONIN T ng/mL 0.015       Xrays, labs reviewed personally by physician.    ECG/EMG Results (most recent)     Procedure Component Value Units Date/Time    SCANNED - TELEMETRY   [[id number]] Resulted: 02/10/22     Updated: 02/12/22 1420    SCANNED - TELEMETRY   [[id number]] Resulted: 02/10/22     Updated: 02/12/22 1631    ECG 12 Lead [[id number]] Collected: 02/11/22 2026     Updated: 02/12/22 2058     QT Interval 351 ms     Narrative:      HEART RATE= 120  bpm  RR Interval= 498  ms  OK Interval=   ms  P Horizontal Axis=   deg  P Front Axis=   deg  QRSD Interval= 91  ms  QT Interval= 351  ms  QRS Axis= -44  deg  T Wave Axis=   deg  - ABNORMAL ECG -  Atrial fibrillation  Low voltage, extremity leads  When compared with ECG of 01-May-2020 19:18:31,  NO SIGNIFICANT CHANGE FROM PREVIOUS ECG  Electronically Signed By: [facility] 12-Feb-2022 20:58:14  Date and Time of Study: 2022-02-11 20:26:40    SCANNED - TELEMETRY   [[id number]] Resulted: 02/10/22     Updated: 02/13/22 0644    SCANNED - TELEMETRY   [[id number]] Resulted: 02/10/22     Updated: 02/13/22 0644    SCANNED - TELEMETRY   [[id number]] Resulted: 02/10/22     Updated: 02/13/22 0736    SCANNED - TELEMETRY   [[id number]] Resulted: 02/10/22     Updated: 02/13/22 0736    SCANNED - TELEMETRY   [[id number]] Resulted: 02/10/22     Updated: 02/13/22 1044    SCANNED - TELEMETRY   [[id number]] Resulted: 02/10/22     Updated: 02/14/22 0910    SCANNED - TELEMETRY   [[id number]] Resulted: 02/10/22     Updated: 02/14/22 1000    SCANNED - TELEMETRY   [[id number]] Resulted: 02/10/22     Updated: 02/14/22 1127            Medication Review:   I have reviewed the patient's current medication list  Scheduled Meds:enoxaparin, 30 mg, Subcutaneous, Q24H  furosemide, 40 mg, Oral, BID  levothyroxine, 88 mcg, Oral, Q AM  lidocaine, 1 patch, Transdermal, Q24H  metoprolol tartrate, 25 mg,  "Oral, TID  pantoprazole, 40 mg, Oral, QAM  polyethylene glycol, 17 g, Oral, Daily  sodium chloride, 3 mL, Intravenous, Q12H      Continuous Infusions:   PRN Meds:.•  acetaminophen **OR** acetaminophen **OR** acetaminophen  •  aluminum-magnesium hydroxide-simethicone  •  calcium carbonate  •  HYDROcodone-acetaminophen  •  melatonin  •  nitroglycerin  •  ondansetron **OR** ondansetron  •  sodium chloride    Imaging:  Imaging Results (Last 72 Hours)     Procedure Component Value Units Date/Time    XR Hip 1 View Without Pelvis Left (Surgery Only) [[id number]] Resulted: 02/12/22 1031     Updated: 02/12/22 1032    FL C Arm During Surgery [[id number]] Resulted: 02/12/22 1030     Updated: 02/12/22 1030    Narrative:      This procedure was auto-finalized with no dictation required.            LIAN Palencia  02/14/22  12:45 EST       EMR Dragon/Transcription:   \"Dictated utilizing Dragon dictation\".       Electronically signed by LIAN Palencia, 02/14/22, 12:45 PM EST.'  "

## 2022-02-15 NOTE — CASE MANAGEMENT/SOCIAL WORK
Continued Stay Note   Alejandro     Patient Name: Marychuy Verdugo  MRN: [id number]  Today's Date: 2/15/2022    Admit Date: 2/10/2022     Discharge Plan     Row Name 02/15/22 1441       Plan    Plan Accepted at [facility]. Bed available on Wednesday 02/16/2021    Plan Comments Accepted at [facility], Bed available on Wednesday 02/16/2022.  [facility] pending [facility] availability.                           Loan Hopkins RN   Phone communication or documentation only - no physical contact with patient or family.

## 2022-02-15 NOTE — PLAN OF CARE
Goal Outcome Evaluation:              Outcome Summary: VSS. Pain treated with PRN PO orders. Pt to dc to [facility] on 2/16.

## 2022-02-15 NOTE — THERAPY TREATMENT NOTE
Subjective: Pt agreeable to therapeutic plan of care.    Objective:     Bed mobility - N/A or Not attempted.  Transfers - CGA  Ambulation - 50-60 feet x2 CGA     WB'ing status: L Lower Extremity WBAT     Therapeutic Exercise: 10 Reps L Lower Extremity AROM Total Hip: Ankle Pumps, Glut Sets, Heel slides <90 degrees, Hip Abduction, Mini-Squat    Precautions: Anterior hip precautions    Pain: 3 VAS  Education: Provided education on importance of mobility and skilled verbal / tactile cueing throughout intervention.     Assessment: Marychuy Verdugo presents with functional mobility impairments which indicate the need for skilled intervention.  Pt instructed in LLE hip there ex to promote mobility and strengthening with pt valente well. Pt completed functional tx and gait training with CGA. Pt presents below functional baseline and would benefit from IP rehab. Tolerating session today without incident. Will continue to follow and progress as tolerated.     Plan/Recommendations:   Pt would benefit from Inpatient Rehabilitation placement at discharge from facility and requires no DME at discharge.   Pt desires Inpatient Rehabilitation placement at discharge. Pt cooperative; agreeable to therapeutic recommendations and plan of care.     Basic Mobility 6-click:  Rollin = Total, A lot = 2, A little = 3; 4 = None  Supine>Sit:   1 = Total, A lot = 2, A little = 3; 4 = None   Sit>Stand with arms:  1 = Total, A lot = 2, A little = 3; 4 = None  Bed>Chair:   1 = Total, A lot = 2, A little = 3; 4 = None  Ambulate in room:  1 = Total, A lot = 2, A little = 3; 4 = None  3-5 Steps with railin = Total, A lot = 2, A little = 3; 4 = None  Score: 18    Modified Grimes: N/A = No pre-op stroke/TIA    Post-Tx Position: Up in Chair, Alarms activated and Call light and personal items within reach, family present  PPE: gloves, surgical mask, eyewear protection

## 2022-02-15 NOTE — PLAN OF CARE
Goal Outcome Evaluation:  Plan of Care Reviewed With: patient        Progress: no change  Outcome Summary: Pt is on room air, VSS, Pt ambulates to BSC with 1 assist, walker,and gait belt, Pain treated with prn pain medication. Pt has been resting well with call light in reach, will continue to monitor.

## 2022-02-15 NOTE — PLAN OF CARE
Assessment: Marychuy Verdugo presents with functional mobility impairments which indicate the need for skilled intervention.  Pt instructed in LLE hip there ex to promote mobility and strengthening with pt valente well. Pt completed functional tx and gait training with CGA. Pt presents below functional baseline and would benefit from IP rehab. Tolerating session today without incident. Will continue to follow and progress as tolerated.     Plan/Recommendations:   Pt would benefit from Inpatient Rehabilitation placement at discharge from facility and requires no DME at discharge.   Pt desires Inpatient Rehabilitation placement at discharge. Pt cooperative; agreeable to therapeutic recommendations and plan of care.

## 2022-02-16 PROBLEM — S72.002A LEFT DISPLACED FEMORAL NECK FRACTURE (HCC): Status: RESOLVED | Noted: 2022-01-01 | Resolved: 2022-01-01

## 2022-02-16 PROBLEM — Z95.818 PRESENCE OF WATCHMAN LEFT ATRIAL APPENDAGE CLOSURE DEVICE: Chronic | Status: ACTIVE | Noted: 2020-10-30

## 2022-02-16 NOTE — CASE MANAGEMENT/SOCIAL WORK
Case Management Discharge Note      Final Note: [facility] rehab    Provided Post Acute Provider List?: Yes  Post Acute Provider List: Inpatient Rehab  Delivered To: Patient, Support Person  Support Person: ashanti  Method of Delivery: In person    Selected Continued Care - Discharged on 2/16/2022 Admission date: 2/10/2022 - Discharge disposition: Rehab Facility or Unit (DC - External)    Destination Coordination complete.    Service Provider Selected Services Address Phone Fax Patient Preferred    [facility]  Inpatient Rehabilitation [address] [address] [phone] --                Final Discharge Disposition Code: 62 - inpatient rehab facility

## 2022-02-16 NOTE — PROGRESS NOTES
[facility] Medicine Services Daily Progress Note    Patient Name: Marychuy Verdugo  : 1940  MRN: [id number]  Primary Care Physician:  Curt Ng MD  Date of admission: 2/10/2022      Subjective      Chief Complaint: Fall with left femur fracture      Patient Reports   2/15/2022: Patient reports doing great.  She is ambulated and she is having bowel movements and tolerating p.o. well.  She reports adequate pain control.  She has chronic ankle swelling that is unchanged.    ROS   12 point review of systems was reviewed and was negative except left hip pain and chronic bilateral ankle edema.      Objective      Vitals:   Temp:  [97.5 °F (36.4 °C)-97.8 °F (36.6 °C)] 97.5 °F (36.4 °C)  Heart Rate:  [] 96  Resp:  [16-18] 18  BP: (107-129)/(66-75) 129/66    Physical Exam   Vital signs and nurses notes reviewed.  Well-developed well-nourished elderly female comfortable on room air sitting up in the chair in no acute distress awake and alert; mucous membranes moist; sclerae anicteric; lungs clear to auscultation bilaterally; CV regular rate and rhythm; abdomen soft nontender nondistended; extremities with 1+ bilateral ankle edema, no cyanosis or calf tenderness; palpable pedal pulses bilaterally; no Santos catheter.   Exam unchanged from 2022.    Result Review    Result Review:  I have personally reviewed the results from the time of this admission to 2/15/2022 19:07 EST and agree with these findings:  [x]  Laboratory  [x]  Microbiology  [x]  Radiology  [x]  EKG/Telemetry   [x]  Cardiology/Vascular   []  Pathology  []  Old records  []  Other:  Most notable findings discussed in the assessment and plan.    Wounds (last 24 hours)     LDA Wound     Row Name 02/15/22 0724 22          Wound 22 0000 Bilateral gluteal Pressure Injury    Wound - Properties Group Placement Date: 22  -JE Placement Time:   -JE Side: Bilateral  -JE Location: gluteal  -JE Primary  Wound Type: Pressure inj  -JE Stage, Pressure Injury : Stage 1; deep tissue injury  -JE, Possible DTI      Dressing Appearance -- open to air  -CH     Closure -- None  -CH     Base other (see comments)  skin intact, blanchable without redness  -BC other (see comments); blanchable  skin intact, blanchable, without redness.  -CH     Retired Wound - Properties Group Date first assessed: 02/12/22  -JE Time first assessed: 0000  -JE Side: Bilateral  -JE Location: gluteal  -JE Primary Wound Type: Pressure inj  -JE            Wound 02/12/22 0855 Left anterior greater trochanter Incision    Wound - Properties Group Placement Date: 02/12/22  -LM Placement Time: 0855  -LM Side: Left  -LM Orientation: anterior  -LM Location: greater trochanter  -LM Primary Wound Type: Incision  -LM     Dressing Appearance dry; intact; no drainage  -BC dry; intact; no drainage  -CH     Closure JIMMY  -BC JIMMY  -CH     Base dressing in place, unable to visualize  -BC dressing in place, unable to visualize  -CH     Drainage Amount -- none  -CH     Retired Wound - Properties Group Date first assessed: 02/12/22  -LM Time first assessed: 0855  -LM Side: Left  -LM Location: greater trochanter  -LM Primary Wound Type: Incision  -LM           User Key  (r) = Recorded By, (t) = Taken By, (c) = Cosigned By    Initials Name Provider Type     Mamta Easton RN Registered Nurse    Lit Sagastume RN Registered Nurse    Yue Lowe LPN Licensed Nurse    Lucinda Burciaga RN Registered Nurse                  Assessment/Plan      Brief Patient Summary:  Marychuy Verdugo is a 82 y.o. female who presented to [facility] on 2/10/2022 complaining of mechanical fall at home while walking back into her house and falling onto left hip with immediate left hip pain. Patient does report that she hit her head during the fall but denies any LOC, dizziness, N/V, or headaches since the fall. Patient also denies any chest pain, dyspnea, or  lightheadedness prior to or after the fall.   Orthopedic surgery was consulted for surgical repair, and cardiology for preoperative clearance.    2/14/2022: The patient reports some affected postoperative pain that is adequately controlled with the pain medication.  She has ambulated since surgery.  She is tolerating p.o. and reports having normal bowel movements and urination.  She reports that she has chronic ankle edema.      Inpatient medications include:  enoxaparin, 30 mg, Subcutaneous, Q24H  furosemide, 40 mg, Oral, BID  levothyroxine, 88 mcg, Oral, Q AM  lidocaine, 1 patch, Transdermal, Q24H  metoprolol tartrate, 25 mg, Oral, TID  pantoprazole, 40 mg, Oral, QAM  polyethylene glycol, 17 g, Oral, Daily  sodium chloride, 3 mL, Intravenous, Q12H             Active Hospital Problems:  Active Hospital Problems    Diagnosis    • **Left displaced femoral neck fracture (HCC)    • Dermatitis associated with moisture    • Peripheral edema    • Overactive bladder    • Presence of Watchman left atrial appendage closure device    • Atrial fibrillation (HCC)    • GERD (gastroesophageal reflux disease)    • Hypothyroid      Plan:   Mechanical fall   Displaced femur neck fracture intracapsular, comminuted left hip  -Status post direct anterior left total hip arthroplasty  -Continue postoperative care per orthopedic surgery with plans for inpatient rehab at discharge    DVT prophylaxis:  Medical and mechanical DVT prophylaxis orders are present.    CODE STATUS:    Level Of Support Discussed With: Patient; Next of Kin (If No Surrogate)  Code Status (Patient has no pulse and is not breathing): No CPR (Do Not Attempt to Resuscitate)  Medical Interventions (Patient has pulse or is breathing): Full Support      Disposition:  I expect patient to be discharged inpatient rehab in 1 to 2 days.    This patient has been examined wearing appropriate Personal Protective Equipment and discussed with hospital infection control department.  02/15/22      Electronically signed by Brea Sr MD, 02/15/22, 19:07 EST.  [facility] Hospitalist Team

## 2022-02-16 NOTE — PLAN OF CARE
Goal Outcome Evaluation:           Progress: improving  Outcome Summary: Patient is resting with no complaints. She has been up to the chair and to the BSC. VSS will continue to monitor.

## 2022-02-16 NOTE — DISCHARGE SUMMARY
[facility] Medicine Services  DISCHARGE SUMMARY    Patient Name: Marychuy Verdugo  : 1940  MRN: [id number]    Date of Admission: 2/10/2022  Discharge Diagnosis: Left displaced femoral neck fracture status post left total hip arthroplasty  Date of Discharge:  2022  Primary Care Physician: Curt Ng MD      Presenting Problem:   Left displaced femoral neck fracture (HCC) [S72.002A]    Active and Resolved Hospital Problems:  Active Hospital Problems    Diagnosis POA   • Dermatitis associated with moisture [L30.8] No     Priority: High   • Peripheral edema [R60.9] Yes   • Overactive bladder [N32.81] Yes   • Presence of Watchman left atrial appendage closure device [Z95.818] Yes   • Atrial fibrillation (HCC) [I48.91] Yes   • GERD (gastroesophageal reflux disease) [K21.9] Yes   • Hypothyroid [E03.9] Yes      Resolved Hospital Problems    Diagnosis POA   • **Left displaced femoral neck fracture (HCC) [S72.002A] Yes     Priority: High         Hospital Course     Hospital Course:    Marychuy Verdugo is a 81 y.o. female  with a past medical history of atrial fibrillation status post watchman's, GERD, hypothyroidism, and overactive bladder who presented to [facility] on 2/10/2022 complaining of mechanical fall at home while walking back into her house and falling onto left hip with immediate left hip pain that radiates down to left ankle. Patient was unable to immediately bear weight on the joint. Patient reported that she hit her head during the fall but denied any LOC, dizziness, N/V, or headaches since the fall. Patient also denied any chest pain, dyspnea, or lightheadedness prior to and after the fall.   In the ED, patient was found to have a left femoral neck fracture.  Patient was admitted to the Hospitalist for further care management.  Ortho was consulted.  Cardiology was consulted and cleared patient for surgery.  On 2022 patient underwent a left total  hip arthroplasty by Dr. Carver.  Postoperatively, physical therapy worked with patient recommended inpatient rehab.  Wound care nurse also assessed patient and patient was noted to have moisture associated dermatitis, and they recommended a zinc oxide-based moisture barrier.  On 2/16/2022 Patient stable for discharge.  Patient is going to be discharged [facility].  Patient to follow-up with PCP once discharged from rehab.  Patient to follow-up with Ortho in 2 weeks.  Patient to follow-up with cardiology at next scheduled appointment.  Patient was restarted on Xarelto.      Fall with Left Femoral Neck Fracture  -S/P left total hip arthroplasty  - Fall precautions and ambulate with assistance  - On hydrocodone  - Ortho followed    Moisture associated dermatitis  - On zinc oxide     Chronic Afib with Watchman  -On metoprolol and Xarelto     Hypothyroidism  - On levothyroxine     Overactive bladder  - On Myrbetriq     GERD  - On PPI     Chronic peripheral edema  - On Lasix           DISCHARGE Follow Up Recommendations for labs and diagnostics:   Follow-up with PCP once discharged from rehab.  Follow-up with Ortho in 2 weeks.  Follow-up with cardiology at next scheduled appointment.  Take medications as prescribed  Fall risk precautions.  Use incentive spirometry every 4 hours.    Reasons For Change In Medications and Indications for New Medications:  Hydrocodone-pain  MiraLAX-constipation    Day of Discharge     Vital Signs:  Temp:  [97.2 °F (36.2 °C)-97.7 °F (36.5 °C)] 97.7 °F (36.5 °C)  Heart Rate:  [83-96] 83  Resp:  [13-18] 13  BP: ()/(45-89) 143/89    Physical Exam:  Physical Exam   Performed by Dr. Brea Sr MD: Vital signs and nurses notes reviewed.  Well-developed well-nourished elderly female in no acute distress awake and alert; mucous membranes moist; sclerae anicteric;  lungs clear to auscultation bilaterally; CV regular rate and rhythm; abdomen soft nontender nondistended with active bowel sounds;  extremities with 1+ bilateral ankle and pedal edema, no cyanosis or calf tenderness; palpable pedal pulses bilaterally; neurologic exam grossly nonfocal; no Santos catheter.    Pertinent  and/or Most Recent Results     LAB RESULTS:      Lab 02/14/22  0301 02/11/22  0712 02/10/22  1432   WBC 8.50 8.40 12.20*   HEMOGLOBIN 9.9* 11.2* 12.3   HEMATOCRIT 29.3* 33.5* 37.2   PLATELETS 216 237 264   NEUTROS ABS  --  6.50 10.50*   LYMPHS ABS  --  0.80 0.70   MONOS ABS  --  1.00* 0.80   EOS ABS  --  0.00 0.00   MCV 90.2 90.8 90.7   PROTIME  --   --  10.7         Lab 02/14/22  0301 02/11/22  0712 02/10/22  1432   SODIUM 139 140 141   POTASSIUM 4.0 3.8 3.8   CHLORIDE 103 105 103   CO2 26.0 25.0 24.0   ANION GAP 10.0 10.0 14.0   BUN 28* 19 23   CREATININE 1.02* 0.83 0.95   GLUCOSE 95 117* 108*   CALCIUM 8.6 9.4 9.8   MAGNESIUM  --  2.1  --    PHOSPHORUS  --  3.3  --    TSH  --  2.340  --          Lab 02/11/22  0712 02/10/22  1432   TOTAL PROTEIN 6.4 7.2   ALBUMIN 3.60 4.10   GLOBULIN 2.8 3.1   ALT (SGPT) 11 13   AST (SGOT) 26 26   BILIRUBIN 0.8 0.5   ALK PHOS 61 69         Lab 02/11/22  0712 02/10/22  1432   TROPONIN T 0.015  --    PROTIME  --  10.7   INR  --  0.96         Lab 02/11/22  0712   CHOLESTEROL 161   LDL CHOL 59   HDL CHOL 91*   TRIGLYCERIDES 49         Lab 02/10/22  1432   ABO TYPING A   RH TYPING Negative   ANTIBODY SCREEN Negative         Brief Urine Lab Results  (Last result in the past 365 days)      Color   Clarity   Blood   Leuk Est   Nitrite   Protein   CREAT   Urine HCG        09/04/21 0753 Yellow   Clear   Negative   Small (1+)   Negative   Negative               Microbiology Results (last 10 days)     Procedure Component Value - Date/Time    COVID-19,CEPHEID/LEONIDAS,COR/DARYL/PAD/HERNANDEZ IN-HOUSE(OR EMERGENT/ADD-ON),NP SWAB IN TRANSPORT MEDIA 3-4 HR TAT, RT-PCR - Swab, Nasopharynx [[id number]]  (Normal) Collected: 02/10/22 1839    Lab Status: Final result Specimen: Swab from Nasopharynx Updated: 02/10/22 2009      COVID19 Not Detected    Narrative:      Fact sheet for providers: https://www.fda.gov/media/540600/download     Fact sheet for patients: https://www.fda.gov/media/491372/download  Fact sheet for providers: https://www.fda.gov/media/955681/download    Fact sheet for patients: https://www.fda.gov/media/006234/download    Test performed by PCR.          XR Hip With or Without Pelvis 2 - 3 View Left    Result Date: 2/10/2022  Impression: Left femoral neck fracture.      Electronically Signed By-Gilberto Ng On:2/10/2022 3:46 PM This report was finalized on [id number] by  Gilberto Ng, .              Results for orders placed during the hospital encounter of 05/01/20    Adult Transthoracic Echo Complete W/ Cont if Necessary Per Protocol    Interpretation Summary  · Mild tricuspid valve regurgitation is present.  · Left atrial cavity size is severely dilated.  · Estimated EF = 50%.  · Left ventricular systolic function is normal.  · Mild aortic valve regurgitation is present.  · Mild mitral valve regurgitation is present  · Right ventricular cavity is mildly dilated.      Labs Pending at Discharge:      Procedures Performed  Procedure(s):  TOTAL HIP ARTHROPLASTY ANTERIOR         Consults:   Consults     Date and Time Order Name Status Description    2/10/2022  6:35 PM Inpatient Cardiology Consult Completed     2/10/2022  4:01 PM Ortho (on-call MD unless specified)      2/10/2022  4:01 PM Hospitalist (on-call MD unless specified)              Discharge Details        Discharge Medications      New Medications      Instructions Start Date   HYDROcodone-acetaminophen 5-325 MG per tablet  Commonly known as: NORCO   1 tablet, Oral, Every 4 Hours PRN      ondansetron 4 MG tablet  Commonly known as: Zofran   4 mg, Oral, Every 6 Hours PRN      polyethylene glycol 17 GM/SCOOP powder  Commonly known as: MIRALAX   mix 1 capful (17 g) in liquid by mouth Daily.   Start Date: February 17, 2022     rivaroxaban 10 MG tablet  Commonly  known as: Xarelto   10 mg, Oral, Daily         Continue These Medications      Instructions Start Date   aspirin 81 MG chewable tablet   81 mg, Oral, Daily      docusate sodium 100 MG capsule  Commonly known as: COLACE   100 mg, Oral, Daily      furosemide 40 MG tablet  Commonly known as: LASIX   40 mg, Oral, 2 Times Daily      levothyroxine 88 MCG tablet  Commonly known as: SYNTHROID, LEVOTHROID   88 mcg, Oral, Every Early Morning      loratadine 10 MG tablet  Commonly known as: CLARITIN   10 mg, Oral, Daily      metoprolol tartrate 25 MG tablet  Commonly known as: LOPRESSOR   25 mg, Oral, 2 Times Daily      Mirabegron ER 50 MG tablet sustained-release 24 hour 24 hr tablet  Commonly known as: MYRBETRIQ   50 mg, Oral, Daily      multivitamin with minerals tablet tablet   1 tablet, Oral, Daily      OS-TATUM 500 + D PO   1 tablet, Oral, Daily      pantoprazole 40 MG EC tablet  Commonly known as: PROTONIX   40 mg, Oral, Daily      potassium chloride 20 MEQ CR tablet  Commonly known as: K-DUR,KLOR-CON   20 mEq, Oral, Daily             Allergies   Allergen Reactions   • Haloperidol Unknown - Low Severity         Discharge Disposition:   Rehab Facility or Unit (DC - External)    Diet:  Hospital:  Diet Order   Procedures   • Diet Regular         Discharge Activity:   Activity Instructions     Activity as Tolerated              CODE STATUS:  Code Status and Medical Interventions:   Ordered at: 02/10/22 9137     Level Of Support Discussed With:    Patient    Next of Kin (If No Surrogate)     Code Status (Patient has no pulse and is not breathing):    No CPR (Do Not Attempt to Resuscitate)     Medical Interventions (Patient has pulse or is breathing):    Full Support         No future appointments.    Additional Instructions for the Follow-ups that You Need to Schedule     Call MD With Problems / Concerns   As directed      Instructions: Call [phone] or email [email or web address] for problems or concerns.    Order  Comments: Instructions: Call [phone] or email [email or web address] for problems or concerns.          Discharge Follow-up with PCP   As directed       Currently Documented PCP:    Curt Ng MD    PCP Phone Number:    [phone]     Follow Up Details: once discharged from rehab         Discharge Follow-up with Specified Provider: Cardiology   As directed      To: Cardiology    Follow Up Details: as needed         Discharge Follow-up with Specified Provider: Dr. Carver; 2 Weeks   As directed      To: Dr. Carver    Follow Up: 2 Weeks               Time spent on Discharge including face to face service:  30 minutes    This patient has been examined wearing appropriate Personal Protective Equipment . 02/16/22      Signature: Electronically signed by LIAN Godinez, 02/16/22, 9:17 AM EST.    Attending attestation:  The patient is an 82-year-old female admitted after a fall at home which time she sustained a left hip fracture.  The patient underwent surgical repair and had an uneventful perioperative course.  Please see attendings physical exam documented above.  Assessment and plan:  Displaced, comminuted, intracapsular left femoral neck fracture status post surgical repair   -Continue postoperative supportive care  The patient was seen, examined, counseled and chart was reviewed.  I spent 35 minutes on discharging this patient including time spent at the bedside, reviewing the chart and documenting in the medical record.  Brea Sr MD  Electronically signed by Brea Sr MD, 02/24/22, 9:33 AM EST.

## 2022-02-16 NOTE — CASE MANAGEMENT/SOCIAL WORK
Continued Stay Note   Alejandro     Patient Name: Marychuy Verdugo  MRN: [id number]  Today's Date: 2/16/2022    Admit Date: 2/10/2022     Discharge Plan     Row Name 02/16/22 1557       Plan    Plan Accepted at [facility] bed ready today    Plan Comments Accepted at [facility], bed ready today.  Son will transport patient to [facility].  Denies any dc needs                         Expected Discharge Date and Time     Expected Discharge Date Expected Discharge Time    Feb 16, 2022             Loan Hopkins RN   Phone communication or documentation only - no physical contact with patient or family.

## 2022-02-23 NOTE — TELEPHONE ENCOUNTER
Yes please call her back immediately and let her know to change the dressing ASAP.  They can also start her on oral doxycycline 100 mg tab 1 p.o. twice daily along with Bactrim DS tab 1 p.o. twice daily for 10 days.  Also wrap the leg with an Ace bandage from toes to groin.  This will help to reduce the swelling in the lower extremity in general.  She most likely has some fat necrosis that is causing the swelling and drainage from the superficial part of the incision.  Thank you

## 2022-03-02 PROBLEM — Z96.642 STATUS POST TOTAL HIP REPLACEMENT, LEFT: Status: ACTIVE | Noted: 2022-01-01

## 2022-03-02 NOTE — PROGRESS NOTES
POST OP TOTAL GLOBAL      NAME: Marychuy Verdugo           : 1940    MRN: [id number]    Chief Complaint   Patient presents with   • Left Hip - Post-op   • Wound Check       Date of Surgery: 22  ?  HPI:   Patient returns today for 2.5 week follow up of left total hip arthroplasty Incision(s) healing nicely/as expected with no signs of infection. Patient reports doing well with no unusual complaints. No fevers, rigors or chills. Appears to be progressing appropriately. Patient is on appropriate anticoagulation.       Ortho Exam:   Left hip. Patient is post op 2.5 week(s) from total hip arthoplasty, direct anterior. Incision is clean and healing well. Calf is soft and nontender. Homans sign is negative. Skin and soft tissues are appropriate for postoperative status of the patient. Hip flexion is 0-80 degrees, hip abduction is 0-30 degrees. No limb lenth discrepancy. Neurovascular status is intact. Patients gait is significantly improved. The pain relief is extremely dramatic for the patient. Dorsalis pedis and posterior tibial artery pulses palpable. Common peroneal function is well preserved. There is no evidence of cellulitis or erythema or deep seated joint infection.      Diagnostic Studies:  LEFT hip with pelvis xrays  nonweightbearing ap/lateral views were ordered by Yair Carver MD. Performed at [facility] on 22. Images were independently viewed and interpreted by myself, my impression as follows:    left Hip X-Ray  Indication: Evaluation of implant position after total hip arthroplasty.  AP and lateral  Findings: Excellent position of the hip arthroplasty implants without any subsidence or periprosthetic fractures.  no bony lesion  Soft tissues within normal limits  within normal limits joint spaces  Hardware appropriately positioned yes      yes prior studies available for comparison.    X-RAY was ordered and reviewed by Yair Carver  MD            Assessment:  Diagnoses and all orders for this visit:    1. Status post total hip replacement, left (Primary)  -     XR Hip With or Without Pelvis 2 - 3 View Left    Other orders  -     rivaroxaban (Xarelto) 10 MG tablet; Take 1 tablet by mouth Daily. Take for 30 days only following total joint replacement  Indications: Post Surgical - Hip  Dispense: 30 tablet; Refill: 0            Plan   · Incision care  · To use ACE wrap/MENDEL stocking which should be long Jobst compression garment stockings 15-20 compression that go past her knees.  · Continue ice to joint   · Stretching and strengthening exercises of the quads and hamstrings specifically working on core strength so that she has an easier time getting out of the chair and out of a seated position.  · Aggressive ROM  · Falls precautions  · Once Xarelto is completed, change to an enteric coated Aspirin 325 mg daily until 6 weeks following your surgery  · Continue with lifelong antibiotic prophylaxis with dental procedures following total joint replacement.  · Follow up in 2 month(s)    Date of encounter: 03/02/2022   Yair Carver MD

## 2022-03-04 NOTE — TELEPHONE ENCOUNTER
Care giver Barbara called to state she is taking care of patient's incision and the lower half of incision is draining yellowish down patient's leg.  It is not sensitive to touch.  The patient was worked in today and she will be here soon to be seen.

## 2022-03-05 NOTE — PROGRESS NOTES
"POST OP TOTAL GLOBAL      NAME: Marychuy Verdugo           : 1940    MRN: [id number]    Chief Complaint   Patient presents with   • Left Hip - Pain       Date of Surgery: Patient had left total hip arthroplasty about 3 weeks ago.  ?  HPI:   Patient returns today for 3 week follow up of left total hip arthroplasty Incision(s) healing nicely/as expected with no signs of infection and with drainage from incision. Patient reports doing well with no unusual complaints. No fevers, rigors or chills. Appears to be progressing appropriately. Patient is on appropriate anticoagulation.  She does not have any signs of a deep-seated septic joint.  She does have some serous, clear drainage from the proximal part of the incision.  This area is in the area of a large belly pannus which unfortunately stays moist and macerated.  The patient appears to have fluid from an area of fat necrosis.  She says \"yes Dr. Carver I have a lot of fat in this location \".      Ortho Exam:   Left hip. Patient is post op 3 week(s) from total hip arthoplasty, direct anterior. Incision is clean and healing well. Calf is soft and nontender. Homans sign is negative. Skin and soft tissues are appropriate for postoperative status of the patient. Hip flexion is 0-90 degrees, hip abduction is 0-30 degrees. No limb lenth discrepancy. Neurovascular status is intact. Patients gait is significantly improved. The pain relief is extremely dramatic for the patient. Dorsalis pedis and posterior tibial artery pulses palpable. Common peroneal function is well preserved. There is no evidence of cellulitis or erythema or deep seated joint infection.      Diagnostic Studies:  no diagnostic testing performed this visit        Assessment:  Diagnoses and all orders for this visit:    1. Status post total hip replacement, left (Primary)  -     doxycycline (VIBRAMYICN) 100 MG tablet; Take 1 tablet by mouth 2 (Two) Times a Day for 10 days.  Dispense: 20 tablet; Refill: " 0  -     sulfamethoxazole-trimethoprim (Bactrim DS) 800-160 MG per tablet; Take 1 tablet by mouth 2 (Two) Times a Day for 10 days.  Dispense: 20 tablet; Refill: 0            Plan   · Incision care.  · We are able to express about 50 cc of straw-colored fluid with some blood tingeing from the proximal part of the incision.  There is no evidence of an abscess formation and no evidence of suppuration.  · To use ACE wrap/MENDEL stocking  · Continue ice to joint.  · Tablet doxycycline 100 mg orally twice daily for 10 days.  · Tablet Bactrim DS tab 1 p.o. twice daily for 10 days.  · Discussed with the patient that she might need a surgical incision and drainage of this area because she does appear to have some fat necrosis.  · Stretching and strengthening exercises  · Aggressive ROM  · Falls precautions  · You may now resume any prescription medications you were taking prior to surgery  · Continue with lifelong antibiotic prophylaxis with dental procedures following total joint replacement.  · Follow up in 1 week(s)    Date of encounter: 03/04/2022   Yair Carver MD

## 2022-03-07 NOTE — PROGRESS NOTES
Physical Therapy Initial Evaluation and Plan of Care    Patient: Marychuy Verdugo   : 1940  Diagnosis/ICD-10 Code:  Presence of left artificial hip joint [Z96.642]  Referring practitioner: Yair Carver MD  Date of Initial Visit: 3/7/2022  Today's Date: 3/7/2022  Patient seen for 1 sessions           Subjective Questionnaire: LEFS:       Subjective Evaluation    History of Present Illness  Mechanism of injury: Pt reports she had an anterior left hip replacement on 22 per Dr. Carver.  She had fallen and fractured her hip so they did a replacement instead of fixing the hip.  She did not have any complications from surgery.  She has had a lot of drainage from the incision and she has had extra MD appts for that.  She is taking an ABT for an infection.  She was in the hospital for 2-3 days then went to [facility] until .  She has been doing her HEP as much as she can but has had a lot of MD appts so she has not done it as much as she probably should.      She has difficulty with walking, standing activities, sit to stand, balance.  Her doctor wants her to work on walking and balance/coordination to prevent further falls.    Pain  Current pain ratin  At best pain ratin  At worst pain rating: 10  Location: ant thigh  Quality: throbbing and burning  Aggravating factors: ambulation, squatting, lifting and stairs  Progression: improved    Social Support  Lives in: apartment and one-story house (1 step entry)  Lives with: alone    Diagnostic Tests  X-ray: abnormal (femoral neck)    Patient Goals  Patient goals for therapy: decreased pain, improved balance, increased motion, increased strength and independence with ADLs/IADLs       PRECAUTIONS: A fib, HTN, SOA    Objective          Tenderness     Additional Tenderness Details  Anterior thigh, ASIS to knee    Active Range of Motion   Left Hip   Flexion: 95 degrees with pain  Abduction: 35 degrees with pain    Right Hip   Flexion: 115 degrees   Extension:  10 degrees   Abduction: 45 degrees     Additional Active Range of Motion Details  L hip ext :  neutral    Strength/Myotome Testing     Left Hip   Planes of Motion   Flexion: 4-  Abduction: 2+  Adduction: 4-    Right Hip   Planes of Motion   Flexion: 4  Abduction: 4-  Adduction: 4    Additional Strength Details  Knee ext:  R:  4;  L:  4-            Flex:  R:  4;  L:  4-  Ankle DF:   bilat 4     General Comments     Hip Comments   Gait:  amb (I) with rollator walker with fwd bent trunk, some limp on left LE, walks too far from walker    HEP:: AP/circles; hip abd supine; heel slides; walk a lot    Girth:  Mid patella:  R:  46.5 cm                                 L:  49.5 cm  20 cm distal to inferior patella:  R:  41. 0 cm                                                    L:  46.5 cm                      Assessment & Plan     Assessment  Impairments: abnormal gait, abnormal muscle tone, abnormal or restricted ROM, activity intolerance, impaired balance, impaired physical strength, lacks appropriate home exercise program, pain with function and safety issue  Functional Limitations: carrying objects, lifting, walking, pulling, pushing, uncomfortable because of pain, moving in bed and stooping  Assessment details: Pt is an 81 y/o female with a dx of left THR on 2-12-22 per Dr. Carver.  She had an anterior approach.  She was in [facility]hab until 2-25-22.  She has difficulty with  walking, standing activities, sit to stand, balance (has fallen).    Pt exhibits the above impairments and functional limitations and would benefit from skilled PT to address those areas and maximize function.    Goals  Plan Goals: STGs:  6 weeks  1.  Pt to tolerate initial HEP without inc pain.  2.  Pt to tolerate advancement of HEP without inc pain.  3.  Pt to report pain has decreased by at least 25% to allow pt to tolerate standing tasks such as dishes, cooking and  laundry.  4.  Pt to improve  AROM of left hip ext to 10 and flex to 115 or more to  allow pt to tolerate walking tasks better such as walking through parking lots, grocery shopping  5.  Pt to complete static and dynamic balance assessments (MCTSIB and DGI) when appropriate.        LTGs:  By DC  1.  Pt to be (I) with HEP.  2.  Pt to report pain has decreased by at least 80% to allow better tolerance to and performance of grocery shopping.  3.  Pt to improve AROM of left knee and hip  to WFL/WNL in all planes  to allow pt to be able to sit to stand without difficulty or inc pain.  4.  Pt to improve strength of left knee and hip all  Planes  to 4+/5 or better to be adequate for completion/performance of sit to stands without UE assist .  5.  Pt to exhibit improved function as indicated by improved score on LEFS vs score at evaluation.    6.  Pt to demonstrate (I) and appropriate use of body mechanics for daily home and/or work tasks.  7.  Pt to improve performance with MCTSIB and DGI vs first assessment.    Plan  Therapy options: will be seen for skilled therapy services  Planned modality interventions: high voltage pulsed current (pain management), thermotherapy (hydrocollator packs) and cryotherapy  Planned therapy interventions: balance/weight-bearing training, body mechanics training, functional ROM exercises, gait training, home exercise program, IADL retraining, manual therapy, motor coordination training, neuromuscular re-education, soft tissue mobilization, strengthening, stretching and therapeutic activities  Frequency: 2x week  Plan details: 13 weeks        See flowsheet for treatment details.    History # of Personal Factors and/or Comorbidities: LOW (0)  Examination of Body System(s): # of elements: LOW (1-2)  Clinical Presentation: STABLE   Clinical Decision Making: LOW       Timed:         Manual Therapy:         mins  96024;     Therapeutic Exercise:  10       mins  37272;     Neuromuscular Carli:        mins  27771;    Therapeutic Activity:          mins  28598;     Gait Training:            mins  45349;     Ultrasound:          mins  07980;    Ionto:                                   mins   00149  Self Care:                            mins   10638  Canalith Repos:                  mins   48968      Un-Timed:  Electrical Stimulation:         mins  73165 ( );  Traction          mins 89509  Low Eval    35      Mins  69010  Mod Eval          Mins  36417  High Eval                            Mins  96312  Re-Eval                               mins  73711        Timed Treatment: 10     mins   Total Treatment:   45     mins    PT SIGNATURE: Marychuy Kimbrough, PT   [place] Lic. # [id number]    DATE TREATMENT INITIATED: 3/7/2022    Initial Certification  Certification Period: 6/4/2022  I certify that the therapy services are furnished while this patient is under my care.  The services outlined above are required by this patient, and will be reviewed every 90 days.     PHYSICIAN: Yair Carver MD  NPI: [id number]                                       DATE:     Please sign and return via fax to [phone].. Thank you, [facility].

## 2022-03-09 NOTE — PROGRESS NOTES
POST OP TOTAL GLOBAL      NAME: Marychuy Verdugo           : 1940    MRN: [id number]    Chief Complaint   Patient presents with   • Left Hip - Post-op   • Wound Check       Date of Surgery: 22  ?  HPI:   Patient returns today for 3.5 week follow up of left total hip arthroplasty Incision(s) with drainage from incision. Patient reports doing well with no unusual complaints. No fevers, rigors or chills. Appears to be progressing appropriately. Patient is on appropriate anticoagulation.   She is getting a lot of serous drainage from her hip incision anterolaterally.  There is no evidence of purulence or pus formation.  She does not have any fevers, rigors or chills.  There is no evidence to suggest that she has a deep-seated infection related to the hip arthroplasty implants.  She has been on oral doxycycline and Bactrim for antibiotic prophylaxis.    Ortho Exam:   Left hip. Patient is post op 3.5 week(s) from total hip arthoplasty, direct anterior. Incision is clean and healing well.  There is localized serous drainage which has been expressed from the incision.  No purulence is noted.  Calf is soft and nontender. Homans sign is negative. Skin and soft tissues are appropriate for postoperative status of the patient. Hip flexion is 0-60 degrees, hip abduction is 0-30 degrees. No limb lenth discrepancy. Neurovascular status is intact. Patients gait is significantly improved. The pain relief is extremely dramatic for the patient. Dorsalis pedis and posterior tibial artery pulses palpable. Common peroneal function is well preserved. There is no evidence of cellulitis or erythema or deep seated joint infection.      Diagnostic Studies:  xrays to be obtained at next visit        Assessment:  Diagnoses and all orders for this visit:    1. Status post total hip replacement, left (Primary)            Plan   · Incision care including expression of serous fluid if needed.  · Tablet doxycycline 100 mg tab 1 p.o. twice  daily for antibiotic prophylaxis.  · Tablet Bactrim DS tab 1 p.o twice daily for antibiotic prophylaxis.  ·  To use ACE wrap/MENDEL stocking  · Continue ice to joint   · Stretching and strengthening exercises including stretching and strengthening of the core and body strength training to improve mobility and limit falls.  · Gentle active ROM  · Falls precautions  · You may now resume any prescription medications you were taking prior to surgery  · Continue with lifelong antibiotic prophylaxis with dental procedures following total joint replacement.  · Follow up in 1 week(s)    Date of encounter: 03/09/2022   Yair Carver MD

## 2022-03-10 NOTE — TELEPHONE ENCOUNTER
At this point please call them back and let them know to continue to change the dressing.  Keep a compression dressing on that area of small wound dehiscence distally.  I would like to see how it does over the next 24 hours.  If it does not quit then we may have to consider surgery to go back up and apply a wound VAC to that location.

## 2022-03-10 NOTE — TELEPHONE ENCOUNTER
ATTEMPTED TO WARM TRANSFER    Provider: DR. SEKOU TODD  Caller: JERICHO DUQUE (CAREGIVER) -PATIENT IN ROOM AND SPOKE WITH ME GIVING PERMISSION TO SPEAK WITH CAREGIVER  Relationship to Patient: CAREGIVER  Pharmacy:   Phone Number:  [phone]  Reason for Call:  POST OP LEFT ABIGAIL 2/11/22. PATIENT SAW DR. TODD YESTERDAY IN OFFICE. CAREGIVER SAYS WOUND HAS BEEN DRAINING EVER SINCE SURGERY BUT TODAY DRAINING A LOT INTO A POOL OF FLUID AT PATIENT'S FEET. PLEASE ADVISE ASAP.

## 2022-03-11 NOTE — TELEPHONE ENCOUNTER
Spoke with Criss this morning she will be leaving for [place] and is apprehensive about leaving the patient by herself she did mention her daughter in law would be checking in some on the patient as well.    [phone] Marychuy Elizondo - daughter in law will be the next of contact if we cannot reach the patient

## 2022-03-11 NOTE — TELEPHONE ENCOUNTER
PATIENT'S DAUGHTER-IN-LAW, RYAN, CALLED WANTING TO SPEAK WITH DEISI REGARDING PATIENT.  SHE STATED THAT PATIENT IS NOT WANTING TO GO TO PHYSICAL THERAPY AND SHE IS HOPING SOMEONE FROM OUR OFFICE CAN CALL AND SPEAK TO HER ABOUT THAT.    PLEASE CALL RYAN DYSON @ [phone]

## 2022-03-11 NOTE — TELEPHONE ENCOUNTER
Spoke with the patient she states home health stated they would be out within 24-48 ours I have refilled her Bactrim and Doxycycline per Dr. Carver. She will call if she has any issues this weekend.     Criss went to [employer] and bought bandages so the patient would have some over the weekend

## 2022-03-14 NOTE — HOME HEALTH
pt is a 83 yowf who lives in her one story home. pt had a fall in her kitchen on 2.12.22 that required a left hip fracture repair. pt was doing well but developed a small area of her incision that would not heal and was having moderate drainage. pt was seen at the md office greater the three times and home health was called in for wound management. pt is alert and oriented and gets about her home with the use of a cane and uses a rollator when out of the home. pt was scheduled to start out pt therapy on 3.14.22 but pt stated understanding of this being put on hold until after homehealth discharge.     admitting dx of other complications of procedures, not elsewhere classifed, subsequent encounter    history  afib  overactive bladder  hypertension  GERD  hypothyroidism  left femur fracture

## 2022-03-14 NOTE — TELEPHONE ENCOUNTER
Call from patient, stating she is returning a call to Lupe and would like a call back. Advised she is at one of the other offices but we can get this message to her. Patient states she can be reached at home number and thanked us for taking her message.

## 2022-03-14 NOTE — TELEPHONE ENCOUNTER
Called to check in on the patient to see how her incision looks and to see how she is doing post operatively     I spoke with her daughter in law this morning and she states she is not wanting to do outpatient physical therapy because she doesn't think she needs it but the daughter in law states she could benefit from therapy because she is unsteady and off balance.     Patient cancelled her physical therapy appointment for today.     I will enter in physical therapy order with home health so they can do a home safety assessment as well.

## 2022-03-16 NOTE — PROGRESS NOTES
POST OP TOTAL GLOBAL      NAME: Marychuy Verdugo           : 1940    MRN: [id number]    Chief Complaint   Patient presents with   • Left Hip - Post-op   • Wound Check       Date of Surgery: Patient had left total hip arthroplasty on 2022.  ?  HPI:   Patient returns today for 5 week follow up of left total hip arthroplasty Incision(s) healing nicely/as expected with no signs of infection and with drainage from incision. Patient reports doing well with no unusual complaints. No fevers, rigors or chills. Appears to be progressing appropriately. Patient is on appropriate anticoagulation.  The drainage has settled down substantially.  She does not have any purulence associated with the incision.  She has responded well to a combination of doxycycline and Bactrim as oral antibiotics.      Ortho Exam:   Left hip. Patient is post op 5 week(s) from total hip arthoplasty, direct anterior. Incision is clean and healing well. Calf is soft and nontender. Homans sign is negative. Skin and soft tissues are appropriate for postoperative status of the patient. Hip flexion is 0-80 degrees, hip abduction is 0-20 degrees. No limb lenth discrepancy. Neurovascular status is intact. Patients gait is significantly improved. The pain relief is extremely dramatic for the patient. Dorsalis pedis and posterior tibial artery pulses palpable. Common peroneal function is well preserved. There is no evidence of cellulitis or erythema or deep seated joint infection.      Diagnostic Studies:  no diagnostic testing performed this visit        Assessment:  Diagnoses and all orders for this visit:    1. Status post total hip replacement, left (Primary)            Plan   · Incision care  · To use ACE wrap/MENDEL stocking  · Continue ice to joint   · Stretching and strengthening exercises  · Gentle active ROM  · Falls precautions and dislocation precautions discussed with the patient.  · Continue with tablet doxycycline 100 mg tab 1 p.o.  twice daily for 2 weeks as an antibiotic prophylactic agent.  · Continue with tablet Bactrim DS tab 1 p.o. twice daily for 2 weeks.  · You may now resume any prescription medications you were taking prior to surgery  · Continue with lifelong antibiotic prophylaxis with dental procedures following total joint replacement.  · Follow up in 2 week(s)    Date of encounter: 03/16/2022   Yair Carver MD

## 2022-03-16 NOTE — PROGRESS NOTES
POST OP TOTAL GLOBAL      NAME: Marychuy Verdugo           : 1940    MRN: [id number]    Chief Complaint   Patient presents with   • Left Hip - Post-op   • Wound Check       Date of Surgery: 22  ?  HPI:   Patient returns today for 4.5 week  follow up of left total hip arthroplasty Incision(s) with delayed healing. Patient reports doing well with no unusual complaints. No fevers, rigors or chills. Appears to be progressing appropriately. Patient is on appropriate anticoagulation.       Ortho Exam:   Left hip. Patient is post op 4.5 week(s) from total hip arthoplasty, direct anterior. Incision is clean and healing well. Calf is soft and nontender. Homans sign is negative. Skin and soft tissues are appropriate for postoperative status of the patient. Hip flexion is 0-*** degrees, hip abduction is *** degrees. No limb lenth discrepancy. Neurovascular status is intact. Patients gait is significantly improved. The pain relief is extremely dramatic for the patient. Dorsalis pedis and posterior tibial artery pulses palpable. Common peroneal function is well preserved. There is no evidence of cellulitis or erythema or deep seated joint infection.      Diagnostic Studies:  no diagnostic testing performed this visit        Assessment:  There are no diagnoses linked to this encounter.        Plan   · Incision care  · To use ACE wrap/MENDEL stocking  · Continue ice to joint   · Stretching and strengthening exercises  · {AS Expected ROM:43510}  · Falls precautions  · You may now resume any prescription medications you were taking prior to surgery  · Continue with lifelong antibiotic prophylaxis with dental procedures following total joint replacement.  · Follow up in 2 week(s)    Date of encounter: 2022   Lupe Zarate MA

## 2022-03-16 NOTE — HOME HEALTH
Patient denies SOA, CP, increased fatigue, recent falls or other symptoms. Patient reports that she overall feels well, though her incision continues to drain. Patient does not have iodosorb in the home which SN addressed, but patient states she was told she didn't need. Educated on importance of elevation, compression and sodium restriction for edema management. Reinforcement required. Contacted [facility] to see if they offered circaids for compression, but no answer received.  Medications reviewed with patient and updated on list.     Plan for next visit: edema assess, f/u with Es on circaids, educate falls prevention, wound care/assess, medication review, disease management education.

## 2022-03-18 NOTE — TELEPHONE ENCOUNTER
PATIENT CALLED AND STATED SHE IS HAVING HOME HEALTH AND THE HOME HEALTH NURSE TOLD HER TO CANCEL HER [facility] OUT[facility]IENT THERAPY APPTS AND TO KEEP HER [facility] OUTPATIENT THERAPY APPTS.  - PATIENT'S FIRST OUTPATIENT THERAPY APPT IS NOW SCHEDULED FOR April 5TH.

## 2022-03-19 NOTE — HOME HEALTH
Patient denies acute symptom changes from last visit. Edema at baseline. Discussed options for compression. Patient verbalized understanding and is compliant with sodium restriction. Medications reviewed and no adverse/side effects noted. Patient verbalizes understanding of medications. Wound indicates no complication and patient is compliant with keeping site clean and covered.      Plan for next visit: edema assess, educate falls prevention, wound assessment, medication review, disease management education.

## 2022-03-20 NOTE — HOME HEALTH
pt sitting up and is prepared for PT.  pt denies any pain and no med changes.   pt states she she has been performing hep as able but with limitations.      pt presents with extensive LE edema  and reports she is elevating her LEs throughout the day.  pt states she has been ambulating without an a.d. most times     pt was limited today with weakness,  fatigue and low endurance.     pt needed several cues today to fully contract/hold at end rom and to avoid substitution.    pt was minimally unsteady upon standing but was able to self correct but displayed mild unsteadiness when turning with cues given during gait trg to improve hip flexion and to widen carmella.

## 2022-03-26 NOTE — HOME HEALTH
Patient denies any pain, falls or recent problems developing. She is still concerned about her incision line. Patient reports she is working to complete HH PT next week so she can go to outpatient rehab. Discussed need for home health to discharge patient once she is no longer homebound and patient is in agreement for nursing to discharge once she is set up for therapy as wound is healing well.  Dr Berger next Friday for follow up visit.    Plan for next visit   Cardiopulmonary assessment  wound assessment and care  assess for potential need for agency discharge

## 2022-03-30 NOTE — HOME HEALTH
Pt request agency discharge.  Pt states her goals were met with home health and ready for Outpt PT.  Pt Dced from agency with goals met.

## 2022-04-05 NOTE — PROGRESS NOTES
Physical Therapy Progress Note    Patient: Marychuy Verdugo  : 1940  Referring practitioner: Yair Carver MD  Today's Date: 2022    VISIT#: 2     Subjective   Marychuy Verdugo reports: Ended up having home health for a few weeks due to non healing part of her incision. Was having home health physical therapy also. Is doing well, but still has trouble standing up without using hands. Isn't having much pain, just some in left thigh.     Current HEP from home health per pt report:        sitting march, LAQ, arm tband exercises       Standing squats, hip abd, heel/toe raises    Objective          Tenderness     Additional Tenderness Details  none    Active Range of Motion   Left Hip   Flexion: 104 (anterior left thigh pain) degrees with pain  Abduction: 35 degrees with pain    Right Hip   Flexion: 115 degrees   Extension: 10 degrees   Abduction: 45 degrees     Additional Active Range of Motion Details  L hip ext :  neutral    Strength/Myotome Testing     Left Hip   Planes of Motion   Flexion: 4-  Abduction: 2+  Adduction: 4-    Right Hip   Planes of Motion   Flexion: 4  Abduction: 4-  Adduction: 4     General Comments     Hip Comments   Gait:  Ambulates without AD, slightly forward flexed posture, decreased step length and foot clearance  Transfers: significant BUE use with sit to stand                  See Exercise, Manual, and Modality Logs for complete treatment.     Patient Education: progressed HEP.    Assessment & Plan     Assessment    Assessment details: Marychuy is making good progress with therapy. Her gait is significantly improved as well as her hip flexion ROM. She still has quite a bit of hip weakness and still unable to stand from chair without UE assist.     Goals  Plan Goals: 6 weeks  1.  Pt to tolerate initial HEP without inc pain. NOT MET  2.  Pt to tolerate advancement of HEP without inc pain. NOT MET  3.  Pt to report pain has decreased by at least 25% to allow pt to tolerate standing tasks such  as dishes, cooking and  Laundry. MET  4.  Pt to improve  AROM of left hip ext to 10 and flex to 115 or more to allow pt to tolerate walking tasks better such as walking through parking lots, grocery shopping. NOT MET  5.  Pt to complete static and dynamic balance assessments (MCTSIB and DGI) when appropriate.        LTGs:  By DC  1.  Pt to be (I) with HEP.  2.  Pt to report pain has decreased by at least 80% to allow better tolerance to and performance of grocery shopping.  3.  Pt to improve AROM of left knee and hip  to WFL/WNL in all planes  to allow pt to be able to sit to stand without difficulty or inc pain.  4.  Pt to improve strength of left knee and hip all  Planes  to 4+/5 or better to be adequate for completion/performance of sit to stands without UE assist .  5.  Pt to exhibit improved function as indicated by improved score on LEFS vs score at evaluation.    6.  Pt to demonstrate (I) and appropriate use of body mechanics for daily home and/or work tasks.  7.  Pt to improve performance with MCTSIB and DGI vs first assessment.      Plan  Therapy options: will be seen for skilled therapy services          Progress per Plan of Care            Timed:         Manual Therapy:         mins  33737;     Therapeutic Exercise:    30     mins  30723;     Neuromuscular Carli:        mins  71611;    Therapeutic Activity:     15     mins  09995;     Gait Training:           mins  16083;     Ultrasound:          mins  81934;    Ionto:                                   mins   80325  Self Care:                            mins   64720    Un-Timed:  Electrical Stimulation:         mins  67409 ( );  Dry Needling          mins self-pay  Traction          mins 49308  Re-Eval                               mins  01801    Timed Treatment:  45    mins   Total Treatment:     45   mins    Es Suarez, PT  Physical Therapist  [place] PT license #: [id number]

## 2022-04-08 NOTE — PROGRESS NOTES
Physical Therapy Daily Progress Note      Patient: Marychuy Verdugo   : 1940  Diagnosis/ICD-10 Code:  Presence of left artificial hip joint [Z96.642]  Referring practitioner: Yair Carver MD  Date of Initial Visit: 2022  Today's Date: 2022  Patient seen for 3 sessions.  POC 2x/wk x 13 weeks, exp 22    s/p L THR anterior approach 22   PRECAUTIONS: A fib, HTN, SOA       VISIT#: 3      Subjective :  No pain currently.  She is unable to do SLR in HEP as too hard.       Objective     See Exercise, Manual, and Modality Logs for complete treatment. Progression as noted. Added NuStep.       Patient Education:  Pt. Instructed to stop SLR @ home due to pain.       Assessment/Plan:  Pt. Unable to perform SLR without groin pain even with assistance so instructed to stop as part of HEP for now. She did well with progression with no increase in pain/symptoms.  She is a little anxious with balance activities.        Progress per Plan of Care:  Progress HEP.             Timed:         Manual Therapy:         mins  40835;     Therapeutic Exercise:    30     mins  13829;     Neuromuscular Carli:  15      mins  95428;    Therapeutic Activity:          mins  12294;     Gait Training:           mins  99471;     Ultrasound:          mins  90420;    Ionto                                   mins   52323  Self Care                            mins   10410  Aquatic                               mins 70490    Un-Timed:  Electrical Stimulation:         mins  18308 ( );  Traction          mins 32424      Timed Treatment:  45    mins   Total Treatment:    45    mins    Ailyn Torres PTA  Physical Therapist Assistant   [place] license:  #[id number]

## 2022-04-11 NOTE — PROGRESS NOTES
Physical Therapy Daily Progress Note    Patient: Marychuy Verdugo   : 1940  Referring practitioner: Yair Carver MD  Today's Date: 2022    VISIT#: 4    Subjective Evaluation    History of Present Illness  Mechanism of injury: Pt report she          Objective     See Exercise, Manual, and Modality Logs for complete treatment.     Cont with ex, act, NMR as noted; pt able to progress with several ex today    Patient Education: work on smaller sets of SLRs at home; focus on bending fwd with sit to stands    Assessment & Plan     Assessment    Assessment details: valente well today; progressed with several ex today  Without inc pain       Progress per Plan of Care        Timed:         Manual Therapy:         mins  38146;     Therapeutic Exercise:  18       mins  02692;     Neuromuscular Carli: 10       mins  36019;    Therapeutic Activity:   15       mins  05458;     Gait Training:           mins  29923;     Ultrasound:          mins  34024;    Ionto                                   mins   65813  Self Care                            mins   66672  Canalith Repos                   mins  4209    Un-Timed:  Electrical Stimulation:         mins  06239 ( );  Traction          mins 92573  Low Eval          Mins  74850  Mod Eval          Mins  36119  High Eval                            Mins  52221  Re-Eval                               mins  83267    Timed Treatment: 43     mins   Total Treatment:  43      mins    Marychuy Kimbrough, PT  Physical Therapist  [place] Lic. # [id number]

## 2022-04-20 NOTE — PROGRESS NOTES
Physical Therapy Daily Progress Note    Patient: Marychuy Verdugo  : 1940  Referring practitioner: Yair Carver MD  Today's Date: 2022    VISIT#: 5    Subjective   Pt reports: doing ok today. She has to take 3 lasix a day now. The swelling is getting a little better and going down.       Objective     See Exercise, Manual, and Modality Logs for complete treatment.     Patient Education:    Assessment & Plan     Assessment    Assessment details: Pt tolerated today's rx session well, requires vcs and corrections for technique. Still has a hard time with SLR.           Progress per Plan of Care            Timed:         Manual Therapy:         mins  94435;     Therapeutic Exercise:   25      mins  75266;     Neuromuscular Carli:        mins  49511;    Therapeutic Activity:    15      mins  48827;     Gait Training:           mins  68869;     Ultrasound:          mins  33514;    Ionto                                   mins   79757  Self Care                            mins   58320  Canal repositioning           mins    21312    Un-Timed:  Electrical Stimulation:         mins  51799 ( );  Traction          mins 85728  Low Eval          Mins  40971  Mod Eval          Mins  44146  High Eval                            Mins  76937  Re-Eval                               mins  71930    Timed Treatment:  40    mins   Total Treatment:    40    mins    Simeon Stevens, PT, CLT  Physical Therapist  [place] License:  # [id number]

## 2022-04-22 NOTE — PROGRESS NOTES
Physical Therapy Daily Progress Note    Patient: Marychuy Verdugo  : 1940  Referring practitioner: Yair Carver MD  Today's Date: 2022    VISIT#: 6  S/p anterior left hip replacement on 22     Subjective   Pt reports: doing well this morning, felt pretty good after last visit. Denies any pain presently.       Objective     See Exercise, Manual, and Modality Logs for complete treatment.     Patient Education:    Assessment & Plan     Assessment    Assessment details: Pt tolerated today's rx session well. No inc pain or any complains performing the exercises today.           Progress per Plan of Care            Timed:         Manual Therapy:         mins  81151;     Therapeutic Exercise:    30     mins  08592;     Neuromuscular Carli:        mins  40868;    Therapeutic Activity:    15      mins  28985;     Gait Training:           mins  73897;     Ultrasound:          mins  09204;    Ionto                                   mins   59547  Self Care                            mins   18313  Canal repositioning           mins    86778    Un-Timed:  Electrical Stimulation:         mins  84566 ( );  Traction          mins 42425  Low Eval          Mins  35507  Mod Eval          Mins  02139  High Eval                            Mins  09341  Re-Eval                               mins  05296    Timed Treatment:  45    mins   Total Treatment:     45   mins    Simeon Stevens PT, CLT  Physical Therapist  [place] License:  # [id number]

## 2022-04-26 NOTE — PROGRESS NOTES
Physical Therapy Daily Progress Note    Patient: Marychuy Verdugo  : 1940  Referring practitioner: Yair Carver MD  Today's Date: 2022    VISIT#: 7    Subjective   Marychuy Verdugo reports: Doing very well, thinks swelling is a little better, exercises hurt a little but not having much pain overall.       Objective     See Exercise, Manual, and Modality Logs for complete treatment.     Patient Education: continue HEP    Assessment/Plan  Good response to session, continues to have left hip weakness. Has pain with SLR but is tolerating more overall reps.     Progress per Plan of Care            Timed:         Manual Therapy:         mins  25839;     Therapeutic Exercise:    30     mins  09755;     Neuromuscular Carli:        mins  54418;    Therapeutic Activity:     15     mins  90746;     Gait Training:           mins  02184;     Ultrasound:          mins  73074;    Ionto:                                   mins   40880  Self Care:                            mins   90557    Un-Timed:  Electrical Stimulation:         mins  92347 ( );  Dry Needling          mins self-pay  Traction          mins 37290  Re-Eval                               mins  58288    Timed Treatment:   45   mins   Total Treatment:     45   mins    Es Suarez, PT  Physical Therapist  [place] PT license #: [id number]

## 2022-04-28 NOTE — PROGRESS NOTES
Physical Therapy Daily Treatment Note      Patient: Marychuy Verdugo   : 1940  Referring practitioner: Yair Carver MD  Date of Initial Visit: Type: THERAPY  Noted: 3/7/2022  Today's Date: 2022  Patient seen for 8 sessions       Visit Diagnoses:    ICD-10-CM ICD-9-CM   1. Difficulty walking  R26.2 719.7   2. Presence of left artificial hip joint  Z96.642 V43.64       Subjective Evaluation    History of Present Illness    Subjective comment: hip is doing well 10 weeks post op. LEs still swelling and increased water pill from 2 to 3X day but still moderate swelling. will consider my suggestion of full panty hose vs. knee highs which she doesn't like to wear.        Objective   See Exercise, Manual, and Modality Logs for complete treatment.       Assessment & Plan     Assessment    Assessment details: C/o some R knee pain during nu step. PMH falling down steps in  with x-ray showing spurring at distal femur, tibia and patella. Has had injections in the past and will talk to primary MD about ortho referral if needed.   Will hold off on sit to stands until knee is better. Also suggesting she use a cushion when out.   Worked on balance and stepping reactions today as this is her primary goal to manage curbs           Timed:         Manual Therapy:         mins  08984;     Therapeutic Exercise:    30     mins  85515;     Neuromuscular Carli:    15    mins  19208;    Therapeutic Activity:          mins  68522;     Gait Training:           mins  14463;     Ultrasound:          mins  01585;    Ionto                                   mins   70706  Self Care                            mins   97598  Canalith Repos         mins 22293      Un-Timed:  Electrical Stimulation:         mins  45491 ( );  Dry Needling          mins self-pay  Traction          mins 24771      Timed Treatment:   45   mins   Total Treatment:     45   mins    Zorre Zeno Kimura, PT  [place] License: [id number]    [place] License:  [id number]

## 2022-05-03 NOTE — PROGRESS NOTES
Physical Therapy Daily Progress Note      Patient: Marychuy Verdugo   : 1940  Diagnosis/ICD-10 Code:  Difficulty walking [R26.2]  Referring practitioner: Yair Carver MD. Follow up 22.   Date of Initial Visit: 5/3/2022  Today's Date: 5/3/2022  Patient seen for 9 sessions.  POC 2x/wk x 13 weeks, exp 22    s/p L THR anterior approach 22   PRECAUTIONS: A fib, HTN, SOA       VISIT#: 9      Subjective :  Pt. Reports that doing NuStep for so long last session gave her R knee pain, it is a little sore.  Her knee is also sore from doing some gardening.  She did not kneel though, she sat in a chair.   She feels PT is beneficial and helping her.  She really wants to gain confidence in going up and down steps.       Objective     See Exercise, Manual, and Modality Logs for complete treatment.       Patient Education:        Assessment/Plan:  Pt. Insecure with balance during higher level activities.  Sit to stand from lower surfaces remains challenging.       Progress per Plan of Care:              Timed:         Manual Therapy:         mins  19801;     Therapeutic Exercise:    15     mins  90057;     Neuromuscular Carli:  15      mins  72411;    Therapeutic Activity:     15     mins  39016;     Gait Training:           mins  84883;     Ultrasound:          mins  68981;    Ionto                                   mins   47703  Self Care                            mins   99739  Aquatic                               mins 21039    Un-Timed:  Electrical Stimulation:         mins  05451 (MC );  Traction          mins 90868      Timed Treatment:  45    mins   Total Treatment:    45    mins    Ailyn Torres PTA  Physical Therapist Assistant   [place] license:  #[id number]

## 2022-05-04 NOTE — PROGRESS NOTES
POST OP TOTAL GLOBAL      NAME: Marychuy Verdugo           : 1940    MRN: [id number]    Chief Complaint   Patient presents with   • Left Hip - Post-op       Date of Surgery: 22  ?  HPI:   Patient returns today for 11 week follow up of left total hip arthroplasty Incision(s) healed nicely with no signs of infection. Patient reports doing well with no unusual complaints. No fevers, rigors or chills. Appears to be progressing appropriately. Patient is on appropriate anticoagulation.  She is doing extremely well postoperatively.  She is now not using a cane or a walker for assistance with ambulation.  She is very pleased with her postoperative outcome.  There is no limb length discrepancy or neurovascular deficit postoperatively.  She did have some drainage from fat necrosis which has since completely settled down and does not demonstrate any signs of deep-seated joint infection or soft tissue compromise.      Ortho Exam:   Left hip. Patient is post op 11 week(s) from total hip arthoplasty, direct anterior. Incision is clean and healing well. Calf is soft and nontender. Homans sign is negative. Skin and soft tissues are appropriate for postoperative status of the patient. Hip flexion is 0-90 degrees, hip abduction is 0-30 degrees. No limb lenth discrepancy. Neurovascular status is intact. Patients gait is significantly improved. The pain relief is extremely dramatic for the patient. Dorsalis pedis and posterior tibial artery pulses palpable. Common peroneal function is well preserved. There is no evidence of cellulitis or erythema or deep seated joint infection.      Diagnostic Studies:  no diagnostic testing performed this visit        Assessment:  Diagnoses and all orders for this visit:    1. Status post total hip replacement, left (Primary)            Plan   · Incision care  · To use ACE wrap/MENDEL stocking  · Continue ice to joint   · Stretching and strengthening exercises  · Aggressive ROM  · Falls  precautions  · Once Xarelto is completed, change to an enteric coated Aspirin 325 mg daily until 6 weeks following your surgery  · Continue with lifelong antibiotic prophylaxis with dental procedures following total joint replacement.  · Follow up in 12 month(s)    Date of encounter: 05/04/2022   Yair Carver MD

## 2022-05-06 NOTE — PROGRESS NOTES
Physical Therapy Daily Progress Note      Patient: Marychuy Verdugo   : 1940  Diagnosis/ICD-10 Code:  Difficulty walking [R26.2]  Referring practitioner: Yair Carver MD. Follow up 22.   Date of Initial Visit: 2022  Today's Date: 2022  Patient seen for 10 sessions.  POC 2x/wk x 13 weeks, exp 22    s/p L THR anterior approach 22   PRECAUTIONS: A fib, HTN, SOA       VISIT#: 10      Subjective :  No new complaints. Pt. Admits that she is not doing HEP.     Objective     See Exercise, Manual, and Modality Logs for complete treatment. Progression as noted.       Patient Education:  Emphasized importance of performing HEP consistently.       Assessment/Plan:  L quad remains weak and pt. Still unable to perform left SLR without quad lag. She did well with progression.       Progress per Plan of Care:              Timed:         Manual Therapy:         mins  34036;     Therapeutic Exercise:    20     mins  37245;     Neuromuscular Carli:  15      mins  94707;    Therapeutic Activity:     10     mins  36841;     Gait Training:           mins  14185;     Ultrasound:          mins  15794;    Ionto                                   mins   96727  Self Care                            mins   32587  Aquatic                               mins 22284    Un-Timed:  Electrical Stimulation:         mins  34166 (MC );  Traction          mins 89939      Timed Treatment:  45    mins   Total Treatment:    45    mins    Ailyn Torres PTA  Physical Therapist Assistant   [place] license:  #[id number]

## 2022-05-09 NOTE — PROGRESS NOTES
Physical Therapy Daily Progress Note/Medicare Note/ 30 Day Note    Patient: Marychuy Verdugo   : 1940  Referring practitioner: Yair Carver MD  Today's Date: 2022  VISIT#: 11    Subjective Evaluation    History of Present Illness  Mechanism of injury: Pt reports she feels about 50% improved since the start of PT.  She rates the highest level of pain at 6/10.  She can now perform putting on shoes and socks, getting into and out of the car, sit to stands to start walking.  She does her HEP several times per week.  LEFS:  22/    Pain  Current pain rating: 3         Objective     Active Range of Motion   Left Hip   Flexion: 110 deg  Abduction: 35 degrees   Ext:  0 deg;   PROM is 8 deg     Strength/Myotome Testing    Left Hip   Planes of Motion   Flexion: 4-/4  Abduction: 3/3+  Extension:  3+     Right Hip   Planes of Motion   Flexion: 4/4+  Abduction: 4-  Extension: 4-       Hip Comments   Gait:  Ambulates without AD, slightly forward flexed posture, decreased step length and foot clearance    Transfers: significant BUE use with sit to stand              See Exercise, Manual, and Modality Logs for complete treatment.     Cont with ex, act and NMR as noted; pt was able to progress with some ex today     Patient Education:    Assessment & Plan     Assessment    Assessment details: Pt has met 1/5 STGs and 0/7 LTGs at this time.  Pt continues to have issues with ROM, strength, gait quality and sit to stands.  Pt needs to focus on strength of left hip and knee.  Pt would benefit from continued skilled PT to address impairments, dec pain, work toward meeting remaining goals and maximize function.        Goals  Plan Goals: 6 weeks  1.  Pt to tolerate initial HEP without inc pain. PROGRESSING  2.  Pt to tolerate advancement of HEP without inc pain. PROGRESSING  3.  Pt to report pain has decreased by at least 25% to allow pt to tolerate standing tasks such as dishes, cooking and  Laundry. MET  4.  Pt to improve   AROM of left hip ext to 10 and flex to 115 or more to allow pt to tolerate walking tasks better such as walking through parking lots, grocery shopping. PROGRESSING  5.  Pt to complete static and dynamic balance assessments (MCTSIB and DGI) when appropriate.    NOT MET    LTGs:  By DC  1.  Pt to be (I) with HEP.  PROGRESSING  2.  Pt to report pain has decreased by at least 80% to allow better tolerance to and performance of grocery shopping.  PROGRESSING  3.  Pt to improve AROM of left knee and hip  to WFL/WNL in all planes  to allow pt to be able to sit to stand without difficulty or inc pain.  PROGRESSING  4.  Pt to improve strength of left knee and hip all  Planes  to 4+/5 or better to be adequate for completion/performance of sit to stands without UE assist .  PROGRESSING  5.  Pt to exhibit improved function as indicated by improved score on LEFS vs score at evaluation.  NOT MET  6.  Pt to demonstrate (I) and appropriate use of body mechanics for daily home and/or work tasks.  NOT MET  7.  Pt to improve performance with MCTSIB and DGI vs first assessment. NOT MET      Progress per Plan of Care        Timed:         Manual Therapy:         mins  21851;     Therapeutic Exercise:   20      mins  03413;     Neuromuscular Carli: 10       mins  47900;    Therapeutic Activity:   15       mins  86722;     Gait Training:           mins  02088;     Ultrasound:          mins  70479;    Ionto                                   mins   42706  Self Care                            mins   25201  Canalith Repos                   mins  00253    Un-Timed:  Electrical Stimulation:         mins  04298 (MC );  Traction          mins 82832  Low Eval          Mins  20810  Mod Eval          Mins  31454  High Eval                            Mins  29251  Re-Eval                               mins  14418    Timed Treatment: 45     mins   Total Treatment:   45     mins    Marychuy Kimbrough PT  Physical Therapist  [place] Lic. # [id number]

## 2022-05-17 NOTE — PROGRESS NOTES
Physical Therapy Daily Progress Note      Patient: Marychuy Verdugo   : 1940  Diagnosis/ICD-10 Code:  Difficulty walking [R26.2]  Referring practitioner: Yair Carver MD.   Date of Initial Visit: 2022  Today's Date: 2022  Patient seen for 12 sessions.  POC 2x/wk x 13 weeks, exp 22    s/p L THR anterior approach 22   PRECAUTIONS: A fib, HTN, SOA       VISIT#: 12      Subjective : Pain R knee 3-4/10.  She notes she sometimes gets pain in L groin area and left straight leg is still challenging.   She is being more consistent with performing HEP.     Objective     See Exercise, Manual, and Modality Logs for complete treatment.  Added manual.       Patient Education:        Assessment/Plan:  Pt. With tight left  hip adductors, flexors and distal ITB.  She liked manual and felt beneficial.       Progress per Plan of Care:              Timed:         Manual Therapy:  10       mins  50688;     Therapeutic Exercise:    20     mins  97522;     Neuromuscular Carli:  15      mins  51066;    Therapeutic Activity:          mins  19835;     Gait Training:           mins  24403;     Ultrasound:          mins  72513;    Ionto                                   mins   07608  Self Care                            mins   19448  Aquatic                               mins 13494    Un-Timed:  Electrical Stimulation:         mins  11669 ( );  Traction          mins 51694      Timed Treatment:  45    mins   Total Treatment:    45    mins    Ailyn Torres PTA  Physical Therapist Assistant   [place] license:  #[id number]

## 2022-05-19 NOTE — PROGRESS NOTES
Re-Assessment / Re-Certification        Patient: Marychuy Verdugo   : 1940  Diagnosis/ICD-10 Code:  Difficulty walking [R26.2]  Referring practitioner: Yair Carver MD  Date of Initial Visit: Type: THERAPY  Noted: 3/7/2022  Today's Date: 2022  Patient seen for 13 sessions      Subjective:     Subjective Questionnaire: LEFS:   Clinical Progress: improved  Home Program Compliance: Yes  Treatment has included: therapeutic exercise, neuromuscular re-education, manual therapy, therapeutic activity and gait training    Subjective Evaluation    History of Present Illness  Mechanism of injury: Pt rates improvement at this time at 50% overall.  She feels like she can can now perform putting on shoes and socks, getting into and out of the car, sit to stands to start walking.  She does her HEP several times per week.  Pt saw surgeon last week and he discharged her and will see her in a year.      Pain  Current pain ratin         Objective     Active Range of Motion   Left Hip   Flexion: 110 deg  With pain   Abduction: 35 degrees  with pain   Ext:  0 deg;   PROM is 8 deg     Strength/Myotome Testing    Left Hip   Planes of Motion   Flexion: 4-/4 with pain  Abduction: 3/3+  Extension:  3+    Knee ext:  3+/4- with quad pain   Knee flex: 4        Hip Comments   Gait:  Ambulates without AD, slightly forward flexed posture, decreased step length and foot clearance     Transfers: significant BUE use with sit to stand            Assessment/Plan     Pt has met 2/5 STGs and 1/7 LTGs at this time.  Pt continues to have issues with ROM, strength, gait quality and sit to stands.  Pt cont to have pain in left groin with hip flexion and SLR on left.  Pt needs to focus on strength of left hip and knee.  Pt would benefit from continued skilled PT to address impairments, dec pain, work toward meeting remaining goals and maximize function    STGs:  6 weeks  1.  Pt to tolerate initial HEP without inc pain  MET  2.  Pt to  tolerate advancement of HEP without inc pain. PROGRESSING  3.  Pt to report pain has decreased by at least 25% to allow pt to tolerate standing tasks such as dishes, cooking and  Laundry. MET  4.  Pt to improve  AROM of left hip ext to 10 and flex to 115 or more to allow pt to tolerate walking tasks better such as walking through parking lots, grocery shopping. PROGRESSING  5.  Pt to complete static and dynamic balance assessments (MCTSIB and DGI) when appropriate.    NOT MET    LTGs:  By DC  1.  Pt to be (I) with HEP.  PROGRESSING  2.  Pt to report pain has decreased by at least 80% to allow better tolerance to and performance of grocery shopping.  PROGRESSING  3.  Pt to improve AROM of left knee and hip  to WFL/WNL in all planes  to allow pt to be able to sit to stand without difficulty or inc pain.  PROGRESSING  4.  Pt to improve strength of left knee and hip all  Planes  to 4+/5 or better to be adequate for completion/performance of sit to stands without UE assist .  PROGRESSING  5.  Pt to exhibit improved function as indicated by improved score on LEFS vs score at evaluation.  MET  6.  Pt to demonstrate (I) and appropriate use of body mechanics for daily home and/or work tasks.  NOT MET  7.  Pt to improve performance with MCTSIB and DGI vs first assessment. NOT MET       Progress toward previous goals: Partially Met      Recommendations: Continue as planned     Certification Period: 8/16/2022     Prognosis to achieve goals: good    PT Signature: Marychuy Kimbrough, PT  [place] PT Lic. # [id number]      Based upon review of the patient's progress and continued therapy plan, it is my medical opinion that Marychuy Verdugo should continue physical therapy treatment at [facility] [address] [facility] PHYSICAL THERAPY  [facility] [facility] [place] [address] [phone].    Signature: __________________________________  Yair Carver MD  Please sign and return via fax to [phone].. Thank you, [facility] Physical  Therapy.    Timed:         Manual Therapy:         mins  89823;     Therapeutic Exercise:  25       mins  58366;     Neuromuscular Carli:        mins  86307;    Therapeutic Activity:   15       mins  18875;     Gait Training:           mins  55971;     Ultrasound:          mins  52305;    Ionto                                   mins   91287  Self Care                            mins   23409  Canalith Repos:                  mins   55750    Un-Timed:  Electrical Stimulation:         mins  75366 ( );  Traction          mins 13547  Low Eval          Mins  54954  Mod Eval          Mins  75605  High Eval                            Mins  60215  Re-Eval                               mins  18194    Timed Treatment: 40     mins   Total Treatment:   40     mins

## 2022-05-25 NOTE — PROGRESS NOTES
"Physical Therapy Daily Progress Note    Patient: Marychuy Verdugo  : 1940  Referring practitioner: Yair Carver MD  Today's Date: 2022    VISIT#: 14    Subjective   Marychuy Verdugo reports: Says she is doing pretty well, her hip feels a little sore and stiff but is moving better than she used to.       Objective     See Exercise, Manual, and Modality Logs for complete treatment.     Patient Education:     Assessment/Plan  Good response to session, tolerated increased reps with SLR today but still with some groin discomfort. Progress step up to 6\" step and tolerated very well but still required UE assist. Intermittent LOB with higher level activities and required Anne Marie x 1 LOB, otherwise CGA.     Progress per Plan of Care          Timed:         Manual Therapy:         mins  60773;     Therapeutic Exercise:   20     mins  27929;     Neuromuscular Carli:    15    mins  03170;    Therapeutic Activity:       10   mins  12831;     Gait Training:           mins  09820;     Ultrasound:          mins  81374;    Ionto:                                   mins   99617  Self Care:                            mins   58561    Un-Timed:  Electrical Stimulation:         mins  95453 ( );  Dry Needling          mins self-pay  Traction          mins 95577  Re-Eval                               mins  84785    Timed Treatment:   45   mins   Total Treatment:     45   mins    Es Suarez PT  Physical Therapist  [place] PT license #: [id number]  "

## 2022-05-31 NOTE — PROGRESS NOTES
Physical Therapy Daily Progress Note      Patient: Marychuy Verdugo   : 1940  Diagnosis/ICD-10 Code:  Difficulty walking [R26.2]  Referring practitioner: Yair Carver MD.   Date of Initial Visit: 2022  Today's Date: 2022  Patient seen for 15 sessions.  POC 2x/wk x 13 weeks, exp 22    s/p L THR anterior approach 22   PRECAUTIONS: A fib, HTN, SOA       VISIT#: 15      Subjective : Pt. Denies pain.  She is concerned about her confidence when walking.  She went to [employer] yesterday and had to use her cane.  She was very anxious when walking over cobblestones, going up and down steps.       Objective     See Exercise, Manual, and Modality Logs for complete treatment.        Patient Education:        Assessment/Plan:  Pt. Has difficulty with coordination of many balance activities.  This causes her to need more assist with these activities, CGA vs supervision.       Progress per Plan of Care:              Timed:         Manual Therapy:         mins  68418;     Therapeutic Exercise:    15     mins  24579;     Neuromuscular Carli:  15      mins  94506;    Therapeutic Activity:     15     mins  68147;     Gait Training:           mins  29847;     Ultrasound:          mins  36417;    Ionto                                   mins   94325  Self Care                            mins   58100  Aquatic                               mins 61910    Un-Timed:  Electrical Stimulation:         mins  93440 ( );  Traction          mins 72485      Timed Treatment:  45    mins   Total Treatment:    45    mins    Ailyn Torres PTA  Physical Therapist Assistant   [place] license:  #[id number]

## 2022-06-10 NOTE — PROGRESS NOTES
Physical Therapy Daily Progress Note      Patient: Marychuy Verdugo   : 1940  Diagnosis/ICD-10 Code:  Difficulty walking [R26.2]  Referring practitioner: Yair Carver MD.   Date of Initial Visit: 6/10/2022  Today's Date: 6/10/2022  Patient seen for 16 sessions.  POC 2x/wk x 13 weeks, exp 22    s/p L THR anterior approach 22   PRECAUTIONS: A fib, HTN, SOA       VISIT#: 16      Subjective : No new complaints.       Objective     See Exercise, Manual, and Modality Logs for complete treatment.  Progression as noted.       Patient Education:        Assessment/Plan:  Pt. With slightly more confidence today during upright activities.  Also, she is independently correcting posture.       Progress per Plan of Care:              Timed:         Manual Therapy:         mins  17128;     Therapeutic Exercise:    15     mins  97367;     Neuromuscular Carli:  15      mins  49278;    Therapeutic Activity:     15     mins  36400;     Gait Training:           mins  64124;     Ultrasound:          mins  92502;    Ionto                                   mins   47296  Self Care                            mins   53217  Aquatic                               mins 37728    Un-Timed:  Electrical Stimulation:         mins  62363 ( );  Traction          mins 49979      Timed Treatment:  45    mins   Total Treatment:    45    mins    Ailyn Torres PTA  Physical Therapist Assistant   [place] license:  #[id number]

## 2022-06-14 NOTE — TELEPHONE ENCOUNTER
JUST HAD OUYT OF [facility] GUEST SHOW UP AT HOUSE

## 2022-06-23 NOTE — TELEPHONE ENCOUNTER
On 6-20 I called the pt regarding no show for today's appt.  I left a voicemail to call us if she is not going to make it in for her appt on 6-23.

## 2022-06-23 NOTE — TELEPHONE ENCOUNTER
CALLED PATIENT DUE TO NO SHOW TODAY. PATIENT STATED THAT SHE HAD CALLED AND WILL NOT BE IN DUE TO HAVING OUT OF TOWN COMPANY. WILL MAKE HER NEXT APPOINTMENT.

## 2022-06-28 PROBLEM — R60.0 BILATERAL LOWER EXTREMITY EDEMA: Status: ACTIVE | Noted: 2022-01-01

## 2022-06-28 PROBLEM — R04.0 RIGHT-SIDED NOSEBLEED: Status: RESOLVED | Noted: 2020-05-01 | Resolved: 2022-01-01

## 2022-06-28 PROBLEM — I48.21 PERMANENT ATRIAL FIBRILLATION WITH RAPID VENTRICULAR RESPONSE (HCC): Status: RESOLVED | Noted: 2020-05-01 | Resolved: 2022-01-01

## 2022-06-28 NOTE — PROGRESS NOTES
Assessment and Plan     Problem List Items Addressed This Visit        Cardiac and Vasculature    Essential hypertension - Primary (Chronic)    Overview     BP at goal, <140/90.  Encouraged low Na+ & 150 minutes of exercise/week.  Consider decreasing diuretic, Bumex.  Monitoring BP at home.  Monitoring renal function.           Relevant Medications    bumetanide (BUMEX) 2 MG tablet    Other Relevant Orders    Basic metabolic panel    Paroxysmal atrial fibrillation (HCC)    Overview     S/p Watchman procedure. No anticoagulation therapy necessary. 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   Not on rate control medication.  Denies any chest pain or palpitations.   Followed by cardiology, Clyde Muller MD.           Relevant Orders    Basic metabolic panel       Endocrine and Metabolic    Acquired hypothyroidism    Overview     Treated with levothyroxine 88 mcg.  Last TSH was WNL. Monitoring regularly.               Genitourinary and Reproductive     Overactive bladder (Chronic)    Overview     Potentially exacerbated by diuretic use.  Will try a course of oxybutynin. Discussed anticholinergic SE.           Relevant Medications    oxybutynin XL (DITROPAN-XL) 10 MG 24 hr tablet    Other Relevant Orders    Basic metabolic panel       Symptoms and Signs    Bilateral lower extremity edema    Overview     Discussed supportive care.  Continue diuretic therapy.  Would benefit from compression stockings.           Relevant Orders    Compression Stockings      Other Visit Diagnoses     Encounter for osteoporosis screening in asymptomatic postmenopausal patient        Relevant Orders    DEXA Bone Density Axial        Return in about 4 months (around 10/28/2022) for Medicare Wellness.        Patient was given instructions and counseling regarding her condition or for health maintenance advice. Please see specific information pulled into the AVS if appropriate.        Marychuy is a 82 y.o. being seen today for  Discuss medications (Pt  "is new pt and wants to discuss things about her medications. )   Subjective   History of the Present Illness     Post menopausal white female with CMHx of HTN, PAF, and OA presents to [facility] care since moving to [place] 4 years ago.   Normotensive in office. Denies any chest pain, palpitations, and dyspnea.  Complaining of LE edema. Experienced varicose veins from compression stockings. Prescribed Bumex 2 mg BID but complaining of frequent urination. Prescribed Vibegron for overactive bladder. Request switch to oxybutynin.   Participating in PT twice a week for hip fracture.   Participating in chair yoga.     Social History  She  reports that she has never smoked. She has never used smokeless tobacco. She reports that she does not drink alcohol and does not use drugs.  Objective   Vital Signs          Vitals:    06/28/22 1047   BP: 118/79   BP Location: Left arm   Patient Position: Sitting   Cuff Size: Adult   Pulse: 87   Temp: 97.7 °F (36.5 °C)   TempSrc: Oral   SpO2: 97%   Weight: 83.8 kg (184 lb 12.8 oz)   Height: 175.3 cm (69\")     BP Readings from Last 1 Encounters:   06/28/22 118/79     Wt Readings from Last 3 Encounters:   06/28/22 83.8 kg (184 lb 12.8 oz)   04/01/22 83 kg (183 lb)   03/29/22 83 kg (183 lb)   Body mass index is 27.29 kg/m².     Physical Exam  Vitals reviewed.   Constitutional:       General: She is not in acute distress.     Appearance: Normal appearance.   HENT:      Head: Normocephalic and atraumatic.   Cardiovascular:      Rate and Rhythm: Normal rate. Rhythm irregularly irregular.      Heart sounds: Normal heart sounds.   Pulmonary:      Effort: Pulmonary effort is normal.      Breath sounds: Normal breath sounds.   Musculoskeletal:      Right lower leg: Edema present.      Left lower leg: Edema present.   Neurological:      Mental Status: She is alert and oriented to person, place, and time.   Psychiatric:         Mood and Affect: Mood normal.         Behavior: Behavior normal. "         {SnapShot  Notes  Encounters  Result Review  Labs  Imaging  Meds  Media :23}      TSH (05/09/2022 08:43)  Lipid Panel (05/09/2022 08:43)  Comprehensive Metabolic Panel (05/09/2022 08:43)  CBC & Differential (05/09/2022 08:43)  Adult Transthoracic Echo Complete W/ Cont if Necessary Per Protocol (05/02/2020 07:50)

## 2022-06-30 NOTE — TELEPHONE ENCOUNTER
Caller: Marychuy Verdugo    Relationship: Self    Best call back number: [phone]     What is the best time to reach you: ANY     Who are you requesting to speak with (clinical staff, provider,  specific staff member): CLINICAL     What was the call regarding: PATIENT CALLED AND WANTED TO KNOW IF SHE SHOULD TAKE CALCIUM     Do you require a callback: YES

## 2022-06-30 NOTE — PROGRESS NOTES
Physical Therapy Progress Note/  day pn/10th visit reassessment    Patient: Marychuy Verdugo  : 1940  Referring practitioner: Yair Carver MD  Today's Date: 2022    VISIT#: 17     s/p L THR anterior approach 22   PRECAUTIONS: A fib, HTN, SOA     Subjective   Pt reports: her hip is 80% improved. Still having problems with her balance and going up/down stairs.  The balance exercises helping and  giving her more confidence.  She would like to continue with therapy and work on her balance.       Objective          Active Range of Motion   Left Hip   Flexion: 115 degrees   Abduction: 35 degrees     Additional Active Range of Motion Details  No pain is reported with L hip ROM    Strength/Myotome Testing     Additional Strength Details  Left Hip strength:     Flexion: 4/5  Abduction: 3+/4-  Adduction: 3+/4-  Extension:  3+/4-     Knee ext:  4-   Knee flex: 4    Gait:  Ambulates without AD, slightly forward flexed posture, decreased step length and foot clearance          See Exercise, Manual, and Modality Logs for complete treatment.     Patient Education:    Assessment & Plan     Assessment    Assessment details: Pt is s/p anterior left hip replacement on 22 . She has been seen for 17 visits.  She has responded well to therapy and has made steady progress. Reports her hip is almost 80% improved but still having issues with her balance and steps.  3/5 STGs and 2/7 LTGsmet at this time.  Pt making steady progress with improved ROM, strength and gait quality.   Pt needs to focus on strength of left hip and knee and balance.   Pt would benefit from continued skilled PT to address impairments, dec pain, work toward meeting remaining goals and maximize function    LEFS score: 33/80 (58%)  Improved since last reassessment    Goals  Plan Goals: STGs:  6 weeks  1.  Pt to tolerate initial HEP without inc pain  MET  2.  Pt to tolerate advancement of HEP without inc pain. MET  3.  Pt to report pain has  decreased by at least 25% to allow pt to tolerate standing tasks such as dishes, cooking and  Laundry. MET  4.  Pt to improve  AROM of left hip ext to 10 and flex to 115 or more to allow pt to tolerate walking tasks better such as walking through parking lots, grocery shopping. PROGRESSING  5.  Pt to complete static and dynamic balance assessments (MCTSIB and DGI) when appropriate.    NOT MET    LTGs:  By DC  1.  Pt to be (I) with HEP.  PROGRESSING  2.  Pt to report pain has decreased by at least 80% to allow better tolerance to and performance of grocery shopping. MET  3.  Pt to improve AROM of left knee and hip  to WFL/WNL in all planes  to allow pt to be able to sit to stand without difficulty or inc pain.  PROGRESSING  4.  Pt to improve strength of left knee and hip all  Planes  to 4+/5 or better to be adequate for completion/performance of sit to stands without UE assist .  PROGRESSING  5.  Pt to exhibit improved function as indicated by improved score on LEFS vs score at evaluation.  MET  6.  Pt to demonstrate (I) and appropriate use of body mechanics for daily home and/or work tasks.  NOT MET  7.  Pt to improve performance with MCTSIB and DGI vs first assessment. NOT MET      Plan  Plan details: Continue therapy, current POC still indicated.                       Timed:         Manual Therapy:         mins  98520;     Therapeutic Exercise:    15     mins  88527;     Neuromuscular Carli:  15      mins  63333;    Therapeutic Activity:     15     mins  40016;     Gait Training:           mins  65947;     Ultrasound:          mins  41829;    Ionto                                   mins   45745  Self Care                            mins   83133  Canal repositioning           mins    26686    Un-Timed:  Electrical Stimulation:         mins  47615 ( );  Traction          mins 08676  Low Eval          Mins  11701  Mod Eval          Mins  30932  High Eval                            Mins  96162  Re-Eval                                mins  42348    Timed Treatment:   45   mins   Total Treatment:   45     mins    Simeon Stevens PT, CLT  Physical Therapist  [place] License:  # [id number]

## 2022-07-01 NOTE — PROGRESS NOTES
A My-Chart message has been sent to the patient for PATIENT ROUNDING with [facility].

## 2022-07-01 NOTE — TELEPHONE ENCOUNTER
Please call patient and let her know that because her calcium level was elevated, I recommend holding any calcium supplement.

## 2022-07-08 PROBLEM — Z98.890 S/P LEFT ATRIAL APPENDAGE LIGATION: Chronic | Status: ACTIVE | Noted: 2020-09-09

## 2022-07-08 PROBLEM — H02.402 PTOSIS OF LEFT EYELID: Chronic | Status: ACTIVE | Noted: 2022-01-01

## 2022-07-08 PROBLEM — S02.85XA ORBIT FRACTURE, RIGHT (HCC): Chronic | Status: ACTIVE | Noted: 2020-05-02

## 2022-07-08 PROBLEM — H49.02 LEFT OCULOMOTOR NERVE PALSY: Status: ACTIVE | Noted: 2022-01-01

## 2022-07-08 PROBLEM — H53.2 DIPLOPIA: Status: ACTIVE | Noted: 2022-01-01

## 2022-07-08 PROBLEM — H02.402 PTOSIS OF LEFT EYELID: Status: ACTIVE | Noted: 2022-01-01

## 2022-07-08 NOTE — ED NOTES
"Patient ambulated to restroom with use of cane with steady gait. Patient endorses double vision. Past history of surgery to eye \"a couple of years ago\" and \"then the double vision came back on 4th of July with headache\".  "

## 2022-07-08 NOTE — ED NOTES
Pt c/o left eye pain that started 7/4 when pt stated that she had a headache. Pt states that her eye had a hooded appearance that has started today along with the eye pain that she has had since 7/4. Pt has a hx of double vision. Pt called PCP and was recommended to come to ER. Pt is alert and oriented.

## 2022-07-08 NOTE — H&P
[facility]      Patient Name: Marychuy Verdugo  : 1940  MRN: [id number]  Primary Care Physician:  Roya Benitez MD  Date of admission: 2022      Subjective      Chief Complaint: Headache     History of Present Illness: Marychuy Verdugo is a 82 y.o. female w/ PMH   proximal atrial fibrillation with watchman left atrial appendage closure device, pulmonary hypertension, HTN, hypothyroidism, GERD, and overactive bladder,who presented to [facility] on 2022 complaining of a headache, double vision and left eye ptosis that started on . Patient reports that in  she had a fall that caused a right orbit fracture that was surgically repaired post op she states she had 8 weeks of double vision, she states since she has had no issues with her vision until the morning of  she woke up with a terrible 10/10 stent throbbing headache that is isolated above her left eye.  She also reports associated weakness, decreased appetite, and double vision she states she felt so bad she thought she had Covid and took a home test that was negative.  Patient also complains of decreased urinary output, that she feels it is related to decreased intake, and missing her oxybutynin dose.  Patient denies any chest pain, palpitations, dizziness, lightheadedness, syncope, fever, chills, cough, abdominal pain, nausea, vomiting, diarrhea, constipation.    Vital signs upon arrival to the emergency department blood pressure 138/93, heart rate 129, respiratory rate 16, oxygen saturation 94% on room air, patient is afebrile T-max 98.0.    Initial evaluation in emergency department included: Initial labs with the following abnormalities glucose 123, chloride 96, creatinine 1.39, BUN 24, GFR 38.2, PTT 24.4.  MRI angiogram of the head shows no evidence of large vessel occlusion with intracranial vasculature.  Poor visualization of the bilateral ophthalmic arteries secondary  to motion artifacts noted.  Solitary anterior cerebral artery which appears to represent a normal variant azygos anterior cerebral artery.  MRI of the brain without contrast shows no evidence of mass abnormality along the expected course of the cranial nerve III documented versus treatment with limitations of technique.  Left CN III is only partially visualized.  There is mild periventricular white matter T2/FLAIR hyperintense signal changes likely representing sequela of chronic small vessel ischemic disease.  EKG completed showing atrial fibrillation heart rate 85, with nonspecific T abnormalities in lateral leads..  Visual acuity screening completed showed    Patient was administered any pharmacological agents while in the emergency department today.      Patient will be admitted to hospitalist group for further evaluation and treatment of headache, visual changes and other acute and chronic conditions with neurology consulted.    Review of Systems   Eyes: Positive for double vision and visual disturbance.   Respiratory: Negative.    Skin: Negative.    Gastrointestinal: Negative.    Genitourinary: Positive for frequency.   Neurological: Positive for headaches and weakness.   Psychiatric/Behavioral: Negative.    Allergic/Immunologic: Negative.    All other systems reviewed and are negative.       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   Social History:  reports that she has never smoked. She has never used smokeless tobacco. She reports that she does not drink alcohol and does not use drugs.    Home Medications:  Prior to Admission Medications     Prescriptions Last Dose Informant Patient Reported? Taking?    aspirin 81 MG chewable tablet   No No    Chew 1 tablet Daily.    bumetanide (BUMEX) 2 MG tablet   Yes No    Daily.    Calcium Carbonate-Vitamin D (OS-TATUM 500 + D PO)   Yes No    Take 1 tablet by mouth Daily.    cholecalciferol (VITAMIN D3) 25 MCG (1000 UT) tablet   Yes No    Take 1,000 Units by mouth Daily. Unsure about the units.    docusate sodium (COLACE) 100 MG capsule   Yes No    Take 100 mg by mouth Daily.    levothyroxine (SYNTHROID, LEVOTHROID) 88 MCG tablet   Yes No    Take 88 mcg by mouth Every Morning.    loratadine (CLARITIN) 10 MG tablet   Yes No    Take 10 mg by mouth Daily.    Multiple Vitamins-Minerals (MULTIVITAMIN WITH MINERALS) tablet tablet   Yes No    Take 1 tablet by mouth Daily.    oxybutynin XL (DITROPAN-XL) 10 MG 24 hr tablet   No No    Take 1 tablet by mouth Daily.    pantoprazole (PROTONIX) 40 MG EC tablet   Yes No    Take 40 mg by mouth Daily.    polyethylene glycol (MIRALAX) 17 GM/SCOOP powder   No No    mix 1 capful (17 g) in liquid by mouth Daily.    potassium chloride (K-DUR,KLOR-CON) 20 MEQ CR tablet   Yes No    Take 20 mEq by mouth 2 (Two) Times a Day.            Allergies:  Allergies   Allergen Reactions   • Haloperidol Unknown - Low Severity       Objective      Vitals:   Temp:  [98 °F (36.7 °C)] 98 °F (36.7 °C)  Heart Rate:  [] 87  Resp:  [16] 16  BP: (100-138)/(49-93) 120/79    Physical Exam  Vitals and nursing note reviewed.   HENT:      Head: Normocephalic and atraumatic.      Nose: Nose normal.      Mouth/Throat:      Mouth: Mucous membranes are dry.   Eyes:      Extraocular Movements:      Left eye: No  nystagmus.      Conjunctiva/sclera: Conjunctivae normal.      Pupils: Pupils are equal, round, and reactive to light.      Comments: Left proptosis  Left nerve palsy   Cardiovascular:      Rate and Rhythm: Normal rate. Rhythm irregular.      Pulses: Normal pulses.           Radial pulses are 2+ on the right side and 2+ on the left side.      Heart sounds: Normal heart sounds.   Pulmonary:      Effort: Pulmonary effort is normal.   Abdominal:      General: Bowel sounds are normal.      Palpations: Abdomen is soft.   Musculoskeletal:      Cervical back: Normal range of motion and neck supple.      Right lower leg: No edema.      Left lower leg: No edema.   Skin:     General: Skin is warm and dry.      Capillary Refill: Capillary refill takes less than 2 seconds.   Neurological:      General: No focal deficit present.      Mental Status: She is alert and oriented to person, place, and time.   Psychiatric:         Mood and Affect: Mood normal.         Behavior: Behavior normal.         Thought Content: Thought content normal.         Judgment: Judgment normal.          Result Review    Result Review:  I have personally reviewed the results from the time of this admission to 7/8/2022 21:35 EDT and agree with these findings:  [x]  Laboratory  [x]  Microbiology  [x]  Radiology  [x]  EKG/Telemetry   []  Cardiology/Vascular   []  Pathology  []  Old records  []  Other:  Most notable findings include:     Assessment & Plan        Active Hospital Problems:  Active Hospital Problems    Diagnosis    • **Ptosis of left eyelid    • Left oculomotor nerve palsy    • Diplopia    • Overactive bladder      Potentially exacerbated by diuretic use.  Will try a course of oxybutynin. Discussed anticholinergic SE.     • Presence of Watchman left atrial appendage closure device    • Pulmonary hypertension (HCC)      CT shows a lot of Pulm scarring     • Essential hypertension      BP at goal, <140/90.  Encouraged low Na+ & 150 minutes of  exercise/week.  Consider decreasing diuretic, Bumex.  Monitoring BP at home.  Monitoring renal function.     • Paroxysmal atrial fibrillation (HCC)      S/p Watchman procedure. No anticoagulation therapy necessary. 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   Not on rate control medication.  Denies any chest pain or palpitations.   Followed by cardiology, Clyde Muller MD.     • GERD (gastroesophageal reflux disease)    • Acquired hypothyroidism      Treated with levothyroxine 88 mcg.  Last TSH was WNL. Monitoring regularly.        Plan:   Ptosis of left eyelid  Headache  Diplopia  Left oculomotor nerve palsy  MRI A, and MRI of head reviewed  Neurology consult  Neurochecks per policy  Monitor visual acuity  Continuous cardiac monitoring pulse oximetry  Supplemental oxygen as needed for hypoxia/respiratory distress maintain oxygen saturation greater than 90%  Vital signs per policy  Safety precautions  N.p.o. after midnight pending neurology valuation    Pulmonary hypertension  Chronic/Stable   Continue Bumex pending med rec verification-with caution  Monitor renal function-elevation from baseline creatinine of 1.11 to  1.39.  Gentle rehydration with normal saline x8 hours ordered  Check BNP  Monitor BMP-replace electrolytes when clinically appropriate  Continuous pulse oximetry  Supplemental oxygen as needed for hypoxia/respiratory stress maintain oxygen saturation greater than 90%.  Monitor I's and O's  Daily weights    Paroxysmal atrial fibrillation   S/p   Presence of Watchman left atrial appendage closure device  Chronic/stable  EKG reviewed  Continue daily ASA  Continuous cardiac monitoring    Essential  hypertension  Chronic/Controlled   Vital signs per policy       Acquired hypothyroidism  Chronic  Check TSH  Continue with thyroxine pending med rec verification.    GERD (gastroesophageal reflux disease)  Chronic   Continue PPI med rec verification    Overactive bladder  Chronic  Continue oxybutynin pending med  rec verification    Patient's home med rec has not been verified at time of assessment and plan we will need further evaluation upon completion.    DVT prophylaxis:  Mechanical DVT prophylaxis orders are present.    CODE STATUS:    Code Status (Patient has no pulse and is not breathing): CPR (Attempt to Resuscitate)  Medical Interventions (Patient has pulse or is breathing): Full Support    Admission Status:  I believe this patient meets observation status.    I discussed the patient's findings and my recommendations with patient.    This patient has been examined wearing appropriate Personal Protective Equipment  07/08/22      Signature: Electronically signed by LIAN Felix, 07/08/22, 7:21 PM EDT.

## 2022-07-08 NOTE — ED PROVIDER NOTES
Subjective   History of Present Illness  History Provided By: Patient    Chief Complaint: Double vision, headache, vision loss, going on for 4 to 5 days.  Onset: 4 to 5 days ago  Timing: Worsening  Location: Left  Quality: Patient with headache, eye not painful, having double vision sometimes.  Has history of this when she previously had surgery on her right eye.  Also having drooping of her left eyelid.  Severity: Moderate  Modifying Factors: None    Other: Patient with history of atrial fibrillation status post watchman procedure in 3 years ago, hypertension who states that her symptoms started with a headache over the July 4 weekend.  States it was severe but this is since improved.  States she was also feeling weak with this and swears that she had COVID despite the fact she had negative tests.  Patient then began having some diplopia and noticed that her eyes were not lining up.    Review of Systems   Constitutional: Negative for chills and fever.   Eyes: Positive for visual disturbance.   Respiratory: Negative for shortness of breath.    Cardiovascular: Negative for chest pain.   Neurological: Positive for weakness.   All other systems reviewed and are negative.      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   • Shortness of breath        Allergies   Allergen Reactions   • Haloperidol Unknown - Low Severity       Past Surgical History:   Procedure Laterality Date   • TOTAL HIP ARTHROPLASTY Left 2/12/2022    Procedure: TOTAL HIP ARTHROPLASTY ANTERIOR;  Surgeon: Yair Carver MD;  Location: [facility] MAIN OR;  Service: Orthopedics;  Laterality: Left;       Family History   Problem Relation Age of Onset   • Heart murmur Father    • No  "Known Problems Mother        Social History     Socioeconomic History   • Marital status:    Tobacco Use   • Smoking status: Never Smoker   • Smokeless tobacco: Never Used   Vaping Use   • Vaping Use: Never used   Substance and Sexual Activity   • Alcohol use: Never   • Drug use: Never   • Sexual activity: Defer           Objective   Physical Exam  Constitutional:  No acute distress.  Head:  Atraumatic.  Normocephalic.   Eyes:  No scleral icterus. Normal conjunctiva  ENT:  Moist mucosa.  No nasal discharge present.  Cardiovascular:  Well perfused.  Equal pulses.  Regular rate.  Normal capillary refill.    Pulmonary/Chest:  No respiratory distress.  Airway patent.  No tachypnea.  No accessory muscle usage.    Abdominal:  Non-distended. Non-tender.   Extremities:  No peripheral edema.  No Deformity  Skin:  Warm, dry  Neurological:  Alert and oriented to person place time and situation. PERRL.  EOMI. patient with what appears to be cranial nerve III deficit of left eye.  Unable to AB duct left eye and has inducible diplopia.  Strength is equal and 5/5 in the upper and lower extremities bilaterally.  No sensory deficits to light touch.  No pronator drift.  Normal speech.  Normal cerebellar function during finger-nose-finger and heel to shin.       Procedures           ED Course  ED Course as of 07/08/22 2144 Fri Jul 08, 2022 1841 Awaiting phone call from neurology []   1903 I spoke with Dr. Bryant, recommended admission for further evaluation. []   1920 I spoke with ISAI Hernandez who agreedfor Dr. Sr. []      ED Course User Index  [] UlissesChiqui, PA    /79   Pulse 87   Temp 98 °F (36.7 °C)   Resp 16   Ht 175.3 cm (69\")   Wt 79.7 kg (175 lb 11.3 oz)   SpO2 98%   BMI 25.95 kg/m²   Labs Reviewed   COMPREHENSIVE METABOLIC PANEL - Abnormal; Notable for the following components:       Result Value    Glucose 123 (*)     BUN 24 (*)     Creatinine 1.39 (*)     Chloride 96 (*)     eGFR 38.2 (*)  "    All other components within normal limits    Narrative:     GFR Normal >60  Chronic Kidney Disease <60  Kidney Failure <15     APTT - Abnormal; Notable for the following components:    PTT 24.4 (*)     All other components within normal limits   CBC WITH AUTO DIFFERENTIAL - Abnormal; Notable for the following components:    RDW 17.9 (*)     All other components within normal limits   POCT GLUCOSE FINGERSTICK - Abnormal; Notable for the following components:    Glucose 160 (*)     All other components within normal limits   PROTIME-INR - Normal   SEDIMENTATION RATE - Normal   COVID PRE-OP / PRE-PROCEDURE SCREENING ORDER (NO ISOLATION)    Narrative:     The following orders were created for panel order COVID PRE-OP / PRE-PROCEDURE SCREENING ORDER (NO ISOLATION) - Swab, Nasopharynx.  Procedure                               Abnormality         Status                     ---------                               -----------         ------                     COVID-19,CEPHEID/LEONIDAS,CO...[[id number]]                      In process                   Please view results for these tests on the individual orders.   COVID-19,CEPHEID/LEONIDAS,COR/DAYRL/PAD/HERNANDEZ IN-HOUSE,NP SWAB IN TRANSPORT MEDIA 3-4 HR TAT, RT-PCR   BNP (IN-HOUSE)   TSH   CBC AND DIFFERENTIAL    Narrative:     The following orders were created for panel order CBC & Differential.  Procedure                               Abnormality         Status                     ---------                               -----------         ------                     CBC Auto Differential[[id number]]        Abnormal            Final result                 Please view results for these tests on the individual orders.     Medications   nitroglycerin (NITROSTAT) SL tablet 0.4 mg (has no administration in time range)   sodium chloride 0.9 % flush 10 mL (has no administration in time range)   sodium chloride 0.9 % flush 10 mL (has no administration in time range)   acetaminophen (TYLENOL)  tablet 650 mg (has no administration in time range)     Or   acetaminophen (TYLENOL) 160 MG/5ML solution 650 mg (has no administration in time range)     Or   acetaminophen (TYLENOL) suppository 650 mg (has no administration in time range)   aluminum-magnesium hydroxide-simethicone (MAALOX MAX) 400-400-40 MG/5ML suspension 15 mL (has no administration in time range)   ondansetron (ZOFRAN) tablet 4 mg (has no administration in time range)     Or   ondansetron (ZOFRAN) injection 4 mg (has no administration in time range)   melatonin tablet 5 mg (has no administration in time range)   sodium chloride 0.9 % infusion (has no administration in time range)   aspirin chewable tablet 81 mg (has no administration in time range)   oxybutynin (DITROPAN) tablet 10 mg (has no administration in time range)     MRI Angiogram Head Without Contrast    Result Date: 7/8/2022  IMPRESSION : 1. No evidence of large vessel occlusion within the intracranial vasculature. Poor visualization of the bilateral ophthalmic arteries secondary to motion artifact. 2. Solitary anterior cerebral artery which appears to represent a normal variant azygos anterior cerebral artery.  Electronically Signed By-Alan Silveira MD On:7/8/2022 5:18 PM This report was finalized on [id number] by  Alan Silveira MD.    MRI Brain Without Contrast    Result Date: 7/8/2022  1. No acute intracranial abnormality. Specifically, no evidence of acute infarct. 2. No evidence of mass or abnormality along the expected course of the cranial nerves III within the oculomotor cistern within the limitations of technique. Left CN3 only partially visualized. 3. Mild periventricular white matter T2/FLAIR hyperintense signal changes likely representing sequelae of chronic small vessel ischemic disease.  Electronically Signed By-Alan Silveira MD On:7/8/2022 5:12 PM This report was finalized on [id number] by  Alan Silveira MD.                                            MDM  Number of Diagnoses or Management Options  Diplopia  Left oculomotor nerve palsy  Ptosis of left eyelid  Diagnosis management comments:          Amount and/or Complexity of Data Reviewed  Clinical lab tests: reviewed  Tests in the radiology section of CPT®: reviewed  Tests in the medicine section of CPT®: reviewed         EKG # 1  Signed and interpreted by the EP.  Time Interpreted: 4:04 PM  Rate: 85  Rhythm: A. fib  Axis: Normal axis  Intervals: Normal intervals  ST Segments: Mild ST depressions in lateral leads unchanged from February 11, 2022 EKG.         Concern for aneurysm versus CVA versus mass.      Patient care assumed by myself, MICHELE Da Silva pending MRI results.  These were reviewed with ER attending as well as on-call neurology, Dr. Bryant.  Patient to be admitted for further evaluation and possible CTAs in the morning for cranial nerve palsy.  Patient is agreeable.  On reevaluation physical exam unchanged, neurological exam unchanged.  I spoke with ISAI Hernandez who agreed accept patient for Dr. Sr.  Final diagnoses:   Ptosis of left eyelid   Diplopia   Left oculomotor nerve palsy       ED Disposition  ED Disposition     ED Disposition   Decision to Admit    Condition   --    Comment   Level of Care: Med/Surg [1]   Admitting Physician: CHRISTI SR [[id number]]   Attending Physician: CHRISTI SR [[id number]]               No follow-up provider specified.       Medication List      No changes were made to your prescriptions during this visit.          Chiqui Golden PA  07/08/22 [id number]

## 2022-07-09 NOTE — ED NOTES
Patient ambulated to restroom with steady gait using cane. Patient lights turned off completely for comfort. Patient has no further needs at this time.

## 2022-07-09 NOTE — CONSULTS
Primary Care Provider: Roya Benitez MD     Consult requested by: MICHELE Da Silva    Reason for Consultation: Neurological evaluation left eye ptosis     Marychuy Verdugo is a 82 y.o. female *    History taken from: patient chart RN    Chief complaint:  Left eye ptosis and double vision.        SUBJECTIVE:    History of present illness:   The patient is a 82 year old lady with multiple medical problems including atrial fibrillation, watchman left atrial appendage device,  on chronic anti coagulation, GERD, who had an injury to the orbit of right eye about 2 years ago secondary to fall.  She had fracture of right orbit which was surgically repaired.  She had developed double vision at that time.  After physical therapy it had resolved.  About 4 days ago, she woke up with throbbing headaches above left eye.  She also had some weakness at that time.  The patient was unable to move left eye towards her nose with drooping of left eye lid.  She also developed horizontal double vision.  She states that she was sleeping more than usual and elected to come to [facility] yesterday.  A MRI/MRA of Brain showed no stroke    Review of Systems   Constitutional: Negative  HENT: Negative.    Eyes: Negative.    Respiratory: Negative.    Cardiovascular: A-fib.    Gastrointestinal: chronic constipation.    Genitourinary: Negative.    Musculoskeletal: Negative  Skin: Negative.    Neurological: Negative.    Hematological: Negative.    Psychiatric/Behavioral: Negative.        PATIENT HISTORY:  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   • Shortness of breath    ,   Past Surgical History:   Procedure Laterality Date   • TOTAL HIP ARTHROPLASTY Left 2/12/2022     Procedure: TOTAL HIP ARTHROPLASTY ANTERIOR;  Surgeon: Yair Carver MD;  Location: [facility] MAIN OR;  Service: Orthopedics;  Laterality: Left;   ,   Family History   Problem Relation Age of Onset   • Heart murmur Father    • No Known Problems Mother    ,   Social History     Tobacco Use   • Smoking status: Never Smoker   • Smokeless tobacco: Never Used   Vaping Use   • Vaping Use: Never used   Substance Use Topics   • Alcohol use: Never   • Drug use: Never   , (Not in a hospital admission)  , Scheduled Meds:  aspirin, 81 mg, Oral, Daily  oxybutynin, 10 mg, Oral, Daily  pyridostigmine, 60 mg, Oral, Q8H  sodium chloride, 10 mL, Intravenous, Q12H    , Continuous Infusions:   , PRN Meds:  •  acetaminophen **OR** acetaminophen **OR** acetaminophen  •  aluminum-magnesium hydroxide-simethicone  •  magnesium sulfate **OR** magnesium sulfate **OR** magnesium sulfate  •  melatonin  •  nitroglycerin  •  ondansetron **OR** ondansetron  •  potassium chloride  •  potassium chloride  •  sodium chloride, Allergies:  Haloperidol    ________________________________________________________        OBJECTIVE:  Upon today's exam, The patient is laying in bed in no apparent distress.  Head NC, AT, Neck supple, trachea midline.  Lungs CTA,  Good pulmonary effort. CV  S1-S2 no murmur.  Abdomen soft, non tender.  Ext no edema, no cyanosis.          Neurologic Exam  The patient is awake, alert, oriented to person, place and time.  Speech is fluent with good comprehension, follow commands, name common objects.  CN VFFC, EOM showed left medial rectus palsy, left ptosis noted,  Corneal reflexes present,  PERRL.  No facial droop. Tongue midline.  Motor 5/5. Sensory light touch intact.  Reflexes +, plantar mute.  Cerebellum coordinated movements noted.   ________________________________________________________   RESULTS REVIEW:    VITAL SIGNS:   Temp:  [98 °F (36.7 °C)] 98 °F (36.7 °C)  Heart Rate:  [] 87  Resp:  [16] 16  BP:  (100-138)/(49-93) 137/76     LABS:  WBC   Date Value Ref Range Status   07/09/2022 5.50 3.40 - 10.80 10*3/mm3 Final     RBC   Date Value Ref Range Status   07/09/2022 4.74 3.77 - 5.28 10*6/mm3 Final     Hemoglobin   Date Value Ref Range Status   07/09/2022 13.3 12.0 - 15.9 g/dL Final     Hematocrit   Date Value Ref Range Status   07/09/2022 40.4 34.0 - 46.6 % Final     MCV   Date Value Ref Range Status   07/09/2022 85.3 79.0 - 97.0 fL Final     MCH   Date Value Ref Range Status   07/09/2022 28.0 26.6 - 33.0 pg Final     MCHC   Date Value Ref Range Status   07/09/2022 32.9 31.5 - 35.7 g/dL Final     RDW   Date Value Ref Range Status   07/09/2022 17.5 (H) 12.3 - 15.4 % Final     RDW-SD   Date Value Ref Range Status   07/09/2022 51.2 37.0 - 54.0 fl Final     MPV   Date Value Ref Range Status   07/09/2022 7.9 6.0 - 12.0 fL Final     Platelets   Date Value Ref Range Status   07/09/2022 269 140 - 450 10*3/mm3 Final     Neutrophil %   Date Value Ref Range Status   07/09/2022 58.4 42.7 - 76.0 % Final     Lymphocyte %   Date Value Ref Range Status   07/09/2022 27.7 19.6 - 45.3 % Final     Monocyte %   Date Value Ref Range Status   07/09/2022 11.9 5.0 - 12.0 % Final     Eosinophil %   Date Value Ref Range Status   07/09/2022 1.6 0.3 - 6.2 % Final     Basophil %   Date Value Ref Range Status   07/09/2022 0.4 0.0 - 1.5 % Final     Neutrophils, Absolute   Date Value Ref Range Status   07/09/2022 3.20 1.70 - 7.00 10*3/mm3 Final     Lymphocytes, Absolute   Date Value Ref Range Status   07/09/2022 1.50 0.70 - 3.10 10*3/mm3 Final     Monocytes, Absolute   Date Value Ref Range Status   07/09/2022 0.70 0.10 - 0.90 10*3/mm3 Final     Eosinophils, Absolute   Date Value Ref Range Status   07/09/2022 0.10 0.00 - 0.40 10*3/mm3 Final     Basophils, Absolute   Date Value Ref Range Status   07/09/2022 0.00 0.00 - 0.20 10*3/mm3 Final     nRBC   Date Value Ref Range Status   07/09/2022 0.0 0.0 - 0.2 /100 WBC Final     Glucose   Date Value Ref  Range Status   07/09/2022 97 65 - 99 mg/dL Final     BUN   Date Value Ref Range Status   07/09/2022 21 8 - 23 mg/dL Final     Creatinine   Date Value Ref Range Status   07/09/2022 1.10 (H) 0.57 - 1.00 mg/dL Final     Sodium   Date Value Ref Range Status   07/09/2022 135 (L) 136 - 145 mmol/L Final     Potassium   Date Value Ref Range Status   07/09/2022 3.2 (L) 3.5 - 5.2 mmol/L Final     Comment:     Slight hemolysis detected by analyzer. Results may be affected.     Chloride   Date Value Ref Range Status   07/09/2022 95 (L) 98 - 107 mmol/L Final     CO2   Date Value Ref Range Status   07/09/2022 30.0 (H) 22.0 - 29.0 mmol/L Final     Calcium   Date Value Ref Range Status   07/09/2022 9.4 8.6 - 10.5 mg/dL Final     Total Protein   Date Value Ref Range Status   07/08/2022 7.4 6.0 - 8.5 g/dL Final     Albumin   Date Value Ref Range Status   07/08/2022 4.30 3.50 - 5.20 g/dL Final     ALT (SGPT)   Date Value Ref Range Status   07/08/2022 24 1 - 33 U/L Final     AST (SGOT)   Date Value Ref Range Status   07/08/2022 30 1 - 32 U/L Final     Alkaline Phosphatase   Date Value Ref Range Status   07/08/2022 71 39 - 117 U/L Final     Total Bilirubin   Date Value Ref Range Status   07/08/2022 0.4 0.0 - 1.2 mg/dL Final     BUN/Creatinine Ratio   Date Value Ref Range Status   07/09/2022 19.1 7.0 - 25.0 Final     Anion Gap   Date Value Ref Range Status   07/09/2022 10.0 5.0 - 15.0 mmol/L Final       Lab Results   Component Value Date    TSH 3.510 07/08/2022    LDL 72 05/09/2022         IMAGING STUDIES:  MRI Angiogram Head Without Contrast    Result Date: 7/8/2022  IMPRESSION : 1. No evidence of large vessel occlusion within the intracranial vasculature. Poor visualization of the bilateral ophthalmic arteries secondary to motion artifact. 2. Solitary anterior cerebral artery which appears to represent a normal variant azygos anterior cerebral artery.  Electronically Signed By-Alan Silveira MD On:7/8/2022 5:18 PM This report was  finalized on [id number] by  Alan Silveira MD.    MRI Brain Without Contrast    Result Date: 7/8/2022  1. No acute intracranial abnormality. Specifically, no evidence of acute infarct. 2. No evidence of mass or abnormality along the expected course of the cranial nerves III within the oculomotor cistern within the limitations of technique. Left CN3 only partially visualized. 3. Mild periventricular white matter T2/FLAIR hyperintense signal changes likely representing sequelae of chronic small vessel ischemic disease.  Electronically Signed By-Alan Silveira MD On:7/8/2022 5:12 PM This report was finalized on [id number] by  Alan Silveira MD.      I reviewed the patient's new clinical results.      ________________________________________________________     PROBLEM LIST:    Ptosis of left eyelid    Paroxysmal atrial fibrillation (HCC)    GERD (gastroesophageal reflux disease)    Essential hypertension    Acquired hypothyroidism    Pulmonary hypertension (HCC)    Presence of Watchman left atrial appendage closure device    Overactive bladder    Left oculomotor nerve palsy    Diplopia          Assessment & Plan   ASSESSMENT/PLAN:  The patient is a 82 year old lady with multiple medical problems including atrial fibrillation, GERD, HTN, hypothyroidism who developed ptosis of left eye with pseudo LAKHWINDER.  She had MRI/MRA of Brain showed no evidence of brainstem stroke or aneurysm of close to the course of oculomotor nerve.  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    Rec:  Will give a trial of Mestinon and obtain blood work for myasthenia gravis panel.  Will follow.   Atrial fib, HTN, hypothyroidism as per hospitalist, MD.     Modification of stroke risk factors:   - Blood pressure should be less than 130/80 outpatient, HbA1c less than 6.5, LDL less than 70; b12>500 and smoking cessation if applicable. We would be grateful if the primary team / primary care physician would keep a  close watch on the above targets.  - Stroke education  - Follow up with neurologist of choice      I discussed the patient's findings and my recommendations with patient and nursing staff    Sanjuanita Bryant MD  07/09/22  10:22 EDT

## 2022-07-09 NOTE — ED NOTES
Per admitting provider, patient may eat and drink prior to midnight. Patient to become NPO after midnight. This RN provided patient with warmed dinner, ice cream and water. Patient positioned in bed to eat comfortably. Call light within reach.

## 2022-07-09 NOTE — PROGRESS NOTES
[facility] Medicine Services Daily Progress Note    Patient Name: Marychuy Verdugo  : 1940  MRN: [id number]  Primary Care Physician:  Roya Benitez MD  Date of admission: 2022      Subjective      Chief Complaint: Headache      Patient Reports   2022: Patient reports ongoing double vision and difficulty opening her left eye.    ROS   All other systems were reviewed and were negative except for double vision and left eye ptosis.      Objective      Vitals:   Temp:  [98 °F (36.7 °C)] 98 °F (36.7 °C)  Heart Rate:  [] 87  Resp:  [16] 16  BP: (100-138)/(49-93) 137/76    Physical Exam   Vital signs and nurses notes reviewed.  Well-developed well-nourished female in no acute distress sitting up in the chair awake and alert; mucous membranes moist; sclerae anicteric; pupils equal round and reactive to light, extraocular movements intact; left eyelid ptosis; lungs clear to auscultation bilaterally; CV regular rate and rhythm; abdomen soft nontender nondistended; extremities with no edema, cyanosis or calf tenderness; palpable pedal pulses bilaterally; neurologic exam grossly nonfocal; no Santos catheter.       Result Review    Result Review:  I have personally reviewed the results from the time of this admission to 2022 11:42 EDT and agree with these findings:  [x]  Laboratory  [x]  Microbiology  [x]  Radiology  [x]  EKG/Telemetry   [x]  Cardiology/Vascular   []  Pathology  [x]  Old records  []  Other:  Most notable findings discussed in the assessment and plan.          Assessment & Plan      Brief Patient Summary:  Marychuy Verdugo is a 82 y.o. female who presented to the emergency room complaining of severe throbbing headache above her left eye and double vision with left eye ptosis that started on 2022.  Neurology was consulted in the emergency room.  MRI of the brain and MRA of the head showed no evidence of acute abnormality that could explain the patient's  symptoms although there was some motion artifact.  Neurology felt that the patient's symptoms were most likely related to myasthenia gravis and the patient has been admitted for observation and labs ordered.    Current medications include:  aspirin, 81 mg, Oral, Daily  oxybutynin, 10 mg, Oral, Daily  pyridostigmine, 60 mg, Oral, Q8H  sodium chloride, 10 mL, Intravenous, Q12H             Active Hospital Problems:  Active Hospital Problems    Diagnosis    • **Ptosis of left eyelid    • Left oculomotor nerve palsy    • Diplopia    • Overactive bladder      Potentially exacerbated by diuretic use.  Will try a course of oxybutynin. Discussed anticholinergic SE.     • Presence of Watchman left atrial appendage closure device    • Pulmonary hypertension (HCC)      CT shows a lot of Pulm scarring     • Essential hypertension      BP at goal, <140/90.  Encouraged low Na+ & 150 minutes of exercise/week.  Consider decreasing diuretic, Bumex.  Monitoring BP at home.  Monitoring renal function.     • Paroxysmal atrial fibrillation (HCC)      S/p Watchman procedure. No anticoagulation therapy necessary. 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   Not on rate control medication.  Denies any chest pain or palpitations.   Followed by cardiology, Clyde Muller MD.     • GERD (gastroesophageal reflux disease)    • Acquired hypothyroidism      Treated with levothyroxine 88 mcg.  Last TSH was WNL. Monitoring regularly.        Plan:   Ptosis of left eyelid likely secondary to myasthenia gravis  Headache  Diplopia  MRA, and MRI of head reviewed  Neurology consulting  Myasthenia labs pending     Pulmonary hypertension  Chronic/Stable      Paroxysmal atrial fibrillation   S/p   Presence of Watchman left atrial appendage closure device  Chronic/stable; patient is not on any rate controlling medications  Continue daily ASA  Continuous cardiac monitoring     Essential  hypertension  Chronic/Controlled      Acquired hypothyroidism  -Tinea  levothyroxine     GERD (gastroesophageal reflux disease)  Chronic   Continue PPI med rec verification     Overactive bladder  Chronic  Continue oxybutynin pending med rec verification       DVT prophylaxis:  Mechanical DVT prophylaxis orders are present.    CODE STATUS:    Code Status (Patient has no pulse and is not breathing): CPR (Attempt to Resuscitate)  Medical Interventions (Patient has pulse or is breathing): Full Support      Disposition:  I expect patient to be discharged in 1 to 2 days.    This patient has been examined wearing appropriate Personal Protective Equipment and discussed with hospital infection control department. 07/09/22      Electronically signed by Brea Sr MD, 07/09/22, 11:42 EDT.  [facility] [place] [facility]

## 2022-07-10 NOTE — PROGRESS NOTES
LOS: 0 days     Marychuy Verdugo is a 82 y.o. female     Chief Complaint:  Follow up for ptosis of left eye.        SUBJECTIVE:  History taken from: patient chart RN    Interval History:  The patient is a 82 year old lady with multiple medical problems including atrial fibrillation, watchman device in situ, GERD, history of right orbit eye injury about 2 years ago.  Surgical repair and developed double vision at that time which had resolved.  She developed throbbing headaches above left eye lid, ptosis of left eye lid and horizontal double vision.  A MRI/MRA of Brain is unremarkable for brainstem stroke or aneurysm.  She has been started on Mestinon which has not improved ptosis.  At present she does not have any focal weakness, speech and swallowing problems.            Patient Complaints: none.      Review of Systems   Review of Systems   Review of Systems   Constitutional: Negative  HENT: Negative.    Eyes: Negative.    Respiratory: Negative.    Cardiovascular: A-fib.    Gastrointestinal: chronic constipation.    Genitourinary: Negative.    Musculoskeletal: Negative  Skin: Negative.    Neurological: Negative.    Hematological: Negative.    Psychiatric/Behavioral: Negative.      Pertinent PMH:  has a past medical history of Atrial fibrillation (HCC) (10/10/2013), Chronic anticoagulation (05/01/2020), Chronic constipation, Diplopia (7/8/2022), GERD (gastroesophageal reflux disease) (02/11/2013), HTN (hypertension) (06/12/2014), Hypothyroid (02/11/2013), Left oculomotor nerve palsy (7/8/2022), and Shortness of breath.   ________________________________________________     OBJECTIVE:  The patient is laying in bed in no apparent distress.  Head NC, AT, Neck supple, trachea midline.  Lungs CTA,  Good pulmonary effort. CV  S1-S2 no murmur.  Abdomen soft, non tender.  Ext no edema, no cyanosis.             Neurologic Exam    The patient is awake, alert, oriented to person, place and time.  Speech is fluent with good  comprehension, follow commands, name common objects.  CN VFFC, EOM showed left medial rectus palsy, left ptosis noted,  Corneal reflexes present,  PERRL.  No facial droop. Tongue midline.  Motor 5/5. Sensory light touch intact.  Reflexes +, plantar mute    ________________________________________________   RESULTS REVIEW    VITAL SIGNS:  Temp:  [97.8 °F (36.6 °C)-98.6 °F (37 °C)] 98.6 °F (37 °C)  Heart Rate:  [] 92  Resp:  [12-21] 21  BP: (101-138)/(49-94) 113/60    LABS:   Lab Results   Component Value Date    WBC 5.50 07/09/2022    HGB 13.3 07/09/2022    HCT 40.4 07/09/2022    MCV 85.3 07/09/2022     07/09/2022     Lab Results   Component Value Date    GLUCOSE 97 07/09/2022    BUN 21 07/09/2022    CREATININE 1.10 (H) 07/09/2022    EGFRIFNONA 49 (L) 02/21/2022    BCR 19.1 07/09/2022    K 3.2 (L) 07/09/2022    CO2 30.0 (H) 07/09/2022    CALCIUM 9.4 07/09/2022    ALBUMIN 4.30 07/08/2022    AST 30 07/08/2022    ALT 24 07/08/2022       Lab Results   Component Value Date    TSH 3.510 07/08/2022    LDL 72 05/09/2022         IMAGING STUDIES:  MRI Angiogram Head Without Contrast    Result Date: 7/8/2022  IMPRESSION : 1. No evidence of large vessel occlusion within the intracranial vasculature. Poor visualization of the bilateral ophthalmic arteries secondary to motion artifact. 2. Solitary anterior cerebral artery which appears to represent a normal variant azygos anterior cerebral artery.  Electronically Signed By-Alan Silveira MD On:7/8/2022 5:18 PM This report was finalized on [id number] by  Alan Silveira MD.    MRI Brain Without Contrast    Result Date: 7/8/2022  1. No acute intracranial abnormality. Specifically, no evidence of acute infarct. 2. No evidence of mass or abnormality along the expected course of the cranial nerves III within the oculomotor cistern within the limitations of technique. Left CN3 only partially visualized. 3. Mild periventricular white matter T2/FLAIR hyperintense  signal changes likely representing sequelae of chronic small vessel ischemic disease.  Electronically Signed By-Alan Silveira MD On:7/8/2022 5:12 PM This report was finalized on [id number] by  Alan Silveira MD.      I reviewed the patient's new clinical results.    ________________________________________________      PROBLEM LIST:    Ptosis of left eyelid    Paroxysmal atrial fibrillation (HCC)    GERD (gastroesophageal reflux disease)    Essential hypertension    Acquired hypothyroidism    Pulmonary hypertension (HCC)    Presence of Watchman left atrial appendage closure device    Overactive bladder    Left oculomotor nerve palsy    Diplopia        Assessment & Plan   ASSESSMENT/PLAN:  82 year old lady with multiple medical problems including A-fib, GERD, HTN who had developed ptosis of left eye.  Neuro exam revealed pseudo LAKHWINDER.  MRI and MRA of Brain showed no evidence of brainstem stroke or aneurysm.  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  At present she has not responded to Mestinon.    Rec:  Will continue Mestinon at the current dose. Will obtain a chest x-ray for evaluation for possibility of a lung mass.  Will start low dose Prednisone 10 mg po daily.  Will see her in office in 2-3 weeks.  The patient will make an appointment to see her eye surgeon as out patient basis. Will follow while she is in hospital.   A-fib, GERD, HTN as per  HospitalMD adonay.     **Please refer to previous notes for further details and recommendations.     I discussed the patients findings and my recommendations with patient and nursing staff    Sanjuanita Bryant MD  07/10/22  11:18 EDT

## 2022-07-10 NOTE — PLAN OF CARE
Problem: Fall Injury Risk  Goal: Absence of Fall and Fall-Related Injury  Outcome: Ongoing, Progressing  Intervention: Identify and Manage Contributors  Description: Develop a fall prevention plan with the patient and caregiver/family.  Provide reorientation, appropriate sensory stimulation and routines with changes in mental status to decrease risk of fall.  Promote use of personal vision and auditory aids.  Assess assistance level required for safe and effective self-care; provide support as needed, such as toileting, mobilization. For age 65 and older, implement timed toileting with assistance.  Encourage physical activity, such as performance of mobility and self-care at highest level of patient ability, multicomponent exercise program and provision of appropriate assistive devices.  If fall occurs, assess the severity of injury; implement fall injury protocol. Determine the cause and revise fall injury prevention plan.  Regularly review medication contribution to fall risk; adjust medication administration times to minimize risk of falling.  Consider risk related to polypharmacy and age.  Balance adequate pain management with potential for oversedation.  Recent Flowsheet Documentation  Taken 7/10/2022 0200 by Loan Huffman RN  Medication Review/Management: medications reviewed  Self-Care Promotion: independence encouraged  Taken 7/10/2022 0015 by Loan Huffman RN  Medication Review/Management: medications reviewed  Self-Care Promotion: independence encouraged  Taken 7/9/2022 2200 by Loan Huffman RN  Medication Review/Management: medications reviewed  Taken 7/9/2022 2022 by Loan Huffman, RN  Medication Review/Management: medications reviewed  Self-Care Promotion: independence encouraged  Intervention: Promote Injury-Free Environment  Description: Provide a safe, barrier-free environment that encourages independent activity.  Keep care area uncluttered and well-lighted.  Determine need for  increased observation or monitoring.  Avoid use of devices that minimize mobility, such as restraints or indwelling urinary catheter.  Recent Flowsheet Documentation  Taken 7/10/2022 0200 by Loan Huffman RN  Safety Promotion/Fall Prevention:   safety round/check completed   room organization consistent   lighting adjusted   clutter free environment maintained   assistive device/personal items within reach  Taken 7/10/2022 0015 by Loan Huffman, RN  Safety Promotion/Fall Prevention:   safety round/check completed   room organization consistent   nonskid shoes/slippers when out of bed   mobility aid in reach  Taken 7/9/2022 2200 by Loan Huffman RN  Safety Promotion/Fall Prevention:   safety round/check completed   room organization consistent   nonskid shoes/slippers when out of bed   lighting adjusted   fall prevention program maintained   clutter free environment maintained   assistive device/personal items within reach  Taken 7/9/2022 2022 by Loan Huffman RN  Safety Promotion/Fall Prevention:   safety round/check completed   room organization consistent   lighting adjusted   clutter free environment maintained   assistive device/personal items within reach     Problem: Adult Inpatient Plan of Care  Goal: Plan of Care Review  Outcome: Ongoing, Progressing  Flowsheets (Taken 7/10/2022 0310)  Progress: no change  Plan of Care Reviewed With: patient  Goal: Patient-Specific Goal (Individualized)  Outcome: Ongoing, Progressing  Goal: Absence of Hospital-Acquired Illness or Injury  Outcome: Ongoing, Progressing  Intervention: Identify and Manage Fall Risk  Description: Perform standard risk assessment on admission using a validated tool or comprehensive approach appropriate to the patient; reassess fall risk frequently, with change in status or transfer to another level of care.  Communicate fall injury risk to interprofessional healthcare team.  Determine need for increased observation, equipment and  environmental modification, such as low bed, signage and supportive, nonskid footwear.  Adjust safety measures to individual developmental age, stage and identified risk factors.  Reinforce the importance of safety and physical activity with patient and family.  Perform regular intentional rounding to assess need for position change, pain assessment and personal needs, including assistance with toileting.  Recent Flowsheet Documentation  Taken 7/10/2022 0200 by Loan Huffman RN  Safety Promotion/Fall Prevention:   safety round/check completed   room organization consistent   lighting adjusted   clutter free environment maintained   assistive device/personal items within reach  Taken 7/10/2022 0015 by Loan Huffman RN  Safety Promotion/Fall Prevention:   safety round/check completed   room organization consistent   nonskid shoes/slippers when out of bed   mobility aid in reach  Taken 7/9/2022 2200 by Loan Huffman RN  Safety Promotion/Fall Prevention:   safety round/check completed   room organization consistent   nonskid shoes/slippers when out of bed   lighting adjusted   fall prevention program maintained   clutter free environment maintained   assistive device/personal items within reach  Taken 7/9/2022 2022 by Loan Huffman RN  Safety Promotion/Fall Prevention:   safety round/check completed   room organization consistent   lighting adjusted   clutter free environment maintained   assistive device/personal items within reach  Intervention: Prevent Skin Injury  Description: Perform a screening for skin injury risk, such as pressure or moisture associated skin damage on admission and at regular intervals throughout hospital stay.  Keep all areas of skin (especially folds) clean and dry.  Maintain adequate skin hydration.  Relieve and redistribute pressure and protect bony prominences; implement measures based on patient-specific risk factors.  Match turning and repositioning schedule to clinical  condition.  Encourage weight shift frequently; assist with reposition if unable to complete independently.  Float heels off bed; avoid pressure on the Achilles tendon.  Keep skin free from extended contact with medical devices.  Encourage functional activity and mobility, as early as tolerated.  Use aids (e.g., slide boards, mechanical lift) during transfer.  Recent Flowsheet Documentation  Taken 7/10/2022 0200 by Loan Huffman RN  Body Position: position changed independently  Taken 7/10/2022 0015 by Loan Hfufman RN  Body Position: position changed independently  Taken 7/9/2022 2200 by Loan Huffman RN  Body Position: position changed independently  Taken 7/9/2022 2022 by Loan Huffman RN  Body Position: position changed independently  Intervention: Prevent and Manage VTE (Venous Thromboembolism) Risk  Description: Assess for VTE (venous thromboembolism) risk.  Encourage and assist with early ambulation.  Initiate and maintain compression or other therapy, as indicated, based on identified risk in accordance with organizational protocol and provider order.  Encourage both active and passive leg exercises while in bed, if unable to ambulate.  Recent Flowsheet Documentation  Taken 7/10/2022 0200 by Loan Huffman RN  Activity Management:   activity adjusted per tolerance   up ad judith  VTE Prevention/Management:   sequential compression devices off   patient refused intervention   bilateral   dorsiflexion/plantar flexion performed  Taken 7/10/2022 0015 by Loan Huffman RN  Activity Management: activity adjusted per tolerance  VTE Prevention/Management:   bilateral   dorsiflexion/plantar flexion performed   sequential compression devices off   patient refused intervention  Taken 7/9/2022 2200 by Loan Huffman RN  Activity Management: activity adjusted per tolerance  Taken 7/9/2022 2022 by Loan Huffman RN  Activity Management: activity adjusted per tolerance  VTE Prevention/Management:    patient refused intervention   sequential compression devices off   bilateral   dorsiflexion/plantar flexion performed  Goal: Optimal Comfort and Wellbeing  Outcome: Ongoing, Progressing  Intervention: Monitor Pain and Promote Comfort  Description: Assess pain level, treatment efficacy and patient response at regular intervals using a consistent pain scale.  Consider the presence and impact of preexisting chronic pain.  Encourage patient and caregiver involvement in pain assessment, interventions and safety measures.  Recent Flowsheet Documentation  Taken 7/10/2022 0200 by Loan Huffman RN  Pain Management Interventions:   care clustered   see MAR   quiet environment facilitated  Taken 7/10/2022 0015 by Loan Huffman RN  Pain Management Interventions:   care clustered   see MAR  Intervention: Provide Person-Centered Care  Description: Use a family-focused approach to care.  Develop trust and rapport by proactively providing information, encouraging questions, addressing concerns and offering reassurance.  Acknowledge emotional response to hospitalization.  Recognize and utilize personal coping strategies.  Honor spiritual and cultural preferences.  Recent Flowsheet Documentation  Taken 7/10/2022 0200 by Loan Huffman RN  Trust Relationship/Rapport:   care explained   choices provided  Taken 7/10/2022 0015 by Loan Huffman RN  Trust Relationship/Rapport:   care explained   choices provided  Taken 7/9/2022 2200 by Loan Huffman RN  Trust Relationship/Rapport:   care explained   choices provided  Taken 7/9/2022 2022 by Loan Huffman RN  Trust Relationship/Rapport:   care explained   choices provided  Goal: Readiness for Transition of Care  Outcome: Ongoing, Progressing  Intervention: Mutually Develop Transition Plan  Description: Identify available resources for support (e.g., family, friends, community).  Identify and address barriers to ongoing treatment and home management (e.g.,  environmental, financial).  Provide opportunities to practice self-management skills.  Assess and monitor emotional readiness for transition.  Establish or reconnect linkage with outpatient providers or community-based services.  Recent Flowsheet Documentation  Taken 7/9/2022 2258 by Loan Huffman, RN  Transportation Anticipated: family or friend will provide  Patient/Family Anticipated Services at Transition: none  Patient/Family Anticipates Transition to: home  Taken 7/9/2022 2257 by Loan Huffman, RN  Equipment Currently Used at Home: none   Goal Outcome Evaluation:  Plan of Care Reviewed With: patient        Progress: no change

## 2022-07-10 NOTE — DISCHARGE SUMMARY
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     Ptosis of left eyelid likely secondary to myasthenia gravis  Headache  Diplopia  MRA, and MRI of head reviewed  Neurology consulting  Myasthenia labs pending     Pulmonary hypertension  Chronic/Stable      Paroxysmal atrial fibrillation   S/p   Presence of Watchman left atrial appendage closure device  Chronic/stable; patient is not on any rate controlling medications  Continue daily ASA     Essential  hypertension  Chronic/Controlled   -continue bumex but decrease to 2mg once daily     Acquired hypothyroidism  -continue levothyroxine     GERD (gastroesophageal reflux disease)  Chronic   Continue PPI      Overactive bladder  Chronic  Continue oxybutynin     Hospital Course     Hospital Course:  Marychuy Verdugo is a 82 y.o. female who presented to the emergency room complaining of severe throbbing headache above her left eye and double vision with left eye ptosis that started on 2022.  Neurology was consulted in the emergency room.  MRI of the brain  and MRA of the head showed no evidence of acute abnormality that could explain the patient's symptoms although there was some motion artifact.  Neurology felt that the patient's symptoms were most likely related to myasthenia gravis and the patient has been started on mestinon and labs to confirm diagnosis are pending.  Patient had no improvement with mestinon, so prednisone 10 mg daily was added. She is instructed to follow-up with Dr. Bryant in 2-3 weeks. Patient is felt to be stable for discharge.         DISCHARGE Follow Up Recommendations for labs and diagnostics:   Myasthenia gravis panel pending      Reasons For Change In Medications and Indications for New Medications:      Day of Discharge     Vital Signs:  Temp:  [97.9 °F (36.6 °C)-98.6 °F (37 °C)] 97.9 °F (36.6 °C)  Heart Rate:  [64-92] 79  Resp:  [12-21] 12  BP: (101-138)/(49-94) 112/57    Physical Exam:  Physical Exam   Vital signs and nurses notes reviewed.  Well-nourished female sitting up in bed in no acute distress awake and alert; sclera anicteric, mucous membranes moist; ptosis left eyelid; lungs clear to auscultation bilaterally; CV irregularly irregular; abdomen soft and nontender and nondistended; extremities with no edema no cyanosis or calf tenderness; no Santos catheter.      Pertinent  and/or Most Recent Results     LAB RESULTS:      Lab 07/09/22  0828 07/08/22  1520   WBC 5.50 6.00   HEMOGLOBIN 13.3 13.3   HEMATOCRIT 40.4 40.5   PLATELETS 269 305   NEUTROS ABS 3.20 3.70   LYMPHS ABS 1.50 1.50   MONOS ABS 0.70 0.70   EOS ABS 0.10 0.10   MCV 85.3 85.5   SED RATE  --  18   PROTIME  --  10.3   APTT  --  24.4*         Lab 07/09/22  0715 07/08/22  1520   SODIUM 135* 137   POTASSIUM 3.2* 3.5   CHLORIDE 95* 96*   CO2 30.0* 27.0   ANION GAP 10.0 14.0   BUN 21 24*   CREATININE 1.10* 1.39*   EGFR 50.6* 38.2*   GLUCOSE 97 123*   CALCIUM 9.4 9.7   MAGNESIUM 2.1  --    TSH  --  3.510         Lab 07/08/22  1520   TOTAL PROTEIN 7.4   ALBUMIN 4.30   GLOBULIN  3.1   ALT (SGPT) 24   AST (SGOT) 30   BILIRUBIN 0.4   ALK PHOS 71         Lab 07/08/22  1520   PROBNP 932.8   PROTIME 10.3   INR 1.00                 Brief Urine Lab Results  (Last result in the past 365 days)      Color   Clarity   Blood   Leuk Est   Nitrite   Protein   CREAT   Urine HCG        05/09/22 0843 Yellow   Clear   Negative   Small (1+)   Negative   30 mg/dL (1+)               Microbiology Results (last 10 days)     Procedure Component Value - Date/Time    COVID PRE-OP / PRE-PROCEDURE SCREENING ORDER (NO ISOLATION) - Swab, Nasopharynx [[id number]]  (Normal) Collected: 07/08/22 2133    Lab Status: Final result Specimen: Swab from Nasopharynx Updated: 07/08/22 2230    Narrative:      The following orders were created for panel order COVID PRE-OP / PRE-PROCEDURE SCREENING ORDER (NO ISOLATION) - Swab, Nasopharynx.  Procedure                               Abnormality         Status                     ---------                               -----------         ------                     COVID-19,CEPHEID/LEONIDAS,CO...[[id number]]  Normal              Final result                 Please view results for these tests on the individual orders.    COVID-19,CEPHEID/LEONIDAS,COR/[facility]/PAD/[facility] IN-HOUSE(OR EMERGENT/ADD-ON),NP SWAB IN TRANSPORT MEDIA 3-4 HR TAT, RT-PCR - Swab, Nasopharynx [[id number]]  (Normal) Collected: 07/08/22 2133    Lab Status: Final result Specimen: Swab from Nasopharynx Updated: 07/08/22 2230     COVID19 Not Detected    Narrative:      Fact sheet for providers: https://www.fda.gov/media/205381/download     Fact sheet for patients: https://www.fda.gov/media/048069/download  Fact sheet for providers: https://www.fda.gov/media/697505/download     Fact sheet for patients: https://www.fda.gov/media/928473/download          MRI Angiogram Head Without Contrast    Result Date: 7/8/2022  Impression: IMPRESSION : 1. No evidence of large vessel occlusion within the intracranial vasculature. Poor visualization of the  bilateral ophthalmic arteries secondary to motion artifact. 2. Solitary anterior cerebral artery which appears to represent a normal variant azygos anterior cerebral artery.  Electronically Signed By-Alan Silveira MD On:7/8/2022 5:18 PM This report was finalized on [id number] by  Alan Silveira MD.    MRI Brain Without Contrast    Result Date: 7/8/2022  Impression: 1. No acute intracranial abnormality. Specifically, no evidence of acute infarct. 2. No evidence of mass or abnormality along the expected course of the cranial nerves III within the oculomotor cistern within the limitations of technique. Left CN3 only partially visualized. 3. Mild periventricular white matter T2/FLAIR hyperintense signal changes likely representing sequelae of chronic small vessel ischemic disease.  Electronically Signed By-Alan Silveira MD On:7/8/2022 5:12 PM This report was finalized on [id number] by  Alan Silveira MD.    XR Chest PA & Lateral    Result Date: 7/10/2022  Impression:  Negative chest x-ray. No evidence of a lung mass or other acute cardiopulmonary disease.  Electronically Signed By-Gilberto Ng On:7/10/2022 4:13 PM This report was finalized on [id number] by  Gilberto Ng, .              Results for orders placed during the hospital encounter of 05/01/20    Adult Transthoracic Echo Complete W/ Cont if Necessary Per Protocol    Interpretation Summary  · Mild tricuspid valve regurgitation is present.  · Left atrial cavity size is severely dilated.  · Estimated EF = 50%.  · Left ventricular systolic function is normal.  · Mild aortic valve regurgitation is present.  · Mild mitral valve regurgitation is present  · Right ventricular cavity is mildly dilated.      Labs Pending at Discharge:  Pending Labs     Order Current Status    AChR Modulating, Serum In process    Myasthenia Gravis Full Panel w/MuSK Reflex In process    Myasthenia Gravis Full Panel w/MuSK Reflex In process    Striated Ab IgG w/rfx titer  In process          Procedures Performed           Consults:   Consults     Date and Time Order Name Status Description    7/8/2022  7:19 PM Inpatient Neurology Consult General Completed     7/8/2022  7:19 PM Hospitalist (on-call MD unless specified)              Discharge Details        Discharge Medications      New Medications      Instructions Start Date   predniSONE 10 MG tablet  Commonly known as: DELTASONE   10 mg, Oral, Daily, Take with food.   Start Date: July 11, 2022     pyridostigmine 60 MG tablet  Commonly known as: MESTINON   60 mg, Oral, Every 8 Hours Scheduled         Changes to Medications      Instructions Start Date   bumetanide 2 MG tablet  Commonly known as: BUMEX  What changed: when to take this   2 mg, Oral, Daily         Continue These Medications      Instructions Start Date   aspirin 81 MG chewable tablet   81 mg, Oral, Daily      cholecalciferol 25 MCG (1000 UT) tablet  Commonly known as: VITAMIN D3   1,000 Units, Oral, Daily, Unsure about the units.      docusate sodium 100 MG capsule  Commonly known as: COLACE   100 mg, Oral, Daily      levothyroxine 88 MCG tablet  Commonly known as: SYNTHROID, LEVOTHROID   88 mcg, Oral, Every Early Morning      loratadine 10 MG tablet  Commonly known as: CLARITIN   10 mg, Oral, Daily      multivitamin with minerals tablet tablet   1 tablet, Oral, Daily      oxybutynin XL 10 MG 24 hr tablet  Commonly known as: DITROPAN-XL   10 mg, Oral, Daily      pantoprazole 40 MG EC tablet  Commonly known as: PROTONIX   40 mg, Oral, Daily      polyethylene glycol 17 GM/SCOOP powder  Commonly known as: MIRALAX   mix 1 capful (17 g) in liquid by mouth Daily.      potassium chloride 20 MEQ CR tablet  Commonly known as: K-DUR,KLOR-CON   20 mEq, Oral, 2 Times Daily             Allergies   Allergen Reactions   • Haloperidol Unknown - Low Severity         Discharge Disposition:   Home or Self Care    Diet:  Hospital:  Diet Order   Procedures   • Diet Cardiac; Healthy Heart          Discharge Activity:   Activity Instructions     Activity as Tolerated              CODE STATUS:  Code Status and Medical Interventions:   Ordered at: 07/08/22 2123     Code Status (Patient has no pulse and is not breathing):    CPR (Attempt to Resuscitate)     Medical Interventions (Patient has pulse or is breathing):    Full Support         Future Appointments   Date Time Provider Department Center   7/18/2022  2:45 PM Marychuy Kimbrough, PT [facility] [address]   7/22/2022  2:45 PM Ailyn Torres PTA [facility] [address]   10/31/2022  3:00 PM Roya Benitez MD [facility]   5/17/2023 11:15 AM Yair Carver MD [facility]       Additional Instructions for the Follow-ups that You Need to Schedule     Call MD With Problems / Concerns   As directed      Instructions: Call [phone] or email [email or web address] for problems or concerns.    Order Comments: Instructions: Call [phone] or email [email or web address] for problems or concerns.          Discharge Follow-up with PCP   As directed       Currently Documented PCP:    Roya Benitez MD    PCP Phone Number:    [phone]     Follow Up Details: 2-3 days               Time spent on Discharge including face to face service: 25 minutes    This patient has been examined wearing appropriate Personal Protective Equipment and discussed with hospital infection control department. 07/10/22      Signature: Electronically signed by Brea Sr MD, 07/10/22, 6:27 PM EDT.

## 2022-07-11 NOTE — OUTREACH NOTE
Call Center TCM Note    Flowsheet Row Responses   [facility] patient discharged from? [facility]   Does the patient have one of the following disease processes/diagnoses(primary or secondary)? Other   TCM attempt successful? Yes   Call start time 1422   Call end time 1427   Discharge diagnosis Ptosis of left eyelid   Meds reviewed with patient/caregiver? Yes   Is the patient having any side effects they believe may be caused by any medication additions or changes? No   Does the patient have all medications ordered at discharge? Yes   Is the patient taking all medications as directed (includes completed medication regime)? Yes   Does the patient have a primary care provider?  Yes   Does the patient have an appointment with their PCP within 7 days of discharge? Yes   Has the patient kept scheduled appointments due by today? N/A   Comments Has a virtual visit with Kristin BEAL tomorrow.   Psychosocial issues? No   Did the patient receive a copy of their discharge instructions? Yes   Nursing interventions Reviewed instructions with patient   What is the patient's perception of their health status since discharge? Improving   Is the patient/caregiver able to teach back signs and symptoms related to disease process for when to call PCP? Yes   Is the patient/caregiver able to teach back signs and symptoms related to disease process for when to call 911? Yes   Is the patient/caregiver able to teach back the hierarchy of who to call/visit for symptoms/problems? PCP, Specialist, Home health nurse, Urgent Care, ED, 911 Yes   If the patient is a current smoker, are they able to teach back resources for cessation? Not a smoker   Additional teach back comments States she still has the droop to eyelid and double vision and hoping the medication will help.     TCM call completed? Yes   Wrap up additional comments Denies questions or needs at this time.  Gave compliments on her care          Mary Claire LPN    7/11/2022,  14:31 EDT

## 2022-07-11 NOTE — OUTREACH NOTE
Prep Survey    Flowsheet Row Responses   [facility] patient discharged from? [facility]   Is LACE score < 7 ? Yes   Emergency Room discharge w/ pulse ox? No   Eligibility [facility]   Date of Admission 07/08/22   Date of Discharge 07/10/22   Discharge Disposition Home or Self Care   Discharge diagnosis Ptosis of left eyelid   Does the patient have one of the following disease processes/diagnoses(primary or secondary)? Other   Does the patient have Home health ordered? No   Is there a DME ordered? No   Prep survey completed? Yes          YEIMI MILLARD - Registered Nurse

## 2022-07-11 NOTE — CASE MANAGEMENT/SOCIAL WORK
Case Management Discharge Note                Selected Continued Care - Discharged on 7/10/2022 Admission date: 7/8/2022 - Discharge disposition: Home or Self Care         Transportation Services  Private: Car    Final Discharge Disposition Code: 01 - home or self-care     Guera Ibanez RN, MSN  Care Manager  [phone]

## 2022-07-18 NOTE — TELEPHONE ENCOUNTER
Caller: PATIENT     Relationship to patient: SELF    Best call back number: [phone]    Patient is needing: PATIENT STATES SHE WAS IN THE HOSPITAL July 8TH -10TH. SHE SAYS SHE WAS TREATED FOR MYASTHENIA GRAVIS AND SHE WAS INSTRUCTED TO NOTIFY ALL OF HER PHYSICIANS ABOUT THIS.

## 2022-07-18 NOTE — TELEPHONE ENCOUNTER
Caller: Marychuy Verdugo    Relationship to patient: Self    Best call back number: [phone]    Patient is needing: PATIENT IS CALLING TO APOLOGIZE TO TRI HUNT FOR NOT BEING ABLE TO BE THERE ON TELEHEALTH. SHE STATES SHE WASN'T NOT AWARE HOW IT WORKED.

## 2022-07-18 NOTE — TELEPHONE ENCOUNTER
Caller: Marychuy Verdugo    Relationship to patient: Self    Best call back number: [phone]    Patient is needing: PATIENT GOT NEW MEDICATIONS WHILE IN THE HOSPITAL AND PATIENT IS CALLING TO KNOW IF SHE CAN DRIVE WHILE ON THESE MEDICATIONS AND TO ASK SOME MORE QUESTIONS. PATIENT ISN'T SURE WHAT ALL THE MEDICATIONS ARE BUT JUST WANTING TO ASK DR. MARTINEZ TO CALL HER BACK TO ASK SOME QUESTIONS.

## 2022-07-18 NOTE — TELEPHONE ENCOUNTER
I spoke with pt and she wanted to make sure we had the records of her going to ED. I confirmed that we do and she canceled her appt for 07/28/2022.

## 2022-07-21 NOTE — ED PROVIDER NOTES
"Subjective   82-year-old  female presents to the emergency room with complaint of throbbing headache pain in the right temporal area that started this morning.  Patient states she is also felt a little bit off balance since July 4 when she had a headache earlier this month and was admitted and diagnosed and has been treated for myasthenia gravis.  Patient states that she has a follow-up appointment scheduled with Dr. Bryant for August 8 but has not seen them since her discharge here from the hospital.  Patient states that her left eyelid is closed and she cannot open it unless she manually lifts up her left eyelid.  Patient states that she has extreme double vision when she lifts her left eyelid up.  Patient states that she took a Tylenol prior to arrival to the ER today and that has eased the headache a little.  Patient states that she was afraid that this headache was going to \"close her right eye like her left eye did earlier this month.  Patient states that her primary care physician is Dr. Chandan aguilera.  Onset: Sudden onset of right temporal headache this morning  Location: Right temporal area  Duration: Few hours  Character: Throbbing pain  Aggravating/Alleviating Factors: Unknown/Tylenol  Radiation: None  Severity: Moderate            Review of Systems   Constitutional: Negative.  Negative for fatigue.   HENT: Negative for congestion, ear pain, sinus pressure, sinus pain, sore throat and trouble swallowing.    Eyes: Negative.  Negative for photophobia, pain, discharge, redness and itching.   Respiratory: Negative.    Gastrointestinal: Negative for abdominal pain, diarrhea, nausea and vomiting.   Endocrine: Negative.    Genitourinary: Negative for dysuria, flank pain and urgency.   Musculoskeletal: Negative.    Skin: Negative for rash and wound.   Allergic/Immunologic: Negative.    Neurological: Positive for headaches. Negative for dizziness, weakness and light-headedness.   Hematological: Negative.  "   Psychiatric/Behavioral: Negative.        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   • Shortness of breath        Allergies   Allergen Reactions   • Haloperidol Unknown - Low Severity       Past Surgical History:   Procedure Laterality Date   • TOTAL HIP ARTHROPLASTY Left 2/12/2022    Procedure: TOTAL HIP ARTHROPLASTY ANTERIOR;  Surgeon: Yair Carver MD;  Location: [facility] MAIN OR;  Service: Orthopedics;  Laterality: Left;       Family History   Problem Relation Age of Onset   • Heart murmur Father    • No Known Problems Mother        Social History     Socioeconomic History   • Marital status:    Tobacco Use   • Smoking status: Never Smoker   • Smokeless tobacco: Never Used   Vaping Use   • Vaping Use: Never used   Substance and Sexual Activity   • Alcohol use: Never   • Drug use: Never   • Sexual activity: Defer           Objective   Physical Exam  Vitals reviewed.   Constitutional:       General: She is not in acute distress.     Appearance: She is not ill-appearing, toxic-appearing or diaphoretic.   HENT:      Head: Normocephalic and atraumatic.      Nose: Nose normal.      Mouth/Throat:      Mouth: Mucous membranes are moist.      Pharynx: Oropharynx is clear.   Eyes:      General: Lids are normal.         Right eye: No discharge.         Left eye: No discharge.      Extraocular Movements:      Right eye: Normal extraocular motion and no nystagmus.      Left eye: Abnormal extraocular motion present.      Conjunctiva/sclera:      Right eye: Right conjunctiva is not injected. No exudate or hemorrhage.     Left eye: Left conjunctiva is not injected. No exudate or hemorrhage.  Neurological:      Mental Status: She is alert and  oriented to person, place, and time.      GCS: GCS eye subscore is 4. GCS verbal subscore is 5. GCS motor subscore is 6.      Sensory: Sensation is intact.      Motor: Motor function is intact. No weakness.         Procedures           ED Course        Labs Reviewed   COMPREHENSIVE METABOLIC PANEL - Abnormal; Notable for the following components:       Result Value    Creatinine 1.01 (*)     eGFR 56.0 (*)     All other components within normal limits    Narrative:     GFR Normal >60  Chronic Kidney Disease <60  Kidney Failure <15     T4, FREE - Abnormal; Notable for the following components:    Free T4 1.77 (*)     All other components within normal limits    Narrative:     Results may be falsely increased if patient taking Biotin.     URINALYSIS W/ CULTURE IF INDICATED - Abnormal; Notable for the following components:    Blood, UA Trace (*)     Leuk Esterase, UA Large (3+) (*)     All other components within normal limits    Narrative:     In absence of clinical symptoms, the presence of pyuria, bacteria, and/or nitrites on the urinalysis result does not correlate with infection.   CBC WITH AUTO DIFFERENTIAL - Abnormal; Notable for the following components:    RDW 19.2 (*)     RDW-SD 58.6 (*)     All other components within normal limits   URINALYSIS, MICROSCOPIC ONLY - Abnormal; Notable for the following components:    RBC, UA 0-2 (*)     WBC, UA 21-30 (*)     All other components within normal limits   PHOSPHORUS - Normal   TSH - Normal   SEDIMENTATION RATE - Normal   C-REACTIVE PROTEIN - Normal   BNP (IN-HOUSE) - Normal    Narrative:     Among patients with dyspnea, NT-proBNP is highly sensitive for the detection of acute congestive heart failure. In addition NT-proBNP of <300 pg/ml effectively rules out acute congestive heart failure with 99% negative predictive value.    Results may be falsely decreased if patient taking Biotin.     URINE CULTURE   CBC AND DIFFERENTIAL    Narrative:     The following orders  were created for panel order CBC & Differential.  Procedure                               Abnormality         Status                     ---------                               -----------         ------                     CBC Auto Differential[[id number]]        Abnormal            Final result                 Please view results for these tests on the individual orders.   EXTRA TUBES    Narrative:     The following orders were created for panel order Extra Tubes.  Procedure                               Abnormality         Status                     ---------                               -----------         ------                     Gold Top - SST[[id number]]                                   Final result                 Please view results for these tests on the individual orders.   GOLD TOP - SST       Medications   sodium chloride 0.9 % flush 10 mL (has no administration in time range)       CT Head Without Contrast    Result Date: 7/21/2022   1. No acute findings. 2. Decreased density in the white matter tracts felt to represent chronic microvascular ischemia. This was also seen on the recent MRI of the brain.  Electronically Signed By-Chino Dickinson MD On:7/21/2022 3:48 PM This report was finalized on [id number] by  Chino Dickinson MD.    Work-up was significant for urinalysis of 21-30 white blood cell count but no bacteria and 3+ leukocytes with trace blood.  1 g of Rocephin was ordered but patient declined and agrees to take oral antibiotics.  Patient's proBNP was found to be over 1702 mg of Bumex ordered but patient declined and stated she would take an extra Bumex tonight when she gets home.    Vitals:    07/21/22 1602   BP: 103/75   Pulse:    Resp:    Temp:    SpO2:      CT of the head was performed and no acute abnormality was noted.  Discharged home to initiate antibiotic therapy for UTI and to take an extra Bumex this evening when she gets home.    Encourage patient to see primary care physician and/or  Dr. Bryant within the next 5 to 7 days.  Patient verbalizes understanding of all discharge instructions.                                     MDM    Final diagnoses:   Intractable episodic paroxysmal hemicrania   Acute cystitis with hematuria       ED Disposition  ED Disposition     ED Disposition   Discharge    Condition   Stable    Comment   --             Roya Benitez MD  [facility]  [address]  [place] [address] [phone]    Schedule an appointment as soon as possible for a visit in 1 week  As needed, If symptoms worsen         Medication List      New Prescriptions    cefdinir 300 MG capsule  Commonly known as: OMNICEF  Take 1 capsule by mouth 2 (Two) Times a Day for 5 days.           Where to Get Your Medications      These medications were sent to [facility]/pharmacy #[id number] - [facility] [place] - [address] [place] [address] [phone] [place] [phone] [place] [address] [place] [address]    Hours: 24-hours Phone: [phone]   · cefdinir 300 MG capsule          Melissa Roth, APRN  07/21/22 7756

## 2022-07-21 NOTE — DISCHARGE INSTRUCTIONS
Rest and return if worse.  Please take one extra dose of Bumex this evening due to elevated fluid level.  Fill and take antibiotic, as directed.

## 2022-07-25 NOTE — PROGRESS NOTES
"Klarissa Verdugo is a 82 y.o. female.       HPI   Pt here today for hospital follow up from [facility].   Admitted 7/8/22-7/10/22 for diplopia; ptosis of left eyelid and left oculomotor nerve palsy.    Seen in ER on 7/21/11.    ER course: \"82-year-old  female presents to the emergency room with complaint of throbbing headache pain in the right temporal area that started this morning.  Patient states she is also felt a little bit off balance since July 4 when she had a headache earlier this month and was admitted and diagnosed and has been treated for myasthenia gravis.  Patient states that she has a follow-up appointment scheduled with Dr. Bryant for August 8 but has not seen them since her discharge here from the hospital.  Patient states that her left eyelid is closed and she cannot open it unless she manually lifts up her left eyelid.  Patient states that she has extreme double vision when she lifts her left eyelid up.  Patient states that she took a Tylenol prior to arrival to the ER today and that has eased the headache a little.  Patient states that she was afraid that this headache was going to \"close her right eye like her left eye did earlier this month.  Patient states that her primary care physician is Dr. Hawley. Onset: Sudden onset of right temporal headache this morning.  CT Head Without Contrast     Result Date: 7/21/2022   1. No acute findings. 2. Decreased density in the white matter tracts felt to represent chronic microvascular ischemia. This was also seen on the recent MRI of the brain.  Electronically Signed By-Chino Dickinson MD On:7/21/2022 3:48 PM This report was finalized on [id number] by  Chino Dickinson MD.     Work-up was significant for urinalysis of 21-30 white blood cell count but no bacteria and 3+ leukocytes with trace blood.  1 g of Rocephin was ordered but patient declined and agrees to take oral antibiotics.  Patient's proBNP was found to be over 1702 mg of Bumex ordered " "but patient declined and stated she would take an extra Bumex tonight when she gets home.         Vitals:     07/21/22 1602   BP: 103/75   Pulse:     Resp:     Temp:     SpO2:        CT of the head was performed and no acute abnormality was noted.  Discharged home to initiate antibiotic therapy for UTI and to take an extra Bumex this evening when she gets home.     Encourage patient to see primary care physician and/or Dr. Bryant within the next 5 to 7 days.  Patient verbalizes understanding of all discharge instructions.\"    Scheduled with Dr. Montalvo - [facility] - 8/8.  May be having a biopsy this week to r/o giant cell arterirtis. Started on prednisone.  Symptoms have not improved but not worsened.      Completed the cefdinir for UTI today.    Denies any urinary symptoms; was never really having any symptoms. Urine culture from ER from contaminated.      The following portions of the patient's history were reviewed and updated as appropriate: allergies, current medications, past family history, past medical history, past social history, past surgical history and problem list.    Review of Systems   Constitutional: Negative for chills, fatigue and fever.   Eyes: Positive for visual disturbance.   Respiratory: Negative for cough, chest tightness, shortness of breath and wheezing.    Cardiovascular: Negative for chest pain and palpitations.   Gastrointestinal: Negative for diarrhea, nausea and vomiting.   Genitourinary: Negative for decreased urine volume, dysuria, flank pain, frequency, hematuria, pelvic pain and urgency.   Musculoskeletal: Negative for arthralgias and myalgias.   Neurological: Positive for headache. Negative for dizziness, tremors, seizures, syncope, facial asymmetry, speech difficulty, weakness, light-headedness, numbness, memory problem and confusion.   Psychiatric/Behavioral: Negative for depressed mood. The patient is not nervous/anxious.        Objective   Physical Exam  Vitals reviewed. "   Constitutional:       General: She is not in acute distress.     Appearance: Normal appearance.   Eyes:      Conjunctiva/sclera:      Right eye: Right conjunctiva is not injected. No exudate or hemorrhage.     Left eye: Left conjunctiva is not injected. No exudate or hemorrhage.     Comments: Ptosis - left eyelid.    Cardiovascular:      Rate and Rhythm: Normal rate and regular rhythm.      Pulses: Normal pulses.      Heart sounds: Normal heart sounds. No murmur heard.  Pulmonary:      Effort: Pulmonary effort is normal. No respiratory distress.      Breath sounds: Normal breath sounds. No wheezing or rhonchi.   Chest:      Chest wall: No tenderness.   Abdominal:      Tenderness: There is no right CVA tenderness or left CVA tenderness.   Neurological:      Mental Status: She is alert and oriented to person, place, and time.   Psychiatric:         Mood and Affect: Mood normal.           Assessment & Plan   Diagnoses and all orders for this visit:    1. Left oculomotor nerve palsy (Primary)  Comments:  Stable.   Cont. current medication.   Keep f/u with optical neuro.     2. Ptosis of left eyelid  Comments:  Stable.   Cont. current medication.   Keep f/u with optical neuro.     3. Dysuria  Comments:  UA shows trace blood and leuks.    Sent for cx.   Pt asymptomatic; no additional abx given at this time.   Encouraged fluids.   Orders:  -     Urine Culture - Urine, Urine, Clean Catch  -     POC Urinalysis Dipstick, Automated

## 2022-08-02 NOTE — DISCHARGE INSTRUCTIONS
Take the following medications the morning of surgery: PROTONIX, LEVOTHYROXINE, PREDNISONE      If you are on prescription narcotic pain medication to control your pain you may also take that medication the morning of surgery.    General Instructions:  Do not eat solid food after midnight the night before surgery.  You may drink clear liquids day of surgery but must stop at least one hour before your hospital arrival time.  It is beneficial for you to have a clear drink that contains carbohydrates the day of surgery.  We suggest a 12 to 20 ounce bottle of Gatorade or Powerade for non-diabetic patients or a 12 to 20 ounce bottle of G2 or Powerade Zero for diabetic patients.     Clear liquids are liquids you can see through.  Nothing red in color.     Plain water                               Sports drinks  Sodas                                   Gelatin (Jell-O)  Fruit juices without pulp such as white grape juice and apple juice  Popsicles that contain no fruit or yogurt  Tea or coffee (no cream or milk added)  Gatorade / Powerade  G2 / Powerade Zero    Bring any papers given to you in the doctor’s office.  Wear clean comfortable clothes.  Do not wear contact lenses, false eyelashes or make-up.  Bring a case for your glasses.   Bring crutches or walker if applicable.  Remove all piercings.  Leave jewelry and any other valuables at home.  The Pre-Admission Testing nurse will instruct you to bring medications if unable to obtain an accurate list in Pre-Admission Testing.            Preventing a Surgical Site Infection:  For 2 to 3 days before surgery, avoid shaving with a razor because the razor can irritate skin and make it easier to develop an infection.    Any areas of open skin can increase the risk of a post-operative wound infection by allowing bacteria to enter and travel throughout the body.  Notify your surgeon if you have any skin wounds / rashes even if it is not near the expected surgical site.  The area  will need assessed to determine if surgery should be delayed until it is healed.  The night prior to surgery shower using a fresh bar of anti-bacterial soap (such as Dial) and clean washcloth.  Sleep in a clean bed with clean clothing.  Do not allow pets to sleep with you.  Shower on the morning of surgery using a fresh bar of anti-bacterial soap (such as Dial) and clean washcloth.  Dry with a clean towel and dress in clean clothing.  Ask your surgeon if you will be receiving antibiotics prior to surgery.  Make sure you, your family, and all healthcare providers clean their hands with soap and water or an alcohol based hand  before caring for you or your wound.    Day of surgery:  Your arrival time is approximately two hours before your scheduled surgery time.  Upon arrival, a Pre-op nurse and Anesthesiologist will review your health history, obtain vital signs, and answer questions you may have.  The only belongings needed at this time will be a list of your home medications and if applicable your C-PAP/BI-PAP machine.  A Pre-op nurse will start an IV and you may receive medication in preparation for surgery, including something to help you relax.     Please be aware that surgery does come with discomfort.  We want to make every effort to control your discomfort so please discuss any uncontrolled symptoms with your nurse.   Your doctor will most likely have prescribed pain medications.      If you are going home after surgery you will receive individualized written care instructions before being discharged.  A responsible adult must drive you to and from the hospital on the day of your surgery and stay with you for 24 hours.  Discharge prescriptions can be filled by the hospital pharmacy during regular pharmacy hours.  If you are having surgery late in the day/evening your prescription may be e-prescribed to your pharmacy.  Please verify your pharmacy hours or chose a 24 hour pharmacy to avoid not having  access to your prescription because your pharmacy has closed for the day.    If you are staying overnight following surgery, you will be transported to your hospital room following the recovery period.  [facility] has all private rooms.    If you have any questions please call Pre-Admission Testing at [phone].  Deductibles and co-payments are collected on the day of service. Please be prepared to pay the required co-pay, deductible or deposit on the day of service as defined by your plan.    Patient Education for Self-Quarantine Process    Following your COVID testing, we strongly recommend that you wear a mask when you are with other people and practice social distancing.   Limit your activities to only required outings.  Wash your hands with soap and water frequently for at least 20 seconds.   Avoid touching your eyes, nose and mouth with unwashed hands.  Do not share anything - utensils, drinking glasses, food from the same bowl.   Sanitize household surfaces daily. Include all high touch areas (door handles, light switches, phones, countertops, etc.)    Call your surgeon immediately if you experience any of the following symptoms:  Sore Throat  Shortness of Breath or difficulty breathing  Cough  Chills  Body soreness or muscle pain  Headache  Fever  New loss of taste or smell  Do not arrive for your surgery ill.  Your procedure will need to be rescheduled to another time.  You will need to call your physician before the day of surgery to avoid any unnecessary exposure to hospital staff as well as other patients.

## 2022-08-04 NOTE — ANESTHESIA PREPROCEDURE EVALUATION
Anesthesia Evaluation     Patient summary reviewed   no history of anesthetic complications:  NPO Solid Status: > 8 hours  NPO Liquid Status: > 2 hours           Airway   Mallampati: II  TM distance: >3 FB  Neck ROM: full  No difficulty expected  Dental - normal exam     Pulmonary     breath sounds clear to auscultation  (-) shortness of breath, recent URI  Cardiovascular     ECG reviewed  Rhythm: irregular  Rate: normal    (+) hypertension well controlled, valvular problems/murmurs, dysrhythmias Atrial Fib,     ROS comment: Afib s/p watchman in 2020  Rate controlled on ASA - held    Neuro/Psych  (-) seizures, CVA  GI/Hepatic/Renal/Endo    (+)  GERD,  thyroid problem hypothyroidism  (-)  obesity    Musculoskeletal     (+) arthralgias, gait problem, joint swelling,   Abdominal    Substance History      OB/GYN          Other   arthritis,                        Anesthesia Plan    ASA 3     general     intravenous induction     Anesthetic plan, risks, benefits, and alternatives have been provided, discussed and informed consent has been obtained with: patient.    Plan discussed with CRNA.        CODE STATUS:

## 2022-08-04 NOTE — OP NOTE
OPERATIVE NOTE    Patient Identification:  Name: Marychuy Verdugo  Age: 82 y.o.  Sex: female  :  1940  MRN: [id number]                                               Preoperative diagnosis: Left headache with Left third nerve palsy  Postoperative diagnosis: same  Procedure: Bilateral temporal artery biospy  Surgeon: Dr. Silveira who was present and scrubbed throughout all critical portions of the operation  Assistants: Jean Paul Castillo MD and Markus Liu MS4  Anesthesia: General  EBL: less than 50cc  Specimens:    Order Name Source Comment Collection Info Order Time   TISSUE PATHOLOGY EXAM Temporal Artery  Collected By: Marco A Silveira MD 2022  2:48 PM     Release to patient   Immediate          Description of the procedure:     The patient was taken to the operating room and placed on the table in the supine position, where anesthesia was induced. 2% lidocaine with epinephrine and 0.5% marcaine in a 1:1 fashion was injected over the surgical site after confirmation of the artery with palpation and doppler, and the patient was prepped and draped in the usual manner for orbitofacial surgery.     Corneal protectors were placed in both eyes.    A 15 Bard Shaheed blade incision was made over the left temporal artery. Sharp dissection was carried down to the temporal artery, and the temporal artery was undermined superiorly and inferiorly. The artery was ligated with silk sutures ties superiorly and inferiorly. A 3.0 cm strip of artery was excised with sharp dissection. The incision was closed with 5-0 Vicryl sutures deeply and surgical staples superficiall.    The exact same procedure was performed on the contralateral side.  Right side measured 3.4cm    The corneal protectors were removed and antibiotic ophthalmic ointment was placed over the surgical site.     The patient was then awakened and taken from the operating room in good condition, having tolerated the procedure well. There were no complications,  and the estimated blood loss was less than 50 cc.

## 2022-08-04 NOTE — H&P
History & Physical       Patient: Marychuy Verdugo    Date of Admission: No admission date for patient encounter.    YOB: 1940    Medical Record Number: [id number]      Chief Complaints: headache and third nerve palsy      History of Present Illness: 82 y.o. female presents with headache and new onset ocular palsy. No new meds/health problems since office visit      Allergies:   Allergies   Allergen Reactions   • Haloperidol Unknown - Low Severity       Review of systems negative, except pertaining to the HPI    Medications:   Home Medications:  No current facility-administered medications on file prior to encounter.     Current Outpatient Medications on File Prior to Encounter   Medication Sig   • aspirin 81 MG chewable tablet Chew 1 tablet Daily.   • bumetanide (BUMEX) 2 MG tablet Take 1 tablet by mouth Daily. (Patient taking differently: Take 2 mg by mouth 2 (Two) Times a Day.)   • docusate sodium (COLACE) 100 MG capsule Take 100 mg by mouth Every Evening.   • levothyroxine (SYNTHROID, LEVOTHROID) 88 MCG tablet Take 88 mcg by mouth Every Morning.   • loratadine (CLARITIN) 10 MG tablet Take 10 mg by mouth Every Evening.   • oxybutynin XL (DITROPAN-XL) 10 MG 24 hr tablet Take 1 tablet by mouth Daily.   • pantoprazole (PROTONIX) 40 MG EC tablet Take 40 mg by mouth Daily.   • polyethylene glycol (MIRALAX) 17 GM/SCOOP powder mix 1 capful (17 g) in liquid by mouth Daily. (Patient taking differently: Take 17 g by mouth Daily As Needed.)   • potassium chloride (K-DUR,KLOR-CON) 20 MEQ CR tablet Take 20 mEq by mouth 2 (Two) Times a Day.   • predniSONE (DELTASONE) 10 MG tablet Take 20 mg by mouth 3 (Three) Times a Day.     Current Medications:  Scheduled Meds:  Continuous Infusions:No current facility-administered medications for this encounter.    PRN Meds:.    Past Medical History:   Diagnosis Date   • Atrial fibrillation (HCC) 10/10/2013   • Chronic anticoagulation 05/01/2020   • Chronic constipation    •  Diplopia 07/08/2022   • GERD (gastroesophageal reflux disease) 02/11/2013   • Headache 07/01/2022   • Hematuria     STATES ALWAY HAVE HAD SOME BLOOD IN URINE.   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   • Shortness of breath    • Shortness of breath    • Swelling of both lower extremities         Past Surgical History:   Procedure Laterality Date   • CATARACT EXTRACTION W/ INTRAOCULAR LENS IMPLANT      LEFT AND RIGHT   • ORBITAL FRACTURE SURGERY Left     FLOOR   • OTHER SURGICAL HISTORY      WATCHMAN PLACED FOR HISTORY OF A FIB   • TOTAL HIP ARTHROPLASTY Left 02/12/2022    Procedure: TOTAL HIP ARTHROPLASTY ANTERIOR;  Surgeon: Yair Carver MD;  Location: [facility] MAIN OR;  Service: Orthopedics;  Laterality: Left;   • TUBAL ABDOMINAL LIGATION          Social History     Occupational History   • Not on file   Tobacco Use   • Smoking status: Never Smoker   • Smokeless tobacco: Never Used   Vaping Use   • Vaping Use: Never used   Substance and Sexual Activity   • Alcohol use: Yes     Comment: ON OCC   • Drug use: Never   • Sexual activity: Defer      Social History     Social History Narrative   • Not on file        Family History   Problem Relation Age of Onset   • No Known Problems Mother    • Heart murmur Father    • Malig Hyperthermia Neg Hx            Physical Exam   There were no vitals taken for this visit.  Constitutional: Alert, cooperative, in no acute distress    Head: Normocephalic.   Eyes:   headache  Neck: Normal range of motion.   Cardiovascular: Normal rate.    Pulmonary/Chest: Effort normal.   Neurological: Alert.   Skin: Skin is warm.   Psychiatric: Normal mood and affect.       Assessment/Plan:  The patient voiced understanding of the risks, benefits, and alternative forms of treatment that were discussed and the patient consents to proceed with bilateral temporal artery biopsy.       Marco A Silveira MD

## 2022-08-04 NOTE — ANESTHESIA PROCEDURE NOTES
Airway  Urgency: elective    Date/Time: 8/4/2022 1:56 PM  Airway not difficult    General Information and Staff    Patient location during procedure: OR  Anesthesiologist: Joey Elliott MD  CRNA/CAA: Justus Funez CRNA    Indications and Patient Condition  Indications for airway management: airway protection    Preoxygenated: yes  Mask difficulty assessment: 1 - vent by mask    Final Airway Details  Final airway type: endotracheal airway      Successful airway: ETT  Cuffed: yes   Successful intubation technique: direct laryngoscopy  Endotracheal tube insertion site: oral  Blade: Schuler  Blade size: 2  ETT size (mm): 7.0  Cormack-Lehane Classification: grade I - full view of glottis  Placement verified by: chest auscultation and capnometry   Measured from: lips  ETT/EBT  to lips (cm): 22  Number of attempts at approach: 1  Assessment: lips, teeth, and gum same as pre-op and atraumatic intubation    Additional Comments  Pre 02 100%, SIVI, DL x1, atraumatic intubation, BLBS, Positive ETC02.

## 2022-08-04 NOTE — ANESTHESIA POSTPROCEDURE EVALUATION
"Patient: Marychuy Verdugo    Procedure Summary     Date: 08/04/22 Room / Location: [facility] OR 06 / [facility] MAIN OR    Anesthesia Start: 1345 Anesthesia Stop: 1535    Procedure: BILATERAL  TEMPORAL ARTERY BIOPSY (Bilateral Head) Diagnosis:     Surgeons: Marco A Silveira MD Provider: Joey Elliott MD    Anesthesia Type: general ASA Status: 3          Anesthesia Type: general    Vitals  Vitals Value Taken Time   /83 08/04/22 1636   Temp 36.4 °C (97.6 °F) 08/04/22 1532   Pulse 100 08/04/22 1647   Resp 16 08/04/22 1635   SpO2 99 % 08/04/22 1647   Vitals shown include unvalidated device data.        Post Anesthesia Care and Evaluation    Pain management: adequate    Airway patency: patent  Anesthetic complications: No anesthetic complications    Cardiovascular status: acceptable  Respiratory status: acceptable  Hydration status: acceptable    Comments: /89   Pulse 91   Temp 36.4 °C (97.6 °F) (Oral)   Resp 16   Ht 175.3 cm (69\")   Wt 80.8 kg (178 lb 3.2 oz)   SpO2 98%   BMI 26.32 kg/m²         "

## 2022-08-17 NOTE — TELEPHONE ENCOUNTER
"Reviewed guideline with caller, advises she be evaluated within 4 hours. Warm transfer attempted with no answer. Transferred to [facility] for appointment. Transferred me to [place] at the office.     Reason for Disposition  • [1] Red area or streak [2] large (> 2 in. or 5 cm)    Additional Information  • Negative: SEVERE difficulty breathing (e.g., struggling for each breath, speaks in single words)  • Negative: Looks like a broken bone or dislocated joint (e.g., crooked or deformed)  • Negative: Sounds like a life-threatening emergency to the triager  • Negative: Chest pain  • Negative: Followed a leg injury  • Negative: [1] Small area of swelling AND [2] followed an insect bite to the area  • Negative: Swelling of one ankle joint  • Negative: Swelling of knee is main symptom  • Negative: Pregnant  • Negative: Postpartum (from 0 to 6 weeks after delivery)  • Negative: Difficulty breathing at rest  • Negative: Entire foot is cool or blue in comparison to other side  • Negative: [1] Can't walk or can barely walk AND [2] new-onset  • Negative: [1] Difficulty breathing with exertion (e.g., walking) AND [2] new-onset or worsening  • Negative: [1] Red area or streak AND [2] fever  • Negative: [1] Swelling is painful to touch AND [2] fever  • Negative: [1] Cast on leg or ankle AND [2] now increased pain  • Negative: Patient sounds very sick or weak to the triager  • Negative: SEVERE leg swelling (e.g., swelling extends above knee, entire leg is swollen, weeping fluid)    Answer Assessment - Initial Assessment Questions  1. ONSET: \"When did the swelling start?\" (e.g., minutes, hours, days)      States she has had swelling in her legs since she was 18 yo and pregnant  2. LOCATION: \"What part of the leg is swollen?\"  \"Are both legs swollen or just one leg?\"      mid calf down, both legs  3. SEVERITY: \"How bad is the swelling?\" (e.g., localized; mild, moderate, severe)   - Localized - small area of swelling localized to one leg   - " "MILD pedal edema - swelling limited to foot and ankle, pitting edema < 1/4 inch (6 mm) deep, rest and elevation eliminate most or all swelling   - MODERATE edema - swelling of lower leg to knee, pitting edema > 1/4 inch (6 mm) deep, rest and elevation only partially reduce swelling   - SEVERE edema - swelling extends above knee, facial or hand swelling present       moderate  4. REDNESS: \"Does the swelling look red or infected?\"      yes  5. PAIN: \"Is the swelling painful to touch?\" If Yes, ask: \"How painful is it?\"   (Scale 1-10; mild, moderate or severe)      Not exactly, tender to touch  6. FEVER: \"Do you have a fever?\" If Yes, ask: \"What is it, how was it measured, and when did it start?\"       no  7. CAUSE: \"What do you think is causing the leg swelling?\"      Had surgery   8. MEDICAL HISTORY: \"Do you have a history of heart failure, kidney disease, liver failure, or cancer?\"      Afib and watchman, HR gets high  9. RECURRENT SYMPTOM: \"Have you had leg swelling before?\" If Yes, ask: \"When was the last time?\" \"What happened that time?\"      Yes, since she was 20 yo, chronic problem  10. OTHER SYMPTOMS: \"Do you have any other symptoms?\" (e.g., chest pain, difficulty breathing)        no  11. PREGNANCY: \"Is there any chance you are pregnant?\" \"When was your last menstrual period?\"        no    Protocols used: LEG SWELLING AND EDEMA-ADULT-AH      "

## 2022-08-18 NOTE — TELEPHONE ENCOUNTER
"Caller states that she was seen in  yesterday and started on antibiotics last night for bilateral LE cellulitis she has had a total of 4 pills.  No change in appearance.  Explained that antibiotics take a few days to show improvement and that she should see improvement by Saturday.  If cellulitis gets worse in the mean time return to .  Reason for Disposition  • Health Information question, no triage required and triager able to answer question    Additional Information  • Negative: [1] Caller is not with the adult (patient) AND [2] reporting urgent symptoms  • Negative: Lab result questions  • Negative: Medication questions  • Negative: Caller can't be reached by phone  • Negative: Caller has already spoken to PCP or another triager  • Negative: RN needs further essential information from caller in order to complete triage  • Negative: Requesting regular office appointment  • Negative: [1] Caller requesting NON-URGENT health information AND [2] PCP's office is the best resource    Answer Assessment - Initial Assessment Questions  1. REASON FOR CALL or QUESTION: \"What is your reason for calling today?\" or \"How can I best help you?\" or \"What question do you have that I can help answer?\"      How soon should my cellulitis look better?    Protocols used: INFORMATION ONLY CALL - NO TRIAGE-ADULT-    "

## 2022-08-21 PROBLEM — L03.90 CELLULITIS: Status: ACTIVE | Noted: 2022-01-01

## 2022-08-21 NOTE — TELEPHONE ENCOUNTER
"Return to urgent care to be evaluated    Reason for Disposition  • [1] MODERATE leg swelling (e.g., swelling extends up to knees) AND [2] new-onset or worsening    Additional Information  • Negative: SEVERE difficulty breathing (e.g., struggling for each breath, speaks in single words)  • Negative: Looks like a broken bone or dislocated joint (e.g., crooked or deformed)  • Negative: Sounds like a life-threatening emergency to the triager  • Negative: Chest pain  • Negative: Followed a leg injury  • Negative: [1] Small area of swelling AND [2] followed an insect bite to the area  • Negative: Swelling of one ankle joint  • Negative: Swelling of knee is main symptom  • Negative: Pregnant  • Negative: Postpartum (from 0 to 6 weeks after delivery)  • Negative: Difficulty breathing at rest  • Negative: Entire foot is cool or blue in comparison to other side  • Negative: [1] Can't walk or can barely walk AND [2] new-onset  • Negative: [1] Difficulty breathing with exertion (e.g., walking) AND [2] new-onset or worsening  • Negative: [1] Red area or streak AND [2] fever  • Negative: [1] Swelling is painful to touch AND [2] fever  • Negative: [1] Cast on leg or ankle AND [2] now increased pain  • Negative: Patient sounds very sick or weak to the triager  • Negative: SEVERE leg swelling (e.g., swelling extends above knee, entire leg is swollen, weeping fluid)  • Negative: [1] Red area or streak [2] large (> 2 in. or 5 cm)  • Negative: [1] Thigh or calf pain AND [2] only 1 side AND [3] present > 1 hour  • Negative: [1] Thigh, calf, or ankle swelling AND [2] only 1 side  • Negative: [1] Thigh, calf, or ankle swelling AND [2] bilateral AND [3] 1 side is more swollen    Answer Assessment - Initial Assessment Questions  1. ONSET: \"When did the swelling start?\" (e.g., minutes, hours, days)      4 days  2. LOCATION: \"What part of the leg is swollen?\"  \"Are both legs swollen or just one leg?\"      Lower leg and feet  3. SEVERITY: \"How " "bad is the swelling?\" (e.g., localized; mild, moderate, severe)   - Localized - small area of swelling localized to one leg   - MILD pedal edema - swelling limited to foot and ankle, pitting edema < 1/4 inch (6 mm) deep, rest and elevation eliminate most or all swelling   - MODERATE edema - swelling of lower leg to knee, pitting edema > 1/4 inch (6 mm) deep, rest and elevation only partially reduce swelling   - SEVERE edema - swelling extends above knee, facial or hand swelling present       mild  4. REDNESS: \"Does the swelling look red or infected?\"      Entire leg from calk to ankle is red bilaterally  5. PAIN: \"Is the swelling painful to touch?\" If Yes, ask: \"How painful is it?\"   (Scale 1-10; mild, moderate or severe)      burning  6. FEVER: \"Do you have a fever?\" If Yes, ask: \"What is it, how was it measured, and when did it start?\"       none  7. CAUSE: \"What do you think is causing the leg swelling?\"      cellulitis  8. MEDICAL HISTORY: \"Do you have a history of heart failure, kidney disease, liver failure, or cancer?\"      none  9. RECURRENT SYMPTOM: \"Have you had leg swelling before?\" If Yes, ask: \"When was the last time?\" \"What happened that time?\"      nno  10. OTHER SYMPTOMS: \"Do you have any other symptoms?\" (e.g., chest pain, difficulty breathing)        none  11. PREGNANCY: \"Is there any chance you are pregnant?\" \"When was your last menstrual period?\"        na    Protocols used: LEG SWELLING AND EDEMA-ADULT-AH      "

## 2022-08-21 NOTE — PROGRESS NOTES
"Pharmacy Antimicrobial Dosing Service    Subjective:  Marychuy Verdugo is a 82 y.o.female admitted with lower extremity cellulitis and erythema . Pharmacy has been consulted to dose Vancomycin for possible SSTI.    PMH: HTN, atrial fibrillation s/p Watchman procedure, hypothyroidism,       Assessment/Plan    1. Day #1 Vancomycin: Goal trough 10-20 mcg/mL for uncomplicated SSTI. Initial vancomycin 1250 mg IV dose given in ER. Will continue with 1250 mg (14.5 mg/kg actual body weight) IV every 24 hours.     Will not schedule a vancomycin level due to 3-day duration of vancomycin order.    Will continue to monitor drug levels, renal function, culture and sensitivities, and patient clinical status.       Objective:  Relevant clinical data and objective history reviewed:  175.3 cm (69\")   86.5 kg (190 lb 11.2 oz)   Ideal body weight: 66.2 kg (145 lb 15.1 oz)  Adjusted ideal body weight: 74.3 kg (163 lb 13.5 oz)  Body mass index is 28.16 kg/m².        Results from last 7 days   Lab Units 08/21/22  1002   CREATININE mg/dL 1.01*     Estimated Creatinine Clearance: 51.2 mL/min (A) (by C-G formula based on SCr of 1.01 mg/dL (H)).  No intake/output data recorded.    Results from last 7 days   Lab Units 08/21/22  1002   WBC 10*3/mm3 6.00     Temperature    08/21/22 0939 08/21/22 1257 08/21/22 1411   Temp: 98.2 °F (36.8 °C) 98.2 °F (36.8 °C) 97.4 °F (36.3 °C)     Baseline culture/source/susceptibility:  Microbiology Results (last 10 days)       Procedure Component Value - Date/Time    COVID PRE-OP / PRE-PROCEDURE SCREENING ORDER (NO ISOLATION) - Swab, Nasopharynx [[id number]]  (Normal) Collected: 08/21/22 1201    Lab Status: Final result Specimen: Swab from Nasopharynx Updated: 08/21/22 1233    Narrative:      The following orders were created for panel order COVID PRE-OP / PRE-PROCEDURE SCREENING ORDER (NO ISOLATION) - Swab, Nasopharynx.  Procedure                               Abnormality         Status                   "   ---------                               -----------         ------                     COVID-19,CEPHEID/LEONIDAS,CO...[[id number]]  Normal              Final result                 Please view results for these tests on the individual orders.    COVID-19,CEPHEID/LEONIDAS,COR/DARYL/PAD/HERNANDEZ IN-HOUSE(OR EMERGENT/ADD-ON),NP SWAB IN TRANSPORT MEDIA 3-4 HR TAT, RT-PCR - Swab, Nasopharynx [[id number]]  (Normal) Collected: 08/21/22 1201    Lab Status: Final result Specimen: Swab from Nasopharynx Updated: 08/21/22 1233     COVID19 Not Detected    Narrative:      Fact sheet for providers: https://www.fda.gov/media/906552/download     Fact sheet for patients: https://www.fda.gov/media/126513/download  Fact sheet for providers: https://www.fda.gov/media/351858/download    Fact sheet for patients: https://www.fda.gov/media/640492/download    Test performed by PCR.            Anti-Infectives (From admission, onward)      Ordered     Dose/Rate Route Frequency Start Stop    08/21/22 1556  Vancomycin HCl 1,250 mg in sodium chloride 0.9 % 250 mL IVPB        Ordering Provider: Alison Nassar APRN    15 mg/kg × 86.9 kg Intravenous Every 24 Hours 08/22/22 0900 08/25/22 0859    08/21/22 1706  Pharmacy to dose vancomycin        Ordering Provider: Alison Nassar APRN     Does not apply Continuous PRN 08/21/22 1705 08/24/22 1556    08/21/22 1142  Vancomycin HCl 1,250 mg in sodium chloride 0.9 % 250 mL IVPB        Ordering Provider: Arianne Patterson APRN    15 mg/kg × 86.9 kg Intravenous Once 08/21/22 1144 08/21/22 1200          Kaden Cooley RPH  08/21/22 17:06 EDT

## 2022-08-21 NOTE — CONSULTS
Referring Provider: No att. providers found  Reason for Consultation:  Chronic atrial fibrillation- increased heart rate.  Status post watchman procedure  Lower extremity cellulitis    Patient Care Team:  Roya Benitez MD as PCP - General (Family Medicine)    Chief complaint  Lower extremity swelling and cellulitis    Subjective .     History of present illness:  Marychuy Verdugo is a 82 y.o. female who presents with history of lower extremity swelling and erythema.  Patient denies having any chest pain heaviness or tightness in the chest.  Denies having any fever cough chills.  No other associated aggravating or elevating factors.  Patient has history of chronic atrial fibrillation and had watchman procedure in the past.  Patient was noted to have increased heart rate and cardiovascular consultation was requested..  Patient has lower extremity significant swelling and erythema.    ROS      Since I have last seen, the patient has been without any chest discomfort ,shortness of breath, palpitations, dizziness or syncope.  Denies having any headache ,abdominal pain ,nausea, vomiting , diarrhea constipation, loss of weight or loss of appetite.  Denies having any excessive bruising ,hematuria or blood in the stool.    Review of all systems negative except as indicated      History  Past Medical History:   Diagnosis Date   • Atrial fibrillation (HCC) 10/10/2013   • Chronic anticoagulation 05/01/2020   • Chronic constipation    • Diplopia 07/08/2022   • GERD (gastroesophageal reflux disease) 02/11/2013   • Headache 07/01/2022   • Hematuria     STATES ALWAY HAVE HAD SOME BLOOD IN URINE.   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   • Presence of Watchman left atrial appendage closure device    • Shortness of breath    • Shortness of breath    • Swelling of both lower extremities        Past Surgical History:   Procedure Laterality Date   • CATARACT  EXTRACTION W/ INTRAOCULAR LENS IMPLANT      LEFT AND RIGHT   • ORBITAL FRACTURE SURGERY Left     FLOOR   • OTHER SURGICAL HISTORY      WATCHMAN PLACED FOR HISTORY OF A FIB   • TEMPORAL ARTERY BIOPSY Bilateral 8/4/2022    Procedure: BILATERAL  TEMPORAL ARTERY BIOPSY;  Surgeon: Marco A Silveira MD;  Location: [facility] MAIN OR;  Service: Ophthalmology;  Laterality: Bilateral;   • TOTAL HIP ARTHROPLASTY Left 02/12/2022    Procedure: TOTAL HIP ARTHROPLASTY ANTERIOR;  Surgeon: Yair Carver MD;  Location: [facility] MAIN OR;  Service: Orthopedics;  Laterality: Left;   • TUBAL ABDOMINAL LIGATION         Family History   Problem Relation Age of Onset   • No Known Problems Mother    • Heart murmur Father    • Malig Hyperthermia Neg Hx        Social History     Tobacco Use   • Smoking status: Never Smoker   • Smokeless tobacco: Never Used   Vaping Use   • Vaping Use: Never used   Substance Use Topics   • Alcohol use: Yes     Comment: ON OCC   • Drug use: Never        Medications Prior to Admission   Medication Sig Dispense Refill Last Dose   • bumetanide (BUMEX) 2 MG tablet Take 1 tablet by mouth Daily. (Patient taking differently: Take 2 mg by mouth 2 (Two) Times a Day.)   8/21/2022 at Unknown time   • cholecalciferol (VITAMIN D3) 25 MCG (1000 UT) tablet Take 1,000 Units by mouth Daily.   8/20/2022 at Unknown time   • docusate sodium (COLACE) 100 MG capsule Take 100 mg by mouth Every Evening.   8/21/2022 at Unknown time   • levothyroxine (SYNTHROID, LEVOTHROID) 88 MCG tablet Take 88 mcg by mouth Every Morning.   8/21/2022 at Unknown time   • loratadine (CLARITIN) 10 MG tablet Take 10 mg by mouth Every Evening.   8/20/2022 at Unknown time   • multivitamin with minerals tablet tablet Take 1 tablet by mouth Daily.   8/20/2022 at Unknown time   • oxybutynin XL (DITROPAN-XL) 10 MG 24 hr tablet Take 1 tablet by mouth Daily. 90 tablet 1 8/21/2022 at Unknown time   • pantoprazole (PROTONIX) 40 MG EC tablet Take 40 mg by mouth Daily.  "  8/21/2022 at Unknown time   • polyethylene glycol (MIRALAX) 17 GM/SCOOP powder mix 1 capful (17 g) in liquid by mouth Daily. (Patient taking differently: Take 17 g by mouth Daily As Needed.) 476 g 0 8/21/2022 at Unknown time   • potassium chloride (K-DUR,KLOR-CON) 20 MEQ CR tablet Take 20 mEq by mouth 2 (Two) Times a Day.   8/21/2022 at Unknown time         Haloperidol    Scheduled Meds:bumetanide, 2 mg, Oral, BID  cetirizine, 10 mg, Oral, Daily  [START ON 8/22/2022] levothyroxine, 88 mcg, Oral, Q AM  [START ON 8/22/2022] oxybutynin XL, 10 mg, Oral, Daily  potassium chloride, 20 mEq, Oral, BID  senna-docusate sodium, 2 tablet, Oral, BID  sodium chloride, 10 mL, Intravenous, Q12H  sodium chloride, 10 mL, Intravenous, Q12H      Continuous Infusions:   PRN Meds:.•  acetaminophen  •  senna-docusate sodium **AND** polyethylene glycol **AND** bisacodyl **AND** bisacodyl  •  ondansetron **OR** ondansetron  •  [COMPLETED] Insert peripheral IV **AND** sodium chloride  •  sodium chloride  •  sodium chloride    Objective     VITAL SIGNS  Vitals:    08/21/22 1203 08/21/22 1216 08/21/22 1257 08/21/22 1411   BP: 128/67 119/70 118/74 127/83   BP Location:   Left arm Left arm   Patient Position:   Sitting Lying   Pulse: 113 109 96 88   Resp:   19 16   Temp:   98.2 °F (36.8 °C) 97.4 °F (36.3 °C)   TempSrc:   Oral Axillary   SpO2: 92% 96% 99% 96%   Weight:   86.5 kg (190 lb 11.2 oz)    Height:   175.3 cm (69\")        Flowsheet Rows    Flowsheet Row First Filed Value   Admission Height 172.7 cm (68\") Documented at 08/21/2022 0939   Admission Weight 86.9 kg (191 lb 9.6 oz) Documented at 08/21/2022 0939          No intake or output data in the 24 hours ending 08/21/22 1443     TELEMETRY: Atrial fibrillation    Physical Exam:  The patient is alert, oriented and in no distress.  Vital signs as noted above.  Head and neck revealed no carotid bruits or jugular venous distention.  No thyromegaly or lymph adenopathy is present  Lungs clear. "  No wheezing.  Breath sounds are normal bilaterally.  Heart normal first and second heart sounds.No murmur.  No precordial rub is present.  No gallop is present.  Abdomen soft and nontender.  No organomegaly is present.  Extremities with good peripheral pulses.  Diffuse lower extremity erythema and 1-2+ edema.  Skin warm and dry.  Musculoskeletal system is grossly normal  CNS grossly normal      Results Review:   I reviewed the patient's new clinical results.  Lab Results (last 24 hours)     Procedure Component Value Units Date/Time    D-dimer, Quantitative [[id number]]  (Abnormal) Collected: 08/21/22 1002    Specimen: Blood Updated: 08/21/22 1406     D-Dimer, Quantitative 3.09 mg/L (FEU)     Narrative:      Reference Range  --------------------------------------------------------------------     < 0.50   Negative Predictive Value  0.50-0.59   Indeterminate    >= 0.60   Probable VTE             A very low percentage of patients with DVT may yield D-Dimer results   below the cut-off of 0.50 mg/L FEU.  This is known to be more   prevalent in patients with distal DVT.             Results of this test should always be interpreted in conjunction with   the patient's medical history, clinical presentation and other   findings.  Clinical diagnosis should not be based on the result of   INNOVANCE D-Dimer alone.    COVID PRE-OP / PRE-PROCEDURE SCREENING ORDER (NO ISOLATION) - Swab, Nasopharynx [[id number]]  (Normal) Collected: 08/21/22 1201    Specimen: Swab from Nasopharynx Updated: 08/21/22 1233    Narrative:      The following orders were created for panel order COVID PRE-OP / PRE-PROCEDURE SCREENING ORDER (NO ISOLATION) - Swab, Nasopharynx.  Procedure                               Abnormality         Status                     ---------                               -----------         ------                     COVID-19,CEPHEID/LEONIDAS,CO...[[id number]]  Normal              Final result                 Please view results for  these tests on the individual orders.    COVID-19,CEPHEID/LEONIDAS,COR/DARYL/PAD/HERNANDEZ IN-HOUSE(OR EMERGENT/ADD-ON),NP SWAB IN TRANSPORT MEDIA 3-4 HR TAT, RT-PCR - Swab, Nasopharynx [[id number]]  (Normal) Collected: 08/21/22 1201    Specimen: Swab from Nasopharynx Updated: 08/21/22 1233     COVID19 Not Detected    Narrative:      Fact sheet for providers: https://www.fda.gov/media/548036/download     Fact sheet for patients: https://www.fda.gov/media/297400/download  Fact sheet for providers: https://www.fda.gov/media/996755/download    Fact sheet for patients: https://www.fda.gov/media/865149/download    Test performed by PCR.    Comprehensive Metabolic Panel [[id number]]  (Abnormal) Collected: 08/21/22 1002    Specimen: Blood Updated: 08/21/22 1028     Glucose 100 mg/dL      BUN 23 mg/dL      Creatinine 1.01 mg/dL      Sodium 142 mmol/L      Potassium 3.8 mmol/L      Chloride 102 mmol/L      CO2 29.0 mmol/L      Calcium 9.0 mg/dL      Total Protein 6.1 g/dL      Albumin 3.80 g/dL      ALT (SGPT) 13 U/L      AST (SGOT) 23 U/L      Alkaline Phosphatase 73 U/L      Total Bilirubin 0.4 mg/dL      Globulin 2.3 gm/dL      A/G Ratio 1.7 g/dL      BUN/Creatinine Ratio 22.8     Anion Gap 11.0 mmol/L      eGFR 56.0 mL/min/1.73      Comment: National Kidney Foundation and American Society of Nephrology (ASN) Task Force recommended calculation based on the Chronic Kidney Disease Epidemiology Collaboration (CKD-EPI) equation refit without adjustment for race.       Narrative:      GFR Normal >60  Chronic Kidney Disease <60  Kidney Failure <15      Troponin [[id number]]  (Normal) Collected: 08/21/22 1002    Specimen: Blood Updated: 08/21/22 1028     Troponin T 0.012 ng/mL     Narrative:      Troponin T Reference Range:  <= 0.03 ng/mL-   Negative for AMI  >0.03 ng/mL-     Abnormal for myocardial necrosis.  Clinicians would have to utilize clinical acumen, EKG, Troponin and serial changes to determine if it is an Acute Myocardial Infarction  or myocardial injury due to an underlying chronic condition.       Results may be falsely decreased if patient taking Biotin.      BNP [[id number]]  (Normal) Collected: 08/21/22 1002    Specimen: Blood Updated: 08/21/22 1026     proBNP 1,527.0 pg/mL     Narrative:      Among patients with dyspnea, NT-proBNP is highly sensitive for the detection of acute congestive heart failure. In addition NT-proBNP of <300 pg/ml effectively rules out acute congestive heart failure with 99% negative predictive value.    Results may be falsely decreased if patient taking Biotin.      Protime-INR [[id number]]  (Abnormal) Collected: 08/21/22 1002    Specimen: Blood Updated: 08/21/22 1022     Protime 9.5 Seconds      INR <0.93    aPTT [[id number]]  (Abnormal) Collected: 08/21/22 1002    Specimen: Blood Updated: 08/21/22 1022     PTT 24.2 seconds     CBC & Differential [[id number]]  (Abnormal) Collected: 08/21/22 1002    Specimen: Blood Updated: 08/21/22 1005    Narrative:      The following orders were created for panel order CBC & Differential.  Procedure                               Abnormality         Status                     ---------                               -----------         ------                     CBC Auto Differential[[id number]]        Abnormal            Final result                 Please view results for these tests on the individual orders.    CBC Auto Differential [[id number]]  (Abnormal) Collected: 08/21/22 1002    Specimen: Blood Updated: 08/21/22 1005     WBC 6.00 10*3/mm3      RBC 4.08 10*6/mm3      Hemoglobin 11.8 g/dL      Hematocrit 36.5 %      MCV 89.4 fL      MCH 28.8 pg      MCHC 32.3 g/dL      RDW 19.0 %      RDW-SD 59.1 fl      MPV 7.7 fL      Platelets 181 10*3/mm3      Neutrophil % 66.3 %      Lymphocyte % 18.2 %      Monocyte % 12.2 %      Eosinophil % 2.2 %      Basophil % 1.1 %      Neutrophils, Absolute 4.00 10*3/mm3      Lymphocytes, Absolute 1.10 10*3/mm3      Monocytes, Absolute 0.70 10*3/mm3       Eosinophils, Absolute 0.10 10*3/mm3      Basophils, Absolute 0.10 10*3/mm3      nRBC 0.1 /100 WBC           Imaging Results (Last 24 Hours)     Procedure Component Value Units Date/Time    XR Chest 1 View [[id number]] Collected: 08/21/22 1024     Updated: 08/21/22 1028    Narrative:      EXAMINATION: XR CHEST 1 VW-     DATE OF EXAM: 8/21/2022 10:10 AM     INDICATION: Extremity edema, exertional SOA.        COMPARISON: Chest radiograph dated 07/10/2022     TECHNIQUE: Portable AP view of the chest was obtained.     FINDINGS:  There is cardiomegaly. There is aortic arch atherosclerotic  calcification. Pulmonary vascularity appears normal without evidence of  pulmonary edema. There is a hiatal appendage occlusion device. There is  no acute infectious consolidation or pleural effusion. There is no  evidence of pneumothorax. There are mild degenerative changes of the  thoracic spine.       Impression:      1. Cardiomegaly without acute pulmonary process. No evidence of  pulmonary edema or acute infiltrate.     Electronically Signed By-Alan Silveira MD On:8/21/2022 10:26 AM  This report was finalized on [id number] by  Alan Silveira MD.      LAB RESULTS (LAST 7 DAYS)    CBC  Results from last 7 days   Lab Units 08/21/22  1002   WBC 10*3/mm3 6.00   RBC 10*6/mm3 4.08   HEMOGLOBIN g/dL 11.8*   HEMATOCRIT % 36.5   MCV fL 89.4   PLATELETS 10*3/mm3 181       BMP  Results from last 7 days   Lab Units 08/21/22  1002   SODIUM mmol/L 142   POTASSIUM mmol/L 3.8   CHLORIDE mmol/L 102   CO2 mmol/L 29.0   BUN mg/dL 23   CREATININE mg/dL 1.01*   GLUCOSE mg/dL 100*       CMP   Results from last 7 days   Lab Units 08/21/22  1002   SODIUM mmol/L 142   POTASSIUM mmol/L 3.8   CHLORIDE mmol/L 102   CO2 mmol/L 29.0   BUN mg/dL 23   CREATININE mg/dL 1.01*   GLUCOSE mg/dL 100*   ALBUMIN g/dL 3.80   BILIRUBIN mg/dL 0.4   ALK PHOS U/L 73   AST (SGOT) U/L 23   ALT (SGPT) U/L 13         BNP        TROPONIN  Results from last 7 days   Lab  Units 08/21/22  1002   TROPONIN T ng/mL 0.012       CoAg  Results from last 7 days   Lab Units 08/21/22  1002   INR  <0.93*   APTT seconds 24.2*       Creatinine Clearance  Estimated Creatinine Clearance: 51.2 mL/min (A) (by C-G formula based on SCr of 1.01 mg/dL (H)).    ABG        Radiology  XR Chest 1 View    Result Date: 8/21/2022  1. Cardiomegaly without acute pulmonary process. No evidence of pulmonary edema or acute infiltrate.  Electronically Signed By-Alan Silveira MD On:8/21/2022 10:26 AM This report was finalized on [id number] by  Alan Silveira MD.        EKG            I personally viewed and interpreted the patient's EKG/Telemetry data: Atrial fibrillation with RVR    ECHOCARDIOGRAM:    Results for orders placed during the hospital encounter of 05/01/20    Adult Transthoracic Echo Complete W/ Cont if Necessary Per Protocol    Interpretation Summary  · Mild tricuspid valve regurgitation is present.  · Left atrial cavity size is severely dilated.  · Estimated EF = 50%.  · Left ventricular systolic function is normal.  · Mild aortic valve regurgitation is present.  · Mild mitral valve regurgitation is present  · Right ventricular cavity is mildly dilated.              Cardiolite (Tc-99m Sestamibi) stress test      OTHER:     Assessment & Plan     Active Problems:    Cellulitis    ]]]]]]]]]]]]]]]]]]]]]]]  Impression  =======  -Lower extremity edema and cellulitis.    - Chronic atrial fibrillation-rate is increased.    - Hypothyroidism hypertension    - Status post watchman procedure.  Patient is not on anticoagulation    - Status post surgery for left femoral fracture due to mechanical fall in the past.    - Family history negative for coronary artery disease    - Non-smoker    - Allergic to haloperidol     ================  Plan  ===============  Patient has chronic atrial fibrillation.  Rate is increased.  Status post watchman procedure.  Patient has lower extremity erythema and cellulitis may  be causing her heart rate to be faster.  Will start low-dose metoprolol.  Echocardiogram.  Further plan will depend on patient's progress.  [[[[[[[[[[[[[[[[[[[[[[            Amelie Koch MD  08/21/22  14:43 EDT

## 2022-08-21 NOTE — ED PROVIDER NOTES
Subjective   Chief Complaint: Lower extremity wound check      HPI: Patient is an 82-year-old female presents to the ER today by private vehicle complaining of lower extremity swelling and erythema.  States that she awoke approximately 7 days ago and noticed the redness stated that it persisted and she decided to go to the urgent care 2 days later where she was diagnosed with cellulitis and giving Keflex.  She has been taking as prescribed but feels that the symptoms of become worse.  She describes burning sensation with surrounding erythema and edema.  She states she has chronic lower extremity swelling that has become progressively worse over the last 5 years that she denies a history of congestive heart failure but does states she takes a diuretic and has been over the last 60 years, he was recently switched from Lasix to Bumex.  She does have a history of A. fib and has a watchman in place.  She denies chest pain but states she has noted some exertional shortness of breath over the last few weeks.  She has not had a cough denies recent illness or exposure.    PCP: Eli          Review of Systems   Constitutional: Negative.    HENT: Negative.    Respiratory: Positive for shortness of breath (exertional). Negative for cough.    Cardiovascular: Positive for leg swelling. Negative for chest pain.   Gastrointestinal: Negative for nausea and vomiting.   Genitourinary: Negative.    Musculoskeletal: Negative.    Skin: Positive for color change and rash.   Neurological: Negative.    Hematological: Negative.    Psychiatric/Behavioral: Negative.        Past Medical History:   Diagnosis Date   • Atrial fibrillation (HCC) 10/10/2013   • Chronic anticoagulation 05/01/2020   • Chronic constipation    • Diplopia 07/08/2022   • GERD (gastroesophageal reflux disease) 02/11/2013   • Headache 07/01/2022   • Hematuria     STATES ALWAY HAVE HAD SOME BLOOD IN URINE.   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        Past Surgical History:   Procedure Laterality Date   • CATARACT EXTRACTION W/ INTRAOCULAR LENS IMPLANT      LEFT AND RIGHT   • ORBITAL FRACTURE SURGERY Left     FLOOR   • OTHER SURGICAL HISTORY      WATCHMAN PLACED FOR HISTORY OF A FIB   • TEMPORAL ARTERY BIOPSY Bilateral 8/4/2022    Procedure: BILATERAL  TEMPORAL ARTERY BIOPSY;  Surgeon: Marco A Silveira MD;  Location: [facility] MAIN OR;  Service: Ophthalmology;  Laterality: Bilateral;   • TOTAL HIP ARTHROPLASTY Left 02/12/2022    Procedure: TOTAL HIP ARTHROPLASTY ANTERIOR;  Surgeon: Yair Carver MD;  Location: [facility] MAIN OR;  Service: Orthopedics;  Laterality: Left;   • TUBAL ABDOMINAL LIGATION         Family History   Problem Relation Age of Onset   • No Known Problems Mother    • Heart murmur Father    • Malig Hyperthermia Neg Hx        Social History     Socioeconomic History   • Marital status:    Tobacco Use   • Smoking status: Never Smoker   • Smokeless tobacco: Never Used   Vaping Use   • Vaping Use: Never used   Substance and Sexual Activity   • Alcohol use: Yes     Comment: ON OCC   • Drug use: Never   • Sexual activity: Defer           Objective   Physical Exam  Vitals reviewed.   Constitutional:       Appearance: She is obese. She is ill-appearing (chronically). She is not toxic-appearing.   HENT:      Head: Normocephalic.      Mouth/Throat:      Mouth: Mucous membranes are moist.      Pharynx: Oropharynx is clear.   Eyes:      Extraocular Movements: Extraocular movements intact.      Pupils: Pupils are equal, round, and reactive to light.   Cardiovascular:      " "Rate and Rhythm: Regular rhythm. Tachycardia present.      Pulses: Normal pulses.           Dorsalis pedis pulses are 2+ on the right side and 2+ on the left side.      Heart sounds: Normal heart sounds. No murmur heard.    No S3 sounds.   Pulmonary:      Effort: Pulmonary effort is normal.      Breath sounds: Normal breath sounds. No wheezing.   Abdominal:      General: Bowel sounds are normal.      Palpations: Abdomen is soft.      Tenderness: There is no abdominal tenderness.   Musculoskeletal:      Cervical back: Neck supple. No rigidity.      Right lower leg: 3+ Edema present.      Left lower leg: 3+ Pitting Edema present.   Skin:     General: Skin is warm.      Findings: Erythema present.          Neurological:      General: No focal deficit present.      Mental Status: She is alert and oriented to person, place, and time. Mental status is at baseline.         ECG 12 Lead      Date/Time: 8/21/2022 10:00 AM  Performed by: Arianne Patterson APRN  Authorized by: Christiano Bethea MD   Interpreted by physician  Comparison: compared with previous ECG from 7/8/2022  Similar to previous ECG  Comparison to previous ECG: Atrial Fibrillation, rate 85  Rhythm: atrial fibrillation  Rate: tachycardic  BPM: 108                   ED Course  ED Course as of 08/21/22 1237   Sun Aug 21, 2022   1142 Spoke with Alison ALEX in ED observation agreeable to placement.  [BH]      ED Course User Index  [BH] Arianne Patterson APRN      /70   Pulse 109   Temp 98.2 °F (36.8 °C)   Resp 18   Ht 172.7 cm (68\")   Wt 86.9 kg (191 lb 9.6 oz)   SpO2 96%   BMI 29.13 kg/m²   Labs Reviewed   COMPREHENSIVE METABOLIC PANEL - Abnormal; Notable for the following components:       Result Value    Glucose 100 (*)     Creatinine 1.01 (*)     eGFR 56.0 (*)     All other components within normal limits    Narrative:     GFR Normal >60  Chronic Kidney Disease <60  Kidney Failure <15     PROTIME-INR - Abnormal; Notable for the following " components:    Protime 9.5 (*)     INR <0.93 (*)     All other components within normal limits   APTT - Abnormal; Notable for the following components:    PTT 24.2 (*)     All other components within normal limits   CBC WITH AUTO DIFFERENTIAL - Abnormal; Notable for the following components:    Hemoglobin 11.8 (*)     RDW 19.0 (*)     RDW-SD 59.1 (*)     Lymphocyte % 18.2 (*)     Monocyte % 12.2 (*)     All other components within normal limits   COVID-19,CEPHEID/LEONIDAS,COR/DARYL/PAD/HERNANDEZ IN-HOUSE,NP SWAB IN TRANSPORT MEDIA 3-4 HR TAT, RT-PCR - Normal    Narrative:     Fact sheet for providers: https://www.fda.gov/media/827819/download     Fact sheet for patients: https://www.fda.gov/media/746601/download  Fact sheet for providers: https://www.fda.gov/media/148880/download    Fact sheet for patients: https://www.fda.gov/media/669783/download    Test performed by PCR.   BNP (IN-HOUSE) - Normal    Narrative:     Among patients with dyspnea, NT-proBNP is highly sensitive for the detection of acute congestive heart failure. In addition NT-proBNP of <300 pg/ml effectively rules out acute congestive heart failure with 99% negative predictive value.    Results may be falsely decreased if patient taking Biotin.     TROPONIN (IN-HOUSE) - Normal    Narrative:     Troponin T Reference Range:  <= 0.03 ng/mL-   Negative for AMI  >0.03 ng/mL-     Abnormal for myocardial necrosis.  Clinicians would have to utilize clinical acumen, EKG, Troponin and serial changes to determine if it is an Acute Myocardial Infarction or myocardial injury due to an underlying chronic condition.       Results may be falsely decreased if patient taking Biotin.     COVID PRE-OP / PRE-PROCEDURE SCREENING ORDER (NO ISOLATION)    Narrative:     The following orders were created for panel order COVID PRE-OP / PRE-PROCEDURE SCREENING ORDER (NO ISOLATION) - Swab, Nasopharynx.  Procedure                               Abnormality         Status                      ---------                               -----------         ------                     COVID-19,CEPHEID/LEONIDAS,CO...[[id number]]  Normal              Final result                 Please view results for these tests on the individual orders.   CBC AND DIFFERENTIAL    Narrative:     The following orders were created for panel order CBC & Differential.  Procedure                               Abnormality         Status                     ---------                               -----------         ------                     CBC Auto Differential[[id number]]        Abnormal            Final result                 Please view results for these tests on the individual orders.     Medications   sodium chloride 0.9 % flush 10 mL (has no administration in time range)   bumetanide (BUMEX) injection 1 mg (1 mg Intravenous Given 8/21/22 1159)   Vancomycin HCl 1,250 mg in sodium chloride 0.9 % 250 mL IVPB (1,250 mg Intravenous Given 8/21/22 1200)     XR Chest 1 View    Result Date: 8/21/2022  1. Cardiomegaly without acute pulmonary process. No evidence of pulmonary edema or acute infiltrate.  Electronically Signed By-Alan Silveira MD On:8/21/2022 10:26 AM This report was finalized on [id number] by  Alan Silveira MD.                                         MDM  Number of Diagnoses or Management Options  Cellulitis of lower extremity, unspecified laterality  Exertional shortness of breath  Lower extremity edema  Diagnosis management comments: While in the emergency room patient was placed on appropriate monitoring and IV was established, CBC was essentially normal, troponin was 0.012, BNP was 1750, chest x-ray was negative for acute findings.  With patient reporting that she has been compliant with Keflex that was prescribed approximately 4 days, this could be considered failed outpatient treatment.  She denies a history of congestive heart failure with this elevated BNP and her lower extremity swelling as well as  exertional shortness of breath this cannot be ruled out, she was given a dose of Bumex in the emergency room.  With the lower extremity cellulitis she will be started on vancomycin.  She has been without chest pain or hypoxia.  She has been tachycardia and atrial fibrillation rhythm though she has been afebrile with normal white count I do feel that is less likely that she is septic at this time.  Patient case was discussed with Alison nurse practitioner in the observation unit who was agreeable to placement.  This was discussed who initially requested to be discharged home, I discussed the risks of leaving and the benefits of staying which she gave verbal understanding and was agreeable.  She was alert and oriented nontoxic in appearance of time of placement, denies further questions.    Chart review:  2020 Echo  · Mild tricuspid valve regurgitation is present.  · Left atrial cavity size is severely dilated.  · Estimated EF = 50%.  · Left ventricular systolic function is normal.  · Mild aortic valve regurgitation is present.  · Mild mitral valve regurgitation is present  · Right ventricular cavity is mildly dilated.    Comorbidities: See above, reviewed    Differentials: Congestive heart failure, electrolyte imbalance, cellulitis, ACS, sepsis not all inclusive of differentials considered    Appropriate PPE worn throughout the care of this patient.       Amount and/or Complexity of Data Reviewed  Clinical lab tests: reviewed  Tests in the radiology section of CPT®: reviewed  Tests in the medicine section of CPT®: reviewed    Patient Progress  Patient progress: stable      Final diagnoses:   Exertional shortness of breath   Lower extremity edema   Cellulitis of lower extremity, unspecified laterality       ED Disposition  ED Disposition     ED Disposition   Decision to Admit    Condition   --    Comment   --             No follow-up provider specified.       Medication List      No changes were made to your  prescriptions during this visit.          Arianne Patterson, APRN  08/21/22 4459

## 2022-08-21 NOTE — PLAN OF CARE
Problem: Skin Injury Risk Increased  Goal: Skin Health and Integrity  Outcome: Ongoing, Progressing  Intervention: Optimize Skin Protection  Recent Flowsheet Documentation  Taken 8/21/2022 1251 by Sandra Abbott RN  Pressure Reduction Techniques: frequent weight shift encouraged  Head of Bed (HOB) Positioning: HOB at 20-30 degrees  Pressure Reduction Devices: pressure-redistributing mattress utilized  Skin Protection: adhesive use limited   Goal Outcome Evaluation:              Outcome Evaluation: New admit for Bilateral lower extermities cellulitis.  Assist x1 to the B[facility]. Fall risk initiated. Pt from home. Dr. Koch following. PT was consulted. Safety maintained, will continue to follow plan of care.

## 2022-08-22 NOTE — PLAN OF CARE
Goal Outcome Evaluation:         Spoke with OT who performed evaluation on pt.  Reports she will not need skilled PT or OT at this time.  OT is recommending home as pt is independent with her cane that she uses PRN.  Will sign off on pt at this time for PT.

## 2022-08-22 NOTE — PLAN OF CARE
Goal Outcome Evaluation:   Pt continues IV antibiotics as ordered. Afebrile during this shift.Denies any pain/discomfort at rest. Reports some discomfort to BLE's with weight bearing / activity. Pt has order for echocardiogram in AM.

## 2022-08-22 NOTE — CASE MANAGEMENT/SOCIAL WORK
Discharge Planning Assessment  [facility]     Patient Name: Marychuy Verdugo  MRN: [id number]  Today's Date: 8/22/2022    Admit Date: 8/21/2022     Discharge Needs Assessment     Row Name 08/22/22 6647       Living Environment    People in Home alone    Current Living Arrangements home    Primary Care Provided by self    Provides Primary Care For no one    Family Caregiver if Needed child(stephanie), adult    Family Caregiver Names Son - Feliberto    Quality of Family Relationships supportive;helpful    Able to Return to Prior Arrangements yes       Resource/Environmental Concerns    Resource/Environmental Concerns none    Transportation Concerns none       Transition Planning    Patient/Family Anticipates Transition to home    Patient/Family Anticipated Services at Transition none    Transportation Anticipated family or friend will provide  pt states one of her sons will pick her up at dc       Discharge Needs Assessment    Equipment Currently Used at Home walker, rolling;rollator;cane, straight    Concerns to be Addressed other (see comments)    Concerns Comments Pt states her PCP will be leaving the [facility] practice in September. Pt interested in PCPs through [facility] or [place]. Lists for local providers from both agencies provided to patient to review. She reports she has a follow-up appointment with Dr. Benitez in August that she is going to keep,and will look for a new PCP after that.    Anticipated Changes Related to Illness none    Equipment Needed After Discharge none               Discharge Plan     Row Name 08/22/22 9838       Plan    Plan Home    Plan Comments Pt [place] to [place] home at [place]. Reports she lives independently in a patio home.Has family that is able to assist her.Pt drives, but states one of her sons will pick her up at [place]. She  confirms her pharmacy,and denies problems affording medications.She reports her current PCP (Eli) is leaving the practice in September,and she wants a new PCP.She doesn't plan to  stay in the same group.She requested provider list for [facility] and [facility]. Lists given for local providers. Pt reports she has appointment with Dr. Benitez in August (8/24/22) that she will keep,and then will look into finding a new PCP after that time. Pt denies additional dc needs at this time. No skilled PT/OT needs per their notes              Continued Care and Services - Admitted Since 8/21/2022    Coordination has not been started for this encounter.       Expected Discharge Date and Time     Expected Discharge Date Expected Discharge Time    Aug 23, 2022          Demographic Summary     Row Name 08/22/22 1329       General Information    Admission Type observation    Arrived From home    Required Notices Provided Observation Status Notice    Referral Source admission list    Reason for Consult discharge planning    Preferred Language English       Contact Information    Permission Granted to Share Info With ;facility                Functional Status     Row Name 08/22/22 1330       Functional Status    Usual Activity Tolerance good    Current Activity Tolerance good       Functional Status, IADL    Medications independent    Meal Preparation independent    Housekeeping independent    Laundry independent    Shopping independent                   Patient Forms     Row Name 08/22/22 1332       Patient Forms    Important Message from Medicare ([facility]) --  LOPEZ 8/21/22 per registration    Patient Observation Letter Delivered  8/21/22 per registration              Marychuy Fitzgerald RN, [facility]  Office: [phone]  Fax: [phone]  [email or web address]      I met with patient in room wearing PPE: mask and goggles.     Maintained distance greater than six feet and spent </=15 minutes in the room      Marychuy Fitzgerald RN

## 2022-08-22 NOTE — H&P
[facility] Observation Unit H&P    Patient Name: Marychuy Verdugo  : 1940  MRN: [id number]  Primary Care Physician: Roya Benitez MD  Date of admission: 2022     Patient Care Team:  Roya Benitez MD as PCP - General (Family Medicine)  Demi Rosado APRN as Nurse Practitioner (Nurse Practitioner)  Arnulfo Banks DO as Consulting Physician (Cardiology)          Subjective   History Present Illness     Chief Complaint:   Chief Complaint   Patient presents with   • Leg Swelling       Obtained from ED provider HPI on 2022:  Patient is an 82-year-old female presents to the ER today by private vehicle complaining of lower extremity swelling and erythema.  States that she awoke approximately 7 days ago and noticed the redness stated that it persisted and she decided to go to the urgent care 2 days later where she was diagnosed with cellulitis and giving Keflex.  She has been taking as prescribed but feels that the symptoms of become worse.  She describes burning sensation with surrounding erythema and edema.  She states she has chronic lower extremity swelling that has become progressively worse over the last 5 years that she denies a history of congestive heart failure but does states she takes a diuretic and has been over the last 60 years, he was recently switched from Lasix to Bumex.  She does have a history of A. fib and has a watchman in place.  She denies chest pain but states she has noted some exertional shortness of breath over the last few weeks.  She has not had a cough denies recent illness or exposure.     2022: Patient reports she feels generally the same with legs continued with some discomfort and swelling not significantly worse or better from the previous day.  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  Dyspnea is noted is somewhat improved though  with little activity noted since admission.      Review of Systems   Constitutional: Negative.   HENT: Negative.    Eyes: Negative.    Cardiovascular: Positive for leg swelling.   Respiratory: Negative.    Skin: Positive for flushing.   Musculoskeletal: Negative.    Gastrointestinal: Negative.    Genitourinary: Negative.    Neurological: Negative.    Psychiatric/Behavioral: Negative.            Personal History     Past Medical History:   Past Medical History:   Diagnosis Date   • Atrial fibrillation (HCC) 10/10/2013   • Chronic anticoagulation 05/01/2020   • Chronic constipation    • Diplopia 07/08/2022   • GERD (gastroesophageal reflux disease) 02/11/2013   • Headache 07/01/2022   • Hematuria     STATES ALWAY HAVE HAD SOME BLOOD IN URINE.   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   • Presence of Watchman left atrial appendage closure device    • Shortness of breath    • Shortness of breath    • Swelling of both lower extremities        Surgical History:      Past Surgical History:   Procedure Laterality Date   • CATARACT EXTRACTION W/ INTRAOCULAR LENS IMPLANT      LEFT AND RIGHT   • ORBITAL FRACTURE SURGERY Left     FLOOR   • OTHER SURGICAL HISTORY      WATCHMAN PLACED FOR HISTORY OF A FIB   • TEMPORAL ARTERY BIOPSY Bilateral 8/4/2022    Procedure: BILATERAL  TEMPORAL ARTERY BIOPSY;  Surgeon: Marco A Silveira MD;  Location: [facility] OR;  Service: Ophthalmology;  Laterality: Bilateral;   • TOTAL HIP ARTHROPLASTY Left 02/12/2022    Procedure: TOTAL HIP ARTHROPLASTY ANTERIOR;  Surgeon: Yair Carver MD;  Location: [facility] MAIN OR;  Service: Orthopedics;  Laterality: Left;   • TUBAL ABDOMINAL LIGATION             Family History: family history includes Heart murmur in her father; No Known Problems in her mother. Otherwise pertinent FHx was reviewed and unremarkable.     Social History:  reports that she has never smoked. She has never used  "smokeless tobacco. She reports current alcohol use. She reports that she does not use drugs.      Medications:  Prior to Admission medications    Medication Sig Start Date End Date Taking? Authorizing Provider   bumetanide (BUMEX) 2 MG tablet Take 1 tablet by mouth Daily.  Patient taking differently: Take 2 mg by mouth 2 (Two) Times a Day. 7/10/22  Yes Brea Sr MD   cholecalciferol (VITAMIN D3) 25 MCG (1000 UT) tablet Take 1,000 Units by mouth Daily.   Yes Petar Gimenez MD   docusate sodium (COLACE) 100 MG capsule Take 100 mg by mouth Every Evening.   Yes Petar Gimenez MD   levothyroxine (SYNTHROID, LEVOTHROID) 88 MCG tablet Take 88 mcg by mouth Every Morning.   Yes Petar Gimenez MD   loratadine (CLARITIN) 10 MG tablet Take 10 mg by mouth Every Evening.   Yes Petar Gimenez MD   multivitamin with minerals tablet tablet Take 1 tablet by mouth Daily.   Yes Petar Gimenez MD   oxybutynin XL (DITROPAN-XL) 10 MG 24 hr tablet Take 1 tablet by mouth Daily. 6/28/22  Yes Roya Benitez MD   pantoprazole (PROTONIX) 40 MG EC tablet Take 40 mg by mouth Daily.   Yes Petar Gimenez MD   polyethylene glycol (MIRALAX) 17 GM/SCOOP powder mix 1 capful (17 g) in liquid by mouth Daily.  Patient taking differently: Take 17 g by mouth Daily As Needed. 2/17/22  Yes Stefania Freeman APRN   potassium chloride (K-DUR,KLOR-CON) 20 MEQ CR tablet Take 20 mEq by mouth 2 (Two) Times a Day.   Yes Petar Gimenez MD       Allergies:    Allergies   Allergen Reactions   • Haloperidol Other (See Comments)     Pt reports, \"my friends and my  went crazy on it so I want to avoid it.\" Pt denies ever receiving it.        Objective   Objective     Vital Signs  Temp:  [97.5 °F (36.4 °C)-98.1 °F (36.7 °C)] 97.9 °F (36.6 °C)  Heart Rate:  [] 98  Resp:  [14-16] 16  BP: (108-120)/(58-80) 120/80  SpO2:  [97 %-100 %] 98 %  on   ;   Device (Oxygen Therapy): room air  Body mass index is " 28.16 kg/m².    Physical Exam  Physical Exam  Constitutional:       General: She is not in acute distress.     Appearance: Normal appearance. She is not ill-appearing, toxic-appearing or diaphoretic.   HENT:      Head: Normocephalic.      Right Ear: External ear normal.      Left Ear: External ear normal.      Nose: Nose normal.      Mouth/Throat:      Mouth: Mucous membranes are moist.   Eyes:      Extraocular Movements: Extraocular movements intact.   Cardiovascular:      Rate and Rhythm: Normal rate and regular rhythm.      Pulses: Normal pulses.   Pulmonary:      Effort: Pulmonary effort is normal.      Breath sounds: Normal breath sounds.   Abdominal:      General: Bowel sounds are normal.      Palpations: Abdomen is soft.   Musculoskeletal:      Cervical back: Normal range of motion.      Right lower leg: Edema present.      Left lower leg: Edema present.   Skin:     General: Skin is warm and dry.      Capillary Refill: Capillary refill takes less than 2 seconds.      Findings: Erythema present.      Comments: Erythema and warmth noted bilaterally on lower extremities   Neurological:      General: No focal deficit present.      Mental Status: She is alert.   Psychiatric:         Mood and Affect: Mood normal.         Behavior: Behavior normal.         Thought Content: Thought content normal.         Judgment: Judgment normal.     Results Review:  I have personally reviewed most recent cardiac tracings, lab results and radiology images and interpretations and agree with findings, most notably: CBC, CMP, chest x-ray, EKG and CT PE protocol.    Results from last 7 days   Lab Units 08/22/22  0430 08/21/22  1002   WBC 10*3/mm3 5.90 6.00   HEMOGLOBIN g/dL 11.7* 11.8*   HEMATOCRIT % 35.9 36.5   PLATELETS 10*3/mm3 181 181   INR   --  <0.93*     Results from last 7 days   Lab Units 08/22/22  0430 08/21/22  1002   SODIUM mmol/L 142 142   POTASSIUM mmol/L 3.8 3.8   CHLORIDE mmol/L 102 102   CO2 mmol/L 31.0* 29.0   BUN  mg/dL 20 23   CREATININE mg/dL 1.07* 1.01*   GLUCOSE mg/dL 97 100*   CALCIUM mg/dL 8.6 9.0   ALT (SGPT) U/L  --  13   AST (SGOT) U/L  --  23   TROPONIN T ng/mL  --  0.012   PROBNP pg/mL  --  1,527.0     Estimated Creatinine Clearance: 48.4 mL/min (A) (by C-G formula based on SCr of 1.07 mg/dL (H)).  Brief Urine Lab Results  (Last result in the past 365 days)      Color   Clarity   Blood   Leuk Est   Nitrite   Protein   CREAT   Urine HCG        07/26/22 1545 Yellow   Clear   Trace   Moderate (2+)   Negative   Trace                 Microbiology Results (last 10 days)     Procedure Component Value - Date/Time    COVID PRE-OP / PRE-PROCEDURE SCREENING ORDER (NO ISOLATION) - Swab, Nasopharynx [[id number]]  (Normal) Collected: 08/21/22 1201    Lab Status: Final result Specimen: Swab from Nasopharynx Updated: 08/21/22 1233    Narrative:      The following orders were created for panel order COVID PRE-OP / PRE-PROCEDURE SCREENING ORDER (NO ISOLATION) - Swab, Nasopharynx.  Procedure                               Abnormality         Status                     ---------                               -----------         ------                     COVID-19,CEPHEID/LEONIDAS,CO...[[id number]]  Normal              Final result                 Please view results for these tests on the individual orders.    COVID-19,CEPHEID/LEONIDAS,COR/DARYL/PAD/HERNANDEZ IN-HOUSE(OR EMERGENT/ADD-ON),NP SWAB IN TRANSPORT MEDIA 3-4 HR TAT, RT-PCR - Swab, Nasopharynx [[id number]]  (Normal) Collected: 08/21/22 1201    Lab Status: Final result Specimen: Swab from Nasopharynx Updated: 08/21/22 1233     COVID19 Not Detected    Narrative:      Fact sheet for providers: https://www.fda.gov/media/940018/download     Fact sheet for patients: https://www.fda.gov/media/124203/download  Fact sheet for providers: https://www.fda.gov/media/990853/download    Fact sheet for patients: https://www.fda.gov/media/797327/download    Test performed by PCR.          ECG/EMG Results (most  recent)     Procedure Component Value Units Date/Time    ECG 12 Lead [[id number]] Collected: 08/21/22 1000     Updated: 08/21/22 1001     QT Interval 335 ms     Narrative:      HEART RATE= 108  bpm  RR Interval= 553  ms  WA Interval=   ms  P Horizontal Axis=   deg  P Front Axis=   deg  QRSD Interval= 88  ms  QT Interval= 335  ms  QRS Axis= -29  deg  T Wave Axis= -4  deg  - ABNORMAL ECG -  Atrial fibrillation  Low voltage, extremity and precordial leads  Consider anteroseptal infarct  When compared with ECG of 08-Jul-2022 16:04:07,  Significant rate increase  Significant repolarization change  Electronically Signed By:   Date and Time of Study: 2022-08-21 10:00:11    ECG 12 Lead [[id number]] Resulted: 08/21/22 1237     Updated: 08/21/22 1237    SCANNED - TELEMETRY   [[id number]] Resulted: 08/21/22     Updated: 08/22/22 1014    SCANNED - TELEMETRY   [[id number]] Resulted: 08/21/22     Updated: 08/22/22 1314              Results for orders placed during the hospital encounter of 05/01/20    Adult Transthoracic Echo Complete W/ Cont if Necessary Per Protocol    Interpretation Summary  · Mild tricuspid valve regurgitation is present.  · Left atrial cavity size is severely dilated.  · Estimated EF = 50%.  · Left ventricular systolic function is normal.  · Mild aortic valve regurgitation is present.  · Mild mitral valve regurgitation is present  · Right ventricular cavity is mildly dilated.      XR Chest 1 View    Result Date: 8/21/2022  1. Cardiomegaly without acute pulmonary process. No evidence of pulmonary edema or acute infiltrate.  Electronically Signed By-Alan Silveira MD On:8/21/2022 10:26 AM This report was finalized on [id number] by  Alan Silveira MD.    CT Angiogram Chest Pulmonary Embolism    Result Date: 8/21/2022   1. No evidence of thrombus or embolus in the right or left pulmonary artery branches. 2. Chronic lung disease. 3. Cardiomegaly.  Electronically Signed By-Jesu Tobin MD On:8/21/2022  7:14 PM This report was finalized on [id number] by  Jesu Tobin MD.        Estimated Creatinine Clearance: 48.4 mL/min (A) (by C-G formula based on SCr of 1.07 mg/dL (H)).    Assessment & Plan   Assessment/Plan       Active Hospital Problems    Diagnosis  POA   • Cellulitis [L03.90]  Yes      Resolved Hospital Problems   No resolved problems to display.     Lower extremity pain and swelling with likely cellulitis  -WBCs within normal limits  -D-dimer: 3.09  -proBNP: 1572.0  -CT PE protocol showed no evidence of pulmonary embolism with chronic lung disease and cardiomegaly noted  -Patient initially started on vancomycin, switch to p.o. Bactrim in consultation with pharmacy  -Apply compression dressings and elevate lower extremities     Diplopia  -Patient recently underwent temporal artery biopsy after concern for giant cell arteritis was noted which was negative.  Encourage continued outpatient work-up as planned     Anemia  -Hemoglobin: 11.7 (stable)  -Monitor while admitted     CKD  -Creatinine: 1.07 with and eGFR: 52.3  -Continue Bumex  -Avoid nephrotic if medication and IV dye unless urgently needed  -Monitor BMP and I´s and O´s        Atrial fibrillation  -Patient had previous watchman device placed  -Metoprolol restarted by cardiology     OAB  -Oxybutynin     Hypothyroidism  -Levothyroxine     GERD  -PPI            VTE Prophylaxis -   Mechanical Order History:      Ordered        08/21/22 1353  Place Sequential Compression Device  Once            08/21/22 1353  Maintain Sequential Compression Device  Continuous,   Status:  Canceled            08/21/22 1352  Place Sequential Compression Device  Once            08/21/22 1352  Maintain Sequential Compression Device  Continuous                    Pharmalogical Order History:     None          CODE STATUS:    Code Status and Medical Interventions:   Ordered at: 08/21/22 1353     Level Of Support Discussed With:    Patient     Code Status (Patient has no  pulse and is not breathing):    CPR (Attempt to Resuscitate)     Medical Interventions (Patient has pulse or is breathing):    Full Support       This patient has been examined wearing personal protective equipment.     I discussed the patient's findings and my recommendations with patient and nursing staff.      Signature:Electronically signed by Arnulfo Schuler PA-C, 08/22/22, 3:35 PM EDT.

## 2022-08-22 NOTE — THERAPY EVALUATION
Patient Name: Marychuy Verdugo  : 1940    MRN: [id number]                              Today's Date: 2022       Admit Date: 2022    Visit Dx:     ICD-10-CM ICD-9-CM   1. Exertional shortness of breath  R06.02 786.05   2. Lower extremity edema  R60.0 782.3   3. 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   • Presence of Watchman left atrial appendage closure device   • S/P left atrial appendage ligation   • Mitral regurgitation   • Overactive bladder   • Dermatitis associated with moisture   • Status post total hip replacement, left   • Bilateral lower extremity edema   • Ptosis of left eyelid   • Left oculomotor nerve palsy   • Diplopia   • Cellulitis     Past Medical History:   Diagnosis Date   • Atrial fibrillation (HCC) 10/10/2013   • Chronic anticoagulation 2020   • Chronic constipation    • Diplopia 2022   • GERD (gastroesophageal reflux disease) 2013   • Headache 2022   • Hematuria     STATES ALWAY HAVE HAD SOME BLOOD IN URINE.   • Hypothyroid 2013   • Leaky heart valve     ECHO-   • Left oculomotor nerve palsy 2022   • Presence of Watchman left atrial appendage closure device    • Shortness of breath    • Shortness of breath    • Swelling of both lower extremities      Past Surgical History:   Procedure Laterality Date   • CATARACT EXTRACTION W/ INTRAOCULAR LENS IMPLANT      LEFT AND RIGHT   • ORBITAL FRACTURE SURGERY Left     FLOOR   • OTHER SURGICAL HISTORY      WATCHMAN PLACED FOR HISTORY OF A FIB   •  TEMPORAL ARTERY BIOPSY Bilateral 8/4/2022    Procedure: BILATERAL  TEMPORAL ARTERY BIOPSY;  Surgeon: Marco A Silveira MD;  Location: [facility] MAIN OR;  Service: Ophthalmology;  Laterality: Bilateral;   • TOTAL HIP ARTHROPLASTY Left 02/12/2022    Procedure: TOTAL HIP ARTHROPLASTY ANTERIOR;  Surgeon: Yair Carver MD;  Location: [facility] MAIN OR;  Service: Orthopedics;  Laterality: Left;   • TUBAL ABDOMINAL LIGATION        General Information     Row Name 08/22/22 0923          General Information    Patient Profile Reviewed yes  -BL     Prior Level of Function independent:;ADL's;shopping;driving  -BL     Existing Precautions/Restrictions fall  -BL     Row Name 08/22/22 0923          Living Environment    People in Home alone  -BL     Row Name 08/22/22 0923          Cognition    Orientation Status (Cognition) oriented x 4  -BL           User Key  (r) = Recorded By, (t) = Taken By, (c) = Cosigned By    Initials Name Provider Type     Pippa Sheets OT Occupational Therapist                 Mobility/ADL's     Row Name 08/22/22 0951          Bed Mobility    Bed Mobility supine-sit-supine  -BL     Supine-Sit-Supine Ida (Bed Mobility) modified independence  -     Assistive Device (Bed Mobility) head of bed elevated  -     Row Name 08/22/22 0951          Transfers    Transfers sit-stand transfer  -     Sit-Stand Ida (Transfers) standby assist  -     Row Name 08/22/22 0951          Sit-Stand Transfer    Assistive Device (Sit-Stand Transfers) cane, straight  -     Row Name 08/22/22 0951          Functional Mobility    Functional Mobility- Ind. Level conditional independence  -     Functional Mobility- Device cane, straight  -     Row Name 08/22/22 0951          Activities of Daily Living    BADL Assessment/Intervention lower body dressing;toileting  -     Row Name 08/22/22 0951          Lower Body Dressing Assessment/Training    [place]da Level (Lower Body Dressing)  doff;don;pants/bottoms;independent  -BL     Position (Lower Body Dressing) supported standing  -BL     Row Name 08/22/22 0951          Toileting Assessment/Training    [place] Level (Toileting) toileting skills;modified independence  -BL           User Key  (r) = Recorded By, (t) = Taken By, (c) = Cosigned By    Initials Name Provider Type     Pippa Sheets OT Occupational Therapist               Obj/Interventions     Row Name 08/22/22 0953          Sensory Assessment (Somatosensory)    Sensory Assessment (Somatosensory) sensation intact  -[facility] Name 08/22/22 0953          Vision Assessment/Intervention    Visual Impairment/Limitations corrective lenses full-time  Pt with drooping eye lid on L side limiting L visual field  -BL     [facility] Name 08/22/22 0953          Range of Motion Comprehensive    General Range of Motion bilateral upper extremity ROM WFL  -[facility] Name 08/22/22 0953          Strength Comprehensive (MMT)    Comment, General Manual Muscle Testing (MMT) Assessment BUE grossly WFL  -BL     [facility] Name 08/22/22 0953          Balance    Balance Assessment sitting static balance;sit to stand dynamic balance;standing dynamic balance;standing static balance  -BL     Static Sitting Balance independent  -BL     Static Standing Balance modified independence  -BL     Dynamic Standing Balance standby assist  -BL           User Key  (r) = Recorded By, (t) = Taken By, (c) = Cosigned By    Initials Name Provider Type    BL Pippa Sheets OT Occupational Therapist               Goals/Plan    No documentation.                Clinical Impression     Row Name 08/22/22 0954          Pain Assessment    Pretreatment Pain Rating 3/10  -BL     Posttreatment Pain Rating 3/10  -BL     [facility] Name 08/22/22 0954          Plan of Care Review    Plan of Care Reviewed With patient  -     Outcome Evaluation 83 y/o female presenting to [facility] for cellulitis and edema. Pt reports she lives alone and is typically  independent with all ADLs/IADLs. Pt with recent issues with left eye which has limited her ability drive. Pt reports she has good social support and receives assistance as needed. Pt uses her cane for community mobility. At time of evaluation, pt A&OX4. Pt modified independent with bed mobility, SBA for sit to stands and functional mobility with cane. Pt modified independent with lower body dressing and toileting. Pt educated on falls presention and receptive to education. Pt appears at or near baseline for ADLs and functional mobility. Pt declines the need for further therapy. OT signing off.  -BL     Row Name 08/22/22 0954          Therapy Plan Review/Discharge Plan (OT)    Anticipated Discharge Disposition (OT) home  -BL     Row Name 08/22/22 0954          Positioning and Restraints    Pre-Treatment Position in bed  -BL     Post Treatment Position bed  -BL     In Bed notified nsg;encouraged to call for assist;exit alarm on;call light within reach  -BL           User Key  (r) = Recorded By, (t) = Taken By, (c) = Cosigned By    Initials Name Provider Type    Pippa Gong OT Occupational Therapist               Outcome Measures     Row Name 08/22/22 0878          How much help from another person do you currently need...    Turning from your back to your side while in flat bed without using bedrails? 4  -TT     Moving from lying on back to sitting on the side of a flat bed without bedrails? 4  -TT     Moving to and from a bed to a chair (including a wheelchair)? 4  -TT     Standing up from a chair using your arms (e.g., wheelchair, bedside chair)? 4  -TT     Climbing 3-5 steps with a railing? 3  -TT     To walk in hospital room? 2  -TT     AM-PAC 6 Clicks Score (PT) 21  -TT           User Key  (r) = Recorded By, (t) = Taken By, (c) = Cosigned By    Initials Name Provider Type    TT Nu Bright RN Registered Nurse                Occupational Therapy Education                 Title: PT OT SLP Therapies  (Done)     Topic: Occupational Therapy (Done)     Point: ADL training (Done)     Description:   Instruct learner(s) on proper safety adaptation and remediation techniques during self care or transfers.   Instruct in proper use of assistive devices.              Learning Progress Summary           Patient Acceptance, E,TB, VU by  at 8/22/2022 1001                   Point: Precautions (Done)     Description:   Instruct learner(s) on prescribed precautions during self-care and functional transfers.              Learning Progress Summary           Patient Acceptance, E,TB, VU by  at 8/22/2022 1001                   Point: Body mechanics (Done)     Description:   Instruct learner(s) on proper positioning and spine alignment during self-care, functional mobility activities and/or exercises.              Learning Progress Summary           Patient Acceptance, E,TB, VU by  at 8/22/2022 1001                               User Key     Initials Effective Dates Name Provider Type Discipline     04/13/21 -  Pippa Sheets OT Occupational Therapist OT              OT Recommendation and Plan     Plan of Care Review  Plan of Care Reviewed With: patient  Outcome Evaluation: 83 y/o female presenting to [facility] for cellulitis and edema. Pt reports she lives alone and is typically independent with all ADLs/IADLs. Pt with recent issues with left eye which has limited her ability drive. Pt reports she has good social support and receives assistance as needed. Pt uses her cane for community mobility. At time of evaluation, pt A&OX4. Pt modified independent with bed mobility, SBA for sit to stands and functional mobility with cane. Pt modified independent with lower body dressing and toileting. Pt educated on falls presention and receptive to education. Pt appears at or near baseline for ADLs and functional mobility. Pt declines the need for further therapy. OT signing off.     Time Calculation:    Time Calculation- OT     Row Name  08/22/22 1002             Time Calculation- OT    OT Start Time 0919  -BL      OT Stop Time 0945  -BL      OT Time Calculation (min) 26 min  -BL      OT Received On 08/22/22  -BL            User Key  (r) = Recorded By, (t) = Taken By, (c) = Cosigned By    Initials Name Provider Type     Vickie Lynn OT Occupational Therapist              Therapy Charges for Today     Code Description Service Date Service Provider Modifiers Qty    23411034793 HC OT EVAL MOD COMPLEXITY 4 8/22/2022 Vickie Lynn OT GO 1               VICKIE LYNN OT  8/22/2022

## 2022-08-22 NOTE — PLAN OF CARE
Goal Outcome Evaluation:  Plan of Care Reviewed With: patient           Outcome Evaluation: 81 y/o female presenting to [facility] for cellulitis and edema. Pt reports she lives alone and is typically independent with all ADLs/IADLs. Pt with recent issues with left eye which has limited her ability drive. Pt reports she has good social support and receives assistance as needed. Pt uses her cane for community mobility. At time of evaluation, pt A&OX4. Pt modified independent with bed mobility, SBA for sit to stands and functional mobility with cane. Pt modified independent with lower body dressing and toileting. Pt educated on falls presention and receptive to education. Pt appears at or near baseline for ADLs and functional mobility. Pt declines the need for further therapy. OT signing off.

## 2022-08-23 NOTE — PLAN OF CARE
Goal Outcome Evaluation:  Plan of Care Reviewed With: patient           Outcome Evaluation: Patient had echo done today pending results will discharge home today. Wrapped bolivar legs with ace wrap for edema. Patient is stable.

## 2022-08-23 NOTE — THERAPY EVALUATION
Patient Name: Marychuy Verdugo  : 1940    MRN: [id number]                              Today's Date: 2022       Admit Date: 2022    Visit Dx:     ICD-10-CM ICD-9-CM   1. Exertional shortness of breath  R06.02 786.05   2. Lower extremity edema  R60.0 782.3   3. 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   • Presence of Watchman left atrial appendage closure device   • S/P left atrial appendage ligation   • Mitral regurgitation   • Overactive bladder   • Dermatitis associated with moisture   • Status post total hip replacement, left   • Bilateral lower extremity edema   • Ptosis of left eyelid   • Left oculomotor nerve palsy   • Diplopia   • Cellulitis     Past Medical History:   Diagnosis Date   • Atrial fibrillation (HCC) 10/10/2013   • Chronic anticoagulation 2020   • Chronic constipation    • Diplopia 2022   • GERD (gastroesophageal reflux disease) 2013   • Headache 2022   • Hematuria     STATES ALWAY HAVE HAD SOME BLOOD IN URINE.   • Hypothyroid 2013   • Leaky heart valve     ECHO-   • Left oculomotor nerve palsy 2022   • Presence of Watchman left atrial appendage closure device    • Shortness of breath    • Shortness of breath    • Swelling of both lower extremities      Past Surgical History:   Procedure Laterality Date   • CATARACT EXTRACTION W/ INTRAOCULAR LENS IMPLANT      LEFT AND RIGHT   • ORBITAL FRACTURE SURGERY Left     FLOOR   • OTHER SURGICAL HISTORY      WATCHMAN PLACED FOR HISTORY OF A FIB   •  TEMPORAL ARTERY BIOPSY Bilateral 8/4/2022    Procedure: BILATERAL  TEMPORAL ARTERY BIOPSY;  Surgeon: Marco A Silveira MD;  Location: [facility] MAIN OR;  Service: Ophthalmology;  Laterality: Bilateral;   • TOTAL HIP ARTHROPLASTY Left 02/12/2022    Procedure: TOTAL HIP ARTHROPLASTY ANTERIOR;  Surgeon: Yair Carver MD;  Location: [facility] MAIN OR;  Service: Orthopedics;  Laterality: Left;   • TUBAL ABDOMINAL LIGATION        General Information     Row Name 08/23/22 1443          Physical Therapy Time and Intention    Document Type evaluation  -     Mode of Treatment physical therapy  -     Row Name 08/23/22 1443          General Information    Patient Profile Reviewed yes  -EJ     Prior Level of Function independent:;ADL's;driving;shopping  -EJ     Existing Precautions/Restrictions fall  Pt had one assisted fall today - no injuries.  -EJ     Barriers to Rehab medically complex  -     Row Name 08/23/22 1443          Living Environment    People in Home alone  Lives alone but has supportive family/neighbors close by.  -     Row Name 08/23/22 1443          Home Main Entrance    Number of Stairs, Main Entrance none  -EJ     Row Name 08/23/22 1443          Cognition    Orientation Status (Cognition) oriented x 4  -     Row Name 08/23/22 1443          Safety Issues, Functional Mobility    Impairments Affecting Function (Mobility) endurance/activity tolerance  -           User Key  (r) = Recorded By, (t) = Taken By, (c) = Cosigned By    Initials Name Provider Type     Maite Olivier, PT Physical Therapist               Mobility     Row Name 08/23/22 1444          Bed Mobility    Bed Mobility supine-sit-supine  -EJ     Supine-Sit-Supine [place] (Bed Mobility) modified independence  -EJ     Assistive Device (Bed Mobility) head of bed elevated  -     Row Name 08/23/22 1444          Sit-Stand Transfer    Sit-Stand [place] (Transfers) standby assist  -     Assistive Device (Sit-Stand Transfers) cane,  straight  -EJ     Row Name 08/23/22 1444          Gait/Stairs (Locomotion)    [place] Level (Gait) standby assist  -EJ     Assistive Device (Gait) cane, straight  -EJ     Distance in Feet (Gait) 40 ft x2  -EJ           User Key  (r) = Recorded By, (t) = Taken By, (c) = Cosigned By    Initials Name Provider Type    Maite Loving, PT Physical Therapist               Obj/Interventions     Row Name 08/23/22 1445          Range of Motion Comprehensive    General Range of Motion bilateral upper extremity ROM WFL;bilateral lower extremity ROM WFL  -EJ     Row Name 08/23/22 1445          Strength Comprehensive (MMT)    Comment, General Manual Muscle Testing (MMT) Assessment BUE grossly 4- to 4/5, BLE grossly 4/5  -EJ     [facility] Name 08/23/22 1445          Balance    Balance Assessment standing static balance;standing dynamic balance;sit to stand dynamic balance;sitting static balance;sitting dynamic balance  -EJ     Static Sitting Balance independent  -EJ     Dynamic Sitting Balance standby assist  -EJ     Sit to Stand Dynamic Balance supervision  -EJ     Static Standing Balance modified independence  -EJ     Dynamic Standing Balance standby assist  -EJ     Position/Device Used, Standing Balance cane, straight  -EJ     Row Name 08/23/22 1445          Sensory Assessment (Somatosensory)    Sensory Assessment (Somatosensory) sensation intact  -EJ           User Key  (r) = Recorded By, (t) = Taken By, (c) = Cosigned By    Initials Name Provider Type    Maite Loving, PT Physical Therapist               Goals/Plan    No documentation.                Clinical Impression     Row Name 08/23/22 1447          Pain    Pretreatment Pain Rating 0/10 - no pain  -EJ     Posttreatment Pain Rating 0/10 - no pain  -EJ     Row Name 08/23/22 1447          Plan of Care Review    Plan of Care Reviewed With patient  -     Outcome Evaluation Patient is an 83 y/o F who was admitted on 8/21/22 with cellulitis and edema.  She has had  recent issues with  her left eye, cranial nerve 3 palsy, which has limited her ability drive and she will see double at times. Pt uses her cane for community mobility. Pt modified independent with bed mobility, SBA for sit to stands and functional mobility with cane. Due to her mobility status she will be signed off on PT at this time.   Pt to d/c home today.  -EJ     Row Name 08/23/22 1447          Therapy Assessment/Plan (PT)    Therapy Frequency (PT) evaluation only  -EJ     Predicted Duration of Therapy Intervention (PT) discharge  -EJ     Row Name 08/23/22 1447          Positioning and Restraints    Pre-Treatment Position in bed  -EJ     Post Treatment Position bed  -EJ     In Bed call light within reach;encouraged to call for assist;exit alarm on;sitting EOB  -EJ           User Key  (r) = Recorded By, (t) = Taken By, (c) = Cosigned By    Initials Name Provider Type    EJ Maite Olivier, PT Physical Therapist               Outcome Measures     Row Name 08/23/22 1459 08/23/22 1241       How much help from another person do you currently need...    Turning from your back to your side while in flat bed without using bedrails? 4  -EJ 4  -TK    Moving from lying on back to sitting on the side of a flat bed without bedrails? 4  -EJ 4  -TK    Moving to and from a bed to a chair (including a wheelchair)? 4  -EJ 4  -TK    Standing up from a chair using your arms (e.g., wheelchair, bedside chair)? 4  -EJ 4  -TK    Climbing 3-5 steps with a railing? 3  -EJ 3  -TK    To walk in hospital room? 3  -EJ 3  -TK    AM-PAC 6 Clicks Score (PT) 22  -EJ 22  -TK    Highest level of mobility 7 --> Walked 25 feet or more  -EJ 7 --> Walked 25 feet or more  -TK    Row Name 08/23/22 0800          How much help from another person do you currently need...    Turning from your back to your side while in flat bed without using bedrails? 4  -TK     Moving from lying on back to sitting on the side of a flat bed without bedrails? 4  -TK      Moving to and from a bed to a chair (including a wheelchair)? 4  -TK     Standing up from a chair using your arms (e.g., wheelchair, bedside chair)? 4  -TK     Climbing 3-5 steps with a railing? 3  -TK     To walk in hospital room? 3  -TK     AM-PAC 6 Clicks Score (PT) 22  -TK     Highest level of mobility 7 --> Walked 25 feet or more  -TK     Row Name 08/23/22 5259          Functional Assessment    Outcome Measure Options AM-PAC 6 Clicks Basic Mobility (PT)  -EJ           User Key  (r) = Recorded By, (t) = Taken By, (c) = Cosigned By    Initials Name Provider Type    Maite Loving, PT Physical Therapist    Marga Ballard RN Registered Nurse                               PT Recommendation and Plan     Plan of Care Reviewed With: patient  Outcome Evaluation: Patient is an 81 y/o F who was admitted on 8/21/22 with cellulitis and edema.  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 Pt uses her cane for community mobility. Pt modified independent with bed mobility, SBA for sit to stands and functional mobility with cane. Due to her mobility status she will be signed off on PT at this time.   Pt to d/c home today.     Time Calculation:    PT Charges     Row Name 08/23/22 1501             Time Calculation    Start Time 1401  -EJ      Stop Time 1415  -EJ      Time Calculation (min) 14 min  -EJ      PT Received On 08/23/22  -EJ              Time Calculation- PT    Total Timed Code Minutes- PT 0 minute(s)  -EJ            User Key  (r) = Recorded By, (t) = Taken By, (c) = Cosigned By    Initials Name Provider Type    Maite Loving, PT Physical Therapist              Therapy Charges for Today     Code Description Service Date Service Provider Modifiers Qty    52554661900 HC PT EVAL MOD COMPLEXITY 3 8/23/2022 Maite Olivier, PT GP 1          PT G-Codes  Outcome Measure Options: AM-PAC 6 Clicks Basic Mobility (PT)  AM-PAC  6 Clicks Score (PT): 22    Maite Olivier, PT  8/23/2022

## 2022-08-23 NOTE — SIGNIFICANT NOTE
Post Fall Note    Patient: Marychuy Verdugo   Location: 106/1    Time of fall: 1210    Date of fall: 08/23/2022    Location of the fall: doorway and pierson in front of room 106    Describe the Fall: patient walking to restroom with me holding  using her cane and got her foot tangled and we slide down to the floor with me holding on to her she sat down on her bottom.    Interventions in place prior to fall: Call Light Within Reach, Encourage Use of Call Light, Bed Side Rails x1, x2, x3, Bed/Chair in Lowest Position, Bed/Chair Locked, Bed Alarm Set, Falls Wrist Band Applied, Falls Gown Applied, Fall Risk Care Plan Active, Non-Skid Footwear, Items within Reach, Room Clutter Free, Adequate Lighting Provided and Fall Risk Signage Present    Was the fall witnessed? Yes    Where was the patient found?:walking in pierson assisting patient to bathroom patint slide to floor on bottom     Patient position when found?: asssitng patient to bathroom patient slide to floor on bottom    If witnessed, what part of the body made contact with the floor or other object? Buttocks     Injury: No    Is the patient having any pain?: No    Level of Consciousness: awake, alert and oriented     Post fall assessment: completed    Physician notified: Yes    Name of physician:Davon BAUTISTA    Family notified: Yes    Name of family member notified:Gonzalez Elizondo    Post fall precautions in place: Patient/ Family Educated, Call Light Within Reach, Encourage Use of Call Light, Bed Side Rails x1, x2, x3, Bed/Chair in Lowest Position, Bed/Chair Locked, Bed Alarm Set, Falls Wrist Band Applied, Falls Gown Applied, Fall Risk Care Plan Active, Non-Skid Footwear, Room Clutter Free, Items within Reach, Adequate Lighting Provided and Falls Risk Signage Present    Teaching provided:Encourage use call light and use of bedset commode if unable to ambulate to resttoom        Electronically signed by Marga Chaney RN, 08/23/22, 12:10 EDT.

## 2022-08-23 NOTE — DISCHARGE SUMMARY
[facility]    Roya Benitez MD    CHIEF COMPLAINT:     Lower extremity pain and swelling    HISTORY OF PRESENT ILLNESS:    Obtained from ED provider HPI on 8/21/2022:  Patient is an 82-year-old female presents to the ER today by private vehicle complaining of lower extremity swelling and erythema.  States that she awoke approximately 7 days ago and noticed the redness stated that it persisted and she decided to go to the urgent care 2 days later where she was diagnosed with cellulitis and giving Keflex.  She has been taking as prescribed but feels that the symptoms of become worse.  She describes burning sensation with surrounding erythema and edema.  She states she has chronic lower extremity swelling that has become progressively worse over the last 5 years that she denies a history of congestive heart failure but does states she takes a diuretic and has been over the last 60 years, he was recently switched from Lasix to Bumex.  She does have a history of A. fib and has a watchman in place.  She denies chest pain but states she has noted some exertional shortness of breath over the last few weeks.  She has not had a cough denies recent illness or exposure.    8/22/2022: Patient reports she feels generally the same with legs continued with some discomfort and swelling not significantly worse or better from the previous day.  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  Dyspnea is noted is somewhat improved though with little activity noted since admission.    8/23/2022: Patient reports she did well throughout the night and feels her legs are improving somewhat.  She did report that she feels somewhat weaker when attempting to move from her hospital room to the restroom however she was able to function independently with the use of her cane.    Leg Swelling          Past Medical  History:   Diagnosis Date   • Atrial fibrillation (HCC) 10/10/2013   • Chronic anticoagulation 05/01/2020   • Chronic constipation    • Diplopia 07/08/2022   • GERD (gastroesophageal reflux disease) 02/11/2013   • Headache 07/01/2022   • Hematuria     STATES ALWAY HAVE HAD SOME BLOOD IN URINE.   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   • Presence of Watchman left atrial appendage closure device    • Shortness of breath    • Shortness of breath    • Swelling of both lower extremities      Past Surgical History:   Procedure Laterality Date   • CATARACT EXTRACTION W/ INTRAOCULAR LENS IMPLANT      LEFT AND RIGHT   • ORBITAL FRACTURE SURGERY Left     FLOOR   • OTHER SURGICAL HISTORY      WATCHMAN PLACED FOR HISTORY OF A FIB   • TEMPORAL ARTERY BIOPSY Bilateral 8/4/2022    Procedure: BILATERAL  TEMPORAL ARTERY BIOPSY;  Surgeon: Marco A Silveira MD;  Location: [facility] OR;  Service: Ophthalmology;  Laterality: Bilateral;   • TOTAL HIP ARTHROPLASTY Left 02/12/2022    Procedure: TOTAL HIP ARTHROPLASTY ANTERIOR;  Surgeon: Yair Carver MD;  Location: [facility] MAIN OR;  Service: Orthopedics;  Laterality: Left;   • TUBAL ABDOMINAL LIGATION       Family History   Problem Relation Age of Onset   • No Known Problems Mother    • Heart murmur Father    • Malig Hyperthermia Neg Hx      Social History     Tobacco Use   • Smoking status: Never Smoker   • Smokeless tobacco: Never Used   Vaping Use   • Vaping Use: Never used   Substance Use Topics   • Alcohol use: Yes     Comment: ON OCC   • Drug use: Never     Medications Prior to Admission   Medication Sig Dispense Refill Last Dose   • bumetanide (BUMEX) 2 MG tablet Take 1 tablet by mouth Daily. (Patient taking differently: Take 2 mg by mouth 2 (Two) Times a Day.)   8/21/2022 at Unknown time   • cholecalciferol (VITAMIN D3) 25 MCG (1000 UT) tablet Take 1,000 Units by mouth Daily.   8/20/2022 at Unknown time   •  docusate sodium (COLACE) 100 MG capsule Take 100 mg by mouth Every Evening.   8/21/2022 at Unknown time   • levothyroxine (SYNTHROID, LEVOTHROID) 88 MCG tablet Take 88 mcg by mouth Every Morning.   8/21/2022 at Unknown time   • loratadine (CLARITIN) 10 MG tablet Take 10 mg by mouth Every Evening.   8/20/2022 at Unknown time   • multivitamin with minerals tablet tablet Take 1 tablet by mouth Daily.   8/20/2022 at Unknown time   • oxybutynin XL (DITROPAN-XL) 10 MG 24 hr tablet Take 1 tablet by mouth Daily. 90 tablet 1 8/21/2022 at Unknown time   • pantoprazole (PROTONIX) 40 MG EC tablet Take 40 mg by mouth Daily.   8/21/2022 at Unknown time   • polyethylene glycol (MIRALAX) 17 GM/SCOOP powder mix 1 capful (17 g) in liquid by mouth Daily. (Patient taking differently: Take 17 g by mouth Daily As Needed.) 476 g 0 8/21/2022 at Unknown time   • potassium chloride (K-DUR,KLOR-CON) 20 MEQ CR tablet Take 20 mEq by mouth 2 (Two) Times a Day.   8/21/2022 at Unknown time     Allergies:  Haloperidol    Immunization History   Administered Date(s) Administered   • COVID-19 (PFIZER) PURPLE CAP 01/16/2021, 02/06/2021, 10/14/2021   • Flu Vaccine Split Quad 09/15/2012, 07/26/2013   • FluLaval/Fluzone >6mos 09/22/2020   • Fluad Quad 65+ 10/15/2021   • Fluzone High Dose =>65 Years ([facility] ONLY) 09/19/2014, 09/10/2015, 08/12/2018, 09/06/2019, 09/19/2019   • Hepatitis A 05/26/2018, 11/15/2018, 12/29/2018   • Influenza TIV (IM) 09/18/2017   • Influenza, Unspecified 09/13/2018   • PEDS-Pneumococcal Conjugate (PCV7) 12/14/2015   • Pneumococcal Conjugate 13-Valent (PCV13) 12/14/2015, 12/14/2015   • Pneumococcal Polysaccharide (PPSV23) 10/01/2006, 08/12/2018, 09/13/2018   • Shingrix 07/27/2018, 10/22/2018   • Tdap 05/05/2009, 03/13/2019, 07/17/2019   • Zostavax 11/19/2007           REVIEW OF SYSTEMS:    Review of Systems   Constitutional: Negative.   HENT: Negative.    Eyes: Negative.    Cardiovascular: Negative.    Respiratory: Negative.     Skin: Negative.    Musculoskeletal: Negative.    Gastrointestinal: Negative.    Genitourinary: Negative.    Neurological: Negative.    Psychiatric/Behavioral: Negative.        Vital Signs  Temp:  [97.4 °F (36.3 °C)-98 °F (36.7 °C)] 97.4 °F (36.3 °C)  Heart Rate:  [96-98] 96  Resp:  [16-18] 18  BP: (120-132)/(72-87) 132/87          Physical Exam:  Physical Exam  Constitutional:       General: She is not in acute distress.     Appearance: Normal appearance. She is not ill-appearing, toxic-appearing or diaphoretic.   HENT:      Head: Normocephalic.      Right Ear: External ear normal.      Left Ear: External ear normal.      Nose: Nose normal.      Mouth/Throat:      Mouth: Mucous membranes are moist.   Eyes:      Extraocular Movements: Extraocular movements intact.   Cardiovascular:      Rate and Rhythm: Normal rate and regular rhythm.      Pulses: Normal pulses.   Pulmonary:      Effort: Pulmonary effort is normal.      Breath sounds: Normal breath sounds.   Abdominal:      General: Bowel sounds are normal.      Palpations: Abdomen is soft.   Musculoskeletal:      Cervical back: Normal range of motion.      Right lower leg: Edema present.      Left lower leg: Edema present.      Comments: Lower extremity erythema and edema remain present but appear significantly improved from the previous day   Skin:     General: Skin is warm and dry.      Capillary Refill: Capillary refill takes less than 2 seconds.      Findings: Erythema present.      Comments: Erythema and warmth noted bilaterally on lower extremities   Neurological:      General: No focal deficit present.      Mental Status: She is alert.   Psychiatric:         Mood and Affect: Mood normal.         Behavior: Behavior normal.         Thought Content: Thought content normal.         Judgment: Judgment normal.         Emotional Behavior:   Normal   Debilities:  None  Results Review:    I reviewed the patient's new clinical results.  Lab Results (most recent)      Procedure Component Value Units Date/Time    Basic Metabolic Panel [[id number]]  (Abnormal) Collected: 08/22/22 0430    Specimen: Blood Updated: 08/22/22 0538     Glucose 97 mg/dL      BUN 20 mg/dL      Creatinine 1.07 mg/dL      Sodium 142 mmol/L      Potassium 3.8 mmol/L      Comment: Slight hemolysis detected by analyzer. Results may be affected.        Chloride 102 mmol/L      CO2 31.0 mmol/L      Calcium 8.6 mg/dL      BUN/Creatinine Ratio 18.7     Anion Gap 9.0 mmol/L      eGFR 52.3 mL/min/1.73      Comment: National Kidney Foundation and American Society of Nephrology (ASN) Task Force recommended calculation based on the Chronic Kidney Disease Epidemiology Collaboration (CKD-EPI) equation refit without adjustment for race.       Narrative:      GFR Normal >60  Chronic Kidney Disease <60  Kidney Failure <15      CBC & Differential [[id number]]  (Abnormal) Collected: 08/22/22 0430    Specimen: Blood Updated: 08/22/22 0507    Narrative:      The following orders were created for panel order CBC & Differential.  Procedure                               Abnormality         Status                     ---------                               -----------         ------                     CBC Auto Differential[[id number]]        Abnormal            Final result                 Please view results for these tests on the individual orders.    CBC Auto Differential [[id number]]  (Abnormal) Collected: 08/22/22 0430    Specimen: Blood Updated: 08/22/22 0507     WBC 5.90 10*3/mm3      RBC 4.06 10*6/mm3      Hemoglobin 11.7 g/dL      Hematocrit 35.9 %      MCV 88.4 fL      MCH 28.7 pg      MCHC 32.5 g/dL      RDW 19.1 %      RDW-SD 59.5 fl      MPV 7.6 fL      Platelets 181 10*3/mm3      Neutrophil % 67.6 %      Lymphocyte % 17.7 %      Monocyte % 10.0 %      Eosinophil % 3.4 %      Basophil % 1.3 %      Neutrophils, Absolute 4.00 10*3/mm3      Lymphocytes, Absolute 1.00 10*3/mm3      Monocytes, Absolute 0.60 10*3/mm3       Eosinophils, Absolute 0.20 10*3/mm3      Basophils, Absolute 0.10 10*3/mm3      nRBC 0.1 /100 WBC     D-dimer, Quantitative [[id number]]  (Abnormal) Collected: 08/21/22 1002    Specimen: Blood Updated: 08/21/22 1406     D-Dimer, Quantitative 3.09 mg/L (FEU)     Narrative:      Reference Range  --------------------------------------------------------------------     < 0.50   Negative Predictive Value  0.50-0.59   Indeterminate    >= 0.60   Probable VTE             A very low percentage of patients with DVT may yield D-Dimer results   below the cut-off of 0.50 mg/L FEU.  This is known to be more   prevalent in patients with distal DVT.             Results of this test should always be interpreted in conjunction with   the patient's medical history, clinical presentation and other   findings.  Clinical diagnosis should not be based on the result of   INNOVANCE D-Dimer alone.    COVID PRE-OP / PRE-PROCEDURE SCREENING ORDER (NO ISOLATION) - Swab, Nasopharynx [[id number]]  (Normal) Collected: 08/21/22 1201    Specimen: Swab from Nasopharynx Updated: 08/21/22 1233    Narrative:      The following orders were created for panel order COVID PRE-OP / PRE-PROCEDURE SCREENING ORDER (NO ISOLATION) - Swab, Nasopharynx.  Procedure                               Abnormality         Status                     ---------                               -----------         ------                     COVID-19,CEPHEID/LEONIDAS,CO...[[id number]]  Normal              Final result                 Please view results for these tests on the individual orders.    COVID-19,CEPHEID/LEONIDAS,COR/DARYL/PAD/HERNANDEZ IN-HOUSE(OR EMERGENT/ADD-ON),NP SWAB IN TRANSPORT MEDIA 3-4 HR TAT, RT-PCR - Swab, Nasopharynx [[id number]]  (Normal) Collected: 08/21/22 1201    Specimen: Swab from Nasopharynx Updated: 08/21/22 1233     COVID19 Not Detected    Narrative:      Fact sheet for providers: https://www.fda.gov/media/980715/download     Fact sheet for patients:  https://www.fda.gov/media/280117/download  Fact sheet for providers: https://www.fda.gov/media/375296/download    Fact sheet for patients: https://www.fda.gov/media/633387/download    Test performed by PCR.    Comprehensive Metabolic Panel [[id number]]  (Abnormal) Collected: 08/21/22 1002    Specimen: Blood Updated: 08/21/22 1028     Glucose 100 mg/dL      BUN 23 mg/dL      Creatinine 1.01 mg/dL      Sodium 142 mmol/L      Potassium 3.8 mmol/L      Chloride 102 mmol/L      CO2 29.0 mmol/L      Calcium 9.0 mg/dL      Total Protein 6.1 g/dL      Albumin 3.80 g/dL      ALT (SGPT) 13 U/L      AST (SGOT) 23 U/L      Alkaline Phosphatase 73 U/L      Total Bilirubin 0.4 mg/dL      Globulin 2.3 gm/dL      A/G Ratio 1.7 g/dL      BUN/Creatinine Ratio 22.8     Anion Gap 11.0 mmol/L      eGFR 56.0 mL/min/1.73      Comment: National Kidney Foundation and American Society of Nephrology (ASN) Task Force recommended calculation based on the Chronic Kidney Disease Epidemiology Collaboration (CKD-EPI) equation refit without adjustment for race.       Narrative:      GFR Normal >60  Chronic Kidney Disease <60  Kidney Failure <15      Troponin [[id number]]  (Normal) Collected: 08/21/22 1002    Specimen: Blood Updated: 08/21/22 1028     Troponin T 0.012 ng/mL     Narrative:      Troponin T Reference Range:  <= 0.03 ng/mL-   Negative for AMI  >0.03 ng/mL-     Abnormal for myocardial necrosis.  Clinicians would have to utilize clinical acumen, EKG, Troponin and serial changes to determine if it is an Acute Myocardial Infarction or myocardial injury due to an underlying chronic condition.       Results may be falsely decreased if patient taking Biotin.      BNP [[id number]]  (Normal) Collected: 08/21/22 1002    Specimen: Blood Updated: 08/21/22 1026     proBNP 1,527.0 pg/mL     Narrative:      Among patients with dyspnea, NT-proBNP is highly sensitive for the detection of acute congestive heart failure. In addition NT-proBNP of <300 pg/ml  effectively rules out acute congestive heart failure with 99% negative predictive value.    Results may be falsely decreased if patient taking Biotin.      Protime-INR [[id number]]  (Abnormal) Collected: 08/21/22 1002    Specimen: Blood Updated: 08/21/22 1022     Protime 9.5 Seconds      INR <0.93    aPTT [[id number]]  (Abnormal) Collected: 08/21/22 1002    Specimen: Blood Updated: 08/21/22 1022     PTT 24.2 seconds     CBC & Differential [[id number]]  (Abnormal) Collected: 08/21/22 1002    Specimen: Blood Updated: 08/21/22 1005    Narrative:      The following orders were created for panel order CBC & Differential.  Procedure                               Abnormality         Status                     ---------                               -----------         ------                     CBC Auto Differential[[id number]]        Abnormal            Final result                 Please view results for these tests on the individual orders.    CBC Auto Differential [[id number]]  (Abnormal) Collected: 08/21/22 1002    Specimen: Blood Updated: 08/21/22 1005     WBC 6.00 10*3/mm3      RBC 4.08 10*6/mm3      Hemoglobin 11.8 g/dL      Hematocrit 36.5 %      MCV 89.4 fL      MCH 28.8 pg      MCHC 32.3 g/dL      RDW 19.0 %      RDW-SD 59.1 fl      MPV 7.7 fL      Platelets 181 10*3/mm3      Neutrophil % 66.3 %      Lymphocyte % 18.2 %      Monocyte % 12.2 %      Eosinophil % 2.2 %      Basophil % 1.1 %      Neutrophils, Absolute 4.00 10*3/mm3      Lymphocytes, Absolute 1.10 10*3/mm3      Monocytes, Absolute 0.70 10*3/mm3      Eosinophils, Absolute 0.10 10*3/mm3      Basophils, Absolute 0.10 10*3/mm3      nRBC 0.1 /100 WBC           Imaging Results (Most Recent)     Procedure Component Value Units Date/Time    CT Angiogram Chest Pulmonary Embolism [[id number]] Collected: 08/21/22 1910     Updated: 08/21/22 1916    Narrative:         DATE OF EXAM:  8/21/2022 6:27 PM     PROCEDURE:  CT ANGIOGRAM CHEST PULMONARY EMBOLISM-      INDICATIONS:   Pulmonary embolism (PE) suspected, positive D-dimer; R06.02-Shortness of  breath; R60.0-Localized edema; L03.119-Cellulitis of unspecified part of  limb     COMPARISON:   No Comparisons Available     TECHNIQUE:  Routine transaxial slices were obtained through chest after  administration of intravenous Isovue 370. Reconstructed coronal and  sagittal images were also obtained. In addition, a 3 D volume rendered  image was obtained after post processing.  Automated exposure control  and iterative reconstruction methods were used.      FINDINGS:  CT the chest utilizing 100 cc of Isovue-370 IV contrast enhancement PE  protocol reveals no evidence of mass or adenopathy in the base the neck  or in the periclavicular soft tissues or the axillary soft tissues. The  right subclavian artery arises from the posterior medial aspect of the  distal end of the arch and the thoracic aorta. This is an anatomic  variation and causes flattening of the posterior aspect of the  esophagus. No evidence of thrombus or embolus in the right pulmonary  artery branches. No evidence of thrombus or embolus in the left  pulmonary artery branches. The heart is enlarged. No evidence of  pericardial effusion. No evidence of hiatal hernia. No acute disease in  the superior aspect of the abdomen no evidence of mass or adenopathy in  the mediastinum or in the right or left pulmonary laina. No acute disease  in the thoracic spine there are bilateral diffuse increased lung  markings consistent with chronic lung disease. Small curvilinear areas  of atelectasis or scarring are seen in the right lower lobe and left  lower lobe along no evidence of pneumonia or pleural effusion or  pneumothorax or mass right or left lungs. There is pleural-parenchymal  thickening in the lung apices bilaterally consistent with scarring.        Impression:         1. No evidence of thrombus or embolus in the right or left pulmonary  artery branches.  2. Chronic  lung disease.  3. Cardiomegaly.     Electronically Signed By-Jesu Tobin MD On:8/21/2022 7:14 PM  This report was finalized on [id number] by  Jesu Tobin MD.    XR Chest 1 View [[id number]] Collected: 08/21/22 1024     Updated: 08/21/22 1028    Narrative:      EXAMINATION: XR CHEST 1 VW-     DATE OF EXAM: 8/21/2022 10:10 AM     INDICATION: Extremity edema, exertional SOA.        COMPARISON: Chest radiograph dated 07/10/2022     TECHNIQUE: Portable AP view of the chest was obtained.     FINDINGS:  There is cardiomegaly. There is aortic arch atherosclerotic  calcification. Pulmonary vascularity appears normal without evidence of  pulmonary edema. There is a hiatal appendage occlusion device. There is  no acute infectious consolidation or pleural effusion. There is no  evidence of pneumothorax. There are mild degenerative changes of the  thoracic spine.       Impression:      1. Cardiomegaly without acute pulmonary process. No evidence of  pulmonary edema or acute infiltrate.     Electronically Signed By-Alan Silveira MD On:8/21/2022 10:26 AM  This report was finalized on [id number] by  Alan Silveira MD.        reviewed    ECG/EMG Results (most recent)     Procedure Component Value Units Date/Time    ECG 12 Lead [[id number]] Resulted: 08/21/22 1237     Updated: 08/21/22 1237    SCANNED - TELEMETRY   [[id number]] Resulted: 08/21/22     Updated: 08/22/22 1014    SCANNED - TELEMETRY   [[id number]] Resulted: 08/21/22     Updated: 08/22/22 1314    ECG 12 Lead [[id number]] Collected: 08/21/22 1000     Updated: 08/22/22 1614     QT Interval 335 ms     Narrative:      HEART RATE= 108  bpm  RR Interval= 553  ms  RI Interval=   ms  P Horizontal Axis=   deg  P Front Axis=   deg  QRSD Interval= 88  ms  QT Interval= 335  ms  QRS Axis= -29  deg  T Wave Axis= -4  deg  - ABNORMAL ECG -  Atrial fibrillation  Low voltage, extremity and precordial leads  Consider anteroseptal infarct  When compared with ECG of  08-Jul-2022 16:04:07,  Significant rate increase  Significant repolarization change  Electronically Signed By: Christiano Bethea (Demario) 22-Aug-2022 16:14:44  Date and Time of Study: 2022-08-21 10:00:11    Adult Transthoracic Echo Complete W/ Cont if Necessary Per Protocol [[id number]] Resulted: 08/23/22 1026     Updated: 08/23/22 1029     Target HR (85%) 118 bpm      Max. Pred. HR (100%) 139 bpm      ACS 2.35 cm      Ao root diam 3.8 cm      Ao pk meet 114.8 cm/sec      Ao V2 VTI 21.7 cm      APRIL(I,D) 4.0 cm2      EDV(cubed) 59.8 ml      EDV(MOD-sp4) 67.9 ml      EF(MOD-bp) 54.0 %      EF(MOD-sp4) 53.7 %      ESV(cubed) 18.3 ml      ESV(MOD-sp4) 31.4 ml      IVS/LVPW 0.84 cm      LV mass(C)d 112.5 grams      LV V1 max PG 2.35 mmHg      LV V1 mean PG 1.39 mmHg      LV V1 max 76.6 cm/sec      LVPWd 1.02 cm      MV dec time 0.16 msec      MV V2 VTI 18.0 cm      MVA(VTI) 4.8 cm2      PA V2 max 93.1 cm/sec      PI end-d meet 103.0 cm/sec      RV V1 max PG 1.95 mmHg      RV V1 max 69.8 cm/sec      RV V1 VTI 13.1 cm      RVIDd 3.4 cm      SI(MOD-sp4) 18.0 ml/m2      SV(LVOT) 86.7 ml      SV(MOD-sp4) 36.5 ml      SV(RVOT) 29.5 ml      TR max PG 36.5 mmHg      Ao max PG 5.3 mmHg      Ao mean PG 3.1 mmHg      FS 32.6 %      IVSd 0.86 cm      LV V1 VTI 17.4 cm      LVIDd 3.9 cm      LVIDs 2.6 cm      LVOT area 5.0 cm2      LVOT diam 2.5 cm      MV max PG 5.7 mmHg      MV mean PG 2.9 mmHg      Qp/Qs 0.34     RVOT diam 1.70 cm      MV E max meet 86.5 cm/sec      TR max meet 301.2 cm/sec      LV You Vol (BSA corrected) 33.6 cm2      LV Sys Vol (BSA corrected) 15.5 cm2      LA length (A2C) 4.8 cm      Echo EF Estimated 60 %     Narrative:      · Estimated left ventricular EF = 60% Left ventricular systolic function   is normal.     Indications  Arrhythmia    Technically satisfactory study.  Mitral valve is structurally normal.  Tricuspid valve is structurally normal.  Moderate tricuspid regurgitation   with calculated pulmonary artery  pressure 40 mmHg.  Aortic valve is structurally normal.  Pulmonic valve could not be well visualized.  No evidence for mitral or aortic regurgitation is seen by Doppler study.  Biatrial enlargement.  Left ventricle is normal in size and contractility with ejection fraction   of 60%.  Right ventricle is normal in size.  Atrial septum is intact.  Aorta is normal.  No pericardial effusion or intracardiac thrombus is seen.    Impression  Biatrial enlargement.  Moderate tricuspid regurgitation with calculated pulmonary artery pressure   40 mmHg.  Left ventricular size and contractility is normal with ejection fraction   of 60%.    SCANNED - TELEMETRY   [[id number]] Resulted: 08/21/22     Updated: 08/23/22 1223    SCANNED - TELEMETRY   [[id number]] Resulted: 08/21/22     Updated: 08/23/22 1317        reviewed        Results for orders placed during the hospital encounter of 08/21/22    Adult Transthoracic Echo Complete W/ Cont if Necessary Per Protocol    Interpretation Summary  · Estimated left ventricular EF = 60% Left ventricular systolic function is normal.    Indications  Arrhythmia    Technically satisfactory study.  Mitral valve is structurally normal.  Tricuspid valve is structurally normal.  Moderate tricuspid regurgitation with calculated pulmonary artery pressure 40 mmHg.  Aortic valve is structurally normal.  Pulmonic valve could not be well visualized.  No evidence for mitral or aortic regurgitation is seen by Doppler study.  Biatrial enlargement.  Left ventricle is normal in size and contractility with ejection fraction of 60%.  Right ventricle is normal in size.  Atrial septum is intact.  Aorta is normal.  No pericardial effusion or intracardiac thrombus is seen.    Impression  Biatrial enlargement.  Moderate tricuspid regurgitation with calculated pulmonary artery pressure 40 mmHg.  Left ventricular size and contractility is normal with ejection fraction of 60%.      Microbiology Results (last 10 days)      Procedure Component Value - Date/Time    COVID PRE-OP / PRE-PROCEDURE SCREENING ORDER (NO ISOLATION) - Swab, Nasopharynx [[id number]]  (Normal) Collected: 08/21/22 1201    Lab Status: Final result Specimen: Swab from Nasopharynx Updated: 08/21/22 1233    Narrative:      The following orders were created for panel order COVID PRE-OP / PRE-PROCEDURE SCREENING ORDER (NO ISOLATION) - Swab, Nasopharynx.  Procedure                               Abnormality         Status                     ---------                               -----------         ------                     COVID-19,CEPHEID/LEONIDAS,CO...[[id number]]  Normal              Final result                 Please view results for these tests on the individual orders.    COVID-19,CEPHEID/LEONIDAS,COR/[facility]/PAD/HERNANDEZ IN-HOUSE(OR EMERGENT/ADD-ON),NP SWAB IN TRANSPORT MEDIA 3-4 HR TAT, RT-PCR - Swab, Nasopharynx [[id number]]  (Normal) Collected: 08/21/22 1201    Lab Status: Final result Specimen: Swab from Nasopharynx Updated: 08/21/22 1233     COVID19 Not Detected    Narrative:      Fact sheet for providers: https://www.fda.gov/media/220081/download     Fact sheet for patients: https://www.fda.gov/media/579142/download  Fact sheet for providers: https://www.fda.gov/media/516374/download    Fact sheet for patients: https://www.fda.gov/media/127435/download    Test performed by PCR.          Assessment & Plan     Cellulitis       Lower extremity pain and swelling with likely cellulitis  -WBCs within normal limits  -D-dimer: 3.09  -proBNP: 1572.0  -CT PE protocol showed no evidence of pulmonary embolism with chronic lung disease and cardiomegaly noted  -Patient initially started on vancomycin, switch to p.o. Bactrim in consultation with pharmacy  -Apply compression dressings and elevate lower extremities    Diplopia  -Patient recently underwent temporal artery biopsy after concern for giant cell arteritis was noted which was negative.  Encourage continued outpatient work-up as  planned    Anemia  -Hemoglobin: 11.7 (stable)  -Monitor while admitted    CKD  -Creatinine: 1.07 with and eGFR: 52.3  -Continue Bumex  -Avoid nephrotic if medication and IV dye unless urgently needed  -Monitor BMP and I´s and O´s      Atrial fibrillation  -Patient had previous watchman device placed  -Metoprolol restarted by cardiology    OAB  -Oxybutynin    Hypothyroidism  -Levothyroxine    GERD  -PPI    I discussed the patients findings and my recommendations with patient and nursing staff.     Discharge Diagnosis:      Cellulitis      Hospital Course  Patient is a 82 y.o. female presented with worsening lower extremity edema, erythema and pain with an HPI noted above.  WBCs were assessed and trended and remained within normal limits with proBNP found to be 1572.0 and D-dimer elevated at 3.09 on admission.  CT PE protocol was obtained which showed evidence of chronic lung disease but no PE.  She is initially started on vancomycin which was switched to p.o. Bactrim in consultation with pharmacy and compression dressings were applied to lower extremities.  Cardiology was consulted given patient's tachycardia and recommended reinitiating metoprolol therapy and her Bumex was also continued.  Patient's heart rate has been generally well controlled though she is somewhat tachycardic with exertion.  This was discussed with cardiology who recommended continued scheduled metoprolol for now and outpatient reevaluation.  While walking assisted to the restroom from her room patient did get her Ace bandage on her leg caught on a transition piece, she began to lose her balance and the staff member who was assisting her along with another nurse were able to assist her to the ground slowly without any trauma and patient reporting no pain or injury.  She was able to be assisted back to standing without any other significant complaints noted.  At this time patient felt to be in good condition for discharge with close follow-up with  her PCP on an outpatient basis.  Her full testing/results and plan were discussed with patient all concerning/alarm symptoms which to call 911/return to the ED.  All questions were answered and she verbalizes her understanding and agreement.    Past Medical History:     Past Medical History:   Diagnosis Date   • Atrial fibrillation (HCC) 10/10/2013   • Chronic anticoagulation 05/01/2020   • Chronic constipation    • Diplopia 07/08/2022   • GERD (gastroesophageal reflux disease) 02/11/2013   • Headache 07/01/2022   • Hematuria     STATES ALWAY HAVE HAD SOME BLOOD IN URINE.   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   • Presence of Watchman left atrial appendage closure device    • Shortness of breath    • Shortness of breath    • Swelling of both lower extremities        Past Surgical History:     Past Surgical History:   Procedure Laterality Date   • CATARACT EXTRACTION W/ INTRAOCULAR LENS IMPLANT      LEFT AND RIGHT   • ORBITAL FRACTURE SURGERY Left     FLOOR   • OTHER SURGICAL HISTORY      WATCHMAN PLACED FOR HISTORY OF A FIB   • TEMPORAL ARTERY BIOPSY Bilateral 8/4/2022    Procedure: BILATERAL  TEMPORAL ARTERY BIOPSY;  Surgeon: Marco A Silveira MD;  Location: [facility] OR;  Service: Ophthalmology;  Laterality: Bilateral;   • TOTAL HIP ARTHROPLASTY Left 02/12/2022    Procedure: TOTAL HIP ARTHROPLASTY ANTERIOR;  Surgeon: Yair Carver MD;  Location: [facility] OR;  Service: Orthopedics;  Laterality: Left;   • TUBAL ABDOMINAL LIGATION         Social History:   Social History     Socioeconomic History   • Marital status:    Tobacco Use   • Smoking status: Never Smoker   • Smokeless tobacco: Never Used   Vaping Use   • Vaping Use: Never used   Substance and Sexual Activity   • Alcohol use: Yes     Comment: ON OCC   • Drug use: Never   • Sexual activity: Defer       Procedures Performed         Consults:   Consults     Date and Time Order  Name Status Description    8/21/2022 11:41 AM IP Consult to Cardiology Completed           Condition on Discharge:     Stable    Discharge Disposition      Discharge Medications     Discharge Medications      New Medications      Instructions Start Date   metoprolol succinate XL 25 MG 24 hr tablet  Commonly known as: TOPROL-XL   25 mg, Oral, Every 24 Hours Scheduled   Start Date: August 24, 2022     sulfamethoxazole-trimethoprim 800-160 MG per tablet  Commonly known as: BACTRIM DS,SEPTRA DS   1 tablet, Oral, Every 12 Hours Scheduled         Changes to Medications      Instructions Start Date   bumetanide 2 MG tablet  Commonly known as: BUMEX  What changed: when to take this   2 mg, Oral, Daily      polyethylene glycol 17 GM/SCOOP powder  Commonly known as: MIRALAX  What changed:   · when to take this  · reasons to take this   mix 1 capful (17 g) in liquid by mouth Daily.         Continue These Medications      Instructions Start Date   cholecalciferol 25 MCG (1000 UT) tablet  Commonly known as: VITAMIN D3   1,000 Units, Oral, Daily      docusate sodium 100 MG capsule  Commonly known as: COLACE   100 mg, Oral, Every Evening      levothyroxine 88 MCG tablet  Commonly known as: SYNTHROID, LEVOTHROID   88 mcg, Oral, Every Early Morning      loratadine 10 MG tablet  Commonly known as: CLARITIN   10 mg, Oral, Every Evening      multivitamin with minerals tablet tablet   1 tablet, Oral, Daily      oxybutynin XL 10 MG 24 hr tablet  Commonly known as: DITROPAN-XL   10 mg, Oral, Daily      pantoprazole 40 MG EC tablet  Commonly known as: PROTONIX   40 mg, Oral, Daily      potassium chloride 20 MEQ CR tablet  Commonly known as: K-DUR,KLOR-CON   20 mEq, Oral, 2 Times Daily             Discharge Diet:     Activity at Discharge:     Follow-up Appointments  Future Appointments   Date Time Provider Department Center   8/24/2022 11:30 AM Roya Benitez MD [facility]   11/10/2022 10:00 AM Rachael Mayer APRN [facility]   5/17/2023 11:15 AM Yair Carver MD [facility]     Additional Instructions for the Follow-ups that You Need to Schedule     Discharge Follow-up with PCP   As directed       Currently Documented PCP:    Roya Benitez MD    PCP Phone Number:    [phone]     Follow Up Details: 5 to 7 days         Discharge Follow-up with Specified Provider: Cardiology   As directed      To: Cardiology               Test Results Pending at Discharge       Risk for Readmission (LACE) Score: 6 (8/23/2022  9:38 AM)          Arnulfo Schuler PA-C  08/23/22  14:15 EDT

## 2022-08-23 NOTE — PLAN OF CARE
Problem: Skin Injury Risk Increased  Goal: Skin Health and Integrity  8/23/2022 1440 by Marga Chaney RN  Outcome: Ongoing, Progressing  8/23/2022 0947 by Marga Chaney RN  Outcome: Ongoing, Progressing  Intervention: Optimize Skin Protection  Recent Flowsheet Documentation  Taken 8/23/2022 1241 by Marga Chaney RN  Pressure Reduction Techniques: frequent weight shift encouraged  Head of Bed (HOB) Positioning: HOB at 30 degrees  Pressure Reduction Devices: pressure-redistributing mattress utilized  Skin Protection: adhesive use limited  Taken 8/23/2022 1200 by Marga Chaney RN  Pressure Reduction Techniques: frequent weight shift encouraged  Head of Bed (HOB) Positioning: HOB at 30 degrees  Pressure Reduction Devices: pressure-redistributing mattress utilized  Skin Protection: adhesive use limited  Taken 8/23/2022 0800 by Marga Chaney RN  Pressure Reduction Techniques: frequent weight shift encouraged  Head of Bed (HOB) Positioning: HOB at 30 degrees  Pressure Reduction Devices: pressure-redistributing mattress utilized  Skin Protection: adhesive use limited     Problem: Fall Injury Risk  Goal: Absence of Fall and Fall-Related Injury  8/23/2022 1440 by Marga Chaney RN  Outcome: Ongoing, Progressing  8/23/2022 0947 by Marga Chaney RN  Outcome: Ongoing, Progressing  Intervention: Identify and Manage Contributors  Recent Flowsheet Documentation  Taken 8/23/2022 1400 by Marga Chaney RN  Medication Review/Management: medications reviewed  Taken 8/23/2022 1241 by Marga Chaney RN  Medication Review/Management: medications reviewed  Self-Care Promotion: BADL personal objects within reach  Taken 8/23/2022 1200 by Marga Chaney RN  Medication Review/Management: medications reviewed  Taken 8/23/2022 0800 by Marga Chaney RN  Medication Review/Management: medications reviewed  Self-Care Promotion:   BADL personal objects within reach   independence encouraged  Intervention: Promote Injury-Free  Environment  Recent Flowsheet Documentation  Taken 8/23/2022 1400 by Marga Chaney RN  Safety Promotion/Fall Prevention: safety round/check completed  Taken 8/23/2022 1241 by Marga Chaney RN  Safety Promotion/Fall Prevention: safety round/check completed  Taken 8/23/2022 1200 by Marga hCaney RN  Safety Promotion/Fall Prevention: safety round/check completed  Taken 8/23/2022 1100 by Marga Chaney RN  Safety Promotion/Fall Prevention: safety round/check completed  Taken 8/23/2022 0800 by Marga Chaney RN  Safety Promotion/Fall Prevention: safety round/check completed     Problem: Adult Inpatient Plan of Care  Goal: Plan of Care Review  8/23/2022 1440 by Marga Chaney RN  Outcome: Ongoing, Progressing  8/23/2022 0947 by Marga Chaney RN  Outcome: Ongoing, Progressing  Flowsheets (Taken 8/23/2022 0947)  Plan of Care Reviewed With: patient  Outcome Evaluation: Patient had echo done today pending results will discharge home today. Wrapped bolivar legs with ace wrap for edema. Patient is stable.  Goal: Patient-Specific Goal (Individualized)  8/23/2022 1440 by Marga Chaney RN  Outcome: Ongoing, Progressing  8/23/2022 0947 by Marga Chaney RN  Outcome: Ongoing, Progressing  Goal: Absence of Hospital-Acquired Illness or Injury  8/23/2022 1440 by Marga Chaney RN  Outcome: Ongoing, Progressing  8/23/2022 0947 by Marga Chaney RN  Outcome: Ongoing, Progressing  Intervention: Identify and Manage Fall Risk  Recent Flowsheet Documentation  Taken 8/23/2022 1400 by Marga Chaney RN  Safety Promotion/Fall Prevention: safety round/check completed  Taken 8/23/2022 1241 by Marga Chaney RN  Safety Promotion/Fall Prevention: safety round/check completed  Taken 8/23/2022 1200 by Marga Chaney RN  Safety Promotion/Fall Prevention: safety round/check completed  Taken 8/23/2022 1100 by Marga Chaney RN  Safety Promotion/Fall Prevention: safety round/check completed  Taken 8/23/2022 0800 by Marga Chaney  RN  Safety Promotion/Fall Prevention: safety round/check completed  Intervention: Prevent Skin Injury  Recent Flowsheet Documentation  Taken 8/23/2022 1241 by Marga Chaney RN  Body Position:   position changed independently   supine  Skin Protection: adhesive use limited  Taken 8/23/2022 1200 by Marga Chaney RN  Body Position: supine  Skin Protection: adhesive use limited  Taken 8/23/2022 0800 by Marga Chaney RN  Body Position: position changed independently  Skin Protection: adhesive use limited  Intervention: Prevent and Manage VTE (Venous Thromboembolism) Risk  Recent Flowsheet Documentation  Taken 8/23/2022 1241 by Marga Chaney RN  Activity Management: activity adjusted per tolerance  Taken 8/23/2022 1200 by Marga Chaney RN  Activity Management: activity adjusted per tolerance  Taken 8/23/2022 0800 by Marga Chaney RN  Activity Management: activity adjusted per tolerance  Intervention: Prevent Infection  Recent Flowsheet Documentation  Taken 8/23/2022 1400 by Marga Chaney RN  Infection Prevention: hand hygiene promoted  Taken 8/23/2022 1241 by Marga Chaney RN  Infection Prevention: hand hygiene promoted  Taken 8/23/2022 1200 by Marga Chaney RN  Infection Prevention: hand hygiene promoted  Taken 8/23/2022 0800 by Marga Chaney RN  Infection Prevention: hand hygiene promoted  Goal: Optimal Comfort and Wellbeing  8/23/2022 1440 by Marga Chaney RN  Outcome: Ongoing, Progressing  8/23/2022 0947 by Marga Chaney RN  Outcome: Ongoing, Progressing  Intervention: Provide Person-Centered Care  Recent Flowsheet Documentation  Taken 8/23/2022 1241 by Marga Chaney RN  Trust Relationship/Rapport: questions answered  Taken 8/23/2022 1200 by Marga Chaney RN  Trust Relationship/Rapport:   care explained   questions answered  Taken 8/23/2022 0800 by Marga Chaney RN  Trust Relationship/Rapport:   questions answered   care explained  Goal: Readiness for Transition of Care  8/23/2022 1440 by  Marga Chaney RN  Outcome: Ongoing, Progressing  8/23/2022 0947 by Marga Chaney RN  Outcome: Ongoing, Progressing     Problem: Asthma Comorbidity  Goal: Maintenance of Asthma Control  8/23/2022 1440 by Marga Chaney RN  Outcome: Ongoing, Progressing  8/23/2022 0947 by Marga Chaney RN  Outcome: Ongoing, Progressing  Intervention: Maintain Asthma Symptom Control  Recent Flowsheet Documentation  Taken 8/23/2022 1400 by Marga Chaney RN  Medication Review/Management: medications reviewed  Taken 8/23/2022 1241 by Marga Chaney RN  Medication Review/Management: medications reviewed  Taken 8/23/2022 1200 by Marga Chaney RN  Medication Review/Management: medications reviewed  Taken 8/23/2022 0800 by Marga Chaney RN  Medication Review/Management: medications reviewed     Problem: Behavioral Health Comorbidity  Goal: Maintenance of Behavioral Health Symptom Control  8/23/2022 1440 by Marga Chaney RN  Outcome: Ongoing, Progressing  8/23/2022 0947 by Marga Chaney RN  Outcome: Ongoing, Progressing  Intervention: Maintain Behavioral Health Symptom Control  Recent Flowsheet Documentation  Taken 8/23/2022 1400 by Marga Chaney RN  Medication Review/Management: medications reviewed  Taken 8/23/2022 1241 by Marga Chaney RN  Medication Review/Management: medications reviewed  Taken 8/23/2022 1200 by Marga Chaney RN  Medication Review/Management: medications reviewed  Taken 8/23/2022 0800 by Marga Chaney RN  Medication Review/Management: medications reviewed     Problem: COPD (Chronic Obstructive Pulmonary Disease) Comorbidity  Goal: Maintenance of COPD Symptom Control  8/23/2022 1440 by Marga Chaney RN  Outcome: Ongoing, Progressing  8/23/2022 0947 by Marga Chaney RN  Outcome: Ongoing, Progressing  Intervention: Maintain COPD-Symptom Control  Recent Flowsheet Documentation  Taken 8/23/2022 1400 by Marga Chaney RN  Medication Review/Management: medications reviewed  Taken 8/23/2022 1241 by  Marga Chaney RN  Supportive Measures: active listening utilized  Medication Review/Management: medications reviewed  Taken 8/23/2022 1200 by Marga Chaney RN  Supportive Measures: active listening utilized  Medication Review/Management: medications reviewed  Taken 8/23/2022 0800 by Marga Chaney RN  Supportive Measures: active listening utilized  Medication Review/Management: medications reviewed     Problem: Diabetes Comorbidity  Goal: Blood Glucose Level Within Targeted Range  8/23/2022 1440 by Marga Chaney RN  Outcome: Ongoing, Progressing  8/23/2022 0947 by Marga Chaney RN  Outcome: Ongoing, Progressing     Problem: Heart Failure Comorbidity  Goal: Maintenance of Heart Failure Symptom Control  8/23/2022 1440 by Marga Chaney RN  Outcome: Ongoing, Progressing  8/23/2022 0947 by Marga Chaney RN  Outcome: Ongoing, Progressing  Intervention: Maintain Heart Failure-Management  Recent Flowsheet Documentation  Taken 8/23/2022 1400 by Marga Chaney RN  Medication Review/Management: medications reviewed  Taken 8/23/2022 1241 by Marga Chaney RN  Medication Review/Management: medications reviewed  Taken 8/23/2022 1200 by Marga Chaney RN  Medication Review/Management: medications reviewed  Taken 8/23/2022 0800 by Marga Chaney RN  Medication Review/Management: medications reviewed     Problem: Hypertension Comorbidity  Goal: Blood Pressure in Desired Range  8/23/2022 1440 by Marga Chaney RN  Outcome: Ongoing, Progressing  8/23/2022 0947 by Marga Chaney RN  Outcome: Ongoing, Progressing  Intervention: Maintain Blood Pressure Management  Recent Flowsheet Documentation  Taken 8/23/2022 1400 by Marga Chaney RN  Medication Review/Management: medications reviewed  Taken 8/23/2022 1241 by Marga Chaney RN  Medication Review/Management: medications reviewed  Taken 8/23/2022 1200 by Marga Chaney RN  Medication Review/Management: medications reviewed  Taken 8/23/2022 0800 by Marga Chaney  RN  Medication Review/Management: medications reviewed     Problem: Obstructive Sleep Apnea Risk or Actual Comorbidity Management  Goal: Unobstructed Breathing During Sleep  8/23/2022 1440 by Marga Chaney RN  Outcome: Ongoing, Progressing  8/23/2022 0947 by Marga Chaney RN  Outcome: Ongoing, Progressing     Problem: Osteoarthritis Comorbidity  Goal: Maintenance of Osteoarthritis Symptom Control  8/23/2022 1440 by Marga Chaney RN  Outcome: Ongoing, Progressing  8/23/2022 0947 by Marga Chaney RN  Outcome: Ongoing, Progressing  Intervention: Maintain Osteoarthritis Symptom Control  Recent Flowsheet Documentation  Taken 8/23/2022 1400 by Marga Chaney RN  Medication Review/Management: medications reviewed  Taken 8/23/2022 1241 by Marga Chaney RN  Activity Management: activity adjusted per tolerance  Adaptive Equipment Use: used independently  Assistive Device Utilized: cane  Medication Review/Management: medications reviewed  Taken 8/23/2022 1200 by Marga Chaney RN  Activity Management: activity adjusted per tolerance  Adaptive Equipment Use: used independently  Assistive Device Utilized: (cane) --  Medication Review/Management: medications reviewed  Taken 8/23/2022 0800 by Marga Chaney RN  Activity Management: activity adjusted per tolerance  Medication Review/Management: medications reviewed     Problem: Pain Chronic (Persistent) (Comorbidity Management)  Goal: Acceptable Pain Control and Functional Ability  8/23/2022 1440 by Marga Chaney RN  Outcome: Ongoing, Progressing  8/23/2022 0947 by Marga Chaney RN  Outcome: Ongoing, Progressing  Intervention: Manage Persistent Pain  Recent Flowsheet Documentation  Taken 8/23/2022 1400 by Marga Chaney RN  Medication Review/Management: medications reviewed  Taken 8/23/2022 1241 by Marga Chaney RN  Medication Review/Management: medications reviewed  Taken 8/23/2022 1200 by Marga Chaney RN  Medication Review/Management: medications  reviewed  Taken 8/23/2022 0800 by Marga Chaney RN  Medication Review/Management: medications reviewed  Intervention: Optimize Psychosocial Wellbeing  Recent Flowsheet Documentation  Taken 8/23/2022 1241 by Marga Chaney RN  Supportive Measures: active listening utilized  Diversional Activities: television  Family/Support System Care: self-care encouraged  Taken 8/23/2022 1200 by Marga Chaney RN  Supportive Measures: active listening utilized  Diversional Activities: television  Family/Support System Care: self-care encouraged  Taken 8/23/2022 0800 by Marga Chaney RN  Supportive Measures: active listening utilized  Diversional Activities: television  Family/Support System Care: self-care encouraged     Problem: Seizure Disorder Comorbidity  Goal: Maintenance of Seizure Control  8/23/2022 1440 by Marga Chaney RN  Outcome: Ongoing, Progressing  8/23/2022 0947 by Marga Chaney RN  Outcome: Ongoing, Progressing  Intervention: Maintain Seizure-Symptom Control  Recent Flowsheet Documentation  Taken 8/23/2022 1241 by Marga Chaney RN  Seizure Precautions: activity supervised  Taken 8/23/2022 1200 by Marga Chaney RN  Seizure Precautions: activity supervised  Taken 8/23/2022 0800 by Marga Chaney RN  Seizure Precautions: activity supervised     Problem: Skin or Soft Tissue Infection  Goal: Absence of Infection Signs and Symptoms  8/23/2022 1440 by Marga Chaney RN  Outcome: Ongoing, Progressing  8/23/2022 0947 by Marga Chaney RN  Outcome: Ongoing, Progressing  Intervention: Minimize and Manage Infection Progression  Recent Flowsheet Documentation  Taken 8/23/2022 1400 by Marga Chaney RN  Infection Prevention: hand hygiene promoted  Taken 8/23/2022 1241 by Marga Chaney RN  Infection Prevention: hand hygiene promoted  Taken 8/23/2022 1200 by Marga Chaney RN  Infection Prevention: hand hygiene promoted  Taken 8/23/2022 0800 by Marga Chaney RN  Infection Prevention: hand hygiene  promoted  Intervention: Provide Meticulous Infection Site Care  Recent Flowsheet Documentation  Taken 8/23/2022 0800 by Marga Chaney, RN  Topical Inflammation Care: (zinc cream applied to genital and sacral area and creases) --   Goal Outcome Evaluation:  Plan of Care Reviewed With: patient           Outcome Evaluation: Patient had echo done today pending results will discharge home today. Wrapped bolivar legs with ace wrap for edema. Patient is stable.

## 2022-08-23 NOTE — CASE MANAGEMENT/SOCIAL WORK
Social Work Assessment  [facility]     Patient Name: Marychuy Verdugo  MRN: [id number]  Today's Date: 8/23/2022    Admit Date: 8/21/2022     Psychosocial     Row Name 08/23/22 1420       Coping/Stress    Coping/Stress Comments SW met with pt. at beside room 106, no visitors present. Pt denies mental health concerns or diagnosis. Pt denies regular drinking. She stated “I party, about twice a year.” Pt denies any other substance abuse concerns. Pt reports that she can’t use drugs because she has a grandson who has been clean for 10 years but sees that he battles with addiction and sobriety daily. Pt denies requiring any financial assistance at this time. Pt reported that her current PCP is moving out of town and reports that she will need a new PCP. Pt asked SW for a list of PCP. SW provided pt. with that list.           Met with patient in room wearing PPE: mask.      Maintained distance greater than six feet and spent less than 15 minutes in the room.    GAYATRI Llamas    Phone: [phone]  Fax: [phone]  [email or web address]

## 2022-08-23 NOTE — PLAN OF CARE
Problem: Skin Injury Risk Increased  Goal: Skin Health and Integrity  Outcome: Ongoing, Progressing  Intervention: Optimize Skin Protection  Recent Flowsheet Documentation  Taken 8/23/2022 0000 by Leslie Pineda RN  Head of Bed (HOB) Positioning: HOB at 30 degrees  Taken 8/22/2022 2000 by Leslie Pineda RN  Head of Bed (HOB) Positioning: HOB at 30 degrees     Problem: Fall Injury Risk  Goal: Absence of Fall and Fall-Related Injury  Outcome: Ongoing, Progressing  Intervention: Identify and Manage Contributors  Recent Flowsheet Documentation  Taken 8/23/2022 0000 by Leslie Pineda RN  Medication Review/Management: medications reviewed  Taken 8/22/2022 2000 by Leslie Pineda RN  Medication Review/Management: medications reviewed  Intervention: Promote Injury-Free Environment  Recent Flowsheet Documentation  Taken 8/23/2022 0000 by Leslie Pineda RN  Safety Promotion/Fall Prevention:   safety round/check completed   nonskid shoes/slippers when out of bed   lighting adjusted  Taken 8/22/2022 2000 by Leslie Pineda RN  Safety Promotion/Fall Prevention:   safety round/check completed   nonskid shoes/slippers when out of bed   activity supervised   fall prevention program maintained     Problem: Adult Inpatient Plan of Care  Goal: Plan of Care Review  Outcome: Ongoing, Progressing  Flowsheets (Taken 8/23/2022 0351)  Progress: no change  Plan of Care Reviewed With: patient  Outcome Evaluation: poss discharge after 2d echo is completed this am  Goal: Patient-Specific Goal (Individualized)  Outcome: Ongoing, Progressing  Goal: Absence of Hospital-Acquired Illness or Injury  Outcome: Ongoing, Progressing  Intervention: Identify and Manage Fall Risk  Recent Flowsheet Documentation  Taken 8/23/2022 0000 by Leslie Pineda RN  Safety Promotion/Fall Prevention:   safety round/check completed   nonskid shoes/slippers when out of bed   lighting adjusted  Taken 8/22/2022 2000 by Leslie Pineda  RN  Safety Promotion/Fall Prevention:   safety round/check completed   nonskid shoes/slippers when out of bed   activity supervised   fall prevention program maintained  Intervention: Prevent Skin Injury  Recent Flowsheet Documentation  Taken 8/23/2022 0000 by Leslie Pineda RN  Body Position: position changed independently  Taken 8/22/2022 2000 by Leslie Pineda RN  Body Position:   position changed independently   sitting up in bed  Intervention: Prevent and Manage VTE (Venous Thromboembolism) Risk  Recent Flowsheet Documentation  Taken 8/23/2022 0000 by Leslie Pineda RN  Activity Management: activity adjusted per tolerance  Taken 8/22/2022 2000 by Leslie Pineda RN  Activity Management: activity adjusted per tolerance  Intervention: Prevent Infection  Recent Flowsheet Documentation  Taken 8/23/2022 0000 by Leslie Pineda RN  Infection Prevention: rest/sleep promoted  Taken 8/22/2022 2000 by Leslie Pineda RN  Infection Prevention:   rest/sleep promoted   single patient room provided  Goal: Optimal Comfort and Wellbeing  Outcome: Ongoing, Progressing  Intervention: Provide Person-Centered Care  Recent Flowsheet Documentation  Taken 8/22/2022 2000 by Leslie Pineda RN  Trust Relationship/Rapport: questions answered  Goal: Readiness for Transition of Care  Outcome: Ongoing, Progressing     Problem: Asthma Comorbidity  Goal: Maintenance of Asthma Control  Outcome: Ongoing, Progressing  Intervention: Maintain Asthma Symptom Control  Recent Flowsheet Documentation  Taken 8/23/2022 0000 by Leslie Pineda RN  Medication Review/Management: medications reviewed  Taken 8/22/2022 2000 by Leslie Pineda RN  Medication Review/Management: medications reviewed     Problem: Behavioral Health Comorbidity  Goal: Maintenance of Behavioral Health Symptom Control  Outcome: Ongoing, Progressing  Intervention: Maintain Behavioral Health Symptom Control  Recent Flowsheet Documentation  Taken  8/23/2022 0000 by Leslie Pineda RN  Medication Review/Management: medications reviewed  Taken 8/22/2022 2000 by Leslie Pineda RN  Medication Review/Management: medications reviewed     Problem: COPD (Chronic Obstructive Pulmonary Disease) Comorbidity  Goal: Maintenance of COPD Symptom Control  Outcome: Ongoing, Progressing  Intervention: Maintain COPD-Symptom Control  Recent Flowsheet Documentation  Taken 8/23/2022 0000 by Leslie Pineda RN  Medication Review/Management: medications reviewed  Taken 8/22/2022 2000 by Leslie Pineda RN  Medication Review/Management: medications reviewed     Problem: Diabetes Comorbidity  Goal: Blood Glucose Level Within Targeted Range  Outcome: Ongoing, Progressing     Problem: Heart Failure Comorbidity  Goal: Maintenance of Heart Failure Symptom Control  Outcome: Ongoing, Progressing  Intervention: Maintain Heart Failure-Management  Recent Flowsheet Documentation  Taken 8/23/2022 0000 by Leslie Pineda RN  Medication Review/Management: medications reviewed  Taken 8/22/2022 2000 by Leslie Pineda RN  Medication Review/Management: medications reviewed     Problem: Hypertension Comorbidity  Goal: Blood Pressure in Desired Range  Outcome: Ongoing, Progressing  Intervention: Maintain Blood Pressure Management  Recent Flowsheet Documentation  Taken 8/23/2022 0000 by Leslie Pineda RN  Medication Review/Management: medications reviewed  Taken 8/22/2022 2000 by Leslie Pineda RN  Medication Review/Management: medications reviewed     Problem: Obstructive Sleep Apnea Risk or Actual Comorbidity Management  Goal: Unobstructed Breathing During Sleep  Outcome: Ongoing, Progressing     Problem: Osteoarthritis Comorbidity  Goal: Maintenance of Osteoarthritis Symptom Control  Outcome: Ongoing, Progressing  Intervention: Maintain Osteoarthritis Symptom Control  Recent Flowsheet Documentation  Taken 8/23/2022 0000 by Leslie Pineda RN  Activity  Management: activity adjusted per tolerance  Medication Review/Management: medications reviewed  Taken 8/22/2022 2000 by Leslie Pineda RN  Activity Management: activity adjusted per tolerance  Adaptive Equipment Use: (cane) --  Medication Review/Management: medications reviewed     Problem: Pain Chronic (Persistent) (Comorbidity Management)  Goal: Acceptable Pain Control and Functional Ability  Outcome: Ongoing, Progressing  Intervention: Manage Persistent Pain  Recent Flowsheet Documentation  Taken 8/23/2022 0000 by Leslie Pineda RN  Medication Review/Management: medications reviewed  Taken 8/22/2022 2000 by Leslie Pineda RN  Medication Review/Management: medications reviewed  Intervention: Optimize Psychosocial Wellbeing  Recent Flowsheet Documentation  Taken 8/22/2022 2000 by Leslie Pineda RN  Diversional Activities: television     Problem: Seizure Disorder Comorbidity  Goal: Maintenance of Seizure Control  Outcome: Ongoing, Progressing     Problem: Skin or Soft Tissue Infection  Goal: Absence of Infection Signs and Symptoms  Outcome: Ongoing, Progressing  Intervention: Minimize and Manage Infection Progression  Recent Flowsheet Documentation  Taken 8/23/2022 0000 by Leslie Pineda RN  Infection Prevention: rest/sleep promoted  Taken 8/22/2022 2000 by Leslie Pineda RN  Infection Prevention:   rest/sleep promoted   single patient room provided   Goal Outcome Evaluation:  Plan of Care Reviewed With: patient        Progress: no change  Outcome Evaluation: poss discharge after 2d echo is completed this am

## 2022-08-23 NOTE — OUTREACH NOTE
Prep Survey    Flowsheet Row Responses   [facility], [place], operated by [facility] patient discharged from? [facility]   Is LACE score < 7 ? Yes   Emergency Room discharge w/ pulse ox? No   Eligibility [facility]   Date of Admission 08/21/22   Date of Discharge 08/23/22   Discharge Disposition Home or Self Care   Discharge diagnosis Lower extremity pain and swelling   Does the patient have one of the following disease processes/diagnoses(primary or secondary)? Other   Does the patient have Home health ordered? No   Is there a DME ordered? No   Prep survey completed? Yes          YEIMI MILLARD - Registered Nurse

## 2022-08-23 NOTE — PLAN OF CARE
Goal Outcome Evaluation:  Plan of Care Reviewed With: patient           Outcome Evaluation: Patient is an 83 y/o F who was admitted on 8/21/22 with cellulitis and edema.  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 Pt uses her cane for community mobility. Pt modified independent with bed mobility, SBA for sit to stands and functional mobility with cane. Due to her mobility status she will be signed off on PT at this time.   Pt to d/c home today.

## 2022-08-24 NOTE — SIGNIFICANT NOTE
Case Management Discharge Note      Final Note: (P) d/c home         Selected Continued Care - Discharged on 8/23/2022 Admission date: 8/21/2022 - Discharge disposition: Home or Self Care                          Final Discharge Disposition Code: (P) 01 - home or self-care

## 2022-08-24 NOTE — OUTREACH NOTE
Call Center TCM Note    Flowsheet Row Responses   [facility] facility patient discharged from? [facility]   Does the patient have one of the following disease processes/diagnoses(primary or secondary)? Other   TCM attempt successful? No   Unsuccessful attempts Attempt 1          Denia Campa LPN    8/24/2022, 13:55 EDT

## 2022-08-24 NOTE — OUTREACH NOTE
Call Center TCM Note    Flowsheet Row Responses   [facility] patient discharged from? [facility]   Does the patient have one of the following disease processes/diagnoses(primary or secondary)? Other   TCM attempt successful? Yes   Call start time 1620   Call end time 1621   Discharge diagnosis Lower extremity pain and swelling   Meds reviewed with patient/caregiver? Yes   Is the patient having any side effects they believe may be caused by any medication additions or changes? No   Does the patient have all medications ordered at discharge? Yes   Is the patient taking all medications as directed (includes completed medication regime)? Yes   Does the patient have a primary care provider?  Yes   Does the patient have an appointment with their PCP within 7 days of discharge? Yes   Has the patient kept scheduled appointments due by today? Yes   Has home health visited the patient within 72 hours of discharge? N/A   Psychosocial issues? No   Did the patient receive a copy of their discharge instructions? Yes   Nursing interventions Reviewed instructions with patient   What is the patient's perception of their health status since discharge? Improving   Is the patient/caregiver able to teach back signs and symptoms related to disease process for when to call PCP? Yes   Is the patient/caregiver able to teach back signs and symptoms related to disease process for when to call 911? Yes   Is the patient/caregiver able to teach back the hierarchy of who to call/visit for symptoms/problems? PCP, Specialist, Home health nurse, Urgent Care, ED, 911 Yes   If the patient is a current smoker, are they able to teach back resources for cessation? Not a smoker   TCM call completed? Yes          Denia Campa LPN    8/24/2022, 16:22 EDT

## 2022-08-29 NOTE — PROGRESS NOTES
Discharge Summary  Discharge Summary from Physical/Occupational Therapy Report    Patient: Marychuy Verdugo   : 1940  Today's Date: 2022    Patient seen for 17 visits.    Discharge Status of Patient: See 22 treatment note for detail.    Goals: Partially Met    Discharge Plan: Patient to return to referring/providing physician    Comments:  Pt cancelled her last 4 scheduled visits and did not return to PT.      Thank you for this referral to [facility].    SIGNATURE: Marychuy Kimbrough PT

## 2022-08-30 NOTE — TELEPHONE ENCOUNTER
Caller: Lucina Marychuy H    Relationship: Self    Best call back number: [phone]    What medication are you requesting: CELLULITIS MEDICATION    What are your current symptoms: CELLULITIS    How long have you been experiencing symptoms: 2 WEEKS, WAS IN ER.     Have you had these symptoms before:    [x] Yes  [] No    Have you been treated for these symptoms before:   [x] Yes  [] No    If a prescription is needed, what is your preferred pharmacy and phone number: [id number] - [place], [place] - [address] [phone] [facility] [phone]      Additional notes: PLEASE ADVISE IF WE CAN SEND MEDICATION IN FOR HER, SHE STATES THE HOSPITAL HAS GIVEN HER TWO DIFFERENT MEDICATIONS THAT HAVE NOT WORKED FOR HER.

## 2022-08-30 NOTE — TELEPHONE ENCOUNTER
"    Reason for Disposition  • [1] MODERATE leg swelling (e.g., swelling extends up to knees) AND [2] new-onset or worsening    Additional Information  • Negative: SEVERE difficulty breathing (e.g., struggling for each breath, speaks in single words)  • Negative: Looks like a broken bone or dislocated joint (e.g., crooked or deformed)  • Negative: Sounds like a life-threatening emergency to the triager  • Negative: Chest pain  • Negative: Followed a leg injury  • Negative: [1] Small area of swelling AND [2] followed an insect bite to the area  • Negative: Swelling of one ankle joint  • Negative: Swelling of knee is main symptom  • Negative: Pregnant  • Negative: Postpartum (from 0 to 6 weeks after delivery)  • Negative: Difficulty breathing at rest  • Negative: Entire foot is cool or blue in comparison to other side  • Negative: [1] Can't walk or can barely walk AND [2] new-onset  • Negative: [1] Difficulty breathing with exertion (e.g., walking) AND [2] new-onset or worsening  • Negative: [1] Red area or streak AND [2] fever  • Negative: [1] Swelling is painful to touch AND [2] fever  • Negative: [1] Cast on leg or ankle AND [2] now increased pain  • Negative: Patient sounds very sick or weak to the triager  • Negative: SEVERE leg swelling (e.g., swelling extends above knee, entire leg is swollen, weeping fluid)  • Negative: [1] Red area or streak [2] large (> 2 in. or 5 cm)  • Negative: [1] Thigh or calf pain AND [2] only 1 side AND [3] present > 1 hour  • Negative: [1] Thigh, calf, or ankle swelling AND [2] only 1 side  • Negative: [1] Thigh, calf, or ankle swelling AND [2] bilateral AND [3] 1 side is more swollen    Answer Assessment - Initial Assessment Questions  1. ONSET: \"When did the swelling start?\" (e.g., minutes, hours, days)      years  2. LOCATION: \"What part of the leg is swollen?\"  \"Are both legs swollen or just one leg?\"      Both legs, calf  3. SEVERITY: \"How bad is the swelling?\" (e.g., localized; " "mild, moderate, severe)   - Localized - small area of swelling localized to one leg   - MILD pedal edema - swelling limited to foot and ankle, pitting edema < 1/4 inch (6 mm) deep, rest and elevation eliminate most or all swelling   - MODERATE edema - swelling of lower leg to knee, pitting edema > 1/4 inch (6 mm) deep, rest and elevation only partially reduce swelling   - SEVERE edema - swelling extends above knee, facial or hand swelling present       moderate  4. REDNESS: \"Does the swelling look red or infected?\"      pink  5. PAIN: \"Is the swelling painful to touch?\" If Yes, ask: \"How painful is it?\"   (Scale 1-10; mild, moderate or severe)      Denies; describes as burning sensation  6. FEVER: \"Do you have a fever?\" If Yes, ask: \"What is it, how was it measured, and when did it start?\"       denies  7. CAUSE: \"What do you think is causing the leg swelling?\"      cellulitis  8. MEDICAL HISTORY: \"Do you have a history of heart failure, kidney disease, liver failure, or cancer?\"      afib  9. RECURRENT SYMPTOM: \"Have you had leg swelling before?\" If Yes, ask: \"When was the last time?\" \"What happened that time?\"      Yes; cellulitis  10. OTHER SYMPTOMS: \"Do you have any other symptoms?\" (e.g., chest pain, difficulty breathing)        denies  11. PREGNANCY: \"Is there any chance you are pregnant?\" \"When was your last menstrual period?\"        na    Protocols used: LEG SWELLING AND EDEMA-ADULT-AH      "

## 2022-08-30 NOTE — TELEPHONE ENCOUNTER
Please call patient and let her know that cellulitis had improved in office last week. I would like her to be seen before considering more antibiotics.

## 2022-08-31 PROBLEM — L03.90 CELLULITIS: Status: RESOLVED | Noted: 2022-01-01 | Resolved: 2022-01-01

## 2022-08-31 PROBLEM — Z96.1 BILATERAL PSEUDOPHAKIA: Status: ACTIVE | Noted: 2022-01-01

## 2022-09-06 NOTE — TELEPHONE ENCOUNTER
Please let cardiology know that I am agreeable to patient seeing any available cardiology provider.  She may ultimately need to see Dr. Aguilar regarding her arrhythmia.

## 2022-09-06 NOTE — TELEPHONE ENCOUNTER
Patient referral for cardiology was routed with this message:     Dr. Aguilar is an EP specialist, so this referral would need to come from cardiology.  Dr. Sharma, Dr. Banks, or Demi Rosado NP would be glad to see the patient if that is what Dr. Benitez would like.     Please advise

## 2022-09-09 NOTE — TELEPHONE ENCOUNTER
Caller: Marychuy Verdugo    Relationship to patient: Self    Best call back number: [phone]    Patient is needing: SHE IS A LITTLE CONFUSED.  THE HOSPITALIST ASK HER TO TAKE A NEW bumetanide (BUMEX) 2 MG tablet & potassium chloride (K-DUR,KLOR-CON) 20 MEQ CR tablet IF HER HEART RATE IS ABOVE 110.  IT AVERAGES .  SHE WANTS TO KNOW IF SHE IS TAKING TOO MUCH BUMEX.  PLEASE CALL AND ADVISE.

## 2022-09-09 NOTE — TELEPHONE ENCOUNTER
Pt informed to go the ER. She is having double vision and confusion. When I spoke to her she didn't sound well.

## 2022-09-13 NOTE — PROGRESS NOTES
Subjective   Marychuy Verdugo is a 82 y.o. female.       HPI   Pt is here today accompanied by her son.    She is concerned she is developing cellulitis again in her lower extremities.   She was hospitalized with this last month.  She has completed all abx.    She was to [facility] yesterday but did not make it to the appt.   Her lower legs remain swollen and tender.  She feels they were warm to touch last night.  She has not run any fever that she is aware of.   She admits to not drinking much fluid at all due to the Bumex causing her increased urination.  She is concerned the Bumex is not working well and wants to discuss switching back to Lasix.    She was told at the hospital to take an extra metoprolol 25 mg when her HR goes above 110.  She hasn't done this because the metoprolol makes her too fatigue and she feels drained.  She denies any CP or palpitations.      The following portions of the patient's history were reviewed and updated as appropriate: allergies, current medications, past family history, past medical history, past social history, past surgical history and problem list.    Review of Systems   Constitutional: Negative for chills, fatigue and fever.   Respiratory: Negative for cough, chest tightness, shortness of breath and wheezing.    Cardiovascular: Negative for chest pain and palpitations.   Gastrointestinal: Negative for diarrhea, nausea and vomiting.   Genitourinary: Negative for dysuria and hematuria.   Musculoskeletal: Positive for arthralgias and myalgias.   Neurological: Positive for weakness (general). Negative for dizziness, numbness and headache.   Psychiatric/Behavioral: Negative for depressed mood. The patient is not nervous/anxious.        Objective   Physical Exam  Vitals reviewed.   Constitutional:       General: She is not in acute distress.     Appearance: Normal appearance. She is obese.      Comments: Uses a rolling walker for assistance    Cardiovascular:      Rate  and Rhythm: Normal rate and regular rhythm.      Pulses: Normal pulses.      Heart sounds: Normal heart sounds.   Pulmonary:      Effort: Pulmonary effort is normal. No respiratory distress.      Breath sounds: Normal breath sounds. No wheezing, rhonchi or rales.   Chest:      Chest wall: No tenderness.   Abdominal:      Tenderness: There is no right CVA tenderness or left CVA tenderness.   Musculoskeletal:      Right lower leg: Edema (2+; no warmth to touch; very minimal erythema. ) present.      Left lower leg: Edema (2+; no warmth to touch; very minimal erythema. ) present.   Neurological:      Mental Status: She is alert and oriented to person, place, and time.   Psychiatric:         Mood and Affect: Mood normal.           Assessment & Plan   Diagnoses and all orders for this visit:    1. Peripheral edema (Primary)  Comments:  Will switch from Bumex to Lasix.    No cellulitis noted today; reviewed warning S/S.    Pt to keep f/u in 2 days to re-evaluate.   Orders:  -     furosemide (Lasix) 20 MG tablet; Take 1 tablet by mouth 2 (Two) Times a Day.  Dispense: 60 tablet; Refill: 1    2. Tachycardia  Comments:  Use the metoprolol as instructed; if HR above 110 take an extra 25 mg dose.   Increase fluid intake.   Keep next scheduled f/u.

## 2022-09-16 NOTE — PROGRESS NOTES
Assessment and Plan     Problem List Items Addressed This Visit        Cardiac and Vasculature    Paroxysmal atrial fibrillation (HCC) (Chronic)    Overview     S/p Watchman procedure in September 2020. No anticoagulation therapy necessary. 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   Increase Toprol to 50 mg daily.  Denies any chest pain or palpitations.   Referred to cardiology.         Relevant Medications    metoprolol succinate XL (TOPROL-XL) 50 MG 24 hr tablet       Symptoms and Signs    Lymphedema of both lower extremities - Primary    Overview     Discussed supportive care.  Advised against CCB use for PAF.  Continue diuretic therapy, Bumex 2 mg twice daily.  Would benefit from compression wraps/Unna boots. Unna boots applied in office.  Will refer to lymphedema clinic.          Relevant Orders    Ambulatory Referral to Physical Therapy Lymphedema    Ambulatory Referral to Lymphedema Clinic      Other Visit Diagnoses     Tachycardia        Increase Toprol to 50 mg.  Patient to monitor BP & HR.    Relevant Medications    metoprolol succinate XL (TOPROL-XL) 50 MG 24 hr tablet      Spent 30 minutes with patient and family counseling and coordinating care.    Return in about 2 weeks (around 9/30/2022) for Recheck edema.        Patient was given instructions and counseling regarding her condition or for health maintenance advice. Please see specific information pulled into the AVS if appropriate.        Marychuy is a 82 y.o. being seen today for  Extremity Weakness and Edema   Subjective   History of the Present Illness     Post menopausal obese white female with CMHx of PAF, pulmonary HTN, and lower extremity edema presents with complaints of worsening edema. Present with son who provides history. Patient is ambulating with the assistance of a rolling walker with seat.     Associated symptoms include fatigue and lower extremity heaviness that makes it difficult to ambulate. Patient recalls falling twice in the last  "week.     Patient is concerned she has cellulitis despite counseling on three separate visits to the contrary.    Hypertensive and tachycardic in office.     Social History  She  reports that she has never smoked. She has never used smokeless tobacco. She reports current alcohol use. She reports that she does not use drugs.  Objective   Vital Signs          Vitals:    22 0904   BP: 141/83   BP Location: Left arm   Patient Position: Sitting   Cuff Size: Adult   Pulse: (!) 125   Temp: 98.6 °F (37 °C)   TempSrc: Oral   SpO2: 99%   Weight: 93 kg (205 lb)   Height: 175.3 cm (69\")     BP Readings from Last 1 Encounters:   22 141/83     Wt Readings from Last 3 Encounters:   22 93 kg (205 lb)   22 91.6 kg (202 lb)   22 89.3 kg (196 lb 12.8 oz)   Body mass index is 30.27 kg/m².     Physical Exam  Vitals reviewed.   Constitutional:       General: She is not in acute distress.     Appearance: Normal appearance. She is obese.   HENT:      Head: Normocephalic and atraumatic.   Cardiovascular:      Rate and Rhythm: Tachycardia present. Rhythm irregularly irregular.   Pulmonary:      Effort: Pulmonary effort is normal.      Breath sounds: Normal breath sounds.   Musculoskeletal:      Right lower le+ Pitting Edema present.      Left lower le+ Pitting Edema present.   Neurological:      Mental Status: She is alert and oriented to person, place, and time.   Psychiatric:         Mood and Affect: Mood normal.         Behavior: Behavior normal.               proBNP (2022 09:15)  Basic metabolic panel (2022 09:15)  CBC & Differential (2022 12:26)  Adult Transthoracic Echo Complete W/ Cont if Necessary Per Protocol (2022 08:17)    "

## 2022-09-19 PROBLEM — I89.0 LYMPHEDEMA OF BOTH LOWER EXTREMITIES: Status: ACTIVE | Noted: 2022-01-01

## 2022-09-22 PROBLEM — S32.591A CLOSED FRACTURE OF MULTIPLE PUBIC RAMI, RIGHT, INITIAL ENCOUNTER: Status: ACTIVE | Noted: 2022-01-01

## 2022-09-22 PROBLEM — R79.89 ELEVATED BRAIN NATRIURETIC PEPTIDE (BNP) LEVEL: Status: ACTIVE | Noted: 2022-01-01

## 2022-09-22 PROBLEM — I82.401 ACUTE DEEP VEIN THROMBOSIS (DVT) OF RIGHT LOWER EXTREMITY: Status: ACTIVE | Noted: 2022-01-01

## 2022-09-22 NOTE — ED PROVIDER NOTES
Subjective       Patient is an 82-year-old female comes in complaining of generalized weakness for the past 2 months.  Patient states she is having progressively generalized weakness bilateral lower extremities including her right leg feeling like it will give out and has had several falls over the past few weeks.  Patient states that she has bumped her head on occasion but denies significant trauma to her head recently.  Patient states that she was hospitalized around July 4 and was diagnosed with left oculomotor nerve palsy and states she has eye lid droop of the left side since that time.  Patient states she had biopsies of her arteries of her face that came back negative.  Patient states that she has had several falls and when she is mainly landed on her buttock.  Patient states she has had worsening pain of her right hip and right knee as well.  Patient has chronic lymphedema of her bilateral lower extremities and is on diuretics for this.  Patient states her appetite has been poor over the past few months and has been trying to push oral fluids.  Patient denies any recent cough, congestion, sick contacts, vomiting, diarrhea, urinary symptoms, abdominal pain, chest pain, shortness of breath or feel like she is going to pass out.  Patient denies any loss of consciousness.  Patient family at bedside states that patient has had about 10 falls in the past 2 weeks.  Patient usually ambulates with a Rollator walker but states that this has been increasingly difficult to do so.  Patient denies any new speech difficulty, weakness of upper extremities or dizziness.        Review of Systems   Constitutional: Negative for chills, fatigue and fever.   HENT: Negative for congestion, sore throat, tinnitus and trouble swallowing.    Eyes: Negative for photophobia, discharge and visual disturbance.   Respiratory: Negative for cough, shortness of breath and wheezing.    Cardiovascular: Negative for chest pain, palpitations and  "leg swelling.   Gastrointestinal: Negative for abdominal pain, blood in stool, diarrhea, nausea and vomiting.   Genitourinary: Negative for dysuria, flank pain and urgency.   Musculoskeletal: Negative for arthralgias and myalgias.        Falls, right hip, right knee pain.  Generalized weakness.   Skin: Negative for rash.   Neurological: Negative for dizziness, light-headedness and headaches.   Psychiatric/Behavioral: Negative for confusion.       Past Medical History:   Diagnosis Date   • Atrial fibrillation (HCC) 10/10/2013   • Chronic anticoagulation 05/01/2020   • Chronic constipation    • Diplopia 07/08/2022   • GERD (gastroesophageal reflux disease) 02/11/2013   • Headache 07/01/2022   • Hematuria     STATES ALWAY HAVE HAD SOME BLOOD IN URINE.   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   Other reaction(s): Other (See Comments)  Pt reports, \"my friends and my  went crazy on it so I want to avoid it.\" Pt denies ever receiving it.        Past Surgical History:   Procedure Laterality Date   • CATARACT EXTRACTION W/ INTRAOCULAR LENS IMPLANT      LEFT AND RIGHT   • ORBITAL FRACTURE SURGERY Left     FLOOR   • OTHER SURGICAL HISTORY      WATCHMAN PLACED FOR HISTORY OF A FIB   • TEMPORAL ARTERY BIOPSY Bilateral 8/4/2022    Procedure: BILATERAL  TEMPORAL ARTERY BIOPSY;  Surgeon: Marco A Silveira MD;  Location: [facility];  Service: Ophthalmology;  Laterality: Bilateral;   • TOTAL HIP ARTHROPLASTY Left 02/12/2022    Procedure: TOTAL HIP ARTHROPLASTY ANTERIOR;  Surgeon: Yair Carver, " MD;  Location: [facility] MAIN OR;  Service: Orthopedics;  Laterality: Left;   • TUBAL ABDOMINAL LIGATION         Family History   Problem Relation Age of Onset   • No Known Problems Mother    • Heart murmur Father    • Malig Hyperthermia Neg Hx        Social History     Socioeconomic History   • Marital status:    Tobacco Use   • Smoking status: Never Smoker   • Smokeless tobacco: Never Used   Vaping Use   • Vaping Use: Never used   Substance and Sexual Activity   • Alcohol use: Yes     Comment: ON OCC   • Drug use: Never   • Sexual activity: Defer           Objective   Physical Exam  Vitals and nursing note reviewed.   Constitutional:       General: She is not in acute distress.     Appearance: She is well-developed. She is not diaphoretic.   HENT:      Head: Normocephalic and atraumatic.      Right Ear: External ear normal.      Left Ear: External ear normal.      Nose: Nose normal.      Mouth/Throat:      Pharynx: No oropharyngeal exudate.   Eyes:      Extraocular Movements: Extraocular movements intact.      Conjunctiva/sclera: Conjunctivae normal.      Pupils: Pupils are equal, round, and reactive to light.   Cardiovascular:      Rate and Rhythm: Normal rate and regular rhythm.      Heart sounds: Normal heart sounds.      Comments: S1, S2 audible.  Pulmonary:      Effort: Pulmonary effort is normal. No respiratory distress.      Breath sounds: Normal breath sounds. No wheezing, rhonchi or rales.      Comments: On room air.  Abdominal:      General: Bowel sounds are normal. There is no distension.      Palpations: Abdomen is soft.      Tenderness: There is no abdominal tenderness. There is no guarding or rebound.   Musculoskeletal:         General: No tenderness or deformity.      Cervical back: Normal range of motion.      Comments: Right hip range of motion somewhat limited secondary to pain.  Otherwise no tenderness over bilateral hips or bilateral knees.  Patient has 2+ edema bilateral lower extremities.  "  Skin:     General: Skin is warm.      Capillary Refill: Capillary refill takes less than 2 seconds.      Findings: No erythema or rash.   Neurological:      Mental Status: She is alert and oriented to person, place, and time.      Cranial Nerves: No cranial nerve deficit.   Psychiatric:         Mood and Affect: Mood normal.         Behavior: Behavior normal.         Procedures           ED Course  ED Course as of 09/23/22 0231   Thu Sep 22, 2022   2000 Dseean, vascular technician reported apparent acute DVT of right gastrocnemius.. [RL]   2035 EKG reviewed by myself and interpreted by Dr. Cam, shows atrial fibrillation rate 90 bpm, no ST elevation apparent.  Similar to previous. [RL]      ED Course User Index  [RL] Celio Peters PA      /95 (BP Location: Left arm, Patient Position: Lying)   Pulse 103   Temp 98.5 °F (36.9 °C) (Oral)   Resp 17   Ht 175.3 cm (69\")   Wt 89.1 kg (196 lb 6.9 oz)   SpO2 98%   BMI 29.01 kg/m²   Labs Reviewed   COMPREHENSIVE METABOLIC PANEL - Abnormal; Notable for the following components:       Result Value    Glucose 103 (*)     Alkaline Phosphatase 118 (*)     All other components within normal limits    Narrative:     GFR Normal >60  Chronic Kidney Disease <60  Kidney Failure <15     CBC WITH AUTO DIFFERENTIAL - Abnormal; Notable for the following components:    Hemoglobin 11.7 (*)     RDW 19.0 (*)     RDW-SD 62.6 (*)     Lymphocyte % 16.5 (*)     Basophil % 2.1 (*)     All other components within normal limits   BNP (IN-HOUSE) - Abnormal; Notable for the following components:    proBNP 2,212.0 (*)     All other components within normal limits    Narrative:     Among patients with dyspnea, NT-proBNP is highly sensitive for the detection of acute congestive heart failure. In addition NT-proBNP of <300 pg/ml effectively rules out acute congestive heart failure with 99% negative predictive value.    Results may be falsely decreased if patient taking Biotin.     RESPIRATORY " PANEL PCR W/ COVID-19 (SARS-COV-2) AILYN/ABHIJEET/DARYL/PAD/COR/MAD/CARI IN-HOUSE, NP SWAB IN UTM/VTP, 3-4 HR TAT - Normal    Narrative:     In the setting of a positive respiratory panel with a viral infection PLUS a negative procalcitonin without other underlying concern for bacterial infection, consider observing off antibiotics or discontinuation of antibiotics and continue supportive care. If the respiratory panel is positive for atypical bacterial infection (Bordetella pertussis, Chlamydophila pneumoniae, or Mycoplasma pneumoniae), consider antibiotic de-escalation to target atypical bacterial infection.   URINALYSIS W/ CULTURE IF INDICATED - Normal    Narrative:     In absence of clinical symptoms, the presence of pyuria, bacteria, and/or nitrites on the urinalysis result does not correlate with infection.  Urine microscopic not indicated.   TSH - Normal   MAGNESIUM - Normal   TROPONIN (IN-HOUSE) - Normal    Narrative:     Troponin T Reference Range:  <= 0.03 ng/mL-   Negative for AMI  >0.03 ng/mL-     Abnormal for myocardial necrosis.  Clinicians would have to utilize clinical acumen, EKG, Troponin and serial changes to determine if it is an Acute Myocardial Infarction or myocardial injury due to an underlying chronic condition.       Results may be falsely decreased if patient taking Biotin.     POCT GLUCOSE FINGERSTICK   CBC AND DIFFERENTIAL    Narrative:     The following orders were created for panel order CBC & Differential.  Procedure                               Abnormality         Status                     ---------                               -----------         ------                     CBC Auto Differential[[id number]]        Abnormal            Final result                 Please view results for these tests on the individual orders.   EXTRA TUBES    Narrative:     The following orders were created for panel order Extra Tubes.  Procedure                               Abnormality         Status                      ---------                               -----------         ------                     Gold Top - SST[[id number]]                                   Final result               Light Blue Top[[id number]]                                   Final result                 Please view results for these tests on the individual orders.   GOLD TOP - SST   LIGHT BLUE TOP     CT Head Without Contrast    Result Date: 9/22/2022  No acute intracranial abnormality.  Electronically Signed ByNick Guerra On:9/22/2022 7:26 PM This report was finalized on [id number] by  Bruno Guerra, .    XR Chest 1 View    Result Date: 9/22/2022  IMPRESSION : Mild pulmonary vascular congestion. Dependent opacities at the lung bases favored represent atelectasis or mild pulmonary edema..[  Electronically Signed By-Bruno Guerra On:9/22/2022 7:03 PM This report was finalized on [id number] by  Bruno Guerra, .    XR Knee 1 or 2 View Bilateral    Result Date: 9/22/2022  Diffuse osteopenia limits detection of subtle abnormalities.  Degenerative changes noted bilaterally  No definite fracture identified  Electronically Signed ByNick Guerra On:9/22/2022 7:02 PM This report was finalized on [id number] by  Bruno Guerra, .    XR Hips Bilateral With or Without Pelvis 2 View    Result Date: 9/22/2022  Fracture of the superior and inferior pubic ramus on the right  Electronically Signed ByNick Guerra On:9/22/2022 7:00 PM This report was finalized on [id number] by  Bruno Guerra, .                                         [facility]     Chart review: Allergy to haloperidol  EKG:  Not indicated    Imaging: Chest x-ray shows atelectasis versus mild pulmonary edema, mild pulmonary vascular congestion.  CT head unremarkable for acute findings.  X-ray of bilateral knees unremarkable for acute findings.  X-ray of bilateral hips show fracture of superior and inferior pubic ramus on the right.    Labs: Respiratory viral  "panel with COVID-19 swab negative.  UA unremarkable.  CBC and CMP unremarkable for acute findings.  Magnesium normal.  TSH normal.  Initial troponin negative.  BNP slightly elevated at 2200.    Vitals:  /95 (BP Location: Left arm, Patient Position: Lying)   Pulse 103   Temp 98.5 °F (36.9 °C) (Oral)   Resp 17   Ht 175.3 cm (69\")   Wt 89.1 kg (196 lb 6.9 oz)   SpO2 98%   BMI 29.01 kg/m²     Medications given:    Medications   sodium chloride 0.9 % flush 10 mL (has no administration in time range)   HYDROcodone-acetaminophen (NORCO) 5-325 MG per tablet 1 tablet (1 tablet Oral Given 9/22/22 2306)   ondansetron (ZOFRAN) injection 4 mg (has no administration in time range)   cholecalciferol (VITAMIN D3) tablet 1,000 Units (has no administration in time range)   docusate sodium (COLACE) capsule 100 mg (has no administration in time range)   furosemide (LASIX) tablet 20 mg (has no administration in time range)   levothyroxine (SYNTHROID, LEVOTHROID) tablet 88 mcg (has no administration in time range)   cetirizine (zyrTEC) tablet 10 mg (has no administration in time range)   metoprolol succinate XL (TOPROL-XL) 24 hr tablet 50 mg (has no administration in time range)   multivitamin with minerals 1 tablet (has no administration in time range)   oxybutynin XL (DITROPAN-XL) 24 hr tablet 10 mg (has no administration in time range)   pantoprazole (PROTONIX) EC tablet 40 mg (has no administration in time range)   potassium chloride (K-DUR,KLOR-CON) CR tablet 20 mEq (has no administration in time range)   Pharmacy to Dose enoxaparin (LOVENOX) (has no administration in time range)   Enoxaparin Sodium (LOVENOX) syringe 90 mg (has no administration in time range)   Enoxaparin Sodium (LOVENOX) syringe 90 mg (90 mg Subcutaneous Given 9/22/22 2115)   furosemide (LASIX) injection 40 mg (40 mg Intravenous Given 9/22/22 2115)       Procedures:  Not indicated  MDM: Patient is an 82-year-old female comes in complaining of fall and " generalized weakness of bilateral lower extremities.  IV established.  Respiratory viral panel with COVID-19 swab negative.  UA unremarkable.  CBC and CMP unremarkable for acute findings.  Magnesium normal.  TSH normal.  Initial troponin negative.  BNP slightly elevated at 2200.  Bilateral duplex of the veins shows acute DVT of right gastrocnemius.  Patient was given dose of Lovenox here in the ER.  Patient has history of A. fib with watchman in place.   Chest x-ray shows atelectasis versus mild pulmonary edema, mild pulmonary vascular congestion.  CT head unremarkable for acute findings.  X-ray of bilateral knees unremarkable for acute findings.  X-ray of bilateral hips show fracture of superior and inferior pubic ramus on the right.  Given patient's fracture and generalized weakness, patient to be admitted for further PT and OT evaluation.  Patient has no focal weakness correlate with acute stroke at this time and is not within any window of intervention.  Spoke with LIAN Marin, accepted patient behalf of hospitalist team for admission to hospital further work-up and treatment on behalf of . See full discharge instructions for further details.  Results and plan discussed with patient and is agreeable with plan.    Final diagnoses:   Generalized weakness   Pubic ramus fracture, right, closed, initial encounter ([facility])   Acute deep vein thrombosis (DVT) of calf muscle vein of right lower extremity (HCC)       ED Disposition  ED Disposition     ED Disposition   Decision to Admit    Condition   --    Comment   Level of Care: Med/Surg [1]   Diagnosis: Closed fracture of multiple pubic rami, right, initial encounter ([facility]) [[id number]]   Admitting Physician: DICK SERRANO [[id number]]   Attending Physician: DICK SERRANO [[id number]]   Bed Request Comments: cardiac monitor               No follow-up provider specified.       Medication List      No changes were made to your prescriptions during this visit.          Fran  MICHELE Pickens  09/23/22 1414

## 2022-09-22 NOTE — TELEPHONE ENCOUNTER
DELETE AFTER REVIEWING: Telephone encounter to be sent to the clinical pool     Caller: moni frederick    Relationship: Emergency Contact    Best call back number: [phone]    What orders are you requesting (i.e. lab or imaging): MRI OR XRAYS    In what timeframe would the patient need to come in: AS SOON AS POSSIBLE     Where will you receive your lab/imaging services: [facility]     Additional notes: RIGHT LEG PAIN AFTER FALL, WOULD LIKE TO KNOW IF X-RAYS OR MRI CAN BE ORDERED.     PREFERS NOT TO TAKE MOM TO THE HOSPITAL AS HE DOESN'T FEEL SHE GETS THE BEST.

## 2022-09-22 NOTE — TELEPHONE ENCOUNTER
Pt would have to be seen in order to have these types of tests ordered.  He can take her to [facility] or schedule with the office.

## 2022-09-23 NOTE — CONSULTS
Patient ID: Marychuy Verdugo is a 82 y.o. female.    Chief Complaint:    Chief Complaint   Patient presents with   • Fall       HPI:  This is an 82-year-old female with a recent injury with a fall suffering right groin pain she has trouble bearing weight she is underwent previous hip arthroplasty earlier this year by my partner which is without pain  Past Medical History:   Diagnosis Date   • Atrial fibrillation (HCC) 10/10/2013   • Chronic anticoagulation 05/01/2020   • Chronic constipation    • Diplopia 07/08/2022   • GERD (gastroesophageal reflux disease) 02/11/2013   • Headache 07/01/2022   • Hematuria     STATES ALWAY HAVE HAD SOME BLOOD IN URINE.   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   • Presence of Watchman left atrial appendage closure device    • Shortness of breath    • Shortness of breath    • Swelling of both lower extremities        Past Surgical History:   Procedure Laterality Date   • CATARACT EXTRACTION W/ INTRAOCULAR LENS IMPLANT      LEFT AND RIGHT   • ORBITAL FRACTURE SURGERY Left     FLOOR   • OTHER SURGICAL HISTORY      WATCHMAN PLACED FOR HISTORY OF A FIB   • TEMPORAL ARTERY BIOPSY Bilateral 8/4/2022    Procedure: BILATERAL  TEMPORAL ARTERY BIOPSY;  Surgeon: Marco A Silveira MD;  Location: [facility] OR;  Service: Ophthalmology;  Laterality: Bilateral;   • TOTAL HIP ARTHROPLASTY Left 02/12/2022    Procedure: TOTAL HIP ARTHROPLASTY ANTERIOR;  Surgeon: Yair Carver MD;  Location: [facility] MAIN OR;  Service: Orthopedics;  Laterality: Left;   • TUBAL ABDOMINAL LIGATION         Family History   Problem Relation Age of Onset   • No Known Problems Mother    • Heart murmur Father    • Malig Hyperthermia Neg Hx           Social History     Occupational History   • Not on file   Tobacco Use   • Smoking status: Never Smoker   • Smokeless tobacco: Never Used   Vaping Use   • Vaping Use: Never used   Substance and Sexual Activity  "  • Alcohol use: Yes     Comment: ON OCC   • Drug use: Never   • Sexual activity: Defer      Review of Systems   Cardiovascular: Negative for chest pain.   Musculoskeletal: Positive for arthralgias.       Objective:    /83 (BP Location: Left arm, Patient Position: Lying)   Pulse 112   Temp 97.7 °F (36.5 °C) (Oral)   Resp 18   Ht 175.3 cm (69\")   Wt 89.1 kg (196 lb 6.9 oz)   SpO2 96%   BMI 29.01 kg/m²     Physical Examination:  Right hip demonstrates minimal to no pain on logroll hip flexion 90 internal rotation 60 no pain on range of motion of left hip knees demonstrate range of motion 0-1 10 calves are covered with a Ace bandage but are soft feet are warm and perfused    Imaging:  Previous x-rays demonstrate no fracture of the knee with arthritis hips demonstrate total hip arthroplasty in place left side nondisplaced pubic ramus fractures right side    Assessment:  Stable pelvic fractures right side  Plan:  Nonsurgical management weightbearing as tolerated DVT prophylaxis as medically indicated see us in the office in 4 to 6 weeks    Disclaimer: Part of this note may be an electronic transcription/translation of spoken language to printed text using the Dragon Dictation System  "

## 2022-09-23 NOTE — PLAN OF CARE
Goal Outcome Evaluation:            Patient doing well, will discharge to [facility] for rehab. Patient is weight bearing as tolerated. Patient will follow up with Dr. Kim  and with cardiology. Patient has call light within reach and is able to make needs known.

## 2022-09-23 NOTE — PLAN OF CARE
Goal Outcome Evaluation:  Plan of Care Reviewed With: patient        Progress: no change  Outcome Evaluation: Pt is an 81 YO F s/p multiple falls at home, imaging revealing pubic ramus frx. Pt states she lives at home alone, typically is independent with all ADLs, ambulation with RWx and several recent falls. Pt. requires mod-min A x 2 for short ambulatory transfer from EOB to armchair, limited standing tolerance secondary to fatigue. Pt. requires complete assist for all LB ADLs this date. Recommend acute IP rehab at d/c to address aforementioned deficits.

## 2022-09-23 NOTE — H&P
"    [facility] Medicine Services      Patient Name: Marychuy Verdugo  : 1940  MRN: [id number]  Primary Care Physician:  Kristin Francis, LIAN  Date of admission: 2022      Subjective      Chief Complaint: Fall    History of Present Illness: Marychuy Verdugo is a 82 y.o. female who presented to [facility] on 2022 complaining of generalized weakness and frequent falls of the past several weeks.  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  She denies any chest pain shortness of air or precipitating factors to the falls and uses a Rollator at home.  She is awake and alert and a relatively good historian.  She reports since her most recent fall several days ago she has had right hip and knee pain.  She also has chronic lymphedema in her bilateral lower extremities are blistered.  Wound care has been consulted.  BNP was mildly elevated at 2122.  Chest x-ray per radiology showed some \"mild pulmonary vascular congestion\" dependent opacities at the lung bases favor represent atelectasis or mild pulmonary edema\".  She was given a one-time dose of 40 mg Lasix.  Bilateral hip x-ray today showed \"fracture of the superior and inferior pubic ramus on the right\".  PT eval and treat has been ordered and orthopedics has been consulted.  Right venous Doppler also showed an acute right lower extremity DVT noted in the gastrocnemius.  She has a past medical history of atrial fibrillation and has had a watchman's device placed.  She was given a one-time dose of Lovenox treatment dosing in ED and cardiology has been consulted regarding anticoagulation with a watchman device.  Troponin was 0.015 and hemoglobin was 11.7 and stable.  Past medical history also includes GERD, essential hypertension, acquired hypothyroidism, pulmonary hypertension, overactive bladder.  Review of Systems   Constitutional: Negative.   HENT: Negative.    Eyes: " Negative.    Cardiovascular: Negative.    Respiratory: Negative.    Endocrine: Negative.    Hematologic/Lymphatic: Negative.    Skin: Positive for poor wound healing.   Musculoskeletal: Positive for falls, myalgias and stiffness.   Gastrointestinal: Negative.    Genitourinary: Negative.    Neurological: Negative.    Psychiatric/Behavioral: Negative.    Allergic/Immunologic: Negative.    All other systems reviewed and are negative.      Personal History     Past Medical History:   Diagnosis Date   • Atrial fibrillation (HCC) 10/10/2013   • Chronic anticoagulation 05/01/2020   • Chronic constipation    • Diplopia 07/08/2022   • GERD (gastroesophageal reflux disease) 02/11/2013   • Headache 07/01/2022   • Hematuria     STATES ALWAY HAVE HAD SOME BLOOD IN URINE.   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 MAIN OR;  Service: Ophthalmology;  Laterality: Bilateral;   • TOTAL HIP ARTHROPLASTY Left 02/12/2022    Procedure: TOTAL HIP ARTHROPLASTY ANTERIOR;  Surgeon: Yair Carver MD;  Location: [facility] MAIN OR;  Service: Orthopedics;  Laterality: Left;   • TUBAL ABDOMINAL LIGATION         Family History: family history includes Heart murmur in her father; No Known Problems in her mother. Otherwise pertinent FHx was reviewed and not pertinent to current issue.    Social History:  reports that she has never smoked. She has never used  "smokeless tobacco. She reports current alcohol use. She reports that she does not use drugs.    Home Medications:  Prior to Admission Medications     Prescriptions Last Dose Informant Patient Reported? Taking?    cholecalciferol (VITAMIN D3) 25 MCG (1000 UT) tablet   Yes No    Take 1,000 Units by mouth Daily.    docusate sodium (COLACE) 100 MG capsule   Yes No    Take 100 mg by mouth Every Evening.    furosemide (Lasix) 20 MG tablet   No No    Take 1 tablet by mouth 2 (Two) Times a Day.    levothyroxine (SYNTHROID, LEVOTHROID) 88 MCG tablet   No No    Take 1 tablet by mouth Every Morning. Take on an empty stomach!    loratadine (CLARITIN) 10 MG tablet   Yes No    Take 10 mg by mouth Every Evening.    metoprolol succinate XL (TOPROL-XL) 50 MG 24 hr tablet   No No    Take 1 tablet by mouth Daily.    multivitamin with minerals tablet tablet   Yes No    Take 1 tablet by mouth Daily.    oxybutynin XL (DITROPAN-XL) 10 MG 24 hr tablet   No No    Take 1 tablet by mouth Daily.    pantoprazole (PROTONIX) 40 MG EC tablet   Yes No    Take 40 mg by mouth Daily.    polyethylene glycol (MIRALAX) 17 GM/SCOOP powder   No No    mix 1 capful (17 g) in liquid by mouth Daily.    Patient taking differently:  Take 17 g by mouth Daily As Needed.    Polyvinyl Alcohol-Povidone PF (HYPOTEARS) 1.4-0.6 % ophthalmic solution   Yes No    1 drop.    potassium chloride (K-DUR,KLOR-CON) 20 MEQ CR tablet   No No    Take 1 tablet by mouth 2 (Two) Times a Day.    potassium chloride ER (K-TAB) 20 MEQ tablet controlled-release ER tablet   Yes No    Take 20 mEq by mouth Every Morning.            Allergies:  Allergies   Allergen Reactions   • Haloperidol Other (See Comments) and Unknown (See Comments)     Pt reports, \"my friends and my  went crazy on it so I want to avoid it.\" Pt denies ever receiving it.   Other reaction(s): Other (See Comments)  Pt reports, \"my friends and my  went crazy on it so I want to avoid it.\" Pt denies ever " "receiving it.        Objective      Vitals:   Temp:  [97.7 °F (36.5 °C)-98.5 °F (36.9 °C)] 98.5 °F (36.9 °C)  Heart Rate:  [] 103  Resp:  [16-18] 17  BP: (133-164)/() 151/95    Physical Exam  Vitals reviewed.   Constitutional:       Appearance: Normal appearance. She is normal weight.   HENT:      Head: Normocephalic and atraumatic.      Right Ear: External ear normal.      Left Ear: External ear normal.      Nose: Nose normal.      Mouth/Throat:      Mouth: Mucous membranes are moist.   Eyes:      Extraocular Movements: Extraocular movements intact.   Cardiovascular:      Rate and Rhythm: Normal rate and regular rhythm.      Pulses: Normal pulses.      Heart sounds: Normal heart sounds.   Pulmonary:      Effort: Pulmonary effort is normal.      Breath sounds: Normal breath sounds.   Abdominal:      Palpations: Abdomen is soft.   Genitourinary:     Comments: deferred  Musculoskeletal:         General: Normal range of motion.      Cervical back: Normal range of motion and neck supple.      Right lower leg: Edema present.      Left lower leg: Edema present.   Skin:     General: Skin is warm and dry.      Comments: BLE lymphedema with erythema and blistering    Neurological:      General: No focal deficit present.      Mental Status: She is alert and oriented to person, place, and time.   Psychiatric:         Mood and Affect: Mood normal.         Behavior: Behavior normal.         Thought Content: Thought content normal.         Judgment: Judgment normal.         Result Review    Result Review:  I have personally reviewed the results from the time of this admission to 9/23/2022 02:06 EDT and agree with these findings:  [x]  Laboratory  []  Microbiology  [x]  Radiology  [x]  EKG/Telemetry   []  Cardiology/Vascular   []  Pathology  [x]  Old records  []  Other:  Most notable findings include: BNP 2212. Troponin 0.015, Hgb 11.7    Venous duplex  · \"Acute right lower extremity deep vein thrombosis noted in the " "gastrocnemius.  · All other veins appeared normal bilaterally.\"        CXR  \"Mild pulmonary vascular congestion. Dependent opacities at the lung  bases favored represent atelectasis or mild pulmonary edema..[\"    Bilateral Hip X Ray    \"Fracture of the superior and inferior pubic ramus on the right      Assessment & Plan        Active Hospital Problems:  Active Hospital Problems    Diagnosis    • Closed fracture of multiple pubic rami, right, initial encounter ([facility])    • Acute deep vein thrombosis (DVT) of right lower extremity ([facility])    • Elevated brain natriuretic peptide (BNP) level    • Overactive bladder      Potentially exacerbated by diuretic use.  Will try a course of oxybutynin. Discussed anticholinergic SE.     • Presence of Watchman left atrial appendage closure device    • Pulmonary hypertension ([facility])      Echocardiogram in August 2022 revealed biatrial enlargement, moderate tricuspid regurgitation with right ventricular systolic pressure greater than 40 mmHg.  Left ventricular ejection fraction was preserved.  Continue diuretic therapy, Bumex 2 mg twice daily.  Will refer to cardiology.     • Essential hypertension      BP at goal, <140/90.  Encouraged low Na+ & 150 minutes of exercise/week.  Continue Bumex 2 mg twice daily and Toprol 25 mg daily.  Patient to monitor ambulatory BP and heart rate.  Monitoring renal function.     • Paroxysmal atrial fibrillation ([facility])      S/p Watchman procedure in September 2020. No anticoagulation therapy necessary. 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   Increase Toprol to 50 mg daily.  Denies any chest pain or palpitations.   Referred to cardiology.     • GERD (gastroesophageal reflux disease)    • Acquired hypothyroidism      Treated with levothyroxine 88 mcg.  Last TSH was WNL. Monitoring regularly.        Plan:    Closed fracture of the superior and inferior pubic rami on the right, PT eval and treat and orthopedics consulted, fall precautions    DVT right lower " extremity gastrocnemius one-time dose treatment Lovenox in ED, treatment Lovenox continued cardiology consulted    Elevated proBNP 2122, pulmonary edema mild process x-ray, stable on home oxygen, one-time dose 40 mg Lasix, IV, reordered home Lasix    Bilateral lower extremity lymphedema with blistering, treated for cellulitis in past WBC not elevated, does not appear to be infectious, wound care consulted    Persistent atrial fibrillation, atrial fibrillation on EKG rate controlled watchman device in place denies chest pain troponin not elevated continuous cardiac monitoring reordered home metoprolol succinate    GERD, reordered home Protonix    Acquired hypothyroidism, reordered home levothyroxine    Overactive bladder reordered home oxybutynin    Dietary supplementation, reordered home potassium multivitamin and vitamin D p.o.    Seasonal allergies formulary substitute for loratadine call cetirizine reordered    DVT prophylaxis:  Treatment Lovenox ordered  CODE STATUS:    Code Status (Patient has no pulse and is not breathing): CPR (Attempt to Resuscitate)  Medical Interventions (Patient has pulse or is breathing): Full Support    Admission Status:  I believe this patient meets observation status.    I discussed the patient's findings and my recommendations with patient.    This patient has been examined wearing appropriate Personal Protective Equipment . 09/23/22      Signature: Electronically signed by LIAN Sanders, 09/23/22, 2:15 AM EDT.    Closed fracture

## 2022-09-23 NOTE — DISCHARGE PLACEMENT REQUEST
"Minnie Breen (82 y.o. Female)             Date of Birth   1940    Social Security Number       Address   [address] [facility] [place] [address]    Home Phone   [phone]    MRN   [id number]       Rastafarian   Non-Evangelical    Marital Status                               Admission Date   9/22/22    Admission Type   Emergency    Admitting Provider   Kaya Banks MD    Attending Provider   Yari Leiva DO    Department, Room/Bed   [facility] SURGICAL INPATIENT, 4122/1       Discharge Date       Discharge Disposition       Discharge Destination                               Attending Provider: Yari Leiva DO    Allergies: Haloperidol    Isolation: None   Infection: None   Code Status: CPR   Advance Care Planning Activity    Ht: 175.3 cm (69\")   Wt: 89.1 kg (196 lb 6.9 oz)    Admission Cmt: None   Principal Problem: None                Active Insurance as of 9/22/2022     Primary Coverage     Payor Plan Insurance Group Employer/Plan Group    MEDICARE MEDICARE A & B      Payor Plan Address Payor Plan Phone Number Payor Plan Fax Number Effective Dates    [address] [phone]  2/1/2006 - None Entered    [facility] [id number]       Subscriber Name Subscriber Birth Date Member ID       MINNIE BREEN 1940 [id number]           Secondary Coverage     Payor Plan Insurance Group Employer/Plan Group    [facility] SUPP [id number]     Payor Plan Address Payor Plan Phone Number Payor Plan Fax Number Effective Dates    [address]   8/1/2018 - None Entered    [facility] [id number]       Subscriber Name Subscriber Birth Date Member ID       MINNIE BREEN 1940 [id number]                 Emergency Contacts      (Rel.) Home Phone Work Phone Mobile Phone    otoniel,laaugustine (Daughter) -- -- [phone]    moni frederick (Son) [phone] -- [phone]    minnie frederick (Relative) -- -- --    MARY FREDERICK (Relative) -- -- --    " "           History & Physical      Tania Kendrick APRN at 23              [facility] Medicine Services      Patient Name: Marychuy Verdugo  : 1940  MRN: [id number]  Primary Care Physician:  Kristin Francis APRN  Date of admission: 2022      Subjective      Chief Complaint: Fall    History of Present Illness: Marychuy Verdugo is a 82 y.o. female who presented to [facility] on 2022 complaining of generalized weakness and frequent falls of the past several weeks.  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  She denies any chest pain shortness of air or precipitating factors to the falls and uses a Rollator at home.  She is awake and alert and a relatively good historian.  She reports since her most recent fall several days ago she has had right hip and knee pain.  She also has chronic lymphedema in her bilateral lower extremities are blistered.  Wound care has been consulted.  BNP was mildly elevated at .  Chest x-ray per radiology showed some \"mild pulmonary vascular congestion\" dependent opacities at the lung bases favor represent atelectasis or mild pulmonary edema\".  She was given a one-time dose of 40 mg Lasix.  Bilateral hip x-ray today showed \"fracture of the superior and inferior pubic ramus on the right\".  PT eval and treat has been ordered and orthopedics has been consulted.  Right venous Doppler also showed an acute right lower extremity DVT noted in the gastrocnemius.  She has a past medical history of atrial fibrillation and has had a watchman's device placed.  She was given a one-time dose of Lovenox treatment dosing in ED and cardiology has been consulted regarding anticoagulation with a watchman device.  Troponin was 0.015 and hemoglobin was 11.7 and stable.  Past medical history also includes GERD, essential hypertension, acquired hypothyroidism, pulmonary hypertension, " overactive bladder.  Review of Systems   Constitutional: Negative.   HENT: Negative.    Eyes: Negative.    Cardiovascular: Negative.    Respiratory: Negative.    Endocrine: Negative.    Hematologic/Lymphatic: Negative.    Skin: Positive for poor wound healing.   Musculoskeletal: Positive for falls, myalgias and stiffness.   Gastrointestinal: Negative.    Genitourinary: Negative.    Neurological: Negative.    Psychiatric/Behavioral: Negative.    Allergic/Immunologic: Negative.    All other systems reviewed and are negative.      Personal History     Past Medical History:   Diagnosis Date   • Atrial fibrillation (HCC) 10/10/2013   • Chronic anticoagulation 05/01/2020   • Chronic constipation    • Diplopia 07/08/2022   • GERD (gastroesophageal reflux disease) 02/11/2013   • Headache 07/01/2022   • Hematuria     STATES ALWAY HAVE HAD SOME BLOOD IN URINE.   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   • Presence of Watchman left atrial appendage closure device    • Shortness of breath    • Shortness of breath    • Swelling of both lower extremities        Past Surgical History:   Procedure Laterality Date   • CATARACT EXTRACTION W/ INTRAOCULAR LENS IMPLANT      LEFT AND RIGHT   • ORBITAL FRACTURE SURGERY Left     FLOOR   • OTHER SURGICAL HISTORY      WATCHMAN PLACED FOR HISTORY OF A FIB   • TEMPORAL ARTERY BIOPSY Bilateral 8/4/2022    Procedure: BILATERAL  TEMPORAL ARTERY BIOPSY;  Surgeon: Marco A Silveira MD;  Location: [facility];  Service: Ophthalmology;  Laterality: Bilateral;   • TOTAL HIP ARTHROPLASTY Left 02/12/2022    Procedure: TOTAL HIP ARTHROPLASTY ANTERIOR;  Surgeon: Yair Carver MD;  Location: [facility] OR;  Service: Orthopedics;  Laterality: Left;   • TUBAL ABDOMINAL LIGATION         Family History: family history includes Heart murmur in her father; No Known Problems in her mother. Otherwise pertinent FHx was reviewed and not  "pertinent to current issue.    Social History:  reports that she has never smoked. She has never used smokeless tobacco. She reports current alcohol use. She reports that she does not use drugs.    Home Medications:  Prior to Admission Medications     Prescriptions Last Dose Informant Patient Reported? Taking?    cholecalciferol (VITAMIN D3) 25 MCG (1000 UT) tablet   Yes No    Take 1,000 Units by mouth Daily.    docusate sodium (COLACE) 100 MG capsule   Yes No    Take 100 mg by mouth Every Evening.    furosemide (Lasix) 20 MG tablet   No No    Take 1 tablet by mouth 2 (Two) Times a Day.    levothyroxine (SYNTHROID, LEVOTHROID) 88 MCG tablet   No No    Take 1 tablet by mouth Every Morning. Take on an empty stomach!    loratadine (CLARITIN) 10 MG tablet   Yes No    Take 10 mg by mouth Every Evening.    metoprolol succinate XL (TOPROL-XL) 50 MG 24 hr tablet   No No    Take 1 tablet by mouth Daily.    multivitamin with minerals tablet tablet   Yes No    Take 1 tablet by mouth Daily.    oxybutynin XL (DITROPAN-XL) 10 MG 24 hr tablet   No No    Take 1 tablet by mouth Daily.    pantoprazole (PROTONIX) 40 MG EC tablet   Yes No    Take 40 mg by mouth Daily.    polyethylene glycol (MIRALAX) 17 GM/SCOOP powder   No No    mix 1 capful (17 g) in liquid by mouth Daily.    Patient taking differently:  Take 17 g by mouth Daily As Needed.    Polyvinyl Alcohol-Povidone PF (HYPOTEARS) 1.4-0.6 % ophthalmic solution   Yes No    1 drop.    potassium chloride (K-DUR,KLOR-CON) 20 MEQ CR tablet   No No    Take 1 tablet by mouth 2 (Two) Times a Day.    potassium chloride ER (K-TAB) 20 MEQ tablet controlled-release ER tablet   Yes No    Take 20 mEq by mouth Every Morning.            Allergies:  Allergies   Allergen Reactions   • Haloperidol Other (See Comments) and Unknown (See Comments)     Pt reports, \"my friends and my  went crazy on it so I want to avoid it.\" Pt denies ever receiving it.   Other reaction(s): Other (See " "Comments)  Pt reports, \"my friends and my  went crazy on it so I want to avoid it.\" Pt denies ever receiving it.        Objective      Vitals:   Temp:  [97.7 °F (36.5 °C)-98.5 °F (36.9 °C)] 98.5 °F (36.9 °C)  Heart Rate:  [] 103  Resp:  [16-18] 17  BP: (133-164)/() 151/95    Physical Exam  Vitals reviewed.   Constitutional:       Appearance: Normal appearance. She is normal weight.   HENT:      Head: Normocephalic and atraumatic.      Right Ear: External ear normal.      Left Ear: External ear normal.      Nose: Nose normal.      Mouth/Throat:      Mouth: Mucous membranes are moist.   Eyes:      Extraocular Movements: Extraocular movements intact.   Cardiovascular:      Rate and Rhythm: Normal rate and regular rhythm.      Pulses: Normal pulses.      Heart sounds: Normal heart sounds.   Pulmonary:      Effort: Pulmonary effort is normal.      Breath sounds: Normal breath sounds.   Abdominal:      Palpations: Abdomen is soft.   Genitourinary:     Comments: deferred  Musculoskeletal:         General: Normal range of motion.      Cervical back: Normal range of motion and neck supple.      Right lower leg: Edema present.      Left lower leg: Edema present.   Skin:     General: Skin is warm and dry.      Comments: BLE lymphedema with erythema and blistering    Neurological:      General: No focal deficit present.      Mental Status: She is alert and oriented to person, place, and time.   Psychiatric:         Mood and Affect: Mood normal.         Behavior: Behavior normal.         Thought Content: Thought content normal.         Judgment: Judgment normal.         Result Review    Result Review:  I have personally reviewed the results from the time of this admission to 9/23/2022 02:06 EDT and agree with these findings:  [x]  Laboratory  []  Microbiology  [x]  Radiology  [x]  EKG/Telemetry   []  Cardiology/Vascular   []  Pathology  [x]  Old records  []  Other:  Most notable findings include: BNP 2212. " "Troponin 0.015, Hgb 11.7    Venous duplex  · \"Acute right lower extremity deep vein thrombosis noted in the gastrocnemius.  · All other veins appeared normal bilaterally.\"        CXR  \"Mild pulmonary vascular congestion. Dependent opacities at the lung  bases favored represent atelectasis or mild pulmonary edema..[\"    Bilateral Hip X Ray    \"Fracture of the superior and inferior pubic ramus on the right      Assessment & Plan        Active Hospital Problems:  Active Hospital Problems    Diagnosis    • Closed fracture of multiple pubic rami, right, initial encounter ([facility])    • Acute deep vein thrombosis (DVT) of right lower extremity ([facility])    • Elevated brain natriuretic peptide (BNP) level    • Overactive bladder      Potentially exacerbated by diuretic use.  Will try a course of oxybutynin. Discussed anticholinergic SE.     • Presence of Watchman left atrial appendage closure device    • Pulmonary hypertension ([facility])      Echocardiogram in August 2022 revealed biatrial enlargement, moderate tricuspid regurgitation with right ventricular systolic pressure greater than 40 mmHg.  Left ventricular ejection fraction was preserved.  Continue diuretic therapy, Bumex 2 mg twice daily.  Will refer to cardiology.     • Essential hypertension      BP at goal, <140/90.  Encouraged low Na+ & 150 minutes of exercise/week.  Continue Bumex 2 mg twice daily and Toprol 25 mg daily.  Patient to monitor ambulatory BP and heart rate.  Monitoring renal function.     • Paroxysmal atrial fibrillation ([facility])      S/p Watchman procedure in September 2020. No anticoagulation therapy necessary. 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   Increase Toprol to 50 mg daily.  Denies any chest pain or palpitations.   Referred to cardiology.     • GERD (gastroesophageal reflux disease)    • Acquired hypothyroidism      Treated with levothyroxine 88 mcg.  Last TSH was WNL. Monitoring regularly.        Plan:    Closed fracture of the superior and " inferior pubic rami on the right, PT eval and treat and orthopedics consulted, fall precautions    DVT right lower extremity gastrocnemius one-time dose treatment Lovenox in ED, treatment Lovenox continued cardiology consulted    Elevated proBNP 2122, pulmonary edema mild process x-ray, stable on home oxygen, one-time dose 40 mg Lasix, IV, reordered home Lasix    Bilateral lower extremity lymphedema with blistering, treated for cellulitis in past WBC not elevated, does not appear to be infectious, wound care consulted    Persistent atrial fibrillation, atrial fibrillation on EKG rate controlled watchman device in place denies chest pain troponin not elevated continuous cardiac monitoring reordered home metoprolol succinate    GERD, reordered home Protonix    Acquired hypothyroidism, reordered home levothyroxine    Overactive bladder reordered home oxybutynin    Dietary supplementation, reordered home potassium multivitamin and vitamin D p.o.    Seasonal allergies formulary substitute for loratadine call cetirizine reordered    DVT prophylaxis:  Treatment Lovenox ordered  CODE STATUS:    Code Status (Patient has no pulse and is not breathing): CPR (Attempt to Resuscitate)  Medical Interventions (Patient has pulse or is breathing): Full Support    Admission Status:  I believe this patient meets observation status.    I discussed the patient's findings and my recommendations with patient.    This patient has been examined wearing appropriate Personal Protective Equipment . 09/23/22      Signature: Electronically signed by LIAN Sanders, 09/23/22, 2:15 AM EDT.    Closed fracture    Electronically signed by Tania Kendrick APRN at 09/23/22 0220       Physician Progress Notes (last 24 hours)  Notes from 09/22/22 1052 through 09/23/22 1052   No notes of this type exist for this encounter.         Physical Therapy Notes (last 24 hours)  Notes from 09/22/22 1052 through 09/23/22 1052   No notes exist for  this encounter.         Occupational Therapy Notes (last 24 hours)  Notes from 09/22/22 1052 through 09/23/22 1052   No notes exist for this encounter.

## 2022-09-23 NOTE — ED NOTES
Nursing report ED to floor  Marychuy Verdugo  82 y.o.  female    HPI:   Chief Complaint   Patient presents with   • Fall       Admitting doctor:   Kaya Banks MD    Admitting diagnosis:   The primary encounter diagnosis was Generalized weakness. Diagnoses of Pubic ramus fracture, right, closed, initial encounter (HCC) and Acute deep vein thrombosis (DVT) of calf muscle vein of right lower extremity (HCC) were also pertinent to this visit.    Code status:   Current Code Status     Date Active Code Status Order ID Comments User Context       9/22/2022 2055 CPR (Attempt to Resuscitate) [id number]  Tania Kendrick APRN ED     Advance Care Planning Activity      Questions for Current Code Status     Question Answer    Code Status (Patient has no pulse and is not breathing) CPR (Attempt to Resuscitate)    Medical Interventions (Patient has pulse or is breathing) Full Support          Allergies:   Haloperidol    Isolation:  No active isolations     Fall Risk:  Fall Risk Assessment was completed, and patient is at moderate risk for falls.   Predictive Model Details         2 (Low) Factor Value    Calculated 9/22/2022 18:06 Age 82    Risk of Fall Model Musculoskeletal Assessment WDL     Imaging order in this encounter Present     Skin Assessment WDL     Magnesium 1.8 mg/dL     Financial Class Medicare     Drug Use No     Ankit Scale not on file     Peripheral Vascular Assessment WDL     Albumin 3.7 g/dL     Cardiac Assessment X     Chloride 101 mmol/L     Gastrointestinal Assessment WDL     Diastolic BP 94     Respiratory Rate 16     Total Bilirubin 0.4 mg/dL     Days after Admission 0.074     Potassium 4.2 mmol/L     Creatinine 0.83 mg/dL     Calcium 9.6 mg/dL     ALT 15 U/L         Weight:       09/22/22  1612   Weight: 93 kg (205 lb 0.4 oz)       Intake and Output  No intake or output data in the 24 hours ending 09/22/22 2103    Diet:   Dietary Orders (From admission, onward)     Start     Ordered    09/22/22  2054  Diet Regular  Diet Effective Now        Question:  Diet / Texture / Consistency  Answer:  Regular    09/22/22 2055                 Most recent vitals:   Vitals:    09/22/22 1714 09/22/22 1732 09/22/22 1747 09/22/22 2002   BP: 148/94 152/71 133/80 153/81   Pulse: 91 113 96 98   Resp:    18   Temp:       TempSrc:       SpO2: 100% 97% 100% 99%   Weight:       Height:           Active LDAs/IV Access:   Lines, Drains & Airways     Active LDAs     None                Skin Condition:   Skin Assessments (last day)     Date/Time Skin WDL    09/22/22 17:31:35 WDL           Labs (abnormal labs have a star):   Labs Reviewed   COMPREHENSIVE METABOLIC PANEL - Abnormal; Notable for the following components:       Result Value    Glucose 103 (*)     Alkaline Phosphatase 118 (*)     All other components within normal limits    Narrative:     GFR Normal >60  Chronic Kidney Disease <60  Kidney Failure <15     CBC WITH AUTO DIFFERENTIAL - Abnormal; Notable for the following components:    Hemoglobin 11.7 (*)     RDW 19.0 (*)     RDW-SD 62.6 (*)     Lymphocyte % 16.5 (*)     Basophil % 2.1 (*)     All other components within normal limits   BNP (IN-HOUSE) - Abnormal; Notable for the following components:    proBNP 2,212.0 (*)     All other components within normal limits    Narrative:     Among patients with dyspnea, NT-proBNP is highly sensitive for the detection of acute congestive heart failure. In addition NT-proBNP of <300 pg/ml effectively rules out acute congestive heart failure with 99% negative predictive value.    Results may be falsely decreased if patient taking Biotin.     RESPIRATORY PANEL PCR W/ COVID-19 (SARS-COV-2) AILYN/ABHIJEET/DARYL/PAD/COR/MAD/CARI IN-HOUSE, NP SWAB IN UTM/VTP, 3-4 HR TAT - Normal    Narrative:     In the setting of a positive respiratory panel with a viral infection PLUS a negative procalcitonin without other underlying concern for bacterial infection, consider observing off antibiotics or  discontinuation of antibiotics and continue supportive care. If the respiratory panel is positive for atypical bacterial infection (Bordetella pertussis, Chlamydophila pneumoniae, or Mycoplasma pneumoniae), consider antibiotic de-escalation to target atypical bacterial infection.   URINALYSIS W/ CULTURE IF INDICATED - Normal    Narrative:     In absence of clinical symptoms, the presence of pyuria, bacteria, and/or nitrites on the urinalysis result does not correlate with infection.  Urine microscopic not indicated.   TSH - Normal   MAGNESIUM - Normal   TROPONIN (IN-HOUSE) - Normal    Narrative:     Troponin T Reference Range:  <= 0.03 ng/mL-   Negative for AMI  >0.03 ng/mL-     Abnormal for myocardial necrosis.  Clinicians would have to utilize clinical acumen, EKG, Troponin and serial changes to determine if it is an Acute Myocardial Infarction or myocardial injury due to an underlying chronic condition.       Results may be falsely decreased if patient taking Biotin.     POCT GLUCOSE FINGERSTICK   CBC AND DIFFERENTIAL    Narrative:     The following orders were created for panel order CBC & Differential.  Procedure                               Abnormality         Status                     ---------                               -----------         ------                     CBC Auto Differential[[id number]]        Abnormal            Final result                 Please view results for these tests on the individual orders.   EXTRA TUBES    Narrative:     The following orders were created for panel order Extra Tubes.  Procedure                               Abnormality         Status                     ---------                               -----------         ------                     Gold Top - SST[[id number]]                                   Final result               Light Blue Top[[id number]]                                   Final result                 Please view results for these tests on the  individual orders.   GOLD TOP - [facility]   LIGHT BLUE TOP       LOC: Person, Place, Time and Situation    Telemetry:  Med/Surg    Cardiac Monitoring Ordered: yes    EKG:   ECG 12 Lead   Preliminary Result   HEART RATE= 87  bpm   RR Interval= 689  ms   WY Interval=   ms   P Horizontal Axis=   deg   P Front Axis=   deg   QRSD Interval= 93  ms   QT Interval= 384  ms   QRS Axis= -45  deg   T Wave Axis= -2  deg   - ABNORMAL ECG -   Atrial fibrillation   Paired ventricular premature complexes   Low voltage, extremity and precordial leads   Nonspecific T abnormalities, anterior leads   When compared with ECG of 21-Aug-2022 10:00:11,   Significant rate decrease   Electronically Signed By:    Date and Time of Study: 2022-09-22 19:24:17          Medications Given in the ED:   Medications   sodium chloride 0.9 % flush 10 mL (has no administration in time range)   Enoxaparin Sodium (LOVENOX) syringe 90 mg (has no administration in time range)   furosemide (LASIX) injection 40 mg (has no administration in time range)   HYDROcodone-acetaminophen (NORCO) 5-325 MG per tablet 1 tablet (has no administration in time range)   ondansetron (ZOFRAN) injection 4 mg (has no administration in time range)       Imaging results:  CT Head Without Contrast    Result Date: 9/22/2022  No acute intracranial abnormality.  Electronically Signed ByNick Guerra On:9/22/2022 7:26 PM This report was finalized on [id number] by  Bruno Guerra, .    XR Chest 1 View    Result Date: 9/22/2022  IMPRESSION : Mild pulmonary vascular congestion. Dependent opacities at the lung bases favored represent atelectasis or mild pulmonary edema..[  Electronically Signed By-Bruno Guerra On:9/22/2022 7:03 PM This report was finalized on [id number] by  Bruno Guerra, .    XR Knee 1 or 2 View Bilateral    Result Date: 9/22/2022  Diffuse osteopenia limits detection of subtle abnormalities.  Degenerative changes noted bilaterally  No definite fracture  identified  Electronically Signed ByNick Guerra On:9/22/2022 7:02 PM This report was finalized on [id number] by  Bruno Guerra, .    XR Hips Bilateral With or Without Pelvis 2 View    Result Date: 9/22/2022  Fracture of the superior and inferior pubic ramus on the right  Electronically Signed ByNick Guerra On:9/22/2022 7:00 PM This report was finalized on [id number] by  Bruno Guerra, .      Social issues:   Social History     Socioeconomic History   • Marital status:    Tobacco Use   • Smoking status: Never Smoker   • Smokeless tobacco: Never Used   Vaping Use   • Vaping Use: Never used   Substance and Sexual Activity   • Alcohol use: Yes     Comment: ON OCC   • Drug use: Never   • Sexual activity: Defer       NIH Stroke Scale:  Interval: (not recorded)  1a. Level of Consciousness: (not recorded)  1b. LOC Questions: (not recorded)  1c. LOC Commands: (not recorded)  2. Best Gaze: (not recorded)  3. Visual: (not recorded)  4. Facial Palsy: (not recorded)  5a. Motor Arm, Left: (not recorded)  5b. Motor Arm, Right: (not recorded)  6a. Motor Leg, Left: (not recorded)  6b. Motor Leg, Right: (not recorded)  7. Limb Ataxia: (not recorded)  8. Sensory: (not recorded)  9. Best Language: (not recorded)  10. Dysarthria: (not recorded)  11. Extinction and Inattention (formerly Neglect): (not recorded)    Total (NIH Stroke Scale): (not recorded)     Additional notable assessment information:.     Nursing report ED to floor:  .    Lauro Brewer RN   09/22/22 21:03 EDT

## 2022-09-23 NOTE — THERAPY EVALUATION
Patient Name: Marychuy Verdugo  : 1940    MRN: [id number]                              Today's Date: 2022       Admit Date: 2022    Visit Dx:     ICD-10-CM ICD-9-CM   1. Generalized weakness  R53.1 780.79   2. Pubic ramus fracture, right, closed, initial encounter ([facility])  S32.591A 808.2   3. Acute deep vein thrombosis (DVT) of calf muscle vein of right lower extremity ([facility])  I82.461 453.42   4. Closed fracture of multiple pubic rami, right, initial encounter ([facility])  S32.599T 808.2     Patient Active Problem List   Diagnosis   • Abnormal cardiovascular stress test   • Paroxysmal atrial fibrillation ([facility])   • Chronic anticoagulation   • GERD (gastroesophageal reflux disease)   • Essential hypertension   • Acquired hypothyroidism   • Knee osteoarthritis   • Moderate tricuspid regurgitation   • Pulmonary hypertension ([facility])   • Orbit fracture, right ([facility])   • Asymptomatic reticular veins 1 mm to 3 mm in diameter   • Presence of Watchman left atrial appendage closure device   • S/P left atrial appendage ligation   • Overactive bladder   • Dermatitis associated with moisture   • Status post total hip replacement, left   • Lymphedema of both lower extremities   • Ptosis of left eyelid   • Left oculomotor nerve palsy   • Diplopia   • Bilateral pseudophakia   • Closed fracture of multiple pubic rami, right, initial encounter ([facility])   • Acute deep vein thrombosis (DVT) of right lower extremity ([facility])   • Elevated brain natriuretic peptide (BNP) level     Past Medical History:   Diagnosis Date   • Atrial fibrillation ([facility]) 10/10/2013   • Chronic anticoagulation 2020   • Chronic constipation    • Diplopia 2022   • GERD (gastroesophageal reflux disease) 2013   • Headache 2022   • Hematuria     STATES ALWAY HAVE HAD SOME BLOOD IN URINE.   • Hypothyroid 2013   • Leaky heart valve     ECHO-   • Left oculomotor nerve palsy 2022   • Presence of Watchman left atrial appendage closure  device    • Shortness of breath    • Shortness of breath    • Swelling of both lower extremities      Past Surgical History:   Procedure Laterality Date   • CATARACT EXTRACTION W/ INTRAOCULAR LENS IMPLANT      LEFT AND RIGHT   • ORBITAL FRACTURE SURGERY Left     FLOOR   • OTHER SURGICAL HISTORY      WATCHMAN PLACED FOR HISTORY OF A FIB   • TEMPORAL ARTERY BIOPSY Bilateral 8/4/2022    Procedure: BILATERAL  TEMPORAL ARTERY BIOPSY;  Surgeon: Marco A Silveira MD;  Location: [facility] MAIN OR;  Service: Ophthalmology;  Laterality: Bilateral;   • TOTAL HIP ARTHROPLASTY Left 02/12/2022    Procedure: TOTAL HIP ARTHROPLASTY ANTERIOR;  Surgeon: Yair Carver MD;  Location: [facility] MAIN OR;  Service: Orthopedics;  Laterality: Left;   • TUBAL ABDOMINAL LIGATION        General Information     Row Name 09/23/22 1250          Physical Therapy Time and Intention    Document Type evaluation  -EL     Mode of Treatment individual therapy;physical therapy  -EL     Row Name 09/23/22 1250          General Information    Patient Profile Reviewed yes  -EL     Prior Level of Function independent:;all household mobility;ADL's;driving  -EL     Row Name 09/23/22 1250          Living Environment    People in Home alone;other (see comments)  family and neighbors check on pt consistently  -EL     Row Name 09/23/22 1250          Home Main Entrance    Number of Stairs, Main Entrance one  -EL     Row Name 09/23/22 1250          Stairs Within Home, Primary    Number of Stairs, Within Home, Primary none  -EL     Row Name 09/23/22 1250          Cognition    Orientation Status (Cognition) oriented x 3  -EL     Row Name 09/23/22 1250          Safety Issues, Functional Mobility    Impairments Affecting Function (Mobility) balance  -EL           User Key  (r) = Recorded By, (t) = Taken By, (c) = Cosigned By    Initials Name Provider Type    Mateo West PT Physical Therapist               Mobility     Row Name 09/23/22 1421          Bed Mobility    Bed  Mobility supine-sit  -EL     Supine-Sit [place] (Bed Mobility) moderate assist (50% patient effort)  -EL     Row Name 09/23/22 1421          Bed-Chair Transfer    Bed-Chair [place] (Transfers) minimum assist (75% patient effort);2 person assist  -EL     Assistive Device (Bed-Chair Transfers) walker, front-wheeled  -EL     Row Name 09/23/22 1421          Sit-Stand Transfer    Sit-Stand [place] (Transfers) minimum assist (75% patient effort);2 person assist  -EL     Assistive Device (Sit-Stand Transfers) walker, front-wheeled  -EL     Row Name 09/23/22 1421          Gait/Stairs (Locomotion)    [place] Level (Gait) minimum assist (75% patient effort);2 person assist  -EL     Assistive Device (Gait) walker, front-wheeled  -EL     Distance in Feet (Gait) 5  -EL     Deviations/Abnormal Patterns (Gait) gait speed decreased;stride length decreased  -EL           User Key  (r) = Recorded By, (t) = Taken By, (c) = Cosigned By    Initials Name Provider Type    Mateo West PT Physical Therapist               Obj/Interventions     Row Name 09/23/22 1427          Range of Motion Comprehensive    General Range of Motion bilateral lower extremity ROM WFL  -     Row Name 09/23/22 1427          Strength Comprehensive (MMT)    General Manual Muscle Testing (MMT) Assessment lower extremity strength deficits identified  -EL     Comment, General Manual Muscle Testing (MMT) Assessment 3/5 gross, painful with hip mobility  -     Row Name 09/23/22 1427          Balance    Balance Assessment sitting static balance;standing dynamic balance;standing static balance  -EL     Static Sitting Balance contact guard  -EL     Static Standing Balance minimal assist  -EL     Dynamic Standing Balance minimal assist  -EL           User Key  (r) = Recorded By, (t) = Taken By, (c) = Cosigned By    Initials Name Provider Type    Mateo West PT Physical Therapist               Goals/Plan     Row Name 09/23/22 1441          Bed  Mobility Goal 1 (PT)    Activity/Assistive Device (Bed Mobility Goal 1, PT) bed mobility activities, all  -EL     Teller Level/Cues Needed (Bed Mobility Goal 1, PT) modified independence  -EL     Time Frame (Bed Mobility Goal 1, PT) long term goal (LTG);2 weeks  -EL     Row Name 09/23/22 1441          Transfer Goal 1 (PT)    Activity/Assistive Device (Transfer Goal 1, PT) transfers, all;walker, rolling  -EL     Teller Level/Cues Needed (Transfer Goal 1, PT) modified independence  -EL     Time Frame (Transfer Goal 1, PT) long term goal (LTG);2 weeks  -EL     Row Name 09/23/22 1441          Gait Training Goal 1 (PT)    Activity/Assistive Device (Gait Training Goal 1, PT) gait (walking locomotion);walker, rolling  -EL     Teller Level (Gait Training Goal 1, PT) modified independence  -EL     Distance (Gait Training Goal 1, PT) 150  -EL     Time Frame (Gait Training Goal 1, PT) long term goal (LTG);2 weeks  -EL     Row Name 09/23/22 1441          Therapy Assessment/Plan (PT)    Planned Therapy Interventions (PT) balance training;neuromuscular re-education;bed mobility training;transfer training;gait training;patient/family education;strengthening;stair training  -EL           User Key  (r) = Recorded By, (t) = Taken By, (c) = Cosigned By    Initials Name Provider Type    Mateo West, PT Physical Therapist               Clinical Impression     Row Name 09/23/22 1434          Pain    Pretreatment Pain Rating 8/10  -EL     Posttreatment Pain Rating 8/10  -EL     Pain Location - Side/Orientation Right  -EL     Pain Location - hip  -EL     Row Name 09/23/22 1434          Plan of Care Review    Plan of Care Reviewed With patient;son  -EL     Outcome Evaluation Pt is an 81 YO F s/p multiple falls at home, imaging revealing pubic ramus frx. Pt states she lives at home alone, typically is independent with all ADLs, ambulation with RWx and several recent falls. Pt this date with significant pain, but willing  to participate. Pt requiring Ax2 to come to sitting EOB with increased time, MIN A x2 to stand and ambulate short distance. PT providing VC for AD management and pt demosntrates fair carryover of cueing. Recommendation is acute IP rehab following d/c. PT to follow while admitted.  -EL     Row Name 09/23/22 1434          Therapy Assessment/Plan (PT)    Rehab Potential (PT) good, to achieve stated therapy goals  -EL     Criteria for Skilled Interventions Met (PT) yes;skilled treatment is necessary  -EL     Therapy Frequency (PT) 5 times/wk  -EL     Predicted Duration of Therapy Intervention (PT) Until d/c  -EL     Row Name 09/23/22 1434          Vital Signs    O2 Delivery Pre Treatment room air  -EL     O2 Delivery Intra Treatment room air  -EL     O2 Delivery Post Treatment room air  -EL     Pre Patient Position Supine  -EL     Intra Patient Position Standing  -EL     Post Patient Position Sitting  -EL     Row Name 09/23/22 1434          Positioning and Restraints    Pre-Treatment Position in bed  -EL     Post Treatment Position chair  -EL     In Chair notified nsg;reclined;exit alarm on;with family/caregiver;encouraged to call for assist  -EL           User Key  (r) = Recorded By, (t) = Taken By, (c) = Cosigned By    Initials Name Provider Type    EL Mateo Boone, PT Physical Therapist               Outcome Measures     Row Name 09/23/22 1442 09/23/22 0840       How much help from another person do you currently need...    Turning from your back to your side while in flat bed without using bedrails? 2  -EL 2  -AK    Moving from lying on back to sitting on the side of a flat bed without bedrails? 2  -EL 2  -AK    Moving to and from a bed to a chair (including a wheelchair)? 2  -EL 2  -AK    Standing up from a chair using your arms (e.g., wheelchair, bedside chair)? 2  -EL 2  -AK    Climbing 3-5 steps with a railing? 2  -EL 2  -AK    To walk in hospital room? 2  -EL 2  -AK    AM-PAC 6 Clicks Score (PT) 12  -EL 12  -AK     Highest level of mobility 4 --> Transferred to chair/commode  -EL 4 --> Transferred to chair/commode  -AK    Row Name 09/23/22 1442          Functional Assessment    Outcome Measure Options AM-PAC 6 Clicks Basic Mobility (PT)  -EL           User Key  (r) = Recorded By, (t) = Taken By, (c) = Cosigned By    Initials Name Provider Type    EL Mateo Boone, PT Physical Therapist    Zeny Verma RN Registered Nurse                             Physical Therapy Education                 Title: PT OT SLP Therapies (Done)     Topic: Physical Therapy (Done)     Point: Mobility training (Done)     Learning Progress Summary           Patient Acceptance, E,TB, VU by EL at 9/23/2022 1442                   Point: Precautions (Done)     Learning Progress Summary           Patient Acceptance, E,TB, VU by EL at 9/23/2022 1442                               User Key     Initials Effective Dates Name Provider Type Discipline     06/23/20 -  Mateo Boone, PT Physical Therapist PT              PT Recommendation and Plan  Planned Therapy Interventions (PT): balance training, neuromuscular re-education, bed mobility training, transfer training, gait training, patient/family education, strengthening, stair training  Plan of Care Reviewed With: patient, son  Outcome Evaluation: Pt is an 81 YO F s/p multiple falls at home, imaging revealing pubic ramus frx. Pt states she lives at home alone, typically is independent with all ADLs, ambulation with RWx and several recent falls. Pt this date with significant pain, but willing to participate. Pt requiring Ax2 to come to sitting EOB with increased time, MIN A x2 to stand and ambulate short distance. PT providing VC for AD management and pt demosntrates fair carryover of cueing. Recommendation is acute IP rehab following d/c. PT to follow while admitted.     Time Calculation:    PT Charges     Row Name 09/23/22 1443             Time Calculation    Start Time 0943  -EL      Stop Time 1024  -EL       Time Calculation (min) 41 min  -EL      PT Received On 09/23/22  -SHANNAN      PT - Next Appointment 09/25/22  -SHANNAN      PT Goal Re-Cert Due Date 10/07/22  -EL            User Key  (r) = Recorded By, (t) = Taken By, (c) = Cosigned By    Initials Name Provider Type    Mateo West PT Physical Therapist              Therapy Charges for Today     Code Description Service Date Service Provider Modifiers Qty    [id number] HC PT EVAL MOD COMPLEXITY 4 9/23/2022 Mateo Boone, PT GP 1          PT G-Codes  Outcome Measure Options: AM-PAC 6 Clicks Basic Mobility (PT)  AM-PAC 6 Clicks Score (PT): 12    Mateo Boone PT  9/23/2022

## 2022-09-23 NOTE — NURSING NOTE
WOCN note:    82 yr old female admitted 9/22/22 after multiple falls at home. WOCN consult received for BLE lymphedema.     Son is present at the bedside and reports that the PCP had applied Unna boots in the office and patient had a referral to the outpatient wound center but was unable to ambulate to the car for the appointment. Son also reported that patient has an appointment at a lymphedema clinic but not until late October.     Patient states she is unable to don compression stockings. Instructed patient and son on typical treatment at an outpatient wound center vs lymphedema clinic. Instructed on options for compression garments or wraps and/or pumps.     Patient presents with non-pitting BLE edema not including the feet which is consistent with lymphedema. There is no erythema, warmth, induration, wounds or blistering noted. Dorsalis pedis pulses are palpable. The feet are warm and dry.     Hydraguard silicone cream was applied to the legs and feet. Kerlix and ACE wraps were applied from the base of the toes to below the knee bilaterally.   Recommend wraps as above with follow up at outpatient wound center or lymphedema clinic after discharge. We will continue to follow as needed.

## 2022-09-23 NOTE — THERAPY EVALUATION
Patient Name: Marychuy Verdugo  : 1940    MRN: [id number]                              Today's Date: 2022       Admit Date: 2022    Visit Dx:     ICD-10-CM ICD-9-CM   1. Generalized weakness  R53.1 780.79   2. Pubic ramus fracture, right, closed, initial encounter ([facility])  S32.591A 808.2   3. Acute deep vein thrombosis (DVT) of calf muscle vein of right lower extremity ([facility])  I82.461 453.42   4. Closed fracture of multiple pubic rami, right, initial encounter ([facility])  S32.594V 808.2     Patient Active Problem List   Diagnosis   • Abnormal cardiovascular stress test   • Paroxysmal atrial fibrillation ([facility])   • Chronic anticoagulation   • GERD (gastroesophageal reflux disease)   • Essential hypertension   • Acquired hypothyroidism   • Knee osteoarthritis   • Moderate tricuspid regurgitation   • Pulmonary hypertension ([facility])   • Orbit fracture, right ([facility])   • Asymptomatic reticular veins 1 mm to 3 mm in diameter   • Presence of Watchman left atrial appendage closure device   • S/P left atrial appendage ligation   • Overactive bladder   • Dermatitis associated with moisture   • Status post total hip replacement, left   • Lymphedema of both lower extremities   • Ptosis of left eyelid   • Left oculomotor nerve palsy   • Diplopia   • Bilateral pseudophakia   • Closed fracture of multiple pubic rami, right, initial encounter ([facility])   • Acute deep vein thrombosis (DVT) of right lower extremity ([facility])   • Elevated brain natriuretic peptide (BNP) level     Past Medical History:   Diagnosis Date   • Atrial fibrillation ([facility]) 10/10/2013   • Chronic anticoagulation 2020   • Chronic constipation    • Diplopia 2022   • GERD (gastroesophageal reflux disease) 2013   • Headache 2022   • Hematuria     STATES ALWAY HAVE HAD SOME BLOOD IN URINE.   • Hypothyroid 2013   • Leaky heart valve     ECHO-   • Left oculomotor nerve palsy 2022   • Presence of Watchman left atrial appendage closure  device    • Shortness of breath    • Shortness of breath    • Swelling of both lower extremities      Past Surgical History:   Procedure Laterality Date   • CATARACT EXTRACTION W/ INTRAOCULAR LENS IMPLANT      LEFT AND RIGHT   • ORBITAL FRACTURE SURGERY Left     FLOOR   • OTHER SURGICAL HISTORY      WATCHMAN PLACED FOR HISTORY OF A FIB   • TEMPORAL ARTERY BIOPSY Bilateral 8/4/2022    Procedure: BILATERAL  TEMPORAL ARTERY BIOPSY;  Surgeon: Marco A Silveira MD;  Location: [facility] MAIN OR;  Service: Ophthalmology;  Laterality: Bilateral;   • TOTAL HIP ARTHROPLASTY Left 02/12/2022    Procedure: TOTAL HIP ARTHROPLASTY ANTERIOR;  Surgeon: Yair Carver MD;  Location: [facility] MAIN OR;  Service: Orthopedics;  Laterality: Left;   • TUBAL ABDOMINAL LIGATION        General Information     Row Name 09/23/22 1436 09/23/22 0952       OT Time and Intention    Document Type evaluation  -MP evaluation  -MP    Mode of Treatment occupational therapy  -MP individual therapy  -MP    Row Name 09/23/22 1436 09/23/22 0952       General Information    Patient Profile Reviewed -- yes  -MP    Prior Level of Function independent:;ADL's  -MP independent:;ADL's  -MP    Row Name 09/23/22 1436 09/23/22 0952       Living Environment    People in Home alone  -MP alone  -MP    Row Name 09/23/22 1436 09/23/22 0952       Cognition    Orientation Status (Cognition) oriented x 3  -MP oriented x 3  -MP    Row Name 09/23/22 1436          Safety Issues, Functional Mobility    Impairments Affecting Function (Mobility) balance;endurance/activity tolerance;strength  -MP           User Key  (r) = Recorded By, (t) = Taken By, (c) = Cosigned By    Initials Name Provider Type    MP Tima Mendez OT Occupational Therapist                 Mobility/ADL's     Row Name 09/23/22 1437          Bed Mobility    Bed Mobility supine-sit  -MP     Supine-Sit [place] (Bed Mobility) moderate assist (50% patient effort)  -MP     Row Name 09/23/22 1437           Transfers    Bed-Chair [place] (Transfers) minimum assist (75% patient effort);2 person assist  -MP     Assistive Device (Bed-Chair Transfers) walker, front-wheeled  -MP     Sit-Stand [place] (Transfers) minimum assist (75% patient effort);2 person assist  -MP     Row Name 09/23/22 1437          Sit-Stand Transfer    Assistive Device (Sit-Stand Transfers) walker, front-wheeled  -MP     Row Name 09/23/22 1437          Activities of Daily Living    BADL Assessment/Intervention lower body dressing  -MP     Row Name 09/23/22 1437          Lower Body Dressing Assessment/Training    [place] Level (Lower Body Dressing) don;doff;socks;dependent (less than 25% patient effort)  -MP           User Key  (r) = Recorded By, (t) = Taken By, (c) = Cosigned By    Initials Name Provider Type    Tima Whitley OT Occupational Therapist               Obj/Interventions     Row Name 09/23/22 1440          Range of Motion Comprehensive    Comment, General Range of Motion BUE WFL  -MP     [facility] Name 09/23/22 1440          Strength Comprehensive (MMT)    Comment, General Manual Muscle Testing (MMT) Assessment BUE 4-/5  -MP     [facility] Name 09/23/22 1440          Balance    Balance Interventions sitting;standing;sit to stand;supported;static;dynamic  -MP           User Key  (r) = Recorded By, (t) = Taken By, (c) = Cosigned By    Initials Name Provider Type    Tima Whitley OT Occupational Therapist               Goals/Plan     Row Name 09/23/22 1446          Bed Mobility Goal 1 (OT)    Activity/Assistive Device (Bed Mobility Goal 1, OT) bed mobility activities, all  -MP     [place] Level/Cues Needed (Bed Mobility Goal 1, OT) minimum assist (75% or more patient effort)  -MP     Time Frame (Bed Mobility Goal 1, OT) long term goal (LTG);2 weeks  -MP     Row Name 09/23/22 1446          Transfer Goal 1 (OT)    Activity/Assistive Device (Transfer Goal 1, OT) sit-to-stand/stand-to-sit;toilet  -MP     [place]  Level/Cues Needed (Transfer Goal 1, OT) minimum assist (75% or more patient effort)  -MP     Time Frame (Transfer Goal 1, OT) long term goal (LTG);2 weeks  -MP     Row Name 09/23/22 1446          Dressing Goal 1 (OT)    Activity/Device (Dressing Goal 1, OT) lower body dressing  -MP     [place]/Cues Needed (Dressing Goal 1, OT) minimum assist (75% or more patient effort)  -MP     Time Frame (Dressing Goal 1, OT) long term goal (LTG);2 weeks  -MP           User Key  (r) = Recorded By, (t) = Taken By, (c) = Cosigned By    Initials Name Provider Type    Tima Whitley, CHEL Occupational Therapist               Clinical Impression     Row Name 09/23/22 1442          Pain Assessment    Pretreatment Pain Rating 8/10  -MP     Posttreatment Pain Rating 8/10  -MP     Row Name 09/23/22 1442          Plan of Care Review    Plan of Care Reviewed With patient  -MP     Progress no change  -MP     Outcome Evaluation Pt is an 81 YO F s/p multiple falls at home, imaging revealing pubic ramus frx. Pt states she lives at home alone, typically is independent with all ADLs, ambulation with RWx and several recent falls. Pt. requires mod-min A x 2 for short ambulatory transfer from EOB to armchair, limited standing tolerance secondary to fatigue. Pt. requires complete assist for all LB ADLs this date. Recommend acute IP rehab at d/c to address aforementioned deficits.  -MP     Row Name 09/23/22 1442          Therapy Assessment/Plan (OT)    Rehab Potential (OT) good, to achieve stated therapy goals  -MP     Criteria for Skilled Therapeutic Interventions Met (OT) yes;skilled treatment is necessary  -MP     Therapy Frequency (OT) 3 times/wk  -MP     Row Name 09/23/22 1442          Therapy Plan Review/Discharge Plan (OT)    Anticipated Discharge Disposition (OT) inpatient rehabilitation facility  -MP     Row Name 09/23/22 1442          Vital Signs    Pre Patient Position Supine  -MP     Intra Patient Position Standing  -MP     Post  Patient Position Sitting  -MP     Row Name 09/23/22 1442          Positioning and Restraints    Pre-Treatment Position in bed  -MP     Post Treatment Position chair  -MP     In Chair sitting;call light within reach;encouraged to call for assist;exit alarm on  -MP           User Key  (r) = Recorded By, (t) = Taken By, (c) = Cosigned By    Initials Name Provider Type    Tima Whitley OT Occupational Therapist               Outcome Measures     Row Name 09/23/22 1442 09/23/22 0840       How much help from another person do you currently need...    Turning from your back to your side while in flat bed without using bedrails? 2  -EL 2  -AK    Moving from lying on back to sitting on the side of a flat bed without bedrails? 2  -EL 2  -AK    Moving to and from a bed to a chair (including a wheelchair)? 2  -EL 2  -AK    Standing up from a chair using your arms (e.g., wheelchair, bedside chair)? 2  -EL 2  -AK    Climbing 3-5 steps with a railing? 2  -EL 2  -AK    To walk in hospital room? 2  -EL 2  -AK    AM-PAC 6 Clicks Score (PT) 12  -EL 12  -AK    Highest level of mobility 4 --> Transferred to chair/commode  -EL 4 --> Transferred to chair/commode  -AK    Row Name 09/23/22 1442          Functional Assessment    Outcome Measure Options AM-PAC 6 Clicks Basic Mobility (PT)  -EL           User Key  (r) = Recorded By, (t) = Taken By, (c) = Cosigned By    Initials Name Provider Type    Mateo West, PT Physical Therapist    Zeny Verma, RN Registered Nurse                  OT Recommendation and Plan  Therapy Frequency (OT): 3 times/wk  Plan of Care Review  Plan of Care Reviewed With: patient  Progress: no change  Outcome Evaluation: Pt is an 81 YO F s/p multiple falls at home, imaging revealing pubic ramus frx. Pt states she lives at home alone, typically is independent with all ADLs, ambulation with RWx and several recent falls. Pt. requires mod-min A x 2 for short ambulatory transfer from EOB to armchair, limited  standing tolerance secondary to fatigue. Pt. requires complete assist for all LB ADLs this date. Recommend acute IP rehab at d/c to address aforementioned deficits.     Time Calculation:    Time Calculation- OT     Row Name 09/23/22 1446             Time Calculation- OT    OT Start Time 0943  -MP      OT Stop Time 1022  -      OT Time Calculation (min) 39 min  -      Total Timed Code Minutes- OT 10 minute(s)  -      OT Received On 09/23/22  -      OT - Next Appointment 09/26/22  -      OT Goal Re-Cert Due Date 10/07/22  -            User Key  (r) = Recorded By, (t) = Taken By, (c) = Cosigned By    Initials Name Provider Type    Tima Whitley OT Occupational Therapist              Therapy Charges for Today     Code Description Service Date Service Provider Modifiers Qty    16802801311  OT EVAL LOW COMPLEXITY 4 9/23/2022 Tima Mendez OT GO 1    [id number]  OT THERAPEUTIC ACT EA 15 MIN 9/23/2022 Tima Mendez OT GO 1               Tima Mendez OT  9/23/2022

## 2022-09-23 NOTE — DISCHARGE INSTR - ACTIVITY
BLE: Cleanse with bath cloths and moisturize with Hydraguard silicone cream. Apply kerlix and ACE wraps from the base of the toes to below the knee. Re-wrap as needed to maintain light compression.

## 2022-09-23 NOTE — PLAN OF CARE
Goal Outcome Evaluation:  Plan of Care Reviewed With: patient, son           Outcome Evaluation: Pt is an 83 YO F s/p multiple falls at home, imaging revealing pubic ramus frx. Pt states she lives at home alone, typically is independent with all ADLs, ambulation with RWx and several recent falls. Pt this date with significant pain, but willing to participate. Pt requiring Ax2 to come to sitting EOB with increased time, MIN A x2 to stand and ambulate short distance. PT providing VC for AD management and pt demosntrates fair carryover of cueing. Recommendation is acute IP rehab following d/c. PT to follow while admitted.

## 2022-09-23 NOTE — DISCHARGE SUMMARY
[facility] Medicine Services  Discharge Summary    Date of Service: 22  Patient Name: Marychuy Verdugo  : 1940  MRN: [id number]    Date of Admission: 2022  Discharge Diagnosis: Pubic rami fracture, acute DVT  Date of Discharge:  22  Primary Care Physician: Kristin Francis APRN      Presenting Problem:   Generalized weakness [R53.1]  Pubic ramus fracture, right, closed, initial encounter ([facility]) [S32.591A]  Closed fracture of multiple pubic rami, right, initial encounter ([facility]) [S32.591A]  Acute deep vein thrombosis (DVT) of calf muscle vein of right lower extremity ([facility]) [I82.461]    Active and Resolved Hospital Problems:  Active Hospital Problems    Diagnosis POA   • Closed fracture of multiple pubic rami, right, initial encounter ([facility]) [S32.591A] Yes   • Acute deep vein thrombosis (DVT) of right lower extremity ([facility]) [I82.401] Yes   • Elevated brain natriuretic peptide (BNP) level [R79.89] Unknown   • Overactive bladder [N32.81] Yes   • Presence of Watchman left atrial appendage closure device [Z95.818] Yes   • Pulmonary hypertension ([facility]) [I27.20] Yes   • Essential hypertension [I10] Yes   • Paroxysmal atrial fibrillation ([facility]) [I48.0] Yes   • GERD (gastroesophageal reflux disease) [K21.9] Yes   • Acquired hypothyroidism [E03.9] Yes      Resolved Hospital Problems   No resolved problems to display.     Hospital Course     Hospital Course:  Marychuy Verdugo is a 82 y.o. female who presented to [facility] on 2022 complaining of generalized weakness and frequent falls of the past several weeks.  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  She denies any chest pain shortness of air or precipitating factors to the falls and uses a Rollator at home.  She is awake and alert and a relatively good historian.  She reports since her most recent fall several days ago she has had right hip and knee pain.  She  "also has chronic lymphedema in her bilateral lower extremities are blistered.  Wound care has been consulted.  BNP was mildly elevated at 2122.  Chest x-ray per radiology showed some \"mild pulmonary vascular congestion\" dependent opacities at the lung bases favor represent atelectasis or mild pulmonary edema\".  She was given a one-time dose of 40 mg Lasix.  Bilateral hip x-ray today showed \"fracture of the superior and inferior pubic ramus on the right\".  PT eval and treat has been ordered and orthopedics has been consulted.  Right venous Doppler also showed an acute right lower extremity DVT noted in the gastrocnemius.  She has a past medical history of atrial fibrillation and has had a watchman's device placed.  She was given a one-time dose of Lovenox treatment dosing in ED and cardiology has been consulted regarding anticoagulation with a watchman device.  Troponin was 0.015 and hemoglobin was 11.7 and stable.  Past medical history also includes GERD, essential hypertension, acquired hypothyroidism, pulmonary hypertension, overactive bladder.  Pt was admitted to observation and Eliquis was ordered, for her acute DVT.  Pt was evaluated by PT and OT, and is in need for SNF and more therapy.  She was accepted in facility and is discharged to them.  Pt should f/u with her PCP within a week post discharge and f/u with cardiology and ortho as out pt, as she progresses with therapy.  Son and daughter, were informed.  Pt is agreeable to plan.    DISCHARGE Follow Up Recommendations for labs and diagnostics: All work up is done.    Reason For Change In Medications and Indications for New Medications:  Eliquis was prescribed for acute DVT.    Day of Discharge     Vital Signs:  Temp:  [97.4 °F (36.3 °C)-98.5 °F (36.9 °C)] 97.5 °F (36.4 °C)  Heart Rate:  [] 99  Resp:  [15-19] 19  BP: (131-164)/() 131/86      Physical Exam:     Constitutional:       Appearance: Normal appearance. She is normal weight.   HENT:      " Head: Normocephalic and atraumatic.      Right Ear: External ear normal.      Left Ear: External ear normal.      Nose: Nose normal.      Mouth/Throat:      Mouth: Mucous membranes are moist.   Eyes:      Extraocular Movements: Extraocular movements intact.   Cardiovascular:      Rate and Rhythm: Irregularly irregular rhythm..      Pulses: Normal pulses.      Heart sounds: Normal heart sounds.   Pulmonary:      Effort: Pulmonary effort is normal.      Breath sounds: Normal breath sounds.   Abdominal:      Palpations: Abdomen is soft.   Musculoskeletal:         General: Normal range of motion.      Cervical back: Normal range of motion and neck supple.      Right lower leg: Edema present.      Left lower leg: Edema present.   Skin:     General: Skin is warm and dry.      Comments: BLE lymphedema with erythema and blistering    Neurological:      General: No focal deficit present.      Mental Status: She is alert and oriented to person, place, and time.   Psychiatric:         Mood and Affect: Mood normal.         Behavior: Behavior normal.         Thought Content: Thought content normal.         Judgment: Judgment normal.          Pertinent  and/or Most Recent Results     LAB RESULTS:      Lab 09/22/22  1713   WBC 5.50   HEMOGLOBIN 11.7*   HEMATOCRIT 36.4   PLATELETS 326   NEUTROS ABS 3.80   LYMPHS ABS 0.90   MONOS ABS 0.60   EOS ABS 0.10   MCV 95.0         Lab 09/22/22  1713   SODIUM 136   POTASSIUM 4.2   CHLORIDE 101   CO2 24.0   ANION GAP 11.0   BUN 12   CREATININE 0.83   EGFR 70.9   GLUCOSE 103*   CALCIUM 9.6   MAGNESIUM 1.8   TSH 2.030         Lab 09/22/22  1713   TOTAL PROTEIN 6.3   ALBUMIN 3.70   GLOBULIN 2.6   ALT (SGPT) 15   AST (SGOT) 26   BILIRUBIN 0.4   ALK PHOS 118*         Lab 09/22/22  1713   PROBNP 2,212.0*   TROPONIN T 0.015                 Brief Urine Lab Results  (Last result in the past 365 days)      Color   Clarity   Blood   Leuk Est   Nitrite   Protein   CREAT   Urine HCG        09/22/22 1722  Yellow   Clear   Negative   Negative   Negative   Negative               Microbiology Results (last 10 days)     Procedure Component Value - Date/Time    Respiratory Panel PCR w/COVID-19(SARS-CoV-2) [facility]/MAD/[facility]I In-House, NP Swab in UTM/VTM, 3-4 HR TAT - Swab, Nasopharynx [[id number]]  (Normal) Collected: 09/22/22 1718    Lab Status: Final result Specimen: Swab from Nasopharynx Updated: 09/22/22 1828     ADENOVIRUS, PCR Not Detected     Coronavirus 229E Not Detected     Coronavirus HKU1 Not Detected     Coronavirus NL63 Not Detected     Coronavirus OC43 Not Detected     COVID19 Not Detected     Human Metapneumovirus Not Detected     Human Rhinovirus/Enterovirus Not Detected     Influenza A PCR Not Detected     Influenza B PCR Not Detected     Parainfluenza Virus 1 Not Detected     Parainfluenza Virus 2 Not Detected     Parainfluenza Virus 3 Not Detected     Parainfluenza Virus 4 Not Detected     RSV, PCR Not Detected     Bordetella pertussis pcr Not Detected     Bordetella parapertussis PCR Not Detected     Chlamydophila pneumoniae PCR Not Detected     Mycoplasma pneumo by PCR Not Detected    Narrative:      In the setting of a positive respiratory panel with a viral infection PLUS a negative procalcitonin without other underlying concern for bacterial infection, consider observing off antibiotics or discontinuation of antibiotics and continue supportive care. If the respiratory panel is positive for atypical bacterial infection (Bordetella pertussis, Chlamydophila pneumoniae, or Mycoplasma pneumoniae), consider antibiotic de-escalation to target atypical bacterial infection.          CT Head Without Contrast    Result Date: 9/22/2022  Impression: No acute intracranial abnormality.  Electronically Signed By-Bruno Guerra On:9/22/2022 7:26 PM This report was finalized on [id number] by  Bruno Guerra, .    XR Chest 1 View    Result Date: 9/22/2022  Impression: IMPRESSION : Mild pulmonary  vascular congestion. Dependent opacities at the lung bases favored represent atelectasis or mild pulmonary edema..[  Electronically Signed By-Bruno Guerra On:9/22/2022 7:03 PM This report was finalized on [id number] by  Bruno Guerra, .    XR Knee 1 or 2 View Bilateral    Result Date: 9/22/2022  Impression: Diffuse osteopenia limits detection of subtle abnormalities.  Degenerative changes noted bilaterally  No definite fracture identified  Electronically Signed By-Bruno Guerra On:9/22/2022 7:02 PM This report was finalized on [id number] by  Bruno Guerra, Abigail    XR Hips Bilateral With or Without Pelvis 2 View    Result Date: 9/22/2022  Impression: Fracture of the superior and inferior pubic ramus on the right  Electronically Signed By-Bruno Guerra On:9/22/2022 7:00 PM This report was finalized on [id number] by  Bruno Guerra, .      Results for orders placed during the hospital encounter of 09/22/22    Duplex Venous Lower Extremity - Bilateral CAR    Interpretation Summary  · Acute right lower extremity deep vein thrombosis noted in the gastrocnemius.  · All other veins appeared normal bilaterally.      Results for orders placed during the hospital encounter of 09/22/22    Duplex Venous Lower Extremity - Bilateral CAR    Interpretation Summary  · Acute right lower extremity deep vein thrombosis noted in the gastrocnemius.  · All other veins appeared normal bilaterally.      Results for orders placed during the hospital encounter of 08/21/22    Adult Transthoracic Echo Complete W/ Cont if Necessary Per Protocol    Interpretation Summary  · Estimated left ventricular EF = 60% Left ventricular systolic function is normal.    Indications  Arrhythmia    Technically satisfactory study.  Mitral valve is structurally normal.  Tricuspid valve is structurally normal.  Moderate tricuspid regurgitation with calculated pulmonary artery pressure 40 mmHg.  Aortic valve is structurally  normal.  Pulmonic valve could not be well visualized.  No evidence for mitral or aortic regurgitation is seen by Doppler study.  Biatrial enlargement.  Left ventricle is normal in size and contractility with ejection fraction of 60%.  Right ventricle is normal in size.  Atrial septum is intact.  Aorta is normal.  No pericardial effusion or intracardiac thrombus is seen.    Impression  Biatrial enlargement.  Moderate tricuspid regurgitation with calculated pulmonary artery pressure 40 mmHg.  Left ventricular size and contractility is normal with ejection fraction of 60%.      Labs Pending at Discharge:      Procedures Performed           Consults:   Consults     Date and Time Order Name Status Description    9/22/2022  8:55 PM Inpatient Cardiology Consult Completed     9/22/2022  8:55 PM Inpatient Orthopedic Surgery Consult Completed             Discharge Details        Discharge Medications      New Medications      Instructions Start Date   apixaban 5 MG tablet tablet  Commonly known as: ELIQUIS   10 mg, Oral, 2 Times Daily      apixaban 5 MG tablet tablet  Commonly known as: ELIQUIS   5 mg, Oral, 2 Times Daily   Start Date: September 30, 2022     HYDROcodone-acetaminophen 5-325 MG per tablet  Commonly known as: NORCO   1 tablet, Oral, Every 6 Hours PRN         Changes to Medications      Instructions Start Date   levothyroxine 88 MCG tablet  Commonly known as: SYNTHROID, LEVOTHROID  What changed: additional instructions   88 mcg, Oral, Every Early Morning   Start Date: September 24, 2022     polyethylene glycol 17 GM/SCOOP powder  Commonly known as: MIRALAX  What changed:   · when to take this  · reasons to take this   mix 1 capful (17 g) in liquid by mouth Daily.         Continue These Medications      Instructions Start Date   cholecalciferol 25 MCG (1000 UT) tablet  Commonly known as: VITAMIN D3   1,000 Units, Oral, Daily      docusate sodium 100 MG capsule  Commonly known as: COLACE   100 mg, Oral, Every  "Evening      furosemide 20 MG tablet  Commonly known as: Lasix   20 mg, Oral, 2 Times Daily      loratadine 10 MG tablet  Commonly known as: CLARITIN   10 mg, Oral, Every Evening      metoprolol succinate XL 50 MG 24 hr tablet  Commonly known as: TOPROL-XL   50 mg, Oral, Daily      multivitamin with minerals tablet tablet   1 tablet, Oral, Daily      oxybutynin XL 10 MG 24 hr tablet  Commonly known as: DITROPAN-XL   10 mg, Oral, Daily      pantoprazole 40 MG EC tablet  Commonly known as: PROTONIX   40 mg, Oral, Daily      Polyvinyl Alcohol-Povidone PF 1.4-0.6 % ophthalmic solution  Commonly known as: HYPOTEARS   1 drop      potassium chloride 20 MEQ CR tablet  Commonly known as: K-DUR,KLOR-CON   20 mEq, Oral, 2 Times Daily         Stop These Medications    potassium chloride ER 20 MEQ tablet controlled-release ER tablet  Commonly known as: K-TAB            Allergies   Allergen Reactions   • Haloperidol Other (See Comments) and Unknown (See Comments)     Pt reports, \"my friends and my  went crazy on it so I want to avoid it.\" Pt denies ever receiving it.   Other reaction(s): Other (See Comments)  Pt reports, \"my friends and my  went crazy on it so I want to avoid it.\" Pt denies ever receiving it.          Discharge Disposition:   Skilled Nursing Facility (DC - External)    Diet:  Hospital:  Diet Order   Procedures   • Diet Regular         Discharge Activity:         CODE STATUS:  Code Status and Medical Interventions:   Ordered at: 09/22/22 2055     Code Status (Patient has no pulse and is not breathing):    CPR (Attempt to Resuscitate)     Medical Interventions (Patient has pulse or is breathing):    Full Support         Future Appointments   Date Time Provider Department Center   10/3/2022 11:15 AM Kristin Francis APRN [facility]   10/21/2022  8:00 AM Volodymyr Cummings DO [facility]   10/24/2022  2:45 PM Robyn Keita OTR [facility]   11/2/2022  1:45 PM Tracy Sharma, " MD [facility]   5/17/2023 11:15 AM Yair Carver MD [facility]           Time spent on Discharge including face to face service:  44 minutes    Signature: Yari Leiva DO.

## 2022-09-23 NOTE — NURSING NOTE
Patient arrived to the unit and was moved to the bed with a bed board and staff assisting. She stated she is at a 2 in pain at this time and has been closing eyes in between care. Patient has no concerns at this time Care plan is ongoing

## 2022-09-23 NOTE — CONSULTS
Cardiology Consult-EP      REQUESTING PROVIDER  Yari Leiva DO    PATIENT IDENTIFICATION  Name: Marychuy Verdugo  Age: 82 y.o.  Sex: female  :  1940  MRN: [id number]             REASON  FOR  CONSULTATION  82 yr/o female who has a past medical history of permanent AF, pulmonary HTN, hypothyroid, and S/P Watchman's device. This patient presents for follow up of permanent atrial fibrillation and her Watchman's device that was implanted on 2020. Echo BRADLEY from 10/30/2020 showed device with satisfactory position and appendage wall apposition, adequate compression, no demonstrated adherent thrombus, and no abnormal flow jets or significant leaks around the device. She is treated with metoprolol and no anti-arrhythmic medications. Her hypothroid is treated with Synthroid and her last TSH was WNL. Her CHADSVaSC score is 4 giving a yearly stroke risk of 4.0% and she has had LAAO with a Watchman device and is on aspirin.  Additional past medical history of cardiomyopathy, GERD, primary hypertension, lung nodule diagnosed 10/10/2013, chronic lymphedema lower extremities.    BRADLEY 10/30/2020:  S/P #24 Watchman device with satisfactory position and appendage wall apposition, adequate compression, no demonstrated adherent thrombus, and no abnormal flow jets or significant leaks around the device. Moderate to severely dilated left atrium. The ejection fraction by 3D echo was calculated at 61%. Global left ventricular wall motion and contractility are within normal limits. Trace-to-mild aortic regurgitation Trace-to-mild mitral regurgitation. Right ventricular systolic pressure of 46 mmHg. Moderate tricuspid regurgitation. Trivial circumferential pericardial effusion without tamponade physiology. Mobile echo dense structure 4.5 mm in diameter in the pericardial sinus next to atrial appendage likely fibrinous material in the pericardial effusion. Mild dilation of the aortic root.    TTE 2022: Normal  LV size and contractility, EF 60%.  Moderate TR with pulmonary artery pressure 40 mmHg.  Biatrial enlargement.        Cardiologist:  Dr. Clyde Muller      CC:  Right hip pain secondary to mechanical fall    HPI:  Patient presented to the emergency department [facility] 9/22/2022 for evaluation of right hip pain.  The patient had a fall several days ago and has had ongoing right hip pain.  Evaluation in the emergency department showed BNP elevated at 2122.  Chest x-ray with mild pulmonary vascular congestion.  She was given one-time dose Lasix 40 mg.  Imaging confirmed fracture of the superior and inferior pubic ramus on the right.  Orthopedic surgeon evaluated today and recommended nonsurgical management.  Cardiology was consulted for further evaluation and treatment of elevated BNP/pulmonary vascular congestion.    Upon my evaluation, patient is sitting in bedside chair with son at bedside.  Patient denies any shortness of breath at present, however the son states over the past 6 months the patient has had increased shortness of breath with and without activity.  He stated she has had atrial fibrillation for many years and has never had cardioversion or ablation.  The patient denies any actual dizziness, lightheadedness, syncopal or near syncopal episodes.      REVIEW OF SYSTEMS:  Pertinent items are noted in HPI, all other systems reviewed and negative    OBJECTIVE   BNP 2212    ASSESSMENT  Mechanical fall with pelvic fracture  Permanent atrial fibrillation  Elevated chads vascular score  Left atrial appendage/watchman device implant with complete seal  Valvular heart disease  Essential hypertension  Lung nodule  Lower extremity edema      RECOMMENDATIONS  No plans for surgical intervention of pelvic fracture  Continue metoprolol for rate control  No anticoagulation indicated due to watchman  Patient wishes to transfer cardiology care to Dr. Sharma.  Information given to son.  Patient has been assessed  "by wound care and lower extremities wrapped  No further CV work-up planned            Vital Signs  Visit Vitals  /83 (BP Location: Left arm, Patient Position: Lying)   Pulse 112   Temp 97.7 °F (36.5 °C) (Oral)   Resp 18   Ht 175.3 cm (69\")   Wt 89.1 kg (196 lb 6.9 oz)   SpO2 96%   BMI 29.01 kg/m²     Oxygen Therapy  SpO2: 96 %  Pulse Oximetry Type: Intermittent  Device (Oxygen Therapy): room air  Flowsheet Rows    Flowsheet Row First Filed Value   Admission Height 175.3 cm (69\") Documented at 09/22/2022 1612   Admission Weight 93 kg (205 lb 0.4 oz) Documented at 09/22/2022 1612        Intake & Output (last 3 days)       09/20 0701  09/21 0700 09/21 0701  09/22 0700 09/22 0701  09/23 0700 09/23 0701  09/24 0700    Urine (mL/kg/hr)   1700     Total Output   1700     Net   -1700                 Lines, Drains & Airways     Active LDAs     Name Placement date Placement time Site Days    Peripheral IV 09/22/22 2115 Left Antecubital 09/22/22 2115  Antecubital  less than 1                MEDICAL HISTORY    Past Medical History:   Diagnosis Date   • Atrial fibrillation (HCC) 10/10/2013   • Chronic anticoagulation 05/01/2020   • Chronic constipation    • Diplopia 07/08/2022   • GERD (gastroesophageal reflux disease) 02/11/2013   • Headache 07/01/2022   • Hematuria     STATES ALWAY HAVE HAD SOME BLOOD IN URINE.   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   • Presence of Watchman left atrial appendage closure device    • Shortness of breath    • Shortness of breath    • Swelling of both lower extremities         SURGICAL HISTORY    Past Surgical History:   Procedure Laterality Date   • CATARACT EXTRACTION W/ INTRAOCULAR LENS IMPLANT      LEFT AND RIGHT   • ORBITAL FRACTURE SURGERY Left     FLOOR   • OTHER SURGICAL HISTORY      WATCHMAN PLACED FOR HISTORY OF A FIB   • TEMPORAL ARTERY BIOPSY Bilateral 8/4/2022    Procedure: BILATERAL  TEMPORAL ARTERY BIOPSY;  " "Surgeon: Marco A Silveira MD;  Location: [facility] MAIN OR;  Service: Ophthalmology;  Laterality: Bilateral;   • TOTAL HIP ARTHROPLASTY Left 02/12/2022    Procedure: TOTAL HIP ARTHROPLASTY ANTERIOR;  Surgeon: Yair Carver MD;  Location: [facility] MAIN OR;  Service: Orthopedics;  Laterality: Left;   • TUBAL ABDOMINAL LIGATION          FAMILY HISTORY    Family History   Problem Relation Age of Onset   • No Known Problems Mother    • Heart murmur Father    • Malig Hyperthermia Neg Hx        SOCIAL HISTORY    Social History     Tobacco Use   • Smoking status: Never Smoker   • Smokeless tobacco: Never Used   Substance Use Topics   • Alcohol use: Yes     Comment: ON OCC        ALLERGIES    Allergies   Allergen Reactions   • Haloperidol Other (See Comments) and Unknown (See Comments)     Pt reports, \"my friends and my  went crazy on it so I want to avoid it.\" Pt denies ever receiving it.   Other reaction(s): Other (See Comments)  Pt reports, \"my friends and my  went crazy on it so I want to avoid it.\" Pt denies ever receiving it.               /83 (BP Location: Left arm, Patient Position: Lying)   Pulse 112   Temp 97.7 °F (36.5 °C) (Oral)   Resp 18   Ht 175.3 cm (69\")   Wt 89.1 kg (196 lb 6.9 oz)   SpO2 96%   BMI 29.01 kg/m²   Intake/Output last 3 shifts:  I/O last 3 completed shifts:  In: -   Out: 1700 [Urine:1700]  Intake/Output this shift:  No intake/output data recorded.    PHYSICAL EXAM:    General: Well-developed, well-nourished 82-year-old female who is alert, cooperative, no distress, appears stated age  Head:  Normocephalic, atraumatic, mucous membranes moist  Eyes:  Conjunctivae/corneas clear, EOM's intact     Neck:  Supple,  no adenopathy;      Lungs: Clear to auscultation bilaterally, no wheezes, rhonchi or rales are noted  Chest wall: No tenderness  Heart::  Irregularly irregular rate and rhythm, S1 and S2 normal, no murmur, rub or gallop  Abdomen: Soft, nontender, nondistended, " bowel sounds active  Extremities: Dressing and aces to both lower extremities  Pulses: 2+ and symmetric all extremities  Skin:  No rashes or lesions  Neuro/psych: A&O x3. CN II through XII are grossly intact with appropriate affect      Scheduled Meds:      apixaban, 10 mg, Oral, BID   Followed by  [START ON 9/30/2022] apixaban, 5 mg, Oral, BID  cetirizine, 10 mg, Oral, Daily  cholecalciferol, 1,000 Units, Oral, Daily  docusate sodium, 100 mg, Oral, Q PM  furosemide, 20 mg, Oral, BID  levothyroxine, 88 mcg, Oral, Q AM  metoprolol succinate XL, 50 mg, Oral, Daily  multivitamin with minerals, 1 tablet, Oral, Daily  oxybutynin XL, 10 mg, Oral, Daily  pantoprazole, 40 mg, Oral, QAM AC  potassium chloride, 20 mEq, Oral, BID With Meals        Continuous Infusions:         PRN Meds:    HYDROcodone-acetaminophen  •  ondansetron  •  sodium chloride     Data Review:     Results Review:     I reviewed the patient's new clinical results.    CBC    Results from last 7 days   Lab Units 09/22/22  1713   WBC 10*3/mm3 5.50   HEMOGLOBIN g/dL 11.7*   PLATELETS 10*3/mm3 326     Cr Clearance Estimated Creatinine Clearance: 63.3 mL/min (by C-G formula based on SCr of 0.83 mg/dL).  Coag     HbA1C No results found for: HGBA1C  Blood Glucose No results found for: POCGLU  Infection     CMP   Results from last 7 days   Lab Units 09/22/22  1713   SODIUM mmol/L 136   POTASSIUM mmol/L 4.2   CHLORIDE mmol/L 101   CO2 mmol/L 24.0   BUN mg/dL 12   CREATININE mg/dL 0.83   GLUCOSE mg/dL 103*   ALBUMIN g/dL 3.70   BILIRUBIN mg/dL 0.4   ALK PHOS U/L 118*   AST (SGOT) U/L 26   ALT (SGPT) U/L 15     ABG      UA    Results from last 7 days   Lab Units 09/22/22  1722   NITRITE UA  Negative     BASIA  No results found for: POCMETH, POCAMPHET, POCBARBITUR, POCBENZO, POCCOCAINE, POCOPIATES, POCOXYCODO, POCPHENCYC, POCPROPOXY, POCTHC, POCTRICYC  Lysis Labs   Results from last 7 days   Lab Units 09/22/22  1713   HEMOGLOBIN g/dL 11.7*   PLATELETS 10*3/mm3 326    CREATININE mg/dL 0.83     Radiology(recent) CT Head Without Contrast    Result Date: 9/22/2022  No acute intracranial abnormality.  Electronically Signed ByNick Guerra On:9/22/2022 7:26 PM This report was finalized on [id number] by  Bruno Guerra, .    XR Chest 1 View    Result Date: 9/22/2022  IMPRESSION : Mild pulmonary vascular congestion. Dependent opacities at the lung bases favored represent atelectasis or mild pulmonary edema..[  Electronically Signed ByNick Guerra On:9/22/2022 7:03 PM This report was finalized on [id number] by  Bruno Guerra, .    XR Knee 1 or 2 View Bilateral    Result Date: 9/22/2022  Diffuse osteopenia limits detection of subtle abnormalities.  Degenerative changes noted bilaterally  No definite fracture identified  Electronically Signed ByNick Guerra On:9/22/2022 7:02 PM This report was finalized on [id number] by  Bruno Guerar .    XR Hips Bilateral With or Without Pelvis 2 View    Result Date: 9/22/2022  Fracture of the superior and inferior pubic ramus on the right  Electronically Signed ByNick Guerra On:9/22/2022 7:00 PM This report was finalized on [id number] by  Bruno Guerra, .        Results from last 7 days   Lab Units 09/22/22  1713   TROPONIN T ng/mL 0.015       X-rays, labs reviewed personally by physician.    ECG/EMG Results (most recent)     Procedure Component Value Units Date/Time    SCANNED - TELEMETRY   [[id number]] Resulted: 09/22/22     Updated: 09/23/22 0435    SCANNED - TELEMETRY   [[id number]] Resulted: 09/22/22     Updated: 09/23/22 0457    SCANNED - TELEMETRY   [[id number]] Resulted: 09/22/22     Updated: 09/23/22 0457    ECG 12 Lead [[id number]] Collected: 09/22/22 1924     Updated: 09/23/22 1013     QT Interval 363 ms     Narrative:      HEART RATE= 90  bpm  RR Interval= 664  ms  MD Interval=   ms  P Horizontal Axis=   deg  P Front Axis=   deg  QRSD Interval= 89  ms  QT Interval= 363  ms  QRS Axis= -51  deg  T  Wave Axis= -29  deg  - ABNORMAL ECG -  Atrial fibrillation  Low voltage, extremity and precordial leads  Electronically Signed By:   Date and Time of Study: 2022-09-22 19:25:04            Medication Review:   I have reviewed the patient's current medication list  Scheduled Meds:apixaban, 10 mg, Oral, BID   Followed by  [START ON 9/30/2022] apixaban, 5 mg, Oral, BID  cetirizine, 10 mg, Oral, Daily  cholecalciferol, 1,000 Units, Oral, Daily  docusate sodium, 100 mg, Oral, Q PM  furosemide, 20 mg, Oral, BID  levothyroxine, 88 mcg, Oral, Q AM  metoprolol succinate XL, 50 mg, Oral, Daily  multivitamin with minerals, 1 tablet, Oral, Daily  oxybutynin XL, 10 mg, Oral, Daily  pantoprazole, 40 mg, Oral, QAM AC  potassium chloride, 20 mEq, Oral, BID With Meals      Continuous Infusions:   PRN Meds:.HYDROcodone-acetaminophen  •  ondansetron  •  sodium chloride    Imaging:  Imaging Results (Last 72 Hours)     Procedure Component Value Units Date/Time    CT Head Without Contrast [[id number]] Collected: 09/22/22 1922     Updated: 09/22/22 1929    Narrative:      CT HEAD WO CONTRAST-     Date of Exam: 9/22/2022 6:20 PM     Indication: generalized weakness.     Comparison: 7/21/2022 and prior     Technique:  Contiguous axial CT images of the head were obtained without  contrast from skull base to vertex.  Coronal and sagittal  reconstructions were performed.  Automated exposure control and  iterative reconstruction methods were used.       FINDINGS:     There is a mild degree of motion artifact.        Brain/Ventricles: There is no acute intracranial hemorrhage, mass effect  or midline shift. No abnormal extra-axial fluid collection.  The  gray-white differentiation is maintained without evidence of an acute  infarct.  There is no evidence of hydrocephalus . Mild atrophy noted.  Mild white matter changes compatible small vessel ischemic disease are  similar to the prior study.     Orbits: The visualized portion of the orbits  demonstrate no acute  abnormality. Remote orbital floor fracture noted.     Sinuses:The visualized paranasal sinuses and mastoid air cells  demonstrate no acute abnormality.     Soft Tissues/Skull: No acute abnormality of the visualized skull or soft  tissues.       Impression:      No acute intracranial abnormality.     Electronically Signed By-Bruno Guerra On:9/22/2022 7:26 PM  This report was finalized on [id number] by  Bruno Guerra, .    XR Chest 1 View [[id number]] Collected: 09/22/22 1902     Updated: 09/22/22 1905    Narrative:         DATE OF EXAM:   9/22/2022 6:16 PM     PROCEDURE:   XR CHEST 1 VW-     INDICATIONS:   AMS Protocol     COMPARISON:  8/21/2022 and prior     TECHNIQUE:   Portable Chest     FINDINGS:      Study is limited by overlying support and monitoring apparatus.     The heart and mediastinal contours are stable in appearance. Mild  pulmonary vascular congestion noted. Increased hazy and interstitial  opacities are noted at the lung bases left greater than right. Findings  slightly increased from the comparison. Osseous structures demonstrate  no acute abnormality        Impression:      IMPRESSION :   Mild pulmonary vascular congestion. Dependent opacities at the lung  bases favored represent atelectasis or mild pulmonary edema..[     Electronically Signed By-Bruno Guerra On:9/22/2022 7:03 PM  This report was finalized on [id number] by  Bruno Guerra, .    XR Knee 1 or 2 View Bilateral [[id number]] Collected: 09/22/22 1900     Updated: 09/22/22 1904    Narrative:      DATE OF EXAM:  9/22/2022 6:21 PM     PROCEDURE:  XR KNEE 1 OR 2 VW BILATERAL-     INDICATIONS:  pain     COMPARISON:  No Comparisons Available     TECHNIQUE:   Two radiographic views of the bilateral knees were obtained.        FINDINGS:  Study limited by diffuse osteopenia.     Left knee: Moderate degenerative changes are noted in the lateral  compartments. Moderate degenerative changes are noted in the  "anterior  compartment. No significant joint effusion or soft tissue swelling  noted.     Right knee demonstrates moderate degenerative changes in the lateral  compartment. No definite acute fracture or dislocation noted. Mild  degenerative changes noted in the anterior compartment. No significant  joint effusion noted.        Impression:      Diffuse osteopenia limits detection of subtle abnormalities.     Degenerative changes noted bilaterally     No definite fracture identified     Electronically Signed By-Bruno Guerra On:9/22/2022 7:02 PM  This report was finalized on [id number] by  Bruno Guerra, .    XR Hips Bilateral With or Without Pelvis 2 View [[id number]] Collected: 09/22/22 1857     Updated: 09/22/22 1902    Narrative:      DATE OF EXAM:  9/22/2022 6:21 PM     PROCEDURE:  XR HIPS BILATERAL W OR WO PELVIS 2 VIEW-     INDICATIONS:  pain     COMPARISON:  3/2/2022     TECHNIQUE:   AP pelvis, AP hips, and frog-leg lateral hip views were obtained.     FINDINGS:  Fracture of the superior pubic ramus proximally near the acetabulum is  noted. Fracture of the midportion of the inferior pubic ramus also  noted.     Patient is status post total arthroplasty of the left hip.     Right hip demonstrates mild degenerative changes without definite acute  osseous abnormality        Impression:      Fracture of the superior and inferior pubic ramus on the right     Electronically Signed ByNick Guerra On:9/22/2022 7:00 PM  This report was finalized on [id number] by  Bruno Guerra, .            LIAN Palencia  09/23/22  10:57 EDT      EMR Dragon/Transcription:   \"Dictated utilizing Dragon dictation\".              Electronically signed by LIAN Palencia, 09/23/22, 10:57 AM EDT.      Patient is seen and examined.  Patient had a fall and sustained a pelvic fracture with a DVT which is being treated conservatively and she is awaiting to be transferred to rehab.  She has noticed more " shortness of breath in the last few weeks according to her.  She denies any chest pain.  Prior cardiac catheterization prior to watchman implantation according to the patient and family did not have significant coronary artery stenosis.  Recent echocardiography shows normal EF.  Patient had a prior watchman implantation and has chronic longstanding atrial fibrillation.  On physical examination bilateral lymphedema noted.  Bilateral lower extremities are wrapped up.  Lungs show decreased breath sound bases with moderate kyphosis.  Cardiovascular exam monitor shows rate controlled atrial fibrillation.  Labs x-rays and EKGs reviewed and discussed with the patient and family.  Patient is being transferred to rehab to recover from the pelvic fracture and also from DVT.  Continue beta-blockers and diuretics on this patient.  She can be followed as an outpatient in our office.    Electronically signed by Donato Aguilar MD, 09/23/22, 6:38 PM EDT.

## 2022-09-23 NOTE — CASE MANAGEMENT/SOCIAL WORK
Discharge Planning Assessment   Alejandro     Patient Name: Marychuy Verdugo  MRN: [id number]  Today's Date: 9/23/2022    Admit Date: 9/22/2022    Plan: DC plan: [facility], bed avail 9/23.   Discharge Needs Assessment     Row Name 09/23/22 1223       Living Environment    People in Home alone    Current Living Arrangements home    Primary Care Provided by self    Provides Primary Care For no one    Family Caregiver if Needed child(stephanie), adult    Family Caregiver Names Soren       Resource/Environmental Concerns    Resource/Environmental Concerns none    Transportation Concerns none       Transition Planning    Patient/Family Anticipates Transition to inpatient rehabilitation facility    Patient/Family Anticipated Services at Transition rehabilitation services    Transportation Anticipated family or friend will provide       Discharge Needs Assessment    Readmission Within the Last 30 Days no previous admission in last 30 days    Equipment Currently Used at Home rollator    Equipment Needed After Discharge none    Provided Post Acute Provider List? Yes    Post Acute Provider List Inpatient Rehab  pt provided option of [facility] or [facility]               Discharge Plan     Row Name 09/23/22 1225       Plan    Plan DC plan: [facility], bed avail 9/23.    Patient/Family in Agreement with Plan yes    Plan Comments Pt lives at home indepedently although has had some decline in July with increasing falls.  pcp and pharm verified.  supportive family.  denies transportation or financial concerns Family to transport at [place].  Referral sent to [facility], who confirmed acceptance and bed avail.  secure chat sent to nursing and MD.              Continued Care and Services - Admitted Since 9/22/2022     Destination     Service Provider Request Status Selected Services Address Phone Fax Patient Preferred    [facility]  Pending - Request Sent N/A [id number] [facility], [place] [address] [phone] --                 Demographic  Summary     Row Name 09/23/22 1223       General Information    Admission Type observation    Arrived From emergency department    Referral Source admission list    Reason for Consult discharge planning    Preferred Language English               Functional Status     Row Name 09/23/22 1223       Functional Status    Usual Activity Tolerance moderate    Current Activity Tolerance fair       Functional Status, IADL    Medications independent    Meal Preparation independent    Housekeeping assistive person;independent    Laundry independent;assistive person    Shopping assistive person              Met with patient in room wearing PPE: mask..      Maintained distance greater than six feet and spent less than 15 minutes in the room.          Angela Plaza RN

## 2022-10-24 PROBLEM — A41.9 SEPSIS, DUE TO UNSPECIFIED ORGANISM, UNSPECIFIED WHETHER ACUTE ORGAN DYSFUNCTION PRESENT (HCC): Status: ACTIVE | Noted: 2022-01-01

## 2022-10-29 PROBLEM — G93.41 METABOLIC ENCEPHALOPATHY: Status: ACTIVE | Noted: 2022-01-01

## 2022-11-01 PROBLEM — I50.23 ACUTE ON CHRONIC SYSTOLIC HEART FAILURE: Status: ACTIVE | Noted: 2022-01-01

## 2022-11-01 NOTE — CASE MANAGEMENT/SOCIAL WORK
Continued Stay Note  DAMIÁN Hanksyd     Patient Name: Marychuy Verdugo  MRN: [id number]  Today's Date: 11/1/2022    Admit Date: 10/24/2022    Plan: GIP with [facility] hospice.   Discharge Plan     Row Name 11/01/22 1354       Plan    Plan GIP with [facility] hospice.    Plan Comments CM discussed with Dodie from [facility] that patient is now meeting GIP criteria. Discharge readmit orders in at this time. Soha Martinez.              Phone communication or documentation only - no physical contact with patient or family.    Kareen Salas RN     Office Phone: [phone]  Office Cell: [phone]

## 2022-11-01 NOTE — PLAN OF CARE
Goal Outcome Evaluation:         Pt still tachycardic. Pt is comfort care. Pt still barely responding, only grimaces when you sternal rub. Other vital signs stable. Family visits during the day, and family said they want absolutely no medications except for comfort care medications.      Problem: Adult Inpatient Plan of Care  Goal: Plan of Care Review  Outcome: Ongoing, Progressing  Goal: Patient-Specific Goal (Individualized)  Outcome: Ongoing, Progressing  Goal: Absence of Hospital-Acquired Illness or Injury  Outcome: Ongoing, Progressing  Intervention: Identify and Manage Fall Risk  Recent Flowsheet Documentation  Taken 11/1/2022 0445 by Bette Ness RN  Safety Promotion/Fall Prevention:   activity supervised   assistive device/personal items within reach   clutter free environment maintained   nonskid shoes/slippers when out of bed   room organization consistent   safety round/check completed  Taken 11/1/2022 0158 by Bette Ness RN  Safety Promotion/Fall Prevention:   activity supervised   assistive device/personal items within reach   clutter free environment maintained  Taken 11/1/2022 0004 by Bette Ness RN  Safety Promotion/Fall Prevention:   activity supervised   assistive device/personal items within reach   clutter free environment maintained   nonskid shoes/slippers when out of bed   room organization consistent   safety round/check completed  Taken 10/31/2022 2215 by Bette Ness RN  Safety Promotion/Fall Prevention:   activity supervised   assistive device/personal items within reach   clutter free environment maintained   nonskid shoes/slippers when out of bed   room organization consistent   safety round/check completed  Taken 10/31/2022 2009 by Bette Ness RN  Safety Promotion/Fall Prevention:   activity supervised   assistive device/personal items within reach   clutter free environment maintained   nonskid shoes/slippers when out of bed   room organization consistent    safety round/check completed  Intervention: Prevent Skin Injury  Recent Flowsheet Documentation  Taken 10/31/2022 2009 by Bette Ness RN  Body Position: supine  Skin Protection:   adhesive use limited   incontinence pads utilized  Taken 10/31/2022 1955 by Bette Ness RN  Body Position:   turned   supine  Taken 10/31/2022 1800 by Bette Ness RN  Body Position:   turned   left   log-rolled  Intervention: Prevent and Manage VTE (Venous Thromboembolism) Risk  Recent Flowsheet Documentation  Taken 10/31/2022 2009 by Bette Ness RN  Activity Management: (pt is now comfort care)   bedrest   other (see comments)   unable to complete activity (see comments)  Range of Motion: (Unable to perform) other (see comments)  Intervention: Prevent Infection  Recent Flowsheet Documentation  Taken 11/1/2022 0445 by Bette Ness RN  Infection Prevention:   hand hygiene promoted   personal protective equipment utilized   rest/sleep promoted   single patient room provided   environmental surveillance performed  Taken 11/1/2022 0158 by Bette Ness RN  Infection Prevention:   hand hygiene promoted   personal protective equipment utilized   rest/sleep promoted   single patient room provided   environmental surveillance performed  Taken 11/1/2022 0004 by Bette Ness RN  Infection Prevention:   hand hygiene promoted   personal protective equipment utilized   rest/sleep promoted   single patient room provided   environmental surveillance performed  Taken 10/31/2022 2215 by Bette Ness RN  Infection Prevention:   hand hygiene promoted   personal protective equipment utilized   rest/sleep promoted   single patient room provided   environmental surveillance performed  Taken 10/31/2022 2009 by Bette Ness RN  Infection Prevention:   hand hygiene promoted   personal protective equipment utilized   rest/sleep promoted   single patient room provided   environmental surveillance performed  Goal: Optimal  Comfort and Wellbeing  Outcome: Ongoing, Progressing  Intervention: Monitor Pain and Promote Comfort  Recent Flowsheet Documentation  Taken 10/31/2022 2009 by Bette Ness RN  Pain Management Interventions:   position adjusted   pillow support provided  Intervention: Provide Person-Centered Care  Recent Flowsheet Documentation  Taken 10/31/2022 2009 by Bette Ness RN  Trust Relationship/Rapport: (pt only grimaces does not open eyes or speak.) other (see comments)  Goal: Readiness for Transition of Care  Outcome: Ongoing, Progressing     Problem: Skin Injury Risk Increased  Goal: Skin Health and Integrity  Outcome: Ongoing, Progressing  Intervention: Promote and Optimize Oral Intake  Recent Flowsheet Documentation  Taken 10/31/2022 2009 by Bette Ness RN  Oral Nutrition Promotion: (pt is now comfort care) other (see comments)  Intervention: Optimize Skin Protection  Recent Flowsheet Documentation  Taken 10/31/2022 2009 by Bette Ness RN  Pressure Reduction Techniques: weight shift assistance provided  Head of Bed (HOB) Positioning: HOB at 20-30 degrees  Pressure Reduction Devices: positioning supports utilized  Skin Protection:   adhesive use limited   incontinence pads utilized  Taken 10/31/2022 1955 by Bette Ness RN  Head of Bed (HOB) Positioning: HOB at 20-30 degrees  Taken 10/31/2022 1800 by Bette Ness RN  Head of Bed (HOB) Positioning: HOB at 20-30 degrees     Problem: Fall Injury Risk  Goal: Absence of Fall and Fall-Related Injury  Outcome: Ongoing, Progressing  Intervention: Identify and Manage Contributors  Recent Flowsheet Documentation  Taken 11/1/2022 0445 by Bette Ness RN  Medication Review/Management: medications reviewed  Taken 11/1/2022 0158 by Bette Ness RN  Medication Review/Management: medications reviewed  Taken 11/1/2022 0004 by Bette Ness RN  Medication Review/Management: medications reviewed  Taken 10/31/2022 2215 by Bette Ness  RN  Medication Review/Management: medications reviewed  Taken 10/31/2022 2009 by Bette Ness RN  Medication Review/Management: medications reviewed  Self-Care Promotion: other (see comments)  Intervention: Promote Injury-Free Environment  Recent Flowsheet Documentation  Taken 11/1/2022 0445 by Bette Ness RN  Safety Promotion/Fall Prevention:   activity supervised   assistive device/personal items within reach   clutter free environment maintained   nonskid shoes/slippers when out of bed   room organization consistent   safety round/check completed  Taken 11/1/2022 0158 by Bette Ness RN  Safety Promotion/Fall Prevention:   activity supervised   assistive device/personal items within reach   clutter free environment maintained  Taken 11/1/2022 0004 by Bette Ness RN  Safety Promotion/Fall Prevention:   activity supervised   assistive device/personal items within reach   clutter free environment maintained   nonskid shoes/slippers when out of bed   room organization consistent   safety round/check completed  Taken 10/31/2022 2215 by Bette Ness RN  Safety Promotion/Fall Prevention:   activity supervised   assistive device/personal items within reach   clutter free environment maintained   nonskid shoes/slippers when out of bed   room organization consistent   safety round/check completed  Taken 10/31/2022 2009 by Bette Ness RN  Safety Promotion/Fall Prevention:   activity supervised   assistive device/personal items within reach   clutter free environment maintained   nonskid shoes/slippers when out of bed   room organization consistent   safety round/check completed     Problem: Heart Failure Comorbidity  Goal: Maintenance of Heart Failure Symptom Control  Outcome: Ongoing, Progressing  Intervention: Maintain Heart Failure-Management  Recent Flowsheet Documentation  Taken 11/1/2022 0445 by Bette Ness RN  Medication Review/Management: medications reviewed  Taken 11/1/2022 0158 by  Bette Ness RN  Medication Review/Management: medications reviewed  Taken 11/1/2022 0004 by Bette Ness RN  Medication Review/Management: medications reviewed  Taken 10/31/2022 2215 by Bette Ness RN  Medication Review/Management: medications reviewed  Taken 10/31/2022 2009 by Bette Ness, RN  Medication Review/Management: medications reviewed

## 2022-11-01 NOTE — PLAN OF CARE
Goal Outcome Evaluation:  Plan of Care Reviewed With: patient           Outcome Evaluation: Patient has been resting well throughout the shift. Patient remains comfort measures. PRN meds given throughout the day. Have been turning patient every 2 hours. External cath remains in place. Family at bedside. Will continue to monitor.

## 2022-11-01 NOTE — DISCHARGE SUMMARY
[facility] Medicine Services  DISCHARGE SUMMARY    Patient Name: Marychuy Verdugo  : 1940  MRN: [id number]    Date of Admission: 10/24/2022  Discharge Diagnosis: mental status change  Date of Discharge:  22  Primary Care Physician: Provider, No Known      Presenting Problem:   Dehydration [E86.0]  Altered mental status, unspecified altered mental status type [R41.82]  Sepsis, due to unspecified organism, unspecified whether acute organ dysfunction present (HCC) [A41.9]    Active and Resolved Hospital Problems:  Active Hospital Problems    Diagnosis POA   • **Acute on chronic systolic heart failure (CMS/HCC) [I50.23] Unknown   • Acute deep vein thrombosis (DVT) of right lower extremity (HCC) [I82.401] Yes     Priority: Medium   • Elevated brain natriuretic peptide (BNP) level [R79.89] Yes     Priority: Medium   • Pulmonary hypertension (HCC) [I27.20] Yes     Priority: Medium   • Essential hypertension [I10] Yes     Priority: Medium   • Chronic constipation [K59.09] Unknown     Priority: Low   • Overactive bladder [N32.81] Yes     Priority: Low   • Chronic anticoagulation [Z79.01] Not Applicable     Priority: Low   • Knee osteoarthritis [M17.9] Yes     Priority: Low   • GERD (gastroesophageal reflux disease) [K21.9] Yes     Priority: Low   • Acquired hypothyroidism [E03.9] Yes     Priority: Low   • Metabolic encephalopathy [G93.41] Unknown   • Status post total hip replacement, 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     Acute encephalopathy-metabolic versus toxic  MRI brain showed findings suggestive of possible cerebritis  Was seen by neurologist who is concerned for cerebritis or paraneoplastic process  Had lumbar puncture 10/29/2022 which was essentially negative for infection.  IV  acyclovir was discontinued  Completed 10 doses of high-dose Solu-Medrol 500 mg twice daily  Continue supportive care     Elevated troponin  History of permanent A. fib  Possible demand ischemia  Was seen by cardiologist     Hypernatremia  Resolved with half-normal saline     Positive SIRS criteria on presentation  Tachycardia, leukocytosis, elevated lactic acid  Blood culture no growth  Chest x-ray no acute cardiopulmonary abnormality  UA insignificant  Initially on empirical antibiotics with Zosyn-ID recommended discontinuing Zosyn     Possible aspiration pneumonia  On supplementary oxygen  on Zosyn-10/29//22   ID following     Dehydration-on IV fluid     Deep vein thrombosis  Duplex venous lower extremity right-10/26/2022-chronic right lower extremity SVT in small saphenous and acute right lower extremity DVT in gastrocnemius  Held  home medication of Eliquis for  LP        Chronic atrial fibrillation  On metoprolol  Stated history of watchman device      Overactive bladder-Hold oxybutynin.     GERD-on PPI     Hypothyroidism-on Synthroid         Status post total hip replacement, left          Hospital Course     Hospital Course, History of Present Illness: Marychuy Verdugo is a 82 y.o. white female with multiple medical problems, past history significant for atrial fibrillation status post watchman procedure.  DVT on chronic anticoagulation,  Who presented to [facility] on 10/24/2022 complaining of change in mental status, decreased mentation, has had poor p.o. intake for the past 2 weeks.  Son at bedside reports that patient recently completed antibiotics for UTI.  She also recently had a fall and had a pubic ramus fracture, was at [facility] rehab facility and developed a DVT and is currently at [facility].  In the emergency room temperature was 98.7, heart rate 144 (AF).  WBC was 14, lactate 3.8 creatinine 1.3 TSH 0.61, UA showed 6+ bacterial, BMP on 7/21/2022 was 1771.     Chest x-ray IMPRESSION:  Stable  mildly enlarged cardiac silhouette.   No acute pulmonary abnormality is seen.    10/25/2022  Patient seen and examined  Continues with altered mental status     10/26/2022  Patient continues with altered mental status  Lethargic  Had EEG today which showed-severe encephalopathy     10/27/2022  Patient remains unresponsive     10/28/2022  Patient remains unresponsive  Fast team called this morning due to low blood pressure and oxygen desaturation.  Patient was given 1 L of normal saline with improvement  Was also put on oxygen supplement  Chest x-ray suggestive of atelectasis     10/29/2022  Patient seen and examined  Remains unresponsive-no significant improvement  LP planned for today- waiting for  PTT to decrease     10/30/2022  Patient seen and examined  Remains unresponsive  Had lumbar puncture 10/29/2022 which was essentially negative for infection.  IV acyclovir was discontinued     10/31/2022  Patient seen and examined  Family met with [facility] liaison yesterday.  Patient has not had any improvement since admission.  Family has decided to make her comfort measures with hospice.  At this time patient is not qualifying for [facility] inpatient hospice.  Will probably be discharged to extended-care facility with hospice  Continue comfort measures only at this time    11/1/22 dc and readmitted under hospice, condition terminal  Day of Discharge     Vital Signs:  Temp:  [97.5 °F (36.4 °C)-98.3 °F (36.8 °C)] 98.3 °F (36.8 °C)  Heart Rate:  [104-129] 124  Resp:  [18-20] 20  BP: (133-136)/(75-96) 136/96  Flow (L/min):  [4] 4      Physical Exam Constitutional:       Appearance: She is ill-appearing.      Comments: Obtunded   HENT:      Head: Normocephalic.      Nose: Nose normal.      Mouth/Throat:      Mouth: Mucous membranes are moist.   Eyes:      Conjunctiva/sclera: Conjunctivae normal.   Cardiovascular:      Rate and Rhythm: Normal rate. Rhythm irregular.   Pulmonary:      Comments: Diffuse decreased air entry at the  bases  Abdominal:      General: Bowel sounds are normal. There is no distension.      Palpations: Abdomen is soft.      Tenderness: There is no abdominal tenderness.   Musculoskeletal:      Comments: Degenerative changes   Skin:     General: Skin is warm.   Neurological:      Comments: Unable to evaluate           Pertinent  and/or Most Recent Results     LAB RESULTS:      Lab 10/31/22  0625 10/31/22  0452 10/30/22  0923 10/30/22  0028 10/29/22  1611 10/29/22  1332 10/29/22  0522 10/29/22  0521 10/28/22  1311 10/28/22  0848 10/28/22  0619 10/28/22  0320 10/27/22  0623 10/27/22  0336 10/26/22  2013 10/26/22  1234 10/26/22  0421 10/25/22  1500   WBC  --  8.80  --  8.50  --   --   --  8.00  --   --   --  6.80  --  8.20  --   --    < >  --    HEMOGLOBIN  --  13.2  --  12.9  --   --   --  13.1  --   --   --  13.8  --  14.4  --   --    < >  --    HEMATOCRIT  --  41.0  --  39.6  --   --   --  39.1  --   --   --  40.8  --  42.2  --   --    < >  --    PLATELETS  --  85*  --  92*  --   --   --  129*  --   --   --  138*  --  169  --   --    < >  --    NEUTROS ABS  --  7.74*  --  6.97  --   --   --  7.44*  --   --   --  6.12  --  7.30*  --   --    < >  --    MCV  --  95.9  --  94.7  --   --   --  95.0  --   --   --  91.4  --  92.8  --   --    < >  --    SED RATE  --   --   --   --   --   --   --   --   --   --   --   --   --   --   --   --   --  7   CRP  --   --   --   --   --   --   --   --   --   --   --   --   --   --   --   --   --  0.66*   LACTATE  --   --   --   --   --   --   --   --   --  2.0  --   --   --   --   --   --   --   --    PROTIME  --   --   --   --  10.5  --   --   --   --   --   --   --   --   --   --  11.7  --   --    APTT 22.2*  --  >200.0* >139.0*  --  24.1* 64.7  --    < >  --    < >  --    < >  --    < > 33.3*  --   --     < > = values in this interval not displayed.         Lab 10/31/22  0625 10/30/22  0923 10/30/22  0028 10/29/22  0521 10/28/22  0320 10/27/22  0336   SODIUM 149* 148*  --  145 140 145    POTASSIUM 3.0* 3.4*  --  4.2 2.8* 3.1*   CHLORIDE 118* 114*  --  115* 105 109*   CO2 21.0* 20.0*  --  21.0* 22.0 23.0   ANION GAP 10.0 14.0  --  9.0 13.0 13.0   BUN 14 15  --  18 18 26*   CREATININE 0.42* 0.50*  --  0.45* 0.45* 0.65   EGFR 98.4 94.4  --  96.8 96.8 88.6   GLUCOSE 91 129*  --  153* 153* 191*   CALCIUM 8.1* 8.2*  --  8.1* 7.8* 8.3*   MAGNESIUM 2.1  --  2.1 2.1 1.8 2.1   PHOSPHORUS  --   --   --   --   --  2.8         Lab 10/27/22  0336   TOTAL PROTEIN 5.3*   ALBUMIN 3.20*   GLOBULIN 2.1   ALT (SGPT) 22   AST (SGOT) 33*   BILIRUBIN 0.3   ALK PHOS 60         Lab 10/29/22  1611 10/26/22  1234 10/26/22  0420 10/25/22  1500   TROPONIN T  --   --  0.073* 0.145*   PROTIME 10.5 11.7  --   --    INR 1.02 1.14*  --   --              Lab 10/25/22  1500   FOLATE >20.00   VITAMIN B 12 1,349*         Lab 10/28/22  0851   PH, ARTERIAL 7.548*   PCO2, ARTERIAL 26.1*   PO2 ART 63.4*   O2 SATURATION ART 94.9   FIO2 36   HCO3 ART 22.8   BASE EXCESS ART 1.5     Brief Urine Lab Results  (Last result in the past 365 days)      Color   Clarity   Blood   Leuk Est   Nitrite   Protein   CREAT   Urine HCG        10/25/22 0103 Yellow   Cloudy   Negative   Small (1+)   Negative   Negative               Microbiology Results (last 10 days)     Procedure Component Value - Date/Time    Culture, CSF - Cerebrospinal Fluid, Lumbar Puncture [[id number]] Collected: 10/29/22 1515    Lab Status: Preliminary result Specimen: Cerebrospinal Fluid from Lumbar Puncture Updated: 11/01/22 0730     CSF Culture No growth at 2 days     Gram Stain No No organisms seen    Cryptococcal AG, CSF - Cerebrospinal Fluid, Lumbar Puncture [[id number]]  (Normal) Collected: 10/29/22 1515    Lab Status: Final result Specimen: Cerebrospinal Fluid from Lumbar Puncture Updated: 10/29/22 1729     Cryptococcal Antigen, CSF Negative    Meningitis / Encephalitis Panel, PCR - Cerebrospinal Fluid, Lumbar Puncture [[id number]]  (Normal) Collected: 10/29/22 1515    Lab Status:  Final result Specimen: Cerebrospinal Fluid from Lumbar Puncture Updated: 10/29/22 1806     ESCHERICHIA COLI K1, PCR Not Detected     HAEMOPHILUS INFLUENZAE, PCR Not Detected     LISTERIA MONOCYTOGENES, PCR Not Detected     NEISSERIA MENINGITIDIS, PCR Not Detected     STREPTOCOCCUS AGALACTIAE, PCR Not Detected     STREPTOCOCCUS PNEUMONIAE, PCR Not Detected     CYTOMEGALOVIRUS (CMV), PCR Not Detected     ENTEROVIRUS, PCR Not Detected     HERPES SIMPLEX VIRUS 1 (HSV-1), PCR Not Detected     HERPES SIMPLEX VIRUS 2 (HSV-2), PCR Not Detected     HUMAN PARECHOVIRUS, PCR Not Detected     VARICELLA ZOSTER VIRUS (VZV), PCR Not Detected     CRYPTOCOCCUS NEOFORMANS / GATTII, PCR Not Detected     HUMAN HERPES VIRUS 6 PCR Not Detected    Blood Culture - Blood, Arm, Right [[id number]]  (Normal) Collected: 10/24/22 1009    Lab Status: Final result Specimen: Blood from Arm, Right Updated: 10/29/22 1030     Blood Culture No growth at 5 days    Blood Culture - Blood, Arm, Right [[id number]]  (Normal) Collected: 10/24/22 1009    Lab Status: Final result Specimen: Blood from Arm, Right Updated: 10/29/22 1030     Blood Culture No growth at 5 days          CT Head Without Contrast    Result Date: 10/24/2022  Impression: 1. No acute intracranial abnormality is identified. 2. Generalized brain volume loss and small vessel ischemic changes. Atherosclerosis.  Electronically signed by:  Warren Roque M.D.  10/24/2022 9:31 PM    MRI Brain With & Without Contrast    Result Date: 10/25/2022  Impression: New patchy areas of FLAIR hyperintensity involving bilateral basal ganglia, thalami, frontal and temporal lobes, as well as upper brainstem extending to right cerebellar peduncle. Differential considerations would include cerebritis, toxic or metabolic encephalopathy, or less likely demyelinating process. Wernicke's encephalopathy should also be considered. Recommend short-term interval follow-up MRI with/without IV contrast to evaluate for  progression. Electronically Signed: Santos Chappell MD 10/25/2022 13:10 EDT Workstation ID: [id number]    XR Chest 1 View    Result Date: 10/28/2022  Impression:  1. Increasing left basilar opacity suggesting combination of small pleural effusion with overlying atelectasis or infiltrate. 2. Cardiomegaly.  Electronically Signed By-Armando Doran MD On:10/28/2022 9:18 AM This report was finalized on [id number] by  Armando Doran MD.    XR Chest 1 View    Result Date: 10/24/2022  Impression: 1.     Stable mildly enlarged cardiac silhouette. 2.     No acute pulmonary abnormality is seen.  Electronically Signed By-Ling Amos MD On:10/24/2022 10:44 AM This report was finalized on [id number] by  Ling Amos MD.    XR Abdomen KUB    Result Date: 10/25/2022  Impression: 1.  Weighted enteric tube tip in the proximal stomach and directed antegrade. 2.  Moderate left basilar pulmonary opacities may represent atelectasis or infiltrate. Electronically signed by:  Heather Resendez M.D.  10/24/2022 11:39 PM      Results for orders placed during the hospital encounter of 10/24/22    Duplex Venous Lower Extremity - Right CAR    Interpretation Summary  •  Acute right lower extremity deep vein thrombosis noted in the gastrocnemius.  •  Chronic right lower extremity superficial thrombophlebitis noted in the small saphenous.  •  All other right sided veins appeared normal.      Results for orders placed during the hospital encounter of 10/24/22    Duplex Venous Lower Extremity - Right CAR    Interpretation Summary  •  Acute right lower extremity deep vein thrombosis noted in the gastrocnemius.  •  Chronic right lower extremity superficial thrombophlebitis noted in the small saphenous.  •  All other right sided veins appeared normal.      Results for orders placed during the hospital encounter of 10/24/22    Adult Transthoracic Echo Limited W/ Cont if Necessary Per Protocol    Interpretation Summary  •  Calculated left  ventricular EF = 0% Left ventricular ejection fraction appears to be 46 - 50%.  •  Left ventricular wall thickness is consistent with mild concentric hypertrophy.  •  Left ventricular diastolic dysfunction is noted.  •  Mild pulmonary hypertension is present.      Labs Pending at Discharge:  Pending Labs     Order Current Status    Flow Cytometry In process    Fungus Culture - Cerebrospinal Fluid, CSF Reservoir In process    Non-gynecologic Cytology In process    Protein Electrophoresis, CSF - Cerebrospinal Fluid, Lumbar Puncture In process    Toxoplasma Gondii, PCR - Blood, CSF Reservoir In process    Culture, CSF - Cerebrospinal Fluid, Lumbar Puncture Preliminary result          Procedures Performed    10/29 7027 BEDSIDE LUMBAR PUNCTURE      Consults:   Consults     Date and Time Order Name Status Description    10/25/2022  5:35 PM Inpatient Infectious Diseases Consult Completed     10/25/2022  7:58 AM Inpatient Cardiology Consult Completed     10/25/2022  2:24 AM Inpatient Neurology Consult Stroke Completed     10/24/2022 11:38 AM Hospitalist (on-call MD unless specified)              Discharge Details        Discharge Medications      ASK your doctor about these medications      Instructions Start Date   acetaminophen 325 MG tablet  Commonly known as: TYLENOL   650 mg, Oral, Every 6 Hours PRN      apixaban 5 MG tablet tablet  Commonly known as: ELIQUIS   5 mg, Oral, 2 Times Daily      bisacodyl 10 MG suppository  Commonly known as: DULCOLAX   10 mg, Rectal, Daily PRN      carboxymethylcellulose 0.5 % solution  Commonly known as: REFRESH PLUS   1 drop, Both Eyes, 2 Times Daily      cholecalciferol 25 MCG (1000 UT) tablet  Commonly known as: VITAMIN D3   1,000 Units, Oral, Daily      docusate sodium 100 MG capsule  Commonly known as: COLACE   100 mg, Oral, Every Evening      furosemide 40 MG tablet  Commonly known as: LASIX  Ask about: Which instructions should I use?   40 mg, Oral, 2 Times Daily     "  levothyroxine 88 MCG tablet  Commonly known as: SYNTHROID, LEVOTHROID   88 mcg, Oral, Every Early Morning      loratadine 10 MG tablet  Commonly known as: CLARITIN   10 mg, Oral, Every Evening      meclizine 12.5 MG tablet  Commonly known as: ANTIVERT   12.5 mg, Oral, Every 6 Hours PRN      metoprolol succinate XL 50 MG 24 hr tablet  Commonly known as: TOPROL-XL   50 mg, Oral, Daily      multivitamin with minerals tablet tablet   1 tablet, Oral, Daily      nystatin 421200 UNIT/GM cream  Commonly known as: MYCOSTATIN   1 application, Topical, 2 Times Daily      ondansetron 4 MG tablet  Commonly known as: ZOFRAN   4 mg, Oral, Every 6 Hours PRN      pantoprazole 40 MG EC tablet  Commonly known as: PROTONIX   40 mg, Oral, Daily      polyethylene glycol 17 GM/SCOOP powder  Commonly known as: MIRALAX   mix 1 capful (17 g) in liquid by mouth Daily.      potassium chloride 20 MEQ CR tablet  Commonly known as: K-DUR,KLOR-CON   20 mEq, Oral, 2 Times Daily      saccharomyces boulardii 250 MG capsule  Commonly known as: FLORASTOR   250 mg, Oral, Nightly             Allergies   Allergen Reactions   • Haloperidol Other (See Comments) and Unknown (See Comments)     Pt reports, \"my friends and my  went crazy on it so I want to avoid it.\" Pt denies ever receiving it.   Other reaction(s): Other (See Comments)  Pt reports, \"my friends and my  went crazy on it so I want to avoid it.\" Pt denies ever receiving it.          Discharge Disposition:   Short Term Hospital ([facility])    Diet:  Hospital:  Diet Order   Procedures   • NPO Diet NPO Type: Strict NPO         Discharge Activity:         CODE STATUS:  Code Status and Medical Interventions:   Ordered at: 10/24/22 144     Medical Intervention Limits:    NO intubation (DNI)     Code Status (Patient has no pulse and is not breathing):    No CPR (Do Not Attempt to Resuscitate)     Medical Interventions (Patient has pulse or is breathing):    Limited Support "     Release to patient:    Routine Release         Future Appointments   Date Time Provider Department Center   5/17/2023 11:15 AM Yair Carver MD [facility]           Time spent on Discharge including face to face service: 34 minutes    This patient has been examined wearing appropriate Personal Protective Equipment and discussed with rn. 11/01/22      Signature: Electronically signed by Jake Gutiérrez MD, 11/01/22, 1:41 PM EDT.

## 2022-11-02 NOTE — PROGRESS NOTES
[facility] Medicine Services Daily Progress Note    Patient Name: Marychuy Verdugo  : 1940  MRN: [id number]  Primary Care Physician:  Provider, No Known  Date of admission: 2022      Subjective      Chief Complaint: [facility] hospice care      Patient Reports unable to obtain.  Daughter in law at bedside.  Patient not in any distress    ROS unable to obtain      Objective      Vitals:   Temp:  [97.5 °F (36.4 °C)-98 °F (36.7 °C)] 97.5 °F (36.4 °C)  Heart Rate:  [102-147] 102  Resp:  [14-16] 14  BP: (100-142)/(68-85) 120/82  Flow (L/min):  [4] 4    Physical Exam   Daughter-in-law at bedside.  Patient not in any distress       Result Review    Result Review:  I have personally reviewed the results from the time of this admission to 2022 16:49 EDT and agree with these findings:  [x]  Laboratory  []  Microbiology  []  Radiology  []  EKG/Telemetry   []  Cardiology/Vascular   []  Pathology  []  Old records  []  Other:  Most notable findings include: [facility] hospice care no labs        Assessment & Plan      Brief Patient Summary:  Marychuy Verdugo is a 82 y.o. female who was transitioned to [facility] hospice care      docusate, 100 mg, Nasogastric, Daily  sodium chloride, 10 mL, Intravenous, Q12H             Active Hospital Problems:  Active Hospital Problems    Diagnosis    • **Sepsis, due to unspecified organism, unspecified whether acute organ dysfunction present (HCC)      Plan:   Continue [facility] hospice care  Ativan as needed morphine.  Robinul.    Discussed with daughter-in-law at bedside.    DVT prophylaxis:  Mechanical DVT prophylaxis orders are present.    CODE STATUS:    Medical Intervention Limits: NO intubation (DNI); NO antiarrhythmic drugs; NO antibiotics; NO artificial nutrition  Code Status (Patient has no pulse and is not breathing): No CPR (Do Not Attempt to Resuscitate)  Medical Interventions (Patient has pulse or is breathing): Limited Support        This patient has been examined  wearing appropriate Personal Protective Equipment and discussed with family. 11/02/22      Electronically signed by Martir Mendoza MD, 11/02/22, 16:49 EDT.  [facility] Hospitalist Team

## 2022-11-02 NOTE — PLAN OF CARE
Goal Outcome Evaluation:               Cont comfort care measures. No meds needed thus far. Pt resting comfortably no grimace or grunts noted. Will cont to monitor.

## 2022-11-02 NOTE — CASE MANAGEMENT/SOCIAL WORK
Case Management Discharge Note      Final Note: [facility] hospice      Selected Continued Care - Discharged on 11/1/2022 Admission date: 10/24/2022 - Discharge disposition: Short Term Hospital ([facility])    Destination Coordination complete.    Service Provider Selected Services Address Phone Fax Patient Preferred    [facility] [address] [place] [address] [phone] --           Transportation Services  Other: Other (Inpatient hospice)    Final Discharge Disposition Code: 51 - hospice medical facility

## 2022-11-02 NOTE — PLAN OF CARE
Problem: Skin Injury Risk Increased  Goal: Skin Health and Integrity  Outcome: Ongoing, Progressing     Problem: Fall Injury Risk  Goal: Absence of Fall and Fall-Related Injury  Outcome: Ongoing, Progressing  Intervention: Promote Injury-Free Environment  Recent Flowsheet Documentation  Taken 11/2/2022 1000 by Kaila Pineda RN  Safety Promotion/Fall Prevention: safety round/check completed  Taken 11/2/2022 0800 by Kaila Pineda RN  Safety Promotion/Fall Prevention: safety round/check completed   Goal Outcome Evaluation:      PRN pain and anxiety medications to manage pain. Supplimental oxygen removed. Pt resting in bed, son at bedside

## 2022-11-02 NOTE — CASE MANAGEMENT/SOCIAL WORK
Continued Stay Note   Alejandro     Patient Name: Marychuy Verdugo  MRN: [id number]  Today's Date: 11/2/2022    Admit Date: 11/1/2022    Plan: [facility] Hospice with [facility].   Discharge Plan     Row Name 11/02/22 1355       Plan    Plan [facility] Hospice with [facility].    Provided Post Acute Provider List? N/A    Provided Post Acute Provider Quality & Resource List? N/A    Plan Comments CM available as needed.              Phone communication or documentation only- no physical contact with patient or family.      Macrina Sheffield RN     Office Phone: [phone]  Office Cell:     [phone]

## 2022-11-02 NOTE — H&P
[facility] Medicine Services      Patient Name: Marychuy Verdugo  : 1940  MRN: [id number]  Primary Care Physician:  Provider, No Known  Date of admission: 2022      Subjective      Chief Complaint: Mental status change    History of Present Illness: Marychuy Verdugo is a 82 y.o. white female with multiple medical problems, past history significant for atrial fibrillation status post watchman procedure.  DVT on chronic anticoagulation,  Who presented to [facility] on 2022 complaining of change in mental status, decreased mentation, has had poor p.o. intake for the past 2 weeks.  Son at bedside reports that patient recently completed antibiotics for UTI.  She also recently had a fall and had a pubic ramus fracture, was at [facility] rehab facility and developed a DVT and is currently at [facility].  In the emergency room temperature was 98.7, heart rate 144 (AF).  WBC was 14, lactate 3.8 creatinine 1.3 TSH 0.61, UA showed 6+ bacterial, BMP on 2022 was 1771.    Chest x-ray IMPRESSION:  Stable mildly enlarged cardiac silhouette.   No acute pulmonary abnormality is seen.    10/25/2022  Patient seen and examined  Continues with altered mental status     10/26/2022  Patient continues with altered mental status  Lethargic  Had EEG today which showed-severe encephalopathy     10/27/2022  Patient remains unresponsive     10/28/2022  Patient remains unresponsive  Fast team called this morning due to low blood pressure and oxygen desaturation.  Patient was given 1 L of normal saline with improvement  Was also put on oxygen supplement  Chest x-ray suggestive of atelectasis     10/29/2022  Patient seen and examined  Remains unresponsive-no significant improvement  LP planned for today- waiting for  PTT to decrease     10/30/2022  Patient seen and examined  Remains unresponsive  Had lumbar puncture 10/29/2022 which was essentially negative for infection.  IV acyclovir was  discontinued     10/31/2022  Patient seen and examined  Family met with [facility] liaison yesterday.  Patient has not had any improvement since admission.  Family has decided to make her comfort measures with hospice.  At this time patient is not qualifying for [facility] inpatient hospice.  Will probably be discharged to extended-care facility with hospice  Continue comfort measures only at this time     11/1/22 patient will be discharged and readmitted under hospice      Review of Systems   Unable to perform ROS: mental status change      Personal History     Past Medical History:   Diagnosis Date   • Atrial fibrillation (HCC) 10/10/2013   • Chronic anticoagulation 05/01/2020   • Chronic constipation    • Diplopia 07/08/2022   • GERD (gastroesophageal reflux disease) 02/11/2013   • Headache 07/01/2022   • Hematuria     STATES ALWAY HAVE HAD SOME BLOOD IN URINE.   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   • Presence of Watchman left atrial appendage closure device    • Shortness of breath    • Shortness of breath    • Swelling of both lower extremities        Past Surgical History:   Procedure Laterality Date   • CATARACT EXTRACTION W/ INTRAOCULAR LENS IMPLANT      LEFT AND RIGHT   • ORBITAL FRACTURE SURGERY Left     FLOOR   • OTHER SURGICAL HISTORY      WATCHMAN PLACED FOR HISTORY OF A FIB   • TEMPORAL ARTERY BIOPSY Bilateral 8/4/2022    Procedure: BILATERAL  TEMPORAL ARTERY BIOPSY;  Surgeon: Marco A Silveira MD;  Location: [facility] OR;  Service: Ophthalmology;  Laterality: Bilateral;   • TOTAL HIP ARTHROPLASTY Left 02/12/2022    Procedure: TOTAL HIP ARTHROPLASTY ANTERIOR;  Surgeon: Yair Carver MD;  Location: [facility] MAIN OR;  Service: Orthopedics;  Laterality: Left;   • TUBAL ABDOMINAL LIGATION         Family History: family history includes Heart murmur in her father; No Known Problems in her mother. Otherwise pertinent FHx was reviewed and not pertinent to current  "issue.    Social History:  reports that she has never smoked. She has never used smokeless tobacco. She reports current alcohol use. She reports that she does not use drugs.    Home Medications:  Prior to Admission Medications     Prescriptions Last Dose Informant Patient Reported? Taking?    apixaban (ELIQUIS) 5 MG tablet tablet   No No    Take 1 tablet by mouth 2 (Two) Times a Day. Indications: DVT/PE (active thrombosis)    bumetanide (BUMEX) 2 MG tablet   Yes No    cholecalciferol (VITAMIN D3) 25 MCG (1000 UT) tablet   Yes No    Take 1,000 Units by mouth Daily.    docusate sodium (COLACE) 100 MG capsule   Yes No    Take 100 mg by mouth Every Evening.    furosemide (LASIX) 20 MG tablet   No No    TAKE 1 TABLET BY MOUTH TWICE A DAY    levothyroxine (SYNTHROID, LEVOTHROID) 88 MCG tablet   No No    Take 1 tablet by mouth Every Morning.    loratadine (CLARITIN) 10 MG tablet   Yes No    Take 10 mg by mouth Every Evening.    metoprolol succinate XL (TOPROL-XL) 50 MG 24 hr tablet   No No    Take 1 tablet by mouth Daily.    multivitamin with minerals tablet tablet   Yes No    Take 1 tablet by mouth Daily.    oxybutynin XL (DITROPAN-XL) 10 MG 24 hr tablet   No No    Take 1 tablet by mouth Daily.    pantoprazole (PROTONIX) 40 MG EC tablet   Yes No    Take 40 mg by mouth Daily.    polyethylene glycol (MIRALAX) 17 GM/SCOOP powder   No No    mix 1 capful (17 g) in liquid by mouth Daily.    Patient taking differently:  Take 17 g by mouth Daily As Needed.    Polyvinyl Alcohol-Povidone PF (HYPOTEARS) 1.4-0.6 % ophthalmic solution   Yes No    1 drop.    potassium chloride (K-DUR,KLOR-CON) 20 MEQ CR tablet   No No    Take 1 tablet by mouth 2 (Two) Times a Day.        Allergies:  Allergies   Allergen Reactions   • Haloperidol Other (See Comments) and Unknown (See Comments)     Pt reports, \"my friends and my  went crazy on it so I want to avoid it.\" Pt denies ever receiving it.   Other reaction(s): Other (See Comments)  Pt " "reports, \"my friends and my  went crazy on it so I want to avoid it.\" Pt denies ever receiving it.      Objective      Vitals:   Temp:  [97.6 °F (36.4 °C)-98 °F (36.7 °C)] 98 °F (36.7 °C)  Heart Rate:  [117-147] 117  Resp:  [14-16] 16  BP: (100-120)/(68-71) 100/71  Flow (L/min):  [4] 4    Physical Exam      GPhysical Exam Constitutional:       Appearance: She is ill-appearing.      Comments: Obtunded   HENT:      Head: Normocephalic.      Nose: Nose normal.      Mouth/Throat:      Mouth: Mucous membranes are moist.   Eyes:      Conjunctiva/sclera: Conjunctivae normal.   Cardiovascular:      Rate and Rhythm: Normal rate. Rhythm irregular.   Pulmonary:      Comments: Diffuse decreased air entry at the bases  Abdominal:      General: Bowel sounds are normal. There is no distension.      Palpations: Abdomen is soft.      Tenderness: There is no abdominal tenderness.   Musculoskeletal:      Comments: Degenerative changes   Skin:     General: Skin is warm.   Neurological:      Comments: Unable to evaluate     Result Review    Result Review:  I have personally reviewed the results from the time of this admission to 11/2/2022 09:42 EDT and agree with these findings:  []  Laboratory  []  Microbiology  []  Radiology  []  EKG/Telemetry   []  Cardiology/Vascular   []  Pathology  []  Old records  []  Other:  Most notable findings include:   CMP:        Lab 10/31/22  0625 10/30/22  0923 10/30/22  0028 10/29/22  0521 10/28/22  0320 10/27/22  0336   SODIUM 149* 148*  --  145 140 145   POTASSIUM 3.0* 3.4*  --  4.2 2.8* 3.1*   CHLORIDE 118* 114*  --  115* 105 109*   CO2 21.0* 20.0*  --  21.0* 22.0 23.0   ANION GAP 10.0 14.0  --  9.0 13.0 13.0   BUN 14 15  --  18 18 26*   CREATININE 0.42* 0.50*  --  0.45* 0.45* 0.65   EGFR 98.4 94.4  --  96.8 96.8 88.6   GLUCOSE 91 129*  --  153* 153* 191*   CALCIUM 8.1* 8.2*  --  8.1* 7.8* 8.3*   MAGNESIUM 2.1  --  2.1 2.1 1.8 2.1   PHOSPHORUS  --   --   --   --   --  2.8   TOTAL PROTEIN  --   " --   --   --   --  5.3*   ALBUMIN  --   --   --   --   --  3.20*   GLOBULIN  --   --   --   --   --  2.1   ALT (SGPT)  --   --   --   --   --  22   AST (SGOT)  --   --   --   --   --  33*   BILIRUBIN  --   --   --   --   --  0.3   ALK PHOS  --   --   --   --   --  60     CBC:      Lab 10/31/22  0452 10/30/22  0028 10/29/22  0521 10/28/22  0320 10/27/22  0336   WBC 8.80 8.50 8.00 6.80 8.20   HEMOGLOBIN 13.2 12.9 13.1 13.8 14.4   HEMATOCRIT 41.0 39.6 39.1 40.8 42.2   PLATELETS 85* 92* 129* 138* 169   NEUTROS ABS 7.74* 6.97 7.44* 6.12 7.30*   MCV 95.9 94.7 95.0 91.4 92.8       Assessment & Plan      Sepsis likely due to urinary tract infection (HCC)-tachycardia, leukocytosis, /71, temp 98.7, lactic acid-3.8, Pro-Josias 0.16    UTI (urinary tract infection), bacterial    RUBENS, dehydration, hypernatremia    History of deep vein thrombosis (DVT) of right lower extremity (HCC)    Elevated brain natriuretic peptide (BNP) level  Pulmonary hypertension (HCC)  Echocardiogram in August 2022 revealed biatrial enlargement, moderate tricuspid regurgitation with right ventricular systolic pressure greater than 40 mmHg.  Left ventricular ejection fraction was preserved.    Essential hypertension-bp 124/79    Chronic constipation-docusate sodium twice daily prn    Overactive bladder-Hold oxybutynin.    Chronic anticoagulation/D-    Knee osteoarthritis-Lortab as needed    GERD (gastroesophageal reflux disease)     Acquired hypothyroidism  Last TSH was WNL.  0.6.     Status post total hip replacement, left    Paroxysmal atrial fibrillation (HCC)  S/p Watchman procedure in September 2020. No anticoagulation therapy necessary.     Plan continue comfort care    DVT prophylaxis:  Mechanical DVT prophylaxis orders are present.    CODE STATUS:    Medical Intervention Limits: NO intubation (DNI); NO antiarrhythmic drugs; NO antibiotics; NO artificial nutrition  Code Status (Patient has no pulse and is not breathing): No CPR (Do Not Attempt  to Resuscitate)  Medical Interventions (Patient has pulse or is breathing): Limited Support    Admission Status:  I believe this patient meets admission status.    I discussed the patient's findings and my recommendations with patient and family.    This patient has been examined wearing appropriate Personal Protective Equipment and discussed with rn. 11/02/22      Signature: Electronically signed by Jake Gutiérrez MD, 10/24/22, 12:18 PM EDT.

## 2022-11-03 NOTE — PROGRESS NOTES
[facility] Medicine Services Daily Progress Note    Patient Name: Marychuy Verdugo  : 1940  MRN: [id number]  Primary Care Physician:  Provider, No Known  Date of admission: 2022      Subjective    Chief Complaint: [facility] hospice care      Patient Reports unable to obtain.  Daughter in law at bedside.  Patient not in any distress     ROS unable to obtain       Objective      Vitals:   Temp:  [97.8 °F (36.6 °C)-98.9 °F (37.2 °C)] 97.9 °F (36.6 °C)  Heart Rate:  [114-127] 120  Resp:  [12-14] 14  BP: (111-118)/(74-86) 111/74    Physical Exam   Daughter-in-law at bedside.  Patient not in any distress       Result Review    Result Review:  I have personally reviewed the results from the time of this admission to 11/3/2022 14:56 EDT and agree with these findings:  [x]  Laboratory  []  Microbiology  []  Radiology  []  EKG/Telemetry   []  Cardiology/Vascular   []  Pathology  []  Old records  []  Other:  Most notable findings include: No new labs         Assessment & Plan       Brief Patient Summary:  Marychuy Verdugo is a 82 y.o. female who was transitioned to [facility] hospice care        docusate, 100 mg, Nasogastric, Daily  sodium chloride, 10 mL, Intravenous, Q12H               Active Hospital Problems:       Active Hospital Problems     Diagnosis     • **Sepsis, due to unspecified organism, unspecified whether acute organ dysfunction present (HCC)        Plan:   Continue [facility] hospice care  Ativan as needed morphine.  Robinul.     Discussed with daughter-in-law at bedside.    Likely is going to  within 24 hours based on vital signs     DVT prophylaxis:  Mechanical DVT prophylaxis orders are present.     CODE STATUS:    Medical Intervention Limits: NO intubation (DNI); NO antiarrhythmic drugs; NO antibiotics; NO artificial nutrition  Code Status (Patient has no pulse and is not breathing): No CPR (Do Not Attempt to Resuscitate)  Medical Interventions (Patient has pulse or is breathing): Limited  Support           This patient has been examined wearing appropriate Personal Protective Equipment and discussed with family.  11/03/22      Electronically signed by Martir Mendoza MD, 11/03/22, 14:56 EDT.  [facility]ist Team

## 2022-11-03 NOTE — PLAN OF CARE
Problem: Skin Injury Risk Increased  Goal: Skin Health and Integrity  Outcome: Ongoing, Progressing     Problem: Fall Injury Risk  Goal: Absence of Fall and Fall-Related Injury  Outcome: Ongoing, Progressing  Intervention: Promote Injury-Free Environment  Recent Flowsheet Documentation  Taken 11/3/2022 1800 by Kaila Pineda RN  Safety Promotion/Fall Prevention: safety round/check completed  Taken 11/3/2022 1700 by Kaila Pineda RN  Safety Promotion/Fall Prevention: safety round/check completed  Taken 11/3/2022 1609 by Kaila Pineda RN  Safety Promotion/Fall Prevention: safety round/check completed  Taken 11/3/2022 1411 by Kaila Pineda RN  Safety Promotion/Fall Prevention: safety round/check completed  Taken 11/3/2022 1245 by Kaila Pineda RN  Safety Promotion/Fall Prevention: safety round/check completed  Taken 11/3/2022 1000 by Kaila Pineda RN  Safety Promotion/Fall Prevention: safety round/check completed  Taken 11/3/2022 0815 by Kaila Pineda RN  Safety Promotion/Fall Prevention: safety round/check completed   Goal Outcome Evaluation:         Comfort care only. Emotional support provided to family. Scopolamine patch added today.

## 2022-11-04 NOTE — DISCHARGE SUMMARY
[facility] Medicine Services  Death SUMMARY    Patient Name: Marychuy Verdugo  : 1940  MRN: [id number]    Discharge condition: Hospice  Date of Admission: 2022  Discharge Diagnosis: Hospice care  Date of Discharge:  2022  Primary Care Physician: Provider, No Known      Presenting Problem:   Sepsis, due to unspecified organism, unspecified whether acute organ dysfunction present (HCC) [A41.9]    Active and Resolved Hospital Problems:  Active Hospital Problems    Diagnosis POA   • **Sepsis, due to unspecified organism, unspecified whether acute organ dysfunction present (HCC) [A41.9] Yes      Resolved Hospital Problems   No resolved problems to display.         Hospital Course     Hospital Course:  Marychuy Verdugo is a 82 y.o. white female with multiple medical problems, past history significant for atrial fibrillation status post watchman procedure.  DVT on chronic anticoagulation,  Who presented to [facility] on 2022 complaining of change in mental status, decreased mentation, has had poor p.o. intake for the past 2 weeks.  Son at bedside reports that patient recently completed antibiotics for UTI.  She also recently had a fall and had a pubic ramus fracture, was at [facility] rehab facility and developed a DVT and is currently at [facility].  In the emergency room temperature was 98.7, heart rate 144 (AF).  WBC was 14, lactate 3.8 creatinine 1.3 TSH 0.61, UA showed 6+ bacterial, BNP on 2022 was 1771.  Patient had severely altered mental status which was persisting.  EEG was done which showed severe encephalopathy.  The patient remained unresponsive.  She began to have low blood pressure and respiratory failure with desaturation.  Lumbar puncture was done as well.  Ruled out infection.  IV acyclovir was discontinued.  Infectious diseases was following.  The patient continued to decline.  Family decided to make her comfortable and the patient was admitted  to [facility] inpatient hospice after qualifying.  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  There was no active diagnosis for sure without a biopsy of the brain which the family did not want to do.  The patient was made hospice care and finally  on  with family at bedside.     22      Signature: Martir Mendoza MD

## 2022-11-04 NOTE — PLAN OF CARE
Goal Outcome Evaluation:               Pt is [facility] with comfort measures in place. Pt resting abed. No family at bedside at this time. Will continue to monitor.  Problem: Skin Injury Risk Increased  Goal: Skin Health and Integrity  Outcome: Ongoing, Progressing  Intervention: Optimize Skin Protection  Recent Flowsheet Documentation  Taken 11/3/2022 2000 by Shaheen Bullock RN  Pressure Reduction Techniques:   frequent weight shift encouraged   heels elevated off bed   weight shift assistance provided  Head of Bed (HOB) Positioning: HOB elevated  Pressure Reduction Devices: pressure-redistributing mattress utilized  Skin Protection: tubing/devices free from skin contact     Problem: Fall Injury Risk  Goal: Absence of Fall and Fall-Related Injury  Outcome: Ongoing, Progressing  Intervention: Identify and Manage Contributors  Recent Flowsheet Documentation  Taken 11/4/2022 0207 by Shaheen Bullock RN  Medication Review/Management: medications reviewed  Taken 11/4/2022 0022 by Shaheen Bullock RN  Medication Review/Management: medications reviewed  Taken 11/3/2022 2225 by Shaheen Bullock RN  Medication Review/Management: medications reviewed  Taken 11/3/2022 2000 by Shaheen Bullock RN  Medication Review/Management: medications reviewed  Intervention: Promote Injury-Free Environment  Recent Flowsheet Documentation  Taken 11/4/2022 0207 by Shaheen Bullock RN  Safety Promotion/Fall Prevention:   safety round/check completed   fall prevention program maintained  Taken 11/4/2022 0022 by Shaheen Bullock RN  Safety Promotion/Fall Prevention:   safety round/check completed   fall prevention program maintained  Taken 11/3/2022 2225 by Shaheen Bullock RN  Safety Promotion/Fall Prevention:   safety round/check completed   fall prevention program maintained  Taken 11/3/2022 2000 by Shaheen Bullock, RN  Safety Promotion/Fall Prevention:   safety round/check completed   nonskid shoes/slippers when out of bed   fall  prevention program maintained   clutter free environment maintained   assistive device/personal items within reach   activity supervised

## 2022-11-04 NOTE — PLAN OF CARE
Problem: Skin Injury Risk Increased  Goal: Skin Health and Integrity  Outcome: Unable to Meet, Plan Revised  Intervention: Optimize Skin Protection  Recent Flowsheet Documentation  Taken 2022 08 by Genet Mckeon RN  Pressure Reduction Techniques: frequent weight shift encouraged  Pressure Reduction Devices: pressure-redistributing mattress utilized  Skin Protection: adhesive use limited     Problem: Fall Injury Risk  Goal: Absence of Fall and Fall-Related Injury  Outcome: Unable to Meet, Plan Revised  Intervention: Identify and Manage Contributors  Recent Flowsheet Documentation  Taken 2022 08 by Genet Mckeon, RN  Medication Review/Management: medications reviewed  Intervention: Promote Injury-Free Environment  Recent Flowsheet Documentation  Taken 2022 1001 by Genet Mckeon, RN  Safety Promotion/Fall Prevention: safety round/check completed  Taken 2022 0820 by Genet Mckeon RN  Safety Promotion/Fall Prevention: safety round/check completed   Goal Outcome Evaluation:                 Patient  at 1057. Family at the bedside. [facility] [[email or web address]  home notified. [facility] notified.

## 2022-11-07 NOTE — CASE MANAGEMENT/SOCIAL WORK
Case Management Discharge Note      Final Note: , [facility]    Provided Post Acute Provider List?: N/A  Provided Post Acute Provider Quality & Resource List?: N/A    Selected Continued Care - Discharged on 2022 Admission date: 2022 - Discharge disposition:                        Final Discharge Disposition Code: 20 -

## 2022-11-26 LAB — FUNGUS WND CULT: NORMAL

## 2023-01-18 NOTE — HOME HEALTH
Ms. Marychuy Verdugo is a pleasant 81-year old patient who underwent lt total hip replacement on 2/12/22 due to fall incident at home with displacement hip fracture. Patient lives alone in a patio home and was transferred to Rehab before she was discharged home on 2/25/22. She was independent in indoor/outdoor ambulation without assistive device prior fall incident. Family lives in the area and assisting as needed.    Patient demonstrates 3/5 and 3+/5 gross strength in lt hip and knee respectively. She is able to perform bed mobility slowly and safely. However, she needs min/cga for coming to stand from recliner chair and would not sit on couch for fear of getting stuck. Able to ambulate about 60ft indoor with rolling walker, supervision and cues for continous steps. Ms. Verdugo would benefit from further skilled PT services for therapeutic/home exercise program to lt le, transfer teaching and gait/endurance training. normal balance

## 2023-06-29 NOTE — CONSULTS
Cardiology Consult Note      REQUESTING PHYSICIAN    Shefali De Los Santos MD    PATIENT IDENTIFICATION  Name: Marychuy Verdugo  Age: 81 y.o.  Sex: female  :  1940  MRN: [id number]             REASON FOR CONSULTATION:  81-year-old female with known history of permanent atrial fibrillation, watchman implant 2020 with follow-up BRADLEY 10/30/2020 with device in satisfactory position and appendage wall apposition with no identifiable leak around the device, history of pulmonary hypertension, hypothyroidism, elevated chads vascular score of 4.  History of GERD, hypertension, lung nodule      TTE 10/11/2021: EF 53%, severely dilated left atrium, mild AI/MR, mod-severe TR, mild-moderate pulmonary hypertension.      CC:  Presurgical cardiac risk assessment    HISTORY OF PRESENT ILLNESS:   Patient presented to the emergency department at [facility] 2/10/2022 after mechanical fall at home with left hip pain.  Patient reported striking her head but denies any loss of consciousness.  She reports no chest pain, dizziness, shortness of breath or lightheadedness prior to the event.  X-ray confirmed left femoral neck fracture.  Aspirin currently on hold for possible surgery this morning.  Upon my evaluation, patient is resting comfortably in bed.  She denies any recent or current chest pain, pressure or tightness.  She has had no palpitations.  She denies any shortness of breath, dyspnea with exertion, lower extremity edema.  Rhythm atrial fibrillation.      REVIEW OF SYSTEMS:  Pertinent items are noted in HPI, all other systems reviewed and negative    OBJECTIVE   Potassium 3.8  TSH 2.41    ASSESSMENT  Mechanical fall  Left femoral neck fracture  Presurgical cardiac risk assessment  Permanent atrial fibrillation  Status post watchman implant with complete seal noted follow-up BRADLEY  Hypothyroidism  Essential hypertension    PLAN  Patient has no known history of coronary disease.  Permanent atrial fibrillation status  Mom called requesting  a medication to be ordered for UTI infection that was ordered by a doctor in [facility]. Chad is telling her that is in backorder, calling the doctor at [facility] but office can not get in touch with the ordering doctor, if by any chance Dr Williamson can ordered different medication. Unsure if she needs to make an appointment.    "post left atrial appendage implant with follow-up BRADLEY and no leak, endothelialized.  No anticoagulation indicated  Recent echo with normal LV systolic function, patient does have mod-severe TR with severely dilated left atrium, pulmonary hypertension.  Recommend monitoring for signs/symptoms fluid overload given valvular history and pulmonary hypertension.  Continue beta-blocker therapy pre and post op  Patient should be low risk for perioperative cardiac event  We will follow postop.          Vital Signs  Visit Vitals  /89 (BP Location: Right arm, Patient Position: Lying)   Pulse 100   Temp 97.2 °F (36.2 °C) (Oral)   Resp 16   Ht 175.3 cm (69\")   Wt 82.6 kg (182 lb)   SpO2 94%   Breastfeeding No   BMI 26.88 kg/m²     Oxygen Therapy  SpO2: 94 %  Pulse Oximetry Type: Intermittent  Device (Oxygen Therapy): room air  Flowsheet Rows        First Filed Value   Admission Height 175.3 cm (69\") Documented at 02/10/2022 1309   Admission Weight 82.6 kg (182 lb) Documented at 02/10/2022 1309          Intake & Output (last 3 days)       None          Lines, Drains & Airways       Active LDAs       Name Placement date Placement time Site Days    Peripheral IV 02/10/22 1436 Right Antecubital 02/10/22  1436  Antecubital  less than 1                    MEDICAL HISTORY    Past Medical History:   Diagnosis Date    Atrial fibrillation (HCC) 10/10/2013    Chronic anticoagulation 5/1/2020    GERD (gastroesophageal reflux disease) 2/11/2013    HTN (hypertension) 6/12/2014    Hypothyroid 2/11/2013        SURGICAL HISTORY    History reviewed. No pertinent surgical history.     FAMILY HISTORY    Family History   Problem Relation Age of Onset    Heart murmur Father     No Known Problems Mother        SOCIAL HISTORY    Social History     Tobacco Use    Smoking status: Never Smoker    Smokeless tobacco: Not on file   Substance Use Topics    Alcohol use: Never        ALLERGIES    Allergies   Allergen Reactions    Haloperidol Unknown - Low " "Severity              /89 (BP Location: Right arm, Patient Position: Lying)   Pulse 100   Temp 97.2 °F (36.2 °C) (Oral)   Resp 16   Ht 175.3 cm (69\")   Wt 82.6 kg (182 lb)   SpO2 94%   Breastfeeding No   BMI 26.88 kg/m²   Intake/Output last 3 shifts:  No intake/output data recorded.  Intake/Output this shift:  No intake/output data recorded.    PHYSICAL EXAM:    General: Well-developed, well-nourished 81-year-old female who is alert, cooperative, no distress, appears stated age  Head:  Normocephalic, atraumatic, mucous membranes moist  Eyes:  Conjunctiva/corneas clear, EOM's intact     Neck:  Supple,  no bruit  Lungs: Clear to auscultation bilaterally, no wheezes rhonchi rales are noted  Chest wall: No tenderness  Heart::  Irregularly irregular rate and rhythm, S1 and S2 normal, soft murmur at the base  Abdomen: Soft, non-tender, nondistended bowel sounds active  Extremities: No cyanosis, clubbing, or edema.  Tender right hip  Pulses: 2+ and symmetric all extremities  Skin:  No rashes or lesions  Neuro/psych: A&O x3. CN II through XII are grossly intact with appropriate affect      Scheduled Meds:      lidocaine, 1 patch, Transdermal, Q24H  polyethylene glycol, 17 g, Oral, Daily  sodium chloride, 3 mL, Intravenous, Q12H        Continuous Infusions:    lactated ringers, 75 mL/hr, Last Rate: 75 mL/hr (02/10/22 1437)        PRN Meds:      acetaminophen **OR** acetaminophen **OR** acetaminophen    aluminum-magnesium hydroxide-simethicone    calcium carbonate    HYDROcodone-acetaminophen    melatonin    nitroglycerin    ondansetron **OR** ondansetron    sodium chloride        Results Review:     I reviewed the patient's new clinical results.    CBC    Results from last 7 days   Lab Units 02/11/22  0712 02/10/22  1432   WBC 10*3/mm3 8.40 12.20*   HEMOGLOBIN g/dL 11.2* 12.3   PLATELETS 10*3/mm3 237 264     Cr Clearance Estimated Creatinine Clearance: 54.3 mL/min (by C-G formula based on SCr of 0.95 " mg/dL).  Coag   Results from last 7 days   Lab Units 02/10/22  1432   INR  0.96     HbA1C No results found for: HGBA1C  Blood Glucose No results found for: POCGLU  Infection     CMP   Results from last 7 days   Lab Units 02/10/22  1432   SODIUM mmol/L 141   POTASSIUM mmol/L 3.8   CHLORIDE mmol/L 103   CO2 mmol/L 24.0   BUN mg/dL 23   CREATININE mg/dL 0.95   GLUCOSE mg/dL 108*   ALBUMIN g/dL 4.10   BILIRUBIN mg/dL 0.5   ALK PHOS U/L 69   AST (SGOT) U/L 26   ALT (SGPT) U/L 13     ABG      UA      BASIA  No results found for: POCMETH, POCAMPHET, POCBARBITUR, POCBENZO, POCCOCAINE, POCOPIATES, POCOXYCODO, POCPHENCYC, POCPROPOXY, POCTHC, POCTRICYC  Lysis Labs   Results from last 7 days   Lab Units 02/11/22  0712 02/10/22  1432   INR   --  0.96   HEMOGLOBIN g/dL 11.2* 12.3   PLATELETS 10*3/mm3 237 264   CREATININE mg/dL  --  0.95     Radiology(recent) XR Hip With or Without Pelvis 2 - 3 View Left    Result Date: 2/10/2022  Left femoral neck fracture.      Electronically Signed By-Gilberto Ng On:2/10/2022 3:46 PM This report was finalized on [id number] by  Gilberto Ng, .              Xrays, labs reviewed personally by physician.    ECG/EMG Results (most recent)       None              Medication Review:   I have reviewed the patient's current medication list  Scheduled Meds:lidocaine, 1 patch, Transdermal, Q24H  polyethylene glycol, 17 g, Oral, Daily  sodium chloride, 3 mL, Intravenous, Q12H      Continuous Infusions:lactated ringers, 75 mL/hr, Last Rate: 75 mL/hr (02/10/22 1437)      PRN Meds:.  acetaminophen **OR** acetaminophen **OR** acetaminophen    aluminum-magnesium hydroxide-simethicone    calcium carbonate    HYDROcodone-acetaminophen    melatonin    nitroglycerin    ondansetron **OR** ondansetron    sodium chloride    Imaging:  Imaging Results (Last 72 Hours)       Procedure Component Value Units Date/Time    XR Hip With or Without Pelvis 2 - 3 View Left [[id number]] Collected: 02/10/22 1543     Updated: 02/10/22  "1548    Narrative:      EXAM: XR HIP W OR WO PELVIS 2-3 VIEW LEFT-     DATE OF EXAM: 2/10/2022 2:49 PM     INDICATION: inj.     COMPARISONS: None available.     FINDINGS:  Left-sided femoral neck fracture is present with displacement.  Associated elevation and rotation of the femoral shaft in relation to  the head. No dislocation      No definitive fracture elsewhere.        Impression:      Left femoral neck fracture.                 Electronically Signed By-Gilberto Ng On:2/10/2022 3:46 PM  This report was finalized on [id number] by  Gilberto Ng .              LIAN Palencia  02/11/22  07:31 EST       EMR Dragon/Transcription:   \"Dictated utilizing Dragon dictation\".                 Electronically signed by LIAN Palencia, 02/11/22, 7:31 AM EST.    Cardiology attending:  Seen and examined.  Chart and labs reviewed.  History and exam findings are verified with above changes noted.  Assessment and plan notated by APC after being formulated by attending consultant.  Note that greater than 50% of the time spent in care of the patient was provided by attending consultant.  Patient denies any chest pain pressure heaviness or tightness.  Denies shortness of breath.  Fall was mechanical.  No appendectomy.  2D echocardiogram with normal LV systolic function.  Patient will be low risk for major adverse cardiac events for surgery.  Will follow postoperatively.    "

## (undated) DEVICE — GLV SURG SENSICARE PI ORTHO SZ8 LF STRL

## (undated) DEVICE — GLV SURG SIGNATURE ESSENTIAL PF LTX SZ8.5

## (undated) DEVICE — 3M™ STERI-STRIP™ REINFORCED ADHESIVE SKIN CLOSURES, R1547, 1/2 IN X 4 IN (12 MM X 100 MM), 6 STRIPS/ENVELOPE: Brand: 3M™ STERI-STRIP™

## (undated) DEVICE — DUAL CUT SAGITTAL BLADE

## (undated) DEVICE — PK TOTL HIP 50

## (undated) DEVICE — TBG PENCL TELESCP MEGADYNE SMOKE EVAC 10FT

## (undated) DEVICE — SUT VIC 5/0 P3 18IN J493G

## (undated) DEVICE — SUT SILK 4/0 TIES 18IN A183H

## (undated) DEVICE — KT SURG TURNOVER 050

## (undated) DEVICE — INTEGUSEAL MICROBIAL SEALANT: Brand: AVANOS

## (undated) DEVICE — KT PT POSITION SUPINE HANA/PROFX TABL

## (undated) DEVICE — MARKR SKIN W/RULR AND LBL

## (undated) DEVICE — DRSNG SURESITE WNDW 2.38X2.75

## (undated) DEVICE — TRAP FLD MINIVAC MEGADYNE 100ML

## (undated) DEVICE — PATIENT RETURN ELECTRODE, SINGLE-USE, CONTACT QUALITY MONITORING, ADULT, WITH 9FT CORD, FOR PATIENTS WEIGING OVER 33LBS. (15KG): Brand: MEGADYNE

## (undated) DEVICE — SWABSTK SKINPREP PVPI LF PK/3

## (undated) DEVICE — ELECTRD NDL EZ CLN MOD 2.75IN

## (undated) DEVICE — ADHS SKIN PREMIERPRO EXOFIN TOPICAL HI/VISC .5ML

## (undated) DEVICE — STERILE COTTON TIP 6IN 10PK: Brand: MEDLINE

## (undated) DEVICE — PK ENT 40

## (undated) DEVICE — GLV SURG BIOGEL SENSR LTX PF SZ7.5

## (undated) DEVICE — NEEDLE, QUINCKE, 18GX3.5": Brand: MEDLINE

## (undated) DEVICE — ZIPPERED TOGA, 2X LARGE: Brand: FLYTE

## (undated) DEVICE — NDL HYPO PRECISIONGLIDE REG 25G 1 1/2

## (undated) DEVICE — SUT VIC 2/0 CT2 27IN J269H

## (undated) DEVICE — SOLUTION,WATER,IRRIGATION,1000ML,STERILE: Brand: MEDLINE

## (undated) DEVICE — DRAPE,C-SECTION,CLR SCREEN,WIRE: Brand: MEDLINE

## (undated) DEVICE — INTENDED FOR TISSUE SEPARATION, AND OTHER PROCEDURES THAT REQUIRE A SHARP SURGICAL BLADE TO PUNCTURE OR CUT.: Brand: BARD-PARKER ® CARBON RIB-BACK BLADES

## (undated) DEVICE — BIPOLAR SEALER 23-112-1 AQM 6.0: Brand: AQUAMANTYS™

## (undated) DEVICE — SOL IRR NACL 0.9PCT ARTHROMATIC 3000ML

## (undated) DEVICE — GLV SURG SENSICARE PI MIC PF SZ7.5 LF STRL

## (undated) DEVICE — PROXIMATE RH ROTATING HEAD SKIN STAPLERS (35 WIDE) CONTAINS 35 STAINLESS STEEL STAPLES: Brand: PROXIMATE

## (undated) DEVICE — SOL IRR NACL 0.9PCT 3000ML

## (undated) DEVICE — PICO 7 10CM X 30CM: Brand: PICO™ 7

## (undated) DEVICE — STERILE COTTON BALLS LARGE 5/P: Brand: MEDLINE

## (undated) DEVICE — SUT VIC 1 CT1 36IN J947H

## (undated) DEVICE — GLV SURG SENSICARE PI LF PF 8 GRN STRL

## (undated) DEVICE — SYR LUERLOK 30CC

## (undated) DEVICE — ADHS LIQ MASTISOL 2/3ML

## (undated) DEVICE — GOWN,REINFORCE,POLY,SIRUS,BREATH SLV,LG: Brand: MEDLINE

## (undated) DEVICE — DRSNG SLVR/ANTIBAC PRIMASEAL POST/OP ADHS 3.5X10IN

## (undated) DEVICE — DRAPE,U/ SHT,SPLIT,PLAS,STERIL: Brand: MEDLINE

## (undated) DEVICE — PENCL EVAC ULTRAVAC SMOKE W/BLD

## (undated) DEVICE — C-ARM: Brand: UNBRANDED

## (undated) DEVICE — IRRIGATOR BULB ASEPTO 60CC STRL